# Patient Record
Sex: FEMALE | Race: WHITE | NOT HISPANIC OR LATINO | Employment: OTHER | URBAN - METROPOLITAN AREA
[De-identification: names, ages, dates, MRNs, and addresses within clinical notes are randomized per-mention and may not be internally consistent; named-entity substitution may affect disease eponyms.]

---

## 2017-06-08 ENCOUNTER — APPOINTMENT (OUTPATIENT)
Dept: GENERAL RADIOLOGY | Facility: CLINIC | Age: 67
End: 2017-06-08
Attending: PODIATRIST
Payer: MEDICARE

## 2017-06-08 ENCOUNTER — APPOINTMENT (OUTPATIENT)
Dept: GENERAL RADIOLOGY | Facility: CLINIC | Age: 67
End: 2017-06-08
Attending: FAMILY MEDICINE
Payer: MEDICARE

## 2017-06-08 ENCOUNTER — HOSPITAL ENCOUNTER (OUTPATIENT)
Facility: CLINIC | Age: 67
Discharge: HOME | End: 2017-06-09
Attending: FAMILY MEDICINE | Admitting: PODIATRIST
Payer: MEDICARE

## 2017-06-08 ENCOUNTER — APPOINTMENT (OUTPATIENT)
Dept: CT IMAGING | Facility: CLINIC | Age: 67
End: 2017-06-08
Attending: FAMILY MEDICINE
Payer: MEDICARE

## 2017-06-08 ENCOUNTER — ANESTHESIA (OUTPATIENT)
Dept: SURGERY | Facility: CLINIC | Age: 67
End: 2017-06-08
Payer: MEDICARE

## 2017-06-08 ENCOUNTER — ANESTHESIA EVENT (OUTPATIENT)
Dept: SURGERY | Facility: CLINIC | Age: 67
End: 2017-06-08
Payer: MEDICARE
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
## 2017-06-08 DIAGNOSIS — S92.012A CLOSED DISPLACED FRACTURE OF BODY OF LEFT CALCANEUS, INITIAL ENCOUNTER: ICD-10-CM

## 2017-06-08 LAB
ANION GAP SERPL CALCULATED.3IONS-SCNC: 10 MMOL/L (ref 3–14)
BASOPHILS # BLD AUTO: 0 10E9/L (ref 0–0.2)
BASOPHILS NFR BLD AUTO: 0.4 %
BUN SERPL-MCNC: 13 MG/DL (ref 7–30)
CALCIUM SERPL-MCNC: 9.3 MG/DL (ref 8.5–10.1)
CHLORIDE SERPL-SCNC: 106 MMOL/L (ref 94–109)
CO2 SERPL-SCNC: 27 MMOL/L (ref 20–32)
CREAT SERPL-MCNC: 0.79 MG/DL (ref 0.52–1.04)
DIFFERENTIAL METHOD BLD: NORMAL
EOSINOPHIL # BLD AUTO: 0.1 10E9/L (ref 0–0.7)
EOSINOPHIL NFR BLD AUTO: 1.7 %
ERYTHROCYTE [DISTWIDTH] IN BLOOD BY AUTOMATED COUNT: 13.5 % (ref 10–15)
GFR SERPL CREATININE-BSD FRML MDRD: 72 ML/MIN/1.7M2
GLUCOSE SERPL-MCNC: 112 MG/DL (ref 70–99)
HCT VFR BLD AUTO: 39.4 % (ref 35–47)
HGB BLD-MCNC: 12.6 G/DL (ref 11.7–15.7)
IMM GRANULOCYTES # BLD: 0 10E9/L (ref 0–0.4)
IMM GRANULOCYTES NFR BLD: 0.1 %
LYMPHOCYTES # BLD AUTO: 2.2 10E9/L (ref 0.8–5.3)
LYMPHOCYTES NFR BLD AUTO: 31.7 %
MCH RBC QN AUTO: 28 PG (ref 26.5–33)
MCHC RBC AUTO-ENTMCNC: 32 G/DL (ref 31.5–36.5)
MCV RBC AUTO: 88 FL (ref 78–100)
MONOCYTES # BLD AUTO: 0.4 10E9/L (ref 0–1.3)
MONOCYTES NFR BLD AUTO: 5.8 %
NEUTROPHILS # BLD AUTO: 4.3 10E9/L (ref 1.6–8.3)
NEUTROPHILS NFR BLD AUTO: 60.3 %
PLATELET # BLD AUTO: 388 10E9/L (ref 150–450)
POTASSIUM SERPL-SCNC: 4 MMOL/L (ref 3.4–5.3)
RBC # BLD AUTO: 4.5 10E12/L (ref 3.8–5.2)
SODIUM SERPL-SCNC: 143 MMOL/L (ref 133–144)
WBC # BLD AUTO: 7.1 10E9/L (ref 4–11)

## 2017-06-08 PROCEDURE — 99285 EMERGENCY DEPT VISIT HI MDM: CPT | Mod: Z6 | Performed by: FAMILY MEDICINE

## 2017-06-08 PROCEDURE — 25000125 ZZHC RX 250: Performed by: PODIATRIST

## 2017-06-08 PROCEDURE — 40000278 XR SURGERY CARM FLUORO LESS THAN 5 MIN

## 2017-06-08 PROCEDURE — 85025 COMPLETE CBC W/AUTO DIFF WBC: CPT | Performed by: FAMILY MEDICINE

## 2017-06-08 PROCEDURE — 96361 HYDRATE IV INFUSION ADD-ON: CPT | Mod: 59 | Performed by: FAMILY MEDICINE

## 2017-06-08 PROCEDURE — 40000065 ZZH STATISTIC EKG NON-CHARGEABLE: Performed by: FAMILY MEDICINE

## 2017-06-08 PROCEDURE — 71000015 ZZH RECOVERY PHASE 1 LEVEL 2 EA ADDTL HR: Performed by: PODIATRIST

## 2017-06-08 PROCEDURE — S0020 INJECTION, BUPIVICAINE HYDRO: HCPCS | Performed by: PODIATRIST

## 2017-06-08 PROCEDURE — 36000060 ZZH SURGERY LEVEL 3 W FLUORO 1ST 30 MIN: Performed by: PODIATRIST

## 2017-06-08 PROCEDURE — 99285 EMERGENCY DEPT VISIT HI MDM: CPT | Mod: 25 | Performed by: FAMILY MEDICINE

## 2017-06-08 PROCEDURE — 73700 CT LOWER EXTREMITY W/O DYE: CPT | Mod: RT

## 2017-06-08 PROCEDURE — 96374 THER/PROPH/DIAG INJ IV PUSH: CPT | Mod: 59 | Performed by: FAMILY MEDICINE

## 2017-06-08 PROCEDURE — 28415 OPTX CALCANEAL FRACTURE: CPT | Mod: RT | Performed by: PODIATRIST

## 2017-06-08 PROCEDURE — 25000128 H RX IP 250 OP 636: Performed by: FAMILY MEDICINE

## 2017-06-08 PROCEDURE — 25000125 ZZHC RX 250: Performed by: NURSE ANESTHETIST, CERTIFIED REGISTERED

## 2017-06-08 PROCEDURE — 25000564 ZZH DESFLURANE, EA 15 MIN: Performed by: PODIATRIST

## 2017-06-08 PROCEDURE — 27210794 ZZH OR GENERAL SUPPLY STERILE: Performed by: PODIATRIST

## 2017-06-08 PROCEDURE — 25000128 H RX IP 250 OP 636: Performed by: NURSE ANESTHETIST, CERTIFIED REGISTERED

## 2017-06-08 PROCEDURE — 73650 X-RAY EXAM OF HEEL: CPT | Mod: TC,RT

## 2017-06-08 PROCEDURE — C1713 ANCHOR/SCREW BN/BN,TIS/BN: HCPCS | Performed by: PODIATRIST

## 2017-06-08 PROCEDURE — 37000008 ZZH ANESTHESIA TECHNICAL FEE, 1ST 30 MIN: Performed by: PODIATRIST

## 2017-06-08 PROCEDURE — 71000014 ZZH RECOVERY PHASE 1 LEVEL 2 FIRST HR: Performed by: PODIATRIST

## 2017-06-08 PROCEDURE — 93005 ELECTROCARDIOGRAM TRACING: CPT | Performed by: FAMILY MEDICINE

## 2017-06-08 PROCEDURE — 71000027 ZZH RECOVERY PHASE 2 EACH 15 MINS: Performed by: PODIATRIST

## 2017-06-08 PROCEDURE — 71010 XR CHEST 1 VW: CPT | Mod: TC

## 2017-06-08 PROCEDURE — 96376 TX/PRO/DX INJ SAME DRUG ADON: CPT | Mod: 59 | Performed by: FAMILY MEDICINE

## 2017-06-08 PROCEDURE — 37000009 ZZH ANESTHESIA TECHNICAL FEE, EACH ADDTL 15 MIN: Performed by: PODIATRIST

## 2017-06-08 PROCEDURE — 73610 X-RAY EXAM OF ANKLE: CPT | Mod: TC,RT

## 2017-06-08 PROCEDURE — 25000128 H RX IP 250 OP 636: Performed by: PODIATRIST

## 2017-06-08 PROCEDURE — 99214 OFFICE O/P EST MOD 30 MIN: CPT | Mod: 57 | Performed by: PODIATRIST

## 2017-06-08 PROCEDURE — 36000058 ZZH SURGERY LEVEL 3 EA 15 ADDTL MIN: Performed by: PODIATRIST

## 2017-06-08 PROCEDURE — 80048 BASIC METABOLIC PNL TOTAL CA: CPT | Performed by: FAMILY MEDICINE

## 2017-06-08 DEVICE — IMPLANTABLE DEVICE: Type: IMPLANTABLE DEVICE | Site: HEEL | Status: FUNCTIONAL

## 2017-06-08 RX ORDER — HYDROXYZINE HYDROCHLORIDE 10 MG/1
10 TABLET, FILM COATED ORAL
Status: DISCONTINUED | OUTPATIENT
Start: 2017-06-08 | End: 2017-06-09 | Stop reason: HOSPADM

## 2017-06-08 RX ORDER — EPHEDRINE SULFATE 50 MG/ML
INJECTION, SOLUTION INTRAMUSCULAR; INTRAVENOUS; SUBCUTANEOUS PRN
Status: DISCONTINUED | OUTPATIENT
Start: 2017-06-08 | End: 2017-06-08

## 2017-06-08 RX ORDER — DIMENHYDRINATE 50 MG/ML
25 INJECTION, SOLUTION INTRAMUSCULAR; INTRAVENOUS
Status: COMPLETED | OUTPATIENT
Start: 2017-06-08 | End: 2017-06-08

## 2017-06-08 RX ORDER — LIDOCAINE 40 MG/G
CREAM TOPICAL
Status: DISCONTINUED | OUTPATIENT
Start: 2017-06-08 | End: 2017-06-09 | Stop reason: HOSPADM

## 2017-06-08 RX ORDER — CEFAZOLIN SODIUM 2 G/100ML
2 INJECTION, SOLUTION INTRAVENOUS
Status: COMPLETED | OUTPATIENT
Start: 2017-06-08 | End: 2017-06-08

## 2017-06-08 RX ORDER — SODIUM CHLORIDE 9 MG/ML
1000 INJECTION, SOLUTION INTRAVENOUS CONTINUOUS
Status: DISCONTINUED | OUTPATIENT
Start: 2017-06-08 | End: 2017-06-09 | Stop reason: HOSPADM

## 2017-06-08 RX ORDER — HYDROMORPHONE HYDROCHLORIDE 1 MG/ML
0.5 INJECTION, SOLUTION INTRAMUSCULAR; INTRAVENOUS; SUBCUTANEOUS
Status: COMPLETED | OUTPATIENT
Start: 2017-06-08 | End: 2017-06-08

## 2017-06-08 RX ORDER — SODIUM CHLORIDE, SODIUM LACTATE, POTASSIUM CHLORIDE, CALCIUM CHLORIDE 600; 310; 30; 20 MG/100ML; MG/100ML; MG/100ML; MG/100ML
INJECTION, SOLUTION INTRAVENOUS CONTINUOUS
Status: DISCONTINUED | OUTPATIENT
Start: 2017-06-08 | End: 2017-06-09 | Stop reason: HOSPADM

## 2017-06-08 RX ORDER — AMOXICILLIN 250 MG
1-2 CAPSULE ORAL 2 TIMES DAILY
Qty: 30 TABLET | Refills: 3 | Status: SHIPPED | OUTPATIENT
Start: 2017-06-08 | End: 2022-03-07

## 2017-06-08 RX ORDER — MEPERIDINE HYDROCHLORIDE 25 MG/ML
12.5 INJECTION INTRAMUSCULAR; INTRAVENOUS; SUBCUTANEOUS EVERY 5 MIN PRN
Status: DISCONTINUED | OUTPATIENT
Start: 2017-06-08 | End: 2017-06-09 | Stop reason: HOSPADM

## 2017-06-08 RX ORDER — SODIUM CHLORIDE, SODIUM LACTATE, POTASSIUM CHLORIDE, CALCIUM CHLORIDE 600; 310; 30; 20 MG/100ML; MG/100ML; MG/100ML; MG/100ML
INJECTION, SOLUTION INTRAVENOUS CONTINUOUS PRN
Status: DISCONTINUED | OUTPATIENT
Start: 2017-06-08 | End: 2017-06-08

## 2017-06-08 RX ORDER — FENTANYL CITRATE 50 UG/ML
INJECTION, SOLUTION INTRAMUSCULAR; INTRAVENOUS PRN
Status: DISCONTINUED | OUTPATIENT
Start: 2017-06-08 | End: 2017-06-08

## 2017-06-08 RX ORDER — CEFAZOLIN SODIUM 1 G/3ML
1 INJECTION, POWDER, FOR SOLUTION INTRAMUSCULAR; INTRAVENOUS SEE ADMIN INSTRUCTIONS
Status: DISCONTINUED | OUTPATIENT
Start: 2017-06-08 | End: 2017-06-09 | Stop reason: HOSPADM

## 2017-06-08 RX ORDER — BUPIVACAINE HYDROCHLORIDE AND EPINEPHRINE 5; 5 MG/ML; UG/ML
INJECTION, SOLUTION PERINEURAL PRN
Status: DISCONTINUED | OUTPATIENT
Start: 2017-06-08 | End: 2017-06-08

## 2017-06-08 RX ORDER — LIDOCAINE HYDROCHLORIDE 20 MG/ML
INJECTION, SOLUTION INFILTRATION; PERINEURAL PRN
Status: DISCONTINUED | OUTPATIENT
Start: 2017-06-08 | End: 2017-06-08

## 2017-06-08 RX ORDER — ONDANSETRON 2 MG/ML
INJECTION INTRAMUSCULAR; INTRAVENOUS PRN
Status: DISCONTINUED | OUTPATIENT
Start: 2017-06-08 | End: 2017-06-08

## 2017-06-08 RX ORDER — ONDANSETRON 4 MG/1
4 TABLET, ORALLY DISINTEGRATING ORAL EVERY 30 MIN PRN
Status: DISCONTINUED | OUTPATIENT
Start: 2017-06-08 | End: 2017-06-09 | Stop reason: HOSPADM

## 2017-06-08 RX ORDER — HYDROXYZINE HYDROCHLORIDE 10 MG/1
10 TABLET, FILM COATED ORAL EVERY 6 HOURS PRN
Qty: 30 TABLET | Refills: 3 | Status: SHIPPED | OUTPATIENT
Start: 2017-06-08 | End: 2017-06-22

## 2017-06-08 RX ORDER — FENTANYL CITRATE 50 UG/ML
25-50 INJECTION, SOLUTION INTRAMUSCULAR; INTRAVENOUS
Status: DISCONTINUED | OUTPATIENT
Start: 2017-06-08 | End: 2017-06-09 | Stop reason: HOSPADM

## 2017-06-08 RX ORDER — OXYCODONE HYDROCHLORIDE 5 MG/1
5-10 TABLET ORAL
Status: DISCONTINUED | OUTPATIENT
Start: 2017-06-08 | End: 2017-06-09 | Stop reason: HOSPADM

## 2017-06-08 RX ORDER — DEXAMETHASONE SODIUM PHOSPHATE 10 MG/ML
INJECTION, SOLUTION INTRAMUSCULAR; INTRAVENOUS PRN
Status: DISCONTINUED | OUTPATIENT
Start: 2017-06-08 | End: 2017-06-08

## 2017-06-08 RX ORDER — ONDANSETRON 2 MG/ML
4 INJECTION INTRAMUSCULAR; INTRAVENOUS EVERY 30 MIN PRN
Status: DISCONTINUED | OUTPATIENT
Start: 2017-06-08 | End: 2017-06-09 | Stop reason: HOSPADM

## 2017-06-08 RX ORDER — BUPIVACAINE HYDROCHLORIDE 5 MG/ML
INJECTION, SOLUTION PERINEURAL PRN
Status: DISCONTINUED | OUTPATIENT
Start: 2017-06-08 | End: 2017-06-09 | Stop reason: HOSPADM

## 2017-06-08 RX ORDER — OXYCODONE AND ACETAMINOPHEN 5; 325 MG/1; MG/1
1-2 TABLET ORAL EVERY 4 HOURS PRN
Qty: 60 TABLET | Refills: 0 | Status: SHIPPED | OUTPATIENT
Start: 2017-06-08 | End: 2017-07-06

## 2017-06-08 RX ORDER — PROPOFOL 10 MG/ML
INJECTION, EMULSION INTRAVENOUS PRN
Status: DISCONTINUED | OUTPATIENT
Start: 2017-06-08 | End: 2017-06-08

## 2017-06-08 RX ORDER — HYDROMORPHONE HYDROCHLORIDE 1 MG/ML
.3-.5 INJECTION, SOLUTION INTRAMUSCULAR; INTRAVENOUS; SUBCUTANEOUS EVERY 5 MIN PRN
Status: DISCONTINUED | OUTPATIENT
Start: 2017-06-08 | End: 2017-06-09 | Stop reason: HOSPADM

## 2017-06-08 RX ADMIN — FENTANYL CITRATE 50 MCG: 50 INJECTION, SOLUTION INTRAMUSCULAR; INTRAVENOUS at 20:29

## 2017-06-08 RX ADMIN — Medication 5 MG: at 20:40

## 2017-06-08 RX ADMIN — DIMENHYDRINATE 25 MG: 50 INJECTION, SOLUTION INTRAMUSCULAR; INTRAVENOUS at 22:57

## 2017-06-08 RX ADMIN — HYDROMORPHONE HYDROCHLORIDE 0.5 MG: 1 INJECTION, SOLUTION INTRAMUSCULAR; INTRAVENOUS; SUBCUTANEOUS at 16:20

## 2017-06-08 RX ADMIN — SUCCINYLCHOLINE CHLORIDE 80 MG: 20 INJECTION, SOLUTION INTRAMUSCULAR; INTRAVENOUS at 20:00

## 2017-06-08 RX ADMIN — Medication 5 ML: at 22:10

## 2017-06-08 RX ADMIN — MIDAZOLAM HYDROCHLORIDE 2 MG: 1 INJECTION, SOLUTION INTRAMUSCULAR; INTRAVENOUS at 19:47

## 2017-06-08 RX ADMIN — SODIUM CHLORIDE, POTASSIUM CHLORIDE, SODIUM LACTATE AND CALCIUM CHLORIDE: 600; 310; 30; 20 INJECTION, SOLUTION INTRAVENOUS at 21:20

## 2017-06-08 RX ADMIN — DEXAMETHASONE SODIUM PHOSPHATE 10 MG: 10 INJECTION, SOLUTION INTRAMUSCULAR; INTRAVENOUS at 20:40

## 2017-06-08 RX ADMIN — HYDROMORPHONE HYDROCHLORIDE 0.5 MG: 1 INJECTION, SOLUTION INTRAMUSCULAR; INTRAVENOUS; SUBCUTANEOUS at 16:46

## 2017-06-08 RX ADMIN — Medication 5 MG: at 20:34

## 2017-06-08 RX ADMIN — Medication 5 ML: at 22:29

## 2017-06-08 RX ADMIN — DEXAMETHASONE SODIUM PHOSPHATE 5 MG: 10 INJECTION, SOLUTION INTRAMUSCULAR; INTRAVENOUS at 22:29

## 2017-06-08 RX ADMIN — CEFAZOLIN SODIUM 2 G: 2 INJECTION, SOLUTION INTRAVENOUS at 20:08

## 2017-06-08 RX ADMIN — SODIUM CHLORIDE 1000 ML: 9 INJECTION, SOLUTION INTRAVENOUS at 16:20

## 2017-06-08 RX ADMIN — HYDROMORPHONE HYDROCHLORIDE 0.5 MG: 1 INJECTION, SOLUTION INTRAMUSCULAR; INTRAVENOUS; SUBCUTANEOUS at 18:44

## 2017-06-08 RX ADMIN — FENTANYL CITRATE 25 MCG: 50 INJECTION, SOLUTION INTRAMUSCULAR; INTRAVENOUS at 21:38

## 2017-06-08 RX ADMIN — Medication 5 MG: at 20:15

## 2017-06-08 RX ADMIN — ONDANSETRON HYDROCHLORIDE 4 MG: 2 SOLUTION INTRAMUSCULAR; INTRAVENOUS at 23:16

## 2017-06-08 RX ADMIN — Medication 5 MG: at 20:53

## 2017-06-08 RX ADMIN — DEXAMETHASONE SODIUM PHOSPHATE 5 MG: 10 INJECTION, SOLUTION INTRAMUSCULAR; INTRAVENOUS at 22:10

## 2017-06-08 RX ADMIN — LIDOCAINE HYDROCHLORIDE 40 MG: 20 INJECTION, SOLUTION INFILTRATION; PERINEURAL at 20:00

## 2017-06-08 RX ADMIN — SODIUM CHLORIDE 1000 ML: 9 INJECTION, SOLUTION INTRAVENOUS at 18:42

## 2017-06-08 RX ADMIN — ONDANSETRON 4 MG: 2 INJECTION INTRAMUSCULAR; INTRAVENOUS at 20:42

## 2017-06-08 RX ADMIN — PROPOFOL 150 MG: 10 INJECTION, EMULSION INTRAVENOUS at 20:00

## 2017-06-08 RX ADMIN — FENTANYL CITRATE 50 MCG: 50 INJECTION, SOLUTION INTRAMUSCULAR; INTRAVENOUS at 20:00

## 2017-06-08 ASSESSMENT — ENCOUNTER SYMPTOMS: ARTHRALGIAS: 1

## 2017-06-08 NOTE — ED PROVIDER NOTES
History     Chief Complaint   Patient presents with     Foot Injury     The history is provided by the patient.     Kylah Britt is a 66 year old female who presents to the emergency department with a right foot injury. She states that around 1300 she fell down an estimated 2-3 outdoor stairs injuring her right foot. She reports that she needed to crawl into her house because walking caused significant pain. Patient last ate at 1030 and last drank at 1200. Patient denies any chronic health problems.    I have reviewed the Medications, Allergies, Past Medical and Surgical History, and Social History in the Epic system.    Patient Active Problem List   Diagnosis     Tear meniscus knee     CARDIOVASCULAR SCREENING; LDL GOAL LESS THAN 160     Past Medical History:   Diagnosis Date     Abnormal Papanicolaou smear of cervix and cervical HPV 9/1/92    vaginitis with abnormal Pap     Depressive disorder, not elsewhere classified     depression     Female stress incontinence     urinary incontinence     Lyme disease      Osteoarthrosis, unspecified whether generalized or localized, unspecified site      monoarticular arthritis in the right first MTP joint     Other psychological or physical stress, not elsewhere classified(V62.89)     delayed stress syndrome       Past Surgical History:   Procedure Laterality Date     C APPENDECTOMY       C LIGATE FALLOPIAN TUBE,POSTPARTUM  04/12/77    Bilateral tubal cauterization     C TOTAL ABDOM HYSTERECTOMY  2/20/90    Hysterectomy, Total Abdominal     HC COLONOSCOPY THRU STOMA, DIAGNOSTIC  06/24/98     HC KNEE SCOPE, DIAGNOSTIC  1/31/89    Examination of right knee under anesthesia. Arthroscopy of right knee with debridement of torn anterior curciate ligament (medial meniscus not resected).     HC REMOVAL OF OVARY/TUBE(S)  2/20/90    Salpingo-Oophorectomy, bilateral       Family History   Problem Relation Age of Onset     CANCER Son      Hodgkin's     CANCER Paternal Aunt       "two with cervical cancer     Hypertension Mother        Social History   Substance Use Topics     Smoking status: Never Smoker     Smokeless tobacco: Not on file     Alcohol use Yes      Comment: very rare        Immunization History   Administered Date(s) Administered     Influenza (IIV3) 11/10/2005, 11/03/2007, 10/15/2008     TD (ADULT, 7+) 04/25/1995          Allergies   Allergen Reactions     No Known Drug Allergies        Current Outpatient Prescriptions   Medication Sig Dispense Refill     Naproxen Sodium (ALEVE PO) Take 220 mg by mouth 2 times daily (with meals)       guaiFENesin (MUCINEX) 600 MG 12 hr tablet Take 600 mg by mouth every 4 hours as needed for congestion Pt states she takes 1 tablet every 4 hours. Unsure of mg of tablet       LAMISIL AT CREA 1 % EX apply bid 60 0     MULTIPLE VITAMIN CAPS   OR daily  0     CALCIUM + D TABS 600-200 MG-IU OR 2 daily 0 0     Review of Systems   Musculoskeletal: Positive for arthralgias (right foot).   All other systems reviewed and are negative.      Physical Exam   BP: (!) 160/92  Pulse: 81  Temp: 98.1  F (36.7  C)  Resp: 18  Height: 160 cm (5' 3\")  Weight: 65.8 kg (145 lb)  SpO2: 97 %    Physical Exam   Constitutional: She is oriented to person, place, and time. She appears well-developed and well-nourished. No distress.   HENT:   Head: Normocephalic and atraumatic.   Eyes: Conjunctivae and EOM are normal.   Neck: Normal range of motion. Neck supple.   Cardiovascular: Intact distal pulses.    Musculoskeletal:   Protruding bone posteriorally on right foot. The skin is under significant tension but intact. No lacerations noted. Normal range of motion of right foot.   Neurological: She is alert and oriented to person, place, and time.   Skin: Skin is warm and dry.   Psychiatric: She has a normal mood and affect. Her behavior is normal.   Nursing note and vitals reviewed.          ED Course     ED Course     Procedures         Results for orders placed or performed " during the hospital encounter of 06/08/17   Ankle XR, G/E 3 views, right    Narrative    RIGHT ANKLE THREE OR MORE VIEWS, RIGHT CALCANEUS TWO OR MORE VIEWS   6/8/2017 4:07 PM     HISTORY: Pain.    COMPARISON: None.    FINDINGS: There is an oblique fracture through the dorsal mid to  superior calcaneus which appears to start just distal to the calcaneal  tuberosity insertion site of the Achilles tendon and extending  cephalad and anteriorly toward the posterior aspect of the subtalar  joint. This does not appear to involve the subtalar joint. There is  approximately 3.6 cm distraction at the dorsal aspect of the fracture  site with only minimal distraction of the anterior aspect of the  fracture. There is no abnormal widening of the calcaneus. Boehler's  angle is maintained. Fracture does not appear to extend in the mid  anterior aspect of the calcaneus. No other fractures are identified.  Ankle mortise and talar dome are symmetric and well-maintained. Small  to moderate-sized enthesopathic spur of the plantar aponeurosis is  seen at the calcaneus.      Impression    IMPRESSION:  1. There is a displaced oblique dorsal calcaneal fracture which  extends from the insertion site of the Achilles tendon into the dorsal  proximal calcaneus obliquely toward the posterior aspect of the  posterior subtalar joint but not definitely including the joint. The  more dorsal aspect is distracted more than the more anterior aspect.  The plantar aspect of the calcaneus and the anterior, middle, and  posterior subtalar joints do not appear to be affected. The achilles  tendon is likely intact. Mild enthesopathic changes are seen at the  Achilles tendon insertion into the calcaneal tuberosity of the dorsal  calcaneus.    I discussed these findings with Dr. Grove on 6/8/2017 at 4:16 PM.   XR Calcaneus Right G/E 2 Views    Narrative    RIGHT ANKLE THREE OR MORE VIEWS, RIGHT CALCANEUS TWO OR MORE VIEWS   6/8/2017 4:07 PM     HISTORY:  Pain.    COMPARISON: None.    FINDINGS: There is an oblique fracture through the dorsal mid to  superior calcaneus which appears to start just distal to the calcaneal  tuberosity insertion site of the Achilles tendon and extending  cephalad and anteriorly toward the posterior aspect of the subtalar  joint. This does not appear to involve the subtalar joint. There is  approximately 3.6 cm distraction at the dorsal aspect of the fracture  site with only minimal distraction of the anterior aspect of the  fracture. There is no abnormal widening of the calcaneus. Boehler's  angle is maintained. Fracture does not appear to extend in the mid  anterior aspect of the calcaneus. No other fractures are identified.  Ankle mortise and talar dome are symmetric and well-maintained. Small  to moderate-sized enthesopathic spur of the plantar aponeurosis is  seen at the calcaneus.      Impression    IMPRESSION:  1. There is a displaced oblique dorsal calcaneal fracture which  extends from the insertion site of the Achilles tendon into the dorsal  proximal calcaneus obliquely toward the posterior aspect of the  posterior subtalar joint but not definitely including the joint. The  more dorsal aspect is distracted more than the more anterior aspect.  The plantar aspect of the calcaneus and the anterior, middle, and  posterior subtalar joints do not appear to be affected. The achilles  tendon is likely intact. Mild enthesopathic changes are seen at the  Achilles tendon insertion into the calcaneal tuberosity of the dorsal  calcaneus.    I discussed these findings with Dr. Grove on 6/8/2017 at 4:16 PM.   CBC with platelets differential   Result Value Ref Range    WBC 7.1 4.0 - 11.0 10e9/L    RBC Count 4.50 3.8 - 5.2 10e12/L    Hemoglobin 12.6 11.7 - 15.7 g/dL    Hematocrit 39.4 35.0 - 47.0 %    MCV 88 78 - 100 fl    MCH 28.0 26.5 - 33.0 pg    MCHC 32.0 31.5 - 36.5 g/dL    RDW 13.5 10.0 - 15.0 %    Platelet Count 388 150 - 450 10e9/L     Diff Method Automated Method     % Neutrophils 60.3 %    % Lymphocytes 31.7 %    % Monocytes 5.8 %    % Eosinophils 1.7 %    % Basophils 0.4 %    % Immature Granulocytes 0.1 %    Absolute Neutrophil 4.3 1.6 - 8.3 10e9/L    Absolute Lymphocytes 2.2 0.8 - 5.3 10e9/L    Absolute Monocytes 0.4 0.0 - 1.3 10e9/L    Absolute Eosinophils 0.1 0.0 - 0.7 10e9/L    Absolute Basophils 0.0 0.0 - 0.2 10e9/L    Abs Immature Granulocytes 0.0 0 - 0.4 10e9/L         Medications   lidocaine 1 % 1 mL (not administered)   lidocaine (LMX4) kit (not administered)   sodium chloride (PF) 0.9% PF flush 3 mL (not administered)   sodium chloride (PF) 0.9% PF flush 3 mL (not administered)   HYDROmorphone (PF) (DILAUDID) injection 0.5 mg (not administered)     Kylah is a 66 year old female who presents to the ED with a right foot injury after she fell down 2-3 outside stairs around 1300 today. Upon arrival to the ED, patient had blood pressure of 160/92 and was otherwise vitally stable. On exam, patient has a protruding bone to the posterior aspect of right foot. The skin is under a significant amount of tension however it is intact. Right ankle and calcaneous X-rays were done showing calcaneal fracture.  It appears that the Achilles tendon hold the calcaneus and half..  I consulted Dr. Kincaid, with podiatry, because of how tight skin is, he will plan on doing surgery today to try and fix this.  He did recommend getting a CT scan of the ankle for further evaluation of this fracture.  He will come down to see the patient in the emergency department.  Patient last 8 or drank around 1 PM today.    Assessments & Plan (with Medical Decision Making)  left calcaneus fracture      I have reviewed the nursing notes.    I have reviewed the findings, diagnosis, plan and need for follow up with the patient.      Pre-OP Clearance   Please see the body of above note or dictation for the history and physical.   No medical contraindication to emergent surgery  or anesthesia identified. No additional modifiable risk identified. Informed consent deferred to surgeon If the surgeon or anesthesiologist feels that the patient needs further testing, would recommend a formal consult to get a more complete pre-op H&P.      This document serves as a record of services personally performed by Eulogio Grove MD. It was created on their behalf by Daniella Doe, a trained medical scribe. The creation of this record is based on the provider's personal observations and the statements of the patient. This document has been checked and approved by the attending provider.     Note: Chart documentation done in part with Dragon Voice Recognition software. Although reviewed after completion, some word and grammatical errors may remain.    6/8/2017   [facility] EMERGENCY DEPARTMENT     Eulogio Grove MD  06/08/17 [id number]

## 2017-06-08 NOTE — PROGRESS NOTES
[facility]  Podiatry Consultation         Darnell Kincaid DPM    Kylah Britt MRN# [id number]   YOB: 1950 Age: 66 year old      Date of Consult: 6/8/2017         Assessment and Plan:   Kylah Britt is a 66 year old female who presented with history, exam, laboratory and imaging most consistent with beak avulsion fracture of calcaneus body right ankle.  Today while walking down steps she fell under her own body weight and presented to the emergency department. Radiographs and CT scan demonstrate extra-articular avulsion of the superior process of the calcaneus including the insertion of the Achilles tendon avulsed proximally. The skin is tented and blanched posing risk for failure of skin therefore we will move forward with open reduction internal fixation in the next few hours.    We discussed treatment options as well as potential risks and outcomes and expected outcomes for each treatment options with the patient and daughter who presents with her today in the ED.  All questions were answered. Follow-up for open reduction internal fixation right calcaneus.       ICD-10-CM    1. Closed displaced fracture of body of left calcaneus, initial encounter S92.012A Basic metabolic panel               Code Status:   Full Code         Primary Care Physician:   [facility]         Requesting Physician:      No ref. provider found         Chief Complaint:     Chief Complaint   Patient presents with     Foot Injury       History is obtained from the patient         History of Present Illness:   Kylah Britt is a 66 year old female who presents on consultation with fall while walking down stairs and calcaneus fracture right ankle. The past medical history, past surgical history, family history, social history and review of systems was performed and as noted.           Past Medical History:     Past Medical History:   Diagnosis Date     Abnormal Papanicolaou  smear of cervix and cervical HPV 9/1/92    vaginitis with abnormal Pap     Depressive disorder, not elsewhere classified     depression     Female stress incontinence     urinary incontinence     Lyme disease      Osteoarthrosis, unspecified whether generalized or localized, unspecified site      monoarticular arthritis in the right first MTP joint     Other psychological or physical stress, not elsewhere classified(V62.89)     delayed stress syndrome             Past Surgical History:     Past Surgical History:   Procedure Laterality Date     C APPENDECTOMY       C LIGATE FALLOPIAN TUBE,POSTPARTUM  04/12/77    Bilateral tubal cauterization     C TOTAL ABDOM HYSTERECTOMY  2/20/90    Hysterectomy, Total Abdominal     HC COLONOSCOPY THRU STOMA, DIAGNOSTIC  06/24/98     HC KNEE SCOPE, DIAGNOSTIC  1/31/89    Examination of right knee under anesthesia. Arthroscopy of right knee with debridement of torn anterior curciate ligament (medial meniscus not resected).     HC REMOVAL OF OVARY/TUBE(S)  2/20/90    Salpingo-Oophorectomy, bilateral            Home Medications:     Prior to Admission medications    Medication Sig Last Dose Taking? Auth Provider   Calcium Carbonate (CALCIUM 600 PO) Take 1 tablet by mouth every other day 6/7/2017 at 0600 Yes Reported, Patient   VITAMIN K PO Take 1,000 mg by mouth every other day 6/7/2017 at 0600 Yes Reported, Patient   Cholecalciferol (VITAMIN D3 PO) Take 2,000 Units by mouth every other day 6/7/2017 at 0600 Yes Reported, Patient   BABY ASPIRIN PO Take 81 mg by mouth daily 6/8/2017 at 0600 Yes Reported, Patient   Naproxen Sodium (ALEVE PO) Take 220 mg by mouth 2 times daily as needed  Past Week at Unknown time Yes Reported, Patient   MULTIPLE VITAMIN CAPS   OR daily 6/8/2017 at 0600 Yes             Current Medications:           sodium chloride (PF)  3 mL Intracatheter Q8H     lidocaine, lidocaine 4%, sodium chloride (PF), HYDROmorphone         Allergies:     Allergies   Allergen  "Reactions     No Known Drug Allergies             Social History:   Kylah Britt  reports that she has never smoked. She does not have any smokeless tobacco history on file. She reports that she drinks alcohol. She reports that she does not use illicit drugs.          Family History:     Family History   Problem Relation Age of Onset     CANCER Son      Hodgkin's     CANCER Paternal Aunt      two with cervical cancer     Hypertension Mother              Review of Systems:   The 10 point Review of Systems is negative other than noted in the HPI or here.           Physical Exam:    ,   Vitals: /50  Pulse 81  Temp 98.1  F (36.7  C) (Oral)  Resp 18  Ht 5' 3\" (1.6 m)  Wt 145 lb (65.8 kg)  SpO2 98%  BMI 25.69 kg/m2  BMI= Body mass index is 25.69 kg/(m^2).    Constitutional: Awake, alert, no acute distress.   Cardiovascular:  DP and PT pulses are palpable bilateral. Skin however is blanched as the superior process of the calcaneus is displaced proximally.  Musculoskeletal:  Obvious deformity about the posterior calcaneus right ankle with tenting of the skin about the Achilles tendon and blanching.  Skin: No skin rashes or lesions to inspection.  No petechia.    Neurologic: Light touch sensation intact bilateral feet.     Radiographs demonstrate avulsion fracture of the posterior right calcaneus and Achilles. This is displaced nearly 2 cm  CT scan demonstrates extra-articular avulsion fracture of the superior calcaneus without involvement of other talus or subtalar joint.       Data:   All new lab and imaging data was reviewed.   Results for orders placed or performed during the hospital encounter of 06/08/17 (from the past 24 hour(s))   CBC with platelets differential   Result Value Ref Range    WBC 7.1 4.0 - 11.0 10e9/L    RBC Count 4.50 3.8 - 5.2 10e12/L    Hemoglobin 12.6 11.7 - 15.7 g/dL    Hematocrit 39.4 35.0 - 47.0 %    MCV 88 78 - 100 fl    MCH 28.0 26.5 - 33.0 pg    MCHC 32.0 31.5 - 36.5 g/dL    " RDW 13.5 10.0 - 15.0 %    Platelet Count 388 150 - 450 10e9/L    Diff Method Automated Method     % Neutrophils 60.3 %    % Lymphocytes 31.7 %    % Monocytes 5.8 %    % Eosinophils 1.7 %    % Basophils 0.4 %    % Immature Granulocytes 0.1 %    Absolute Neutrophil 4.3 1.6 - 8.3 10e9/L    Absolute Lymphocytes 2.2 0.8 - 5.3 10e9/L    Absolute Monocytes 0.4 0.0 - 1.3 10e9/L    Absolute Eosinophils 0.1 0.0 - 0.7 10e9/L    Absolute Basophils 0.0 0.0 - 0.2 10e9/L    Abs Immature Granulocytes 0.0 0 - 0.4 10e9/L   Basic metabolic panel   Result Value Ref Range    Sodium 143 133 - 144 mmol/L    Potassium 4.0 3.4 - 5.3 mmol/L    Chloride 106 94 - 109 mmol/L    Carbon Dioxide 27 20 - 32 mmol/L    Anion Gap 10 3 - 14 mmol/L    Glucose 112 (H) 70 - 99 mg/dL    Urea Nitrogen 13 7 - 30 mg/dL    Creatinine 0.79 0.52 - 1.04 mg/dL    GFR Estimate 72 >60 mL/min/1.7m2    GFR Estimate If Black 87 >60 mL/min/1.7m2    Calcium 9.3 8.5 - 10.1 mg/dL   XR Calcaneus Right G/E 2 Views    Narrative    RIGHT ANKLE THREE OR MORE VIEWS, RIGHT CALCANEUS TWO OR MORE VIEWS   6/8/2017 4:07 PM     HISTORY: Pain.    COMPARISON: None.    FINDINGS: There is an oblique fracture through the dorsal mid to  superior calcaneus which appears to start just distal to the calcaneal  tuberosity insertion site of the Achilles tendon and extending  cephalad and anteriorly toward the posterior aspect of the subtalar  joint. This does not appear to involve the subtalar joint. There is  approximately 3.6 cm distraction at the dorsal aspect of the fracture  site with only minimal distraction of the anterior aspect of the  fracture. There is no abnormal widening of the calcaneus. Boehler's  angle is maintained. Fracture does not appear to extend in the mid  anterior aspect of the calcaneus. No other fractures are identified.  Ankle mortise and talar dome are symmetric and well-maintained. Small  to moderate-sized enthesopathic spur of the plantar aponeurosis is  seen at the  calcaneus.      Impression    IMPRESSION:  1. There is a displaced oblique dorsal calcaneal fracture which  extends from the insertion site of the Achilles tendon into the dorsal  proximal calcaneus obliquely toward the posterior aspect of the  posterior subtalar joint but not definitely including the joint. The  more dorsal aspect is distracted more than the more anterior aspect.  The plantar aspect of the calcaneus and the anterior, middle, and  posterior subtalar joints do not appear to be affected. The achilles  tendon is likely intact. Mild enthesopathic changes are seen at the  Achilles tendon insertion into the calcaneal tuberosity of the dorsal  calcaneus.    I discussed these findings with Dr. Grove on 6/8/2017 at 4:16 PM.    PREETHI EPPS MD   Ankle XR, G/E 3 views, right    Narrative    RIGHT ANKLE THREE OR MORE VIEWS, RIGHT CALCANEUS TWO OR MORE VIEWS   6/8/2017 4:07 PM     HISTORY: Pain.    COMPARISON: None.    FINDINGS: There is an oblique fracture through the dorsal mid to  superior calcaneus which appears to start just distal to the calcaneal  tuberosity insertion site of the Achilles tendon and extending  cephalad and anteriorly toward the posterior aspect of the subtalar  joint. This does not appear to involve the subtalar joint. There is  approximately 3.6 cm distraction at the dorsal aspect of the fracture  site with only minimal distraction of the anterior aspect of the  fracture. There is no abnormal widening of the calcaneus. Boehler's  angle is maintained. Fracture does not appear to extend in the mid  anterior aspect of the calcaneus. No other fractures are identified.  Ankle mortise and talar dome are symmetric and well-maintained. Small  to moderate-sized enthesopathic spur of the plantar aponeurosis is  seen at the calcaneus.      Impression    IMPRESSION:  1. There is a displaced oblique dorsal calcaneal fracture which  extends from the insertion site of the Achilles tendon into the  dorsal  proximal calcaneus obliquely toward the posterior aspect of the  posterior subtalar joint but not definitely including the joint. The  more dorsal aspect is distracted more than the more anterior aspect.  The plantar aspect of the calcaneus and the anterior, middle, and  posterior subtalar joints do not appear to be affected. The achilles  tendon is likely intact. Mild enthesopathic changes are seen at the  Achilles tendon insertion into the calcaneal tuberosity of the dorsal  calcaneus.    I discussed these findings with Dr. Grove on 6/8/2017 at 4:16 PM.    PREETHI EPPS MD   CT Ankle Right w/o Contrast    Narrative    CT RIGHT ANKLE WITHOUT CONTRAST    6/8/2017 5:11 PM     HISTORY: Calcaneus fracture, fall, evaluate for other injury.    TECHNIQUE:  Radiation dose for this scan was reduced using automated  exposure control, adjustment of the mA and/or kV according to patient  size, or iterative reconstruction technique.  No IV contrast and  multiplanar reformations.    COMPARISON: Radiographs today.    FINDINGS: Multiple air bubbles are seen in the subcutaneous fat in the  fracture line indicating an open fracture. There is retraction of the  Achilles tendon with calcification at its insertion. The fracture line  is oblique extending from posterior to the posterior subtalar joint,  in the anterior aspect of the body the calcaneus is intact and not  fractured. No other fracture is seen more anteriorly in the hindfoot  or visualized midfoot and forefoot. There is osteoarthritis of the  first metatarsophalangeal joint.      Impression    IMPRESSION: Oblique open fracture of the posterior superior aspect of  the calcaneus with retraction of the Achilles tendon. No evidence of  other fractures more anteriorly.   XR Chest 1 View    Narrative    CHEST ONE VIEW   6/8/2017 5:13 PM     HISTORY: Preop evaluation.    COMPARISON: None.      Impression    IMPRESSION: No acute cardiopulmonary disease.       Darnell Kincaid  DPM  [facility]

## 2017-06-08 NOTE — IP AVS SNAPSHOT
[facility]    [address] [place] [address]    Phone:  [phone]                                       After Visit Summary   6/8/2017    Kylah Britt    MRN: [id number]           After Visit Summary Signature Page     I have received my discharge instructions, and my questions have been answered. I have discussed any challenges I see with this plan with the nurse or doctor.    ..........................................................................................................................................  Patient/Patient Representative Signature      ..........................................................................................................................................  Patient Representative Print Name and Relationship to Patient    ..................................................               ................................................  Date                                            Time    ..........................................................................................................................................  Reviewed by Signature/Title    ...................................................              ..............................................  Date                                                            Time

## 2017-06-08 NOTE — IP AVS SNAPSHOT
MRN:[id number]                      After Visit Summary   6/8/2017    Kylah Britt    MRN: [id number]           Thank you!     Thank you for choosing [facility] for your care. Our goal is always to provide you with excellent care. Hearing back from our patients is one way we can continue to improve our services. Please take a few minutes to complete the written survey that you may receive in the mail after you visit with us. Thank you!        Patient Information     Date Of Birth          1950        About your hospital stay     You were admitted on:  N/A You last received care in the:  [facility]    You were discharged on:  June 9, 2017       Who to Call     For medical emergencies, please call 911.  For non-urgent questions about your medical care, please call your primary care provider or clinic, [phone]  For questions related to your surgery, please call your surgery clinic        Attending Provider     Provider Eulogio Watts MD Emergency Medicine       Primary Care Provider Office Phone #    [facility] [phone]      After Care Instructions     Discharge Instructions       Review discharge instructions as directed by Provider.            Elevate affected extremity           Ice to affected area       Ice pack to affected area PRN (as needed).            No Alcohol       for 24 hours post-procedure            No dressing change       until follow up clinic appointment.            No driving or operating machinery       until the day after procedure            No weight bearing                 Further instructions from your care team       Post Operative Instructions following a Popliteal Block  Regional anesthesia is injected into or around the nerves to anesthetize the area supplied by that set of nerves.  It is a type of local anesthetic used to numb a specific area, and is used to control pain and  "decrease the need for narcotics following surgery.  Types of Regional Blocks:  Popliteal: An anesthetic medication injection into the back of the knee of the operative leg of the patient having foot surgery.  Procedure:  The type of anesthesia your provide used to numb your leg will usually not wear off for 4-6 hours, but may last as long 12 hours or more.  You should be careful during that period, since it is possible to injure your leg without being aware of the injury.  While your leg is numb, you should:    Use crutches (no weight bearing until the block is completely worn off)    Avoid striking or bumping your leg    Avoid extreme hot or cold  Discomfort:  You will have a tingling and prickly sensation in your leg as the feeling begins to return; you can also expect some discomfort.  The amount of discomfort is unpredictable but if you have more pain than can be controlled with pain medication you may have received, you should notify your physician.  We strongly recommend starting your pain medication at bedtime if you haven t already done so.  This is in the event that the block wears off while you are asleep.    Pending Results     No orders found for last 3 day(s).            Admission Information     Date & Time Department Dept. Phone    6/8/2017 [facility] [phone]      Your Vitals Were     Blood Pressure Pulse Temperature Respirations Height Weight    138/82 81 97.5  F (36.4  C) 15 1.6 m (5' 3\") 65.8 kg (145 lb)    Pulse Oximetry BMI (Body Mass Index)                96% 25.69 kg/m2          [facility] Information     [facility] lets you send messages to your doctor, view your test results, renew your prescriptions, schedule appointments and more. To sign up, go to [email or web address] . Click on \"Log in\" on the left side of the screen, which will take you to the Welcome page. Then click on \"Sign up Now\" on the right side of the page.     You will be asked to enter the access " code listed below, as well as some personal information. Please follow the directions to create your username and password.     Your access code is: [email or web address]  Expires: 2017 11:20 PM     Your access code will  in 90 days. If you need help or a new code, please call your [facility] clinic or [phone].        Care EveryWhere ID     This is your Care EveryWhere ID. This could be used by other organizations to access your [facility] medical records  [id number]           Review of your medicines      START taking        Dose / Directions    hydrOXYzine 10 MG tablet   Commonly known as:  ATARAX   Used for:  Closed displaced fracture of body of left calcaneus, initial encounter        Dose:  10 mg   Take 1 tablet (10 mg) by mouth every 6 hours as needed for itching (and nausea)   Quantity:  30 tablet   Refills:  3       order for DME   Used for:  Closed displaced fracture of body of left calcaneus, initial encounter        One pair of under arm crutches   Quantity:  1 Units   Refills:  0       oxyCODONE-acetaminophen 5-325 MG per tablet   Commonly known as:  PERCOCET   Used for:  Closed displaced fracture of body of left calcaneus, initial encounter        Dose:  1-2 tablet   Take 1-2 tablets by mouth every 4 hours as needed for pain (moderate to severe)   Quantity:  60 tablet   Refills:  0       senna-docusate 8.6-50 MG per tablet   Commonly known as:  SENOKOT-S;PERICOLACE   Used for:  Closed displaced fracture of body of left calcaneus, initial encounter        Dose:  1-2 tablet   Take 1-2 tablets by mouth 2 times daily Take while on oral narcotics to prevent or treat constipation.   Quantity:  30 tablet   Refills:  3         CONTINUE these medicines which have NOT CHANGED        Dose / Directions    ALEVE PO        Dose:  220 mg   Take 220 mg by mouth 2 times daily as needed   Refills:  0       BABY ASPIRIN PO        Dose:  81 mg   Take 81 mg by mouth daily   Refills:  0       CALCIUM 600 PO        Dose:   1 tablet   Take 1 tablet by mouth every other day   Refills:  0       Multiple vitamin Caps        daily   Refills:  0       VITAMIN D3 PO        Dose:  2000 Units   Take 2,000 Units by mouth every other day   Refills:  0       VITAMIN K PO        Dose:  1000 mg   Take 1,000 mg by mouth every other day   Refills:  0            Where to get your medicines      Some of these will need a paper prescription and others can be bought over the counter. Ask your nurse if you have questions.     Bring a paper prescription for each of these medications     hydrOXYzine 10 MG tablet    order for DME    oxyCODONE-acetaminophen 5-325 MG per tablet    senna-docusate 8.6-50 MG per tablet                Protect others around you: Learn how to safely use, store and throw away your medicines at www.disposemymeds.org.             Medication List: This is a list of all your medications and when to take them. Check marks below indicate your daily home schedule. Keep this list as a reference.      Medications           Morning Afternoon Evening Bedtime As Needed    ALEVE PO   Take 220 mg by mouth 2 times daily as needed                                BABY ASPIRIN PO   Take 81 mg by mouth daily                                CALCIUM 600 PO   Take 1 tablet by mouth every other day                                hydrOXYzine 10 MG tablet   Commonly known as:  ATARAX   Take 1 tablet (10 mg) by mouth every 6 hours as needed for itching (and nausea)                                Multiple vitamin Caps   daily                                order for DME   One pair of under arm crutches                                oxyCODONE-acetaminophen 5-325 MG per tablet   Commonly known as:  PERCOCET   Take 1-2 tablets by mouth every 4 hours as needed for pain (moderate to severe)                                senna-docusate 8.6-50 MG per tablet   Commonly known as:  SENOKOT-S;PERICOLACE   Take 1-2 tablets by mouth 2 times daily Take while on oral  narcotics to prevent or treat constipation.                                VITAMIN D3 PO   Take 2,000 Units by mouth every other day                                VITAMIN K PO   Take 1,000 mg by mouth every other day                                          More Information        Foot Fracture  You have a broken bone (fracture) in your foot. This will cause pain, swelling, and sometimes bruising. It will take about 4 to 6 weeks to heal. A foot fracture may be treated with a special shoe, splint, cast, or boot.  Home care  Follow these guidelines when caring for yourself at home:    You may be given a splint, cast, shoe, or boot to keep the injured area from moving. Unless you were told otherwise, use crutches or a walker. Don t put weight on the injured foot until your health care provider says you can do so. (You can rent crutches and a walker at many pharmacies and surgical or orthopedic supply stores.) Don t put weight on a splint, or it will break.    Keep your leg elevated to reduce pain and swelling. When sleeping, put a pillow under the injured leg. When sitting, support the injured leg so it is level with your waist. This is very important during the first 2 days (48 hours).    Put an ice pack on the injured area. Do this for 20 minutes every 1 to 2 hours the first day for pain relief. You can make an ice pack by wrapping a plastic bag of ice cubes in a thin towel. As the ice melts, be careful that the splint/cast/boot/shoe doesn t get wet. You can place the ice pack directly over the splint or cast. Unless told otherwise, you can open the boot or shoe to apply the ice pack. Continue using the ice pack 3 to 4 times a day for the next 2 days. Then use the ice pack as needed to ease pain and swelling.    Keep the splint/cast/boot/shoe dry. When bathing, protect it with a large plastic bag, rubber-banded at the top end. If a fiberglass splint or cast or boot gets wet, you can dry it with a hair dryer. Unless  told otherwise, you can take off the boot or shoe to bathe.    You may use acetaminophen or ibuprofen to control pain, unless another pain medicine was prescribed. If you have chronic liver or kidney disease, talk with your health care provider before using these medicines. Also talk with your provider if you ve had a stomach ulcer or GI bleeding.    Don t put creams or objects under the cast if you have itching.  Follow-up care  Follow up with your health care provider within 1 week, or as advised. This is to make sure the bone is healing the way it should. If you were given a splint, it may be changed to a cast or boot at your follow-up visit.  If X-rays were taken, a radiologist will look at them. You will be told of any new findings that may affect your care.  When to seek medical advice  Call your health care provider right away if any of these occur:    The cast cracks    The plaster cast or splint becomes wet or soft    The fiberglass cast or splint stays wet for more than 24 hours    Bad odor from the cast or wound fluid stains the cast    Tightness or pain under the cast or splint gets worse    Toes become swollen, cold, blue, numb, or tingly    You can t move your toes    Skin around cast becomes red    Fever of 101 F (38.3 C) or higher, or as directed by your health care provider    [id number] [employer]. [address] [place] [address], [place] [address]. All rights reserved. This information is not intended as a substitute for professional medical care. Always follow your healthcare professional's instructions.

## 2017-06-08 NOTE — ED NOTES
Fell outside on pavers/wood steps on deck around 1300 then had to crawl up to the house to call for help.  Deformity to right heel.  Last PO food at 1030-11 and had been drinking water until about 1300 when she fell.  Abrasions to bilateral knees from crawling around to get help.

## 2017-06-09 ENCOUNTER — TELEPHONE (OUTPATIENT)
Dept: PODIATRY | Facility: CLINIC | Age: 67
End: 2017-06-09

## 2017-06-09 VITALS
DIASTOLIC BLOOD PRESSURE: 73 MMHG | HEART RATE: 81 BPM | HEIGHT: 63 IN | TEMPERATURE: 97.5 F | OXYGEN SATURATION: 94 % | SYSTOLIC BLOOD PRESSURE: 126 MMHG | RESPIRATION RATE: 16 BRPM | BODY MASS INDEX: 25.69 KG/M2 | WEIGHT: 145 LBS

## 2017-06-09 NOTE — ANESTHESIA POSTPROCEDURE EVALUATION
Patient: Kylah Britt    Procedure(s):  Open Reduction Internal Fixation Right Calcaneous - Wound Class: I-Clean    Diagnosis:Right calcaneous fracture  Diagnosis Additional Information: No value filed.    Anesthesia Type:  General, ETT, Peripheral Nerve Block    Note:  Anesthesia Post Evaluation    Patient participation: Able to fully participate in evaluation  Level of consciousness: awake and alert  Pain management: adequate  Airway patency: patent  Cardiovascular status: blood pressure returned to baseline  Respiratory status: spontaneous ventilation and room air  Hydration status: euvolemic  PONV: none     Anesthetic complications: None    Comments: Spoke with Kylah Britt via a phone conversation today.  She was sitting up in bed she denies any pain or nausea at this time. Said her block is still working well for her and she has not had any pain yet.  She was very pleased with her anesthetic course        Last vitals:  Vitals:    06/08/17 2345 06/09/17 0000 06/09/17 0015   BP: 140/80 140/69 126/73   Pulse:      Resp:   16   Temp:      SpO2:   94%         Electronically Signed By: ESPERANZA Locke CRNA  June 9, 2017  10:45 AM

## 2017-06-09 NOTE — ANESTHESIA CARE TRANSFER NOTE
Patient: Kylah ROSAS Britt    Procedure(s):  Open Reduction Internal Fixation Right Calcaneous - Wound Class: I-Clean    Diagnosis: Right calcaneous fracture  Diagnosis Additional Information: No value filed.    Anesthesia Type:   General, ETT, Peripheral Nerve Block     Note:  Airway :Nasal Cannula  Patient transferred to:PACU  Comments: Denies pain, feeling a little nauseated.      Vitals: (Last set prior to Anesthesia Care Transfer)    CRNA VITALS  6/8/2017 2219 - 6/8/2017 2257      6/8/2017             SpO2: 97 %                Electronically Signed By: ESPERANZA Deshpande CRNA  June 8, 2017  10:57 PM

## 2017-06-09 NOTE — OP NOTE
DATE OF PROCEDURE:  06/08/2017      SURGEON:  Darnell Kincaid DPM      ASSISTANT:  None.      PREOPERATIVE DIAGNOSIS:  Avulsion type fracture, right calcaneus.      POSTOPERATIVE DIAGNOSIS:  Avulsion type fracture, right calcaneus.      PLANNED PROCEDURE:  Open reduction and internal fixation right calcaneus.      DESCRIPTION OF PROCEDURE:  In the preoperative holding area, informed consent was obtained.  We discussed potential risks and complications, alternative treatment options, it was decided since her skin was very tight nearly an open fracture to progress to the operating room urgently.  All questions were answered.  No guarantees were given or implied foot.  Kylah Britt was brought in the operating room, placed on the operating table after given general anesthesia.  She was placed in the left lateral recumbent position, padding between her knees.  The right foot was prepped and draped after placing a tourniquet about the thigh.  A timeout was performed.  A #15 blade was utilized to make an L or hockey stick style incision about the posterior aspect of the calcaneus after attempt at closed reduction.  The closed reduction did not work as there was too much tension on the fracture fragment, therefore, the incision was made after identifying landmarks.  The incision was plunged directly to bone and no portion of the incision was skived.  The skin was then retracted and subcutaneous tissue was retracted in a single flap in a standard fashion nearly to the posterior facet.  Two 0.062 K-wires were then brought dorsally into the lateral calcaneus and brought dorsally to protect the flap, the superior fragment of the calcaneus did extend proximally by approximately 3 cm; however, it did not extend into the posterior facet.  Surgical site was flushed with copious amounts of sterile saline.  A Steinmann pin was utilized to toggle the fracture fragment from its proximal position into its anatomical position.  Good  anatomical alignment was achieved.  A K-wire was placed across this for temporary fixation after placing a clamp and two 0.045 screws were then placed in a cannulated fashion just anterior to the Achilles tendon attempting to prevent them from being prominent about the posterior insertion of the Achilles tendon.  Fluoroscan was utilized for visualization of the pins and 2 screws were placed, 1 medial and 1 lateral.  I could not obtain more posterior positioning of them as the head would be placed within the Achilles tendon.  Surgical site was again flushed with copious amounts of sterile saline after removing the clamp and confirming under fluoroscan on multiple views that we have adequate reduction and good fixation.  Skin was closed with 3-0 Vicryl and then a 4-0 Prolene.  Ten mL of 0.5% Marcaine plain were injected about the surgical site for local anesthesia.  She was given a postoperative pop. block after she was closed with Prolene and Adaptic and a bulky sterile compression dressing and a posterior splint was applied.  The tourniquet was released after approximately 70 minutes followed by immediate hyperemia to all 5 digits on the right foot.  The patient tolerated the procedure and anesthesia well, and was brought to the recovery with vital signs stable and vascular status intact.         JOSEPHINE VILLAFANA DPM             D: 2017 22:00   T: 2017 01:15   MT: [id number]      Name:     JOSE MICHAELS   MRN:      [id number]        Account:        [id number]   :      1950           Procedure Date: 2017      Document: [id number]

## 2017-06-09 NOTE — ANESTHESIA PREPROCEDURE EVALUATION
Anesthesia Evaluation     . Pt has had prior anesthetic. Type: General           ROS/MED HX    ENT/Pulmonary:  - neg pulmonary ROS     Neurologic:  - neg neurologic ROS     Cardiovascular:  - neg cardiovascular ROS       METS/Exercise Tolerance:     Hematologic:  - neg hematologic  ROS       Musculoskeletal:  - neg musculoskeletal ROS       GI/Hepatic:  - neg GI/hepatic ROS       Renal/Genitourinary:  - ROS Renal section negative       Endo:  - neg endo ROS       Psychiatric:     (+) psychiatric history depression      Infectious Disease:  - neg infectious disease ROS       Malignancy:      - no malignancy   Other:    - neg other ROS                 Physical Exam  Normal systems: cardiovascular, pulmonary and dental    Airway   Mallampati: I  TM distance: >3 FB  Neck ROM: full    Dental     Cardiovascular   Rhythm and rate: regular and normal      Pulmonary    breath sounds clear to auscultation                    Anesthesia Plan      History & Physical Review  History and physical reviewed and following examination; no interval change.    ASA Status:  2 .    NPO Status:  > 8 hours    Plan for General, ETT and Peripheral Nerve Block with Intravenous and Propofol induction. Maintenance will be Inhalation.    PONV prophylaxis:  Ondansetron (or other 5HT-3) and Dexamethasone or Solumedrol       Postoperative Care  Postoperative pain management:  IV analgesics and Oral pain medications.      Consents  Anesthetic plan, risks, benefits and alternatives discussed with:  Patient..                          .

## 2017-06-09 NOTE — PROGRESS NOTES
HPI:  Fractured right calc today and she wishes to follow emmanuel with ORIF right calc.     ROS:  10 point ROS neg other than the symptoms noted above in the HPI.    PAST MEDICAL HISTORY:   Past Medical History:   Diagnosis Date     Abnormal Papanicolaou smear of cervix and cervical HPV 9/1/92    vaginitis with abnormal Pap     Depressive disorder, not elsewhere classified     depression     Female stress incontinence     urinary incontinence     Lyme disease      Osteoarthrosis, unspecified whether generalized or localized, unspecified site      monoarticular arthritis in the right first MTP joint     Other psychological or physical stress, not elsewhere classified(V62.89)     delayed stress syndrome        PAST SURGICAL HISTORY:   Past Surgical History:   Procedure Laterality Date     C APPENDECTOMY       C LIGATE FALLOPIAN TUBE,POSTPARTUM  04/12/77    Bilateral tubal cauterization     C TOTAL ABDOM HYSTERECTOMY  2/20/90    Hysterectomy, Total Abdominal     HC COLONOSCOPY THRU STOMA, DIAGNOSTIC  06/24/98     HC KNEE SCOPE, DIAGNOSTIC  1/31/89    Examination of right knee under anesthesia. Arthroscopy of right knee with debridement of torn anterior curciate ligament (medial meniscus not resected).     HC REMOVAL OF OVARY/TUBE(S)  2/20/90    Salpingo-Oophorectomy, bilateral        MEDICATIONS:   Current Facility-Administered Medications:      lidocaine 1 % 1 mL, 1 mL, Other, Q1H PRN, Eulogio Grove MD     lidocaine (LMX4) kit, , Topical, Q1H PRN, Eulogio Grove MD     sodium chloride (PF) 0.9% PF flush 3 mL, 3 mL, Intracatheter, Q1H PRN, Eulogio Grove MD     sodium chloride (PF) 0.9% PF flush 3 mL, 3 mL, Intracatheter, Q8H, Eulogio Grove MD     [COMPLETED] 0.9% sodium chloride BOLUS, 1,000 mL, Intravenous, Once, Stopped at 06/08/17 1730 **FOLLOWED BY** 0.9% sodium chloride infusion, 1,000 mL, Intravenous, Continuous, Eulogio Grove MD, Last Rate: 125 mL/hr at 06/08/17  "1842, 1,000 mL at 06/08/17 1842    Current Outpatient Prescriptions:      Calcium Carbonate (CALCIUM 600 PO), Take 1 tablet by mouth every other day, Disp: , Rfl:      VITAMIN K PO, Take 1,000 mg by mouth every other day, Disp: , Rfl:      Cholecalciferol (VITAMIN D3 PO), Take 2,000 Units by mouth every other day, Disp: , Rfl:      BABY ASPIRIN PO, Take 81 mg by mouth daily, Disp: , Rfl:      Naproxen Sodium (ALEVE PO), Take 220 mg by mouth 2 times daily as needed , Disp: , Rfl:      MULTIPLE VITAMIN CAPS   OR, daily, Disp: , Rfl: 0     ALLERGIES:    Allergies   Allergen Reactions     No Known Drug Allergies         SOCIAL HISTORY:   Social History     Social History     Marital status:      Spouse name: N/A     Number of children: N/A     Years of education: N/A     Occupational History     Not on file.     Social History Main Topics     Smoking status: Never Smoker     Smokeless tobacco: Not on file     Alcohol use Yes      Comment: very rare     Drug use: No     Sexual activity: Not on file     Other Topics Concern     Not on file     Social History Narrative        FAMILY HISTORY:   Family History   Problem Relation Age of Onset     CANCER Son      Hodgkin's     CANCER Paternal Aunt      two with cervical cancer     Hypertension Mother         EXAM:Vitals: /66  Pulse 81  Temp 98.1  F (36.7  C) (Oral)  Resp 18  Ht 5' 3\" (1.6 m)  Wt 145 lb (65.8 kg)  SpO2 94%  BMI 25.69 kg/m2  BMI= Body mass index is 25.69 kg/(m^2).    General appearance: Patient is alert and fully cooperative with history & exam.  No sign of distress is noted during the visit.     Psychiatric: Affect is pleasant & appropriate.  Patient appears motivated to improve health.     Respiratory: Breathing is regular & unlabored while sitting.     HEENT: Hearing is intact to spoken word.  Speech is clear.  No gross evidence of visual impairment that would impact ambulation.     Vascular: DP & PT pulses are intact & regular " bilaterally.  No significant edema or varicosities noted.  CFT and skin temperature is normal to both lower extremities.     Neurologic: Lower extremity sensation is intact to light touch.  No evidence of weakness or contracture in the lower extremities.  No evidence of neuropathy.    Dermatologic: Skin is intact to both lower extremities , tented posterior calc achilles    Musculoskeletal: deformity with skin blanched.  No change    Xray and CT show avulsion superior calc without fracture into the joint.      ASSESSMENT:       ICD-10-CM    1. Closed displaced fracture of body of left calcaneus, initial encounter S92.012A Basic metabolic panel        PLAN:  Reviewed [facility] chart in [facility].      6/8/2017   I obtained informed consent to treat Right calcaneous fracture with treatment of Procedure(s):  OPEN REDUCTION INTERNAL FIXATION CALCANEOUS.  I discussed with the patient potential risks and complications as well as alternative treatment options and post op care requirements.   I answered all questions for the patient.  No guarantees were given or implied.  They wish to proceed with surgical treatment.  They have been NPO for 8 hours or more.  No contraindications to surgical treatment at this time.        Darnell Kincaid DPM

## 2017-06-09 NOTE — DISCHARGE INSTRUCTIONS
Post Operative Instructions following a Popliteal Block  Regional anesthesia is injected into or around the nerves to anesthetize the area supplied by that set of nerves.  It is a type of local anesthetic used to numb a specific area, and is used to control pain and decrease the need for narcotics following surgery.  Types of Regional Blocks:  Popliteal: An anesthetic medication injection into the back of the knee of the operative leg of the patient having foot surgery.  Procedure:  The type of anesthesia your provide used to numb your leg will usually not wear off for 4-6 hours, but may last as long 12 hours or more.  You should be careful during that period, since it is possible to injure your leg without being aware of the injury.  While your leg is numb, you should:    Use crutches (no weight bearing until the block is completely worn off)    Avoid striking or bumping your leg    Avoid extreme hot or cold  Discomfort:  You will have a tingling and prickly sensation in your leg as the feeling begins to return; you can also expect some discomfort.  The amount of discomfort is unpredictable but if you have more pain than can be controlled with pain medication you may have received, you should notify your physician.  We strongly recommend starting your pain medication at bedtime if you haven t already done so.  This is in the event that the block wears off while you are asleep.

## 2017-06-09 NOTE — ANESTHESIA PROCEDURE NOTES
Peripheral nerve/Neuraxial procedure note : Popliteal  Pre-Procedure  Performed by  ZOILA SOSA   Location: post-op      Pre-Anesthestic Checklist: patient identified, IV checked, site marked, risks and benefits discussed, informed consent, monitors and equipment checked, pre-op evaluation, at physician/surgeon's request and post-op pain management    Timeout  Correct Patient: Yes   Correct Procedure: Yes   Correct Site: Yes   Correct Laterality: Yes   Correct Position: Yes   Site Marked: Yes   .   Procedure Documentation    .    Procedure:    Popliteal.  Local skin infiltrated with 1 mL of 1% lidocaine.     Ultrasound used to identify targeted nerve, plexus, or vascular marker and placed a needle adjacent to it., Ultrasound was used to visualize the spread of the anesthetic in close proximity to the above stated nerve.   Patient Prep;mask, sterile gloves, chlorhexidine gluconate and isopropyl alcohol.  Nerve Stim: Initial Level 0.05 mA. Lowest motor response mA..  Needle: insulated (22 G. 100 mm ). .  Spinal Needle: . . Insertion Method: Single Shot.     Assessment/Narrative  Injection made incrementally with aspirations every 5 mL..  The placement was negative for: blood aspirated, painful injection and site bleeding.  Bolus given via needle..   Secured via.   Complications: none. Comments:  Pt tolerated procedure well under general anesthesia.  No complications noted.Pt. Denies any pain. Will follow if needed.

## 2017-06-12 ENCOUNTER — TELEPHONE (OUTPATIENT)
Dept: PODIATRY | Facility: CLINIC | Age: 67
End: 2017-06-12

## 2017-06-12 NOTE — TELEPHONE ENCOUNTER
Left message for patients daughter to return call to schedule patient for post op appointments.   5-7 days  10-14 days  4 weeks  8 weeks

## 2017-06-12 NOTE — TELEPHONE ENCOUNTER
Patients daughter called back, please call to schedule post op appointments.       Cell - [phone] - daughter Nicholas -  Please call to set up post op appointments -   Thank you,  Eliza.

## 2017-06-13 DIAGNOSIS — Z98.890 STATUS POST OPEN REDUCTION WITH INTERNAL FIXATION (ORIF) OF FRACTURE OF ANKLE: Primary | ICD-10-CM

## 2017-06-13 DIAGNOSIS — Z87.81 STATUS POST OPEN REDUCTION WITH INTERNAL FIXATION (ORIF) OF FRACTURE OF ANKLE: Primary | ICD-10-CM

## 2017-06-13 NOTE — TELEPHONE ENCOUNTER
Patient has been scheduled for post op appointments. Patients daughter is also stating that Dr. Kincaid told them he ordered a scooter and they hadn't heard anything. I dont see an order for a knee scooter and one of the OR nurses stated that she doesn't see any notes or orders regarding the scooter. I sent a text message to Dr. Kincaid to see if he would like me to order one. Will wait to hear back.

## 2017-06-15 ENCOUNTER — OFFICE VISIT (OUTPATIENT)
Dept: PODIATRY | Facility: CLINIC | Age: 67
End: 2017-06-15
Payer: COMMERCIAL

## 2017-06-15 VITALS — WEIGHT: 145 LBS | HEIGHT: 63 IN | TEMPERATURE: 97.6 F | BODY MASS INDEX: 25.69 KG/M2

## 2017-06-15 DIAGNOSIS — Z87.81 STATUS POST OPEN REDUCTION WITH INTERNAL FIXATION (ORIF) OF FRACTURE OF ANKLE: Primary | ICD-10-CM

## 2017-06-15 DIAGNOSIS — Z98.890 STATUS POST OPEN REDUCTION WITH INTERNAL FIXATION (ORIF) OF FRACTURE OF ANKLE: Primary | ICD-10-CM

## 2017-06-15 PROCEDURE — 99024 POSTOP FOLLOW-UP VISIT: CPT | Performed by: PODIATRIST

## 2017-06-15 ASSESSMENT — PAIN SCALES - GENERAL: PAINLEVEL: NO PAIN (0)

## 2017-06-15 NOTE — NURSING NOTE
"Chief Complaint   Patient presents with     Surgical Followup     (7 days), NWB w/splint, pain 2, no fever or falls; DOS 6/8/2017 - Open Reduction Internal Fixation Right Calcaneous       Initial Temp 97.6  F (36.4  C) (Temporal)  Ht 5' 3\" (1.6 m)  Wt 145 lb (65.8 kg)  BMI 25.69 kg/m2 Estimated body mass index is 25.69 kg/(m^2) as calculated from the following:    Height as of this encounter: 5' 3\" (1.6 m).    Weight as of this encounter: 145 lb (65.8 kg).  BP completed using cuff size: NA (Not Taken)  Medication Reconciliation: complete    Candace Parmar CMA, Shilpi 15, 2017    "

## 2017-06-15 NOTE — MR AVS SNAPSHOT
After Visit Summary   6/15/2017    Kylah Britt    MRN: [id number]           Patient Information     Date Of Birth          1950        Visit Information        Provider Department      6/15/2017 2:45 PM Darnell Kincaid DPM [facility]        Today's Diagnoses     Status post open reduction with internal fixation (ORIF) of fracture of ankle    -  1      Care Instructions    Follow-up one week for suture removal. Remain nonweightbearing.          Follow-ups after your visit        Your next 10 appointments already scheduled     Jun 22, 2017  8:00 AM CDT   [facility]    [address] [place] [id number] [phone]            Jul 06, 2017  2:30 PM CDT   [facility] Visit with Darnell [facility]aid DPM   [facility]    [address] [place] [id number] [phone]            Aug 03, 2017  2:30 PM CDT   [facility] Visit with Darnell [facility]aid DPM   [facility]    [address] [place] [id number] [phone]              Who to contact     If you have questions or need follow up information about today's clinic visit or your schedule please contact [facility] directly at [phone].  Normal or non-critical lab and imaging results will be communicated to you by MyChart, letter or phone within 4 business days after the clinic has received the results. If you do not hear from us within 7 days, please contact the clinic through MyChart or phone. If you have a critical or abnormal lab result, we will notify you by phone as soon as possible.  Submit refill requests through [employer] or call your pharmacy and they will forward the refill request to us. Please allow 3 business days for your refill to be completed.          Additional Information About Your Visit    "     MyChart Information     [employer] lets you send messages to your doctor, view your test results, renew your prescriptions, schedule appointments and more. To sign up, go to [email or web address] . Click on \"Log in\" on the left side of the screen, which will take you to the Welcome page. Then click on \"Sign up Now\" on the right side of the page.     You will be asked to enter the access code listed below, as well as some personal information. Please follow the directions to create your username and password.     Your access code is: [email or web address]  Expires: 2017 11:20 PM     Your access code will  in 90 days. If you need help or a new code, please call your [place] clinic or [phone].        Care EveryWhere ID     This is your Care EveryWhere ID. This could be used by other organizations to access your [place] medical records  [id number]        Your Vitals Were     Temperature Height BMI (Body Mass Index)             97.6  F (36.4  C) (Temporal) 5' 3\" (1.6 m) 25.69 kg/m2          Blood Pressure from Last 3 Encounters:   17 126/73   14 142/75   04/21/10 128/74    Weight from Last 3 Encounters:   06/15/17 145 lb (65.8 kg)   17 145 lb (65.8 kg)   14 154 lb (69.9 kg)              Today, you had the following     No orders found for display       Primary Care Provider Office Phone #    [facility] [phone]       No address on file        Thank you!     Thank you for choosing [facility]  for your care. Our goal is always to provide you with excellent care. Hearing back from our patients is one way we can continue to improve our services. Please take a few minutes to complete the written survey that you may receive in the mail after your visit with us. Thank you!             Your Updated Medication List - Protect others around you: Learn how to safely use, store and throw away your medicines at www.disposemymeds.org.          This list " is accurate as of: 6/15/17  3:42 PM.  Always use your most recent med list.                   Brand Name Dispense Instructions for use    ALEVE PO      Take 220 mg by mouth 2 times daily as needed       BABY ASPIRIN PO      Take 81 mg by mouth daily       CALCIUM 600 PO      Take 1 tablet by mouth every other day       hydrOXYzine 10 MG tablet    ATARAX    30 tablet    Take 1 tablet (10 mg) by mouth every 6 hours as needed for itching (and nausea)       Multiple vitamin Caps      daily       * order for DME     1 Units    One pair of under arm crutches       * order for DME     1 Units    One-rollabout or bent knee scooter/wheeled walker with bent knee for non weight bearing status  Please call [phone] to schedule delivery of Roll About.       oxyCODONE-acetaminophen 5-325 MG per tablet    PERCOCET    60 tablet    Take 1-2 tablets by mouth every 4 hours as needed for pain (moderate to severe)       senna-docusate 8.6-50 MG per tablet    SENOKOT-S;PERICOLACE    30 tablet    Take 1-2 tablets by mouth 2 times daily Take while on oral narcotics to prevent or treat constipation.       VITAMIN D3 PO      Take 2,000 Units by mouth every other day       VITAMIN K PO      Take 1,000 mg by mouth every other day       * Notice:  This list has 2 medication(s) that are the same as other medications prescribed for you. Read the directions carefully, and ask your doctor or other care provider to review them with you.

## 2017-06-15 NOTE — PROGRESS NOTES
"Chief Complaint   Patient presents with     Surgical Followup     (7 days), NWB w/splint, pain 2, no fever or falls; DOS 6/8/2017 - Open Reduction Internal Fixation Right Calcaneous     BMI is normal.    Subjective: Patient reports for followup of DOS 6/8/2017 - Open Reduction Internal Fixation Right Calcaneous procedure.  Patient continues pain medication but is tapering off. Patient denies nausea vomiting fever or chills.     EXAM:  No apparent distress, patient is relaxed and comfortable.   Vitals: Temp 97.6  F (36.4  C) (Temporal)  Ht 5' 3\" (1.6 m)  Wt 145 lb (65.8 kg)  BMI 25.69 kg/m2  Vasc:  DP and PT pulses palpable bilateral, CFT immediate to all digits and surrounding the surgical site.  Neuro:  Light touch sensation intact about the digits. There is minimal to no appreciable numbness around the surgical incision with examination.  Derm: The incision is well approximated and dry. Sutures are intact. Mild edema as expected. There is no surrounding erythema, heat, drainage or other signs of infection or hematoma.  Musc: Adequate reduction of deformity identified. No complications.    Radiographs: Intraoperative/postoperative films reviewed with the patient. No complications noted.     Assessment:      ICD-10-CM    1. Status post open reduction with internal fixation (ORIF) of fracture of ankle Z96.7     Z87.81        Plan:    6/15/2017  The dressing was removed and surgical site inspected.   A bulky sterile dressing was applied with compression.   Patient instructed to reduce or discontinue pain medications as tolerated. Manage pain with reduced activity.   Continue ice and elevation as tolerated.   Continue restrictions of nonweightbearing.      Darnell Kincaid DPM  "

## 2017-06-22 ENCOUNTER — OFFICE VISIT (OUTPATIENT)
Dept: PODIATRY | Facility: CLINIC | Age: 67
End: 2017-06-22
Payer: COMMERCIAL

## 2017-06-22 VITALS — WEIGHT: 145 LBS | HEIGHT: 63 IN | BODY MASS INDEX: 25.69 KG/M2 | TEMPERATURE: 96.5 F

## 2017-06-22 DIAGNOSIS — S92.012A CLOSED DISPLACED FRACTURE OF BODY OF LEFT CALCANEUS, INITIAL ENCOUNTER: ICD-10-CM

## 2017-06-22 PROCEDURE — 99024 POSTOP FOLLOW-UP VISIT: CPT | Performed by: PODIATRIST

## 2017-06-22 RX ORDER — CEPHALEXIN 500 MG/1
500 CAPSULE ORAL 4 TIMES DAILY
Qty: 40 CAPSULE | Refills: 0 | Status: SHIPPED | OUTPATIENT
Start: 2017-06-22 | End: 2017-08-03

## 2017-06-22 RX ORDER — HYDROXYZINE HYDROCHLORIDE 10 MG/1
10 TABLET, FILM COATED ORAL EVERY 6 HOURS PRN
Qty: 30 TABLET | Refills: 3 | Status: SHIPPED | OUTPATIENT
Start: 2017-06-22 | End: 2022-03-07

## 2017-06-22 ASSESSMENT — PAIN SCALES - GENERAL: PAINLEVEL: NO PAIN (0)

## 2017-06-22 NOTE — NURSING NOTE
Dispensed 1 Pneumatic Walking Brace, Size Small, with [facility] agreement signed by patient. Candace Parmar CMA, June 22, 2017

## 2017-06-22 NOTE — NURSING NOTE
"Chief Complaint   Patient presents with     Surgical Followup     (2 weeks), NWB w/splint, today lost balance and pressure on Right foot, no pain, no fever; DOS 6/8/2017 - Open Reduction Internal Fixation Right Calcaneous; LOV 6/15/2017       Initial Temp 96.5  F (35.8  C) (Temporal)  Ht 5' 3\" (1.6 m)  Wt 145 lb (65.8 kg)  BMI 25.69 kg/m2 Estimated body mass index is 25.69 kg/(m^2) as calculated from the following:    Height as of this encounter: 5' 3\" (1.6 m).    Weight as of this encounter: 145 lb (65.8 kg).  BP completed using cuff size: NA (Not Taken)  Medication Reconciliation: complete    Candace Parmar CMA, June 22, 2017    "

## 2017-06-22 NOTE — MR AVS SNAPSHOT
"              After Visit Summary   6/22/2017    Kylah Britt    MRN: [id number]           Patient Information     Date Of Birth          1950        Visit Information        Provider Department      6/22/2017 8:00 AM Darnell Kincaid DPM [facility]        Today's Diagnoses     Closed displaced fracture of body of left calcaneus, initial encounter          Care Instructions    Remain nonweightbearing.          Follow-ups after your visit        Your next 10 appointments already scheduled     Jul 06, 2017  2:30 PM CDT   [facility] Darnell [facility]aid DPM   [facility]    [address] [place] [phone]            Aug 03, 2017  2:30 PM CDT   Return Visit with Darnell Kincaid DPM   [facility]    [address] [place] [address] [phone]              Who to contact     If you have questions or need follow up information about today's clinic visit or your schedule please contact [facility] directly at [phone].  Normal or non-critical lab and imaging results will be communicated to you by "[employer]hart, letter or phone within 4 business days after the clinic has received the results. If you do not hear from us within 7 days, please contact the clinic through [employer] or phone. If you have a critical or abnormal lab result, we will notify you by phone as soon as possible.  Submit refill requests through [employer] or call your pharmacy and they will forward the refill request to us. Please allow 3 business days for your refill to be completed.          Additional Information About Your Visit        "[employer]hart Information     [employer] lets you send messages to your doctor, view your test results, renew your prescriptions, schedule appointments and more. To sign up, go to [email or web address] . Click on \"Log in\" on the left side of the " "screen, which will take you to the Welcome page. Then click on \"Sign up Now\" on the right side of the page.     You will be asked to enter the access code listed below, as well as some personal information. Please follow the directions to create your username and password.     Your access code is: [email or web address]  Expires: 2017 11:20 PM     Your access code will  in 90 days. If you need help or a new code, please call your [facility] or [phone].        Care EveryWhere ID     This is your Care EveryWhere ID. This could be used by other organizations to access your [facility] medical records  [id number]        Your Vitals Were     Temperature Height BMI (Body Mass Index)             96.5  F (35.8  C) (Temporal) 5' 3\" (1.6 m) 25.69 kg/m2          Blood Pressure from Last 3 Encounters:   17 126/73   14 142/75   04/21/10 128/74    Weight from Last 3 Encounters:   17 145 lb (65.8 kg)   06/15/17 145 lb (65.8 kg)   17 145 lb (65.8 kg)              Today, you had the following     No orders found for display         Today's Medication Changes          These changes are accurate as of: 17  8:57 AM.  If you have any questions, ask your nurse or doctor.               Start taking these medicines.        Dose/Directions    cephALEXin 500 MG capsule   Commonly known as:  KEFLEX   Used for:  Closed displaced fracture of body of left calcaneus, initial encounter   Started by:  Darnell Kincaid DPM        Dose:  500 mg   Take 1 capsule (500 mg) by mouth 4 times daily   Quantity:  40 capsule   Refills:  0            Where to get your medicines      These medications were sent to [facility] - [place] [address] [id number]     Phone:  [phone]     cephALEXin 500 MG capsule    hydrOXYzine 10 MG tablet                Primary Care Provider Office Phone #    [facility] [phone]       No address on " file        Equal Access to Services     [facility] JEFFY : Hadii aad ku hadrodríguez Gan, wachelseada luqadaha, qaybta sageleonorsara canseco, edvin pittman. So [facility] [phone].    ATENCIÓN: Si habla español, tiene a anand disposición servicios gratuitos de asistencia lingüística. Llame al [phone].    We comply with applicable federal civil rights laws and [place] laws. We do not discriminate on the basis of race, color, national origin, age, disability sex, sexual orientation or gender identity.            Thank you!     Thank you for choosing [facility]  for your care. Our goal is always to provide you with excellent care. Hearing back from our patients is one way we can continue to improve our services. Please take a few minutes to complete the written survey that you may receive in the mail after your visit with us. Thank you!             Your Updated Medication List - Protect others around you: Learn how to safely use, store and throw away your medicines at www.disposemymeds.org.          This list is accurate as of: 6/22/17  8:57 AM.  Always use your most recent med list.                   Brand Name Dispense Instructions for use Diagnosis    ALEVE PO      Take 220 mg by mouth 2 times daily as needed        BABY ASPIRIN PO      Take 81 mg by mouth daily        CALCIUM 600 PO      Take 1 tablet by mouth every other day        cephALEXin 500 MG capsule    KEFLEX    40 capsule    Take 1 capsule (500 mg) by mouth 4 times daily    Closed displaced fracture of body of left calcaneus, initial encounter       hydrOXYzine 10 MG tablet    ATARAX    30 tablet    Take 1 tablet (10 mg) by mouth every 6 hours as needed for itching (and nausea)    Closed displaced fracture of body of left calcaneus, initial encounter       Multiple vitamin Caps      daily        * order for DME     1 Units    One pair of under arm crutches    Closed displaced fracture of body of left calcaneus, initial  encounter       * order for DME     1 Units    One-rollabout or bent knee scooter/wheeled walker with bent knee for non weight bearing status  Please call [phone] to schedule delivery of Roll About.    Status post open reduction with internal fixation (ORIF) of fracture of ankle       oxyCODONE-acetaminophen 5-325 MG per tablet    PERCOCET    60 tablet    Take 1-2 tablets by mouth every 4 hours as needed for pain (moderate to severe)    Closed displaced fracture of body of left calcaneus, initial encounter       senna-docusate 8.6-50 MG per tablet    SENOKOT-S;PERICOLACE    30 tablet    Take 1-2 tablets by mouth 2 times daily Take while on oral narcotics to prevent or treat constipation.    Closed displaced fracture of body of left calcaneus, initial encounter       VITAMIN D3 PO      Take 2,000 Units by mouth every other day        VITAMIN K PO      Take 1,000 mg by mouth every other day        * Notice:  This list has 2 medication(s) that are the same as other medications prescribed for you. Read the directions carefully, and ask your doctor or other care provider to review them with you.

## 2017-06-22 NOTE — PROGRESS NOTES
"Chief Complaint   Patient presents with     Surgical Followup     (2 weeks), NWB w/splint, today lost balance and pressure on Right foot, no pain, no fever; DOS 6/8/2017 - Open Reduction Internal Fixation Right Calcaneous; LOV 6/15/2017     BMI is normal.    Subjective: Patient reports for followup of DOS 6/8/2017 - Open Reduction Internal Fixation Right Calcaneous procedure.  Patient continues pain medication but is tapering off. Patient denies nausea vomiting fever or chills.     EXAM:  No apparent distress, patient is relaxed and comfortable.   Vitals: Temp 96.5  F (35.8  C) (Temporal)  Ht 5' 3\" (1.6 m)  Wt 145 lb (65.8 kg)  BMI 25.69 kg/m2  Vasc:  DP and PT pulses palpable bilateral, CFT immediate to all digits and surrounding the surgical site.  Neuro:  Light touch sensation intact about the digits. There is minimal to no appreciable numbness around the surgical incision with examination.  Derm: The incision is well approximated and dry. Sutures are intact. Mild edema as expected. No surrounding heat or significant edema. No bleeding. Approximately 1-2 cm of erythema about the apex of the flap but not posteriorly.  Musc: Adequate reduction of deformity identified. No complications.    Assessment:      ICD-10-CM    1. Closed displaced fracture of body of left calcaneus, initial encounter S92.012A cephALEXin (KEFLEX) 500 MG capsule     hydrOXYzine (ATARAX) 10 MG tablet       Plan:    6/15/2017  The dressing was removed and surgical site inspected.   A bulky sterile dressing was applied with compression.   Patient instructed to reduce or discontinue pain medications as tolerated. Manage pain with reduced activity.   Continue ice and elevation as tolerated.   Continue restrictions of nonweightbearing.    6/22/2017  Refill atarax  Start keflex just a bit of erythema.   Fracture boot today to stay on 24/7  Can begin bathing in 5 days.   Follow-up in 2 weeks.      Darnell Kincaid DPM    "

## 2017-07-06 ENCOUNTER — OFFICE VISIT (OUTPATIENT)
Dept: PODIATRY | Facility: CLINIC | Age: 67
End: 2017-07-06
Payer: COMMERCIAL

## 2017-07-06 VITALS — TEMPERATURE: 97.4 F | BODY MASS INDEX: 25.69 KG/M2 | WEIGHT: 145 LBS | HEIGHT: 63 IN

## 2017-07-06 DIAGNOSIS — Z87.81 STATUS POST OPEN REDUCTION WITH INTERNAL FIXATION (ORIF) OF FRACTURE OF ANKLE: ICD-10-CM

## 2017-07-06 DIAGNOSIS — S92.031A CLOSED DISPLACED AVULSION FRACTURE OF TUBEROSITY OF RIGHT CALCANEUS, INITIAL ENCOUNTER: Primary | ICD-10-CM

## 2017-07-06 DIAGNOSIS — Z98.890 STATUS POST OPEN REDUCTION WITH INTERNAL FIXATION (ORIF) OF FRACTURE OF ANKLE: ICD-10-CM

## 2017-07-06 PROCEDURE — 99024 POSTOP FOLLOW-UP VISIT: CPT | Performed by: PODIATRIST

## 2017-07-06 RX ORDER — OXYCODONE AND ACETAMINOPHEN 5; 325 MG/1; MG/1
1-2 TABLET ORAL EVERY 4 HOURS PRN
Qty: 25 TABLET | Refills: 0 | Status: SHIPPED | OUTPATIENT
Start: 2017-07-06 | End: 2017-08-03

## 2017-07-06 ASSESSMENT — PAIN SCALES - GENERAL: PAINLEVEL: NO PAIN (0)

## 2017-07-06 NOTE — PROGRESS NOTES
"Chief Complaint   Patient presents with     Surgical Followup     (4 weeks), NWB w/fracture boot; DOS 6/8/2017 - Open Reduction Internal Fixation Right Calcaneous; LOV 6/22/2017     BMI is normal.    Subjective: Patient reports for followup of DOS 6/8/2017 - Open Reduction Internal Fixation Right Calcaneous procedure.  Patient continues pain medication but is tapering off. Patient denies nausea vomiting fever or chills.     EXAM:  No apparent distress, patient is relaxed and comfortable.   Vitals: Temp 97.4  F (36.3  C) (Temporal)  Ht 5' 3\" (1.6 m)  Wt 145 lb (65.8 kg)  BMI 25.69 kg/m2  Vasc:  DP and PT pulses palpable bilateral, CFT immediate to all digits and surrounding the surgical site.  Neuro:  Light touch sensation intact about the digits. There is minimal to no appreciable numbness around the surgical incision with examination.  Derm: The incision is well approximated and dry. Sutures are intact.  No surrounding heat or significant edema. No bleeding. Skin flap has immediate CFT and no open drainage or bleeding. Minimal induration about the surgical site.  Musc: Adequate reduction of deformity identified. No complications.    Assessment:      ICD-10-CM    1. Closed displaced avulsion fracture of tuberosity of right calcaneus, initial encounter S92.031A PHYSICAL THERAPY REFERRAL   2. Status post open reduction with internal fixation (ORIF) of fracture of ankle Z96.7     Z87.81        Plan:    6/15/2017  The dressing was removed and surgical site inspected.   A bulky sterile dressing was applied with compression.   Patient instructed to reduce or discontinue pain medications as tolerated. Manage pain with reduced activity.   Continue ice and elevation as tolerated.   Continue restrictions of nonweightbearing.    6/22/2017  Refill atarax  Start keflex just a bit of erythema.   Fracture boot today to stay on 24/7  Can begin bathing in 5 days.   Follow-up in 2 weeks.    7/6/2017  OrderPT for active ROM and can " begin WB in boot at 6-8 weeks post op, progress as tolerated, and then no boot after 8 weeks po, PT PT review pre and post ORIF xrays  Follow-up in 3-4 weeks.  Refilled Percocet 25 tablets to keep sleeping through the night and not sleep during the day    Darnell Kincaid DPM

## 2017-07-06 NOTE — NURSING NOTE
"Temp 97.4  F (36.3  C) (Temporal)  Ht 5' 3\" (1.6 m)  Wt 145 lb (65.8 kg)  BMI 25.69 kg/m2  Estimated body mass index is 25.87 kg/(m^2) as calculated from the following:    Height as of 9/2/16: 1.638 m (5' 4.5\").    Weight as of this encounter: 69.4 kg (153 lb).  BP Readings from Last 1 Encounters:   09/06/16 121/80   ]  BP cuff size:  NA (Not Taken)  Do you feel safe in your environment?  Not asked  Does the patient need any medication refills today? Yes. Oxycodone.    Lilia Fuentes RN  [facility]  [phone]  7/6/2017 2:39 PM      "

## 2017-07-06 NOTE — MR AVS SNAPSHOT
"              After Visit Summary   7/6/2017    Kylah Britt    MRN: [id number]           Patient Information     Date Of Birth          1950        Visit Information        Provider Department      7/6/2017 2:30 PM Darnell Kincaid DPM [facility]        Today's Diagnoses     Closed displaced avulsion fracture of tuberosity of right calcaneus, initial encounter    -  1    Status post open reduction with internal fixation (ORIF) of fracture of ankle          Care Instructions    Begin PT          Follow-ups after your visit        Additional Services     PHYSICAL THERAPY REFERRAL       *This therapy referral will be filtered to a centralized scheduling office at [facility] and the patient will receive a call to schedule an appointment at a [place] location most convenient for them. *     [facility] provides Physical Therapy evaluation and treatment and many specialty services across the [place] system.  If requesting a specialty program, please choose from the list below.    If you have not heard from the scheduling office within 2 business days, please call [phone] for all locations, with the exception of [place], please call [phone].  Treatment: Evaluation & Treatment  Special Instructions/Modalities: eval and treat,  Special Programs: None    Please be aware that coverage of these services is subject to the terms and limitations of your health insurance plan.  Call member services at your health plan with any benefit or coverage questions.      **Note to Provider:  If you are referring outside of [place] for the therapy appointment, please list the name of the location in the \"special instructions\" above, print the referral and give to the patient to schedule the appointment.            PHYSICAL THERAPY REFERRAL       *This therapy referral will be filtered to a centralized scheduling office at [facility] and " "the patient will receive a call to schedule an appointment at a [place] location most convenient for them. *     [facility] provides Physical Therapy evaluation and treatment and many specialty services across the [place] system.  If requesting a specialty program, please choose from the list below.    If you have not heard from the scheduling office within 2 business days, please call [phone] for all locations, with the exception of [place], please call [phone].  Treatment: Evaluation & Treatment  Special Instructions/Modalities: eval and treat  Special Programs: None    Please be aware that coverage of these services is subject to the terms and limitations of your health insurance plan.  Call member services at your health plan with any benefit or coverage questions.      **Note to Provider:  If you are referring outside of [place] for the therapy appointment, please list the name of the location in the \"special instructions\" above, print the referral and give to the patient to schedule the appointment.                  Your next 10 appointments already scheduled     Jul 25, 2017  3:45 PM CDT   Ortho Treatment with Sadaf Dobbs PT   [facility] ([facility])    [address] [place] [address] [address] [place] [address]   [phone]            Jul 31, 2017  2:15 PM CDT   Ortho Treatment with Carmita Gomez PT   [facility] ([facility])    [address] [place] [address]   [phone]            Aug 03, 2017  2:30 PM CDT   Return Visit with Darnell Kincaid DPM   [facility] ([facility]    [address] [place] [address]   [phone]            Aug 07, 2017  2:30 PM CDT   Ortho Treatment with Sadaf Dobbs PT   [facility] ([facility])    [address] [place] [address]   [phone]            Aug 11, 2017 "  7:15 AM CDT   Ortho Treatment with Sadaf Kneisl, PT   [facility] ([facility])    [address] [place] [address]   [phone]            Aug 15, 2017  2:30 PM CDT   Ortho Treatment with Sadaf Kneisl, PT   [facility] ([facility])    [address] [place] [address]   [phone]            Aug 18, 2017  3:30 PM CDT   Ortho Treatment with Sadaf Kneisl, PT   [facility] ([facility])    [address] [place] [address]   [phone]            Aug 21, 2017  2:30 PM CDT   Ortho Treatment with Sadaf Kneisl, PT   [facility] ([facility])    [address] [place] [address]   [phone]            Aug 23, 2017  3:00 PM CDT   Ortho Treatment with Sadaf Kneisl, PT   [facility] ([facility])    [address] [place] [address]   [phone]            Aug 29, 2017  3:15 PM CDT   Ortho Treatment with Sadaf Kneisl, PT   [facility] ([facility])    [address] [place] [address]   [phone]              Who to contact     If you have questions or need follow up information about today's clinic visit or your schedule please contact [facility] directly at [phone].  Normal or non-critical lab and imaging results will be communicated to you by MyChart, letter or phone within 4 business days after the clinic has received the results. If you do not hear from us within 7 days, please contact the clinic through MyChart or phone. If you have a critical or abnormal lab result, we will notify you by phone as soon as possible.  Submit refill requests through [email or web address] or call your pharmacy and they will forward the refill request to us. Please allow 3 business days for your refill to be completed.          Additional  "Information About Your Visit        MyChart Information     [employer] lets you send messages to your doctor, view your test results, renew your prescriptions, schedule appointments and more. To sign up, go to [email or web address] . Click on \"Log in\" on the left side of the screen, which will take you to the Welcome page. Then click on \"Sign up Now\" on the right side of the page.     You will be asked to enter the access code listed below, as well as some personal information. Please follow the directions to create your username and password.     Your access code is: [email or web address]  Expires: 2017 11:20 PM     Your access code will  in 90 days. If you need help or a new code, please call your [facility] clinic or [phone].        Care EveryWhere ID     This is your Care EveryWhere ID. This could be used by other organizations to access your [place] medical records  [id number]        Your Vitals Were     Temperature Height BMI (Body Mass Index)             97.4  F (36.3  C) (Temporal) 5' 3\" (1.6 m) 25.69 kg/m2          Blood Pressure from Last 3 Encounters:   17 126/73   14 142/75   04/21/10 128/74    Weight from Last 3 Encounters:   17 145 lb (65.8 kg)   17 145 lb (65.8 kg)   06/15/17 145 lb (65.8 kg)              We Performed the Following     PHYSICAL THERAPY REFERRAL     PHYSICAL THERAPY REFERRAL          Where to get your medicines      Some of these will need a paper prescription and others can be bought over the counter.  Ask your nurse if you have questions.     Bring a paper prescription for each of these medications     oxyCODONE-acetaminophen 5-325 MG per tablet          Primary Care Provider Office Phone #    [facility] [phone]       No address on file        Equal Access to Services     JOSE PITTMAN: Shantanu Gan, waalexander luqadaha, qaybta kaalmada harleen, edvin pittman. So waryan " [phone].    ATENCIÓN: Si luigi troy, tiene a anand disposición servicios gratuitos de asistencia lingüística. Remigio hall [phone].    We comply with applicable federal civil rights laws and [place] laws. We do not discriminate on the basis of race, color, national origin, age, disability sex, sexual orientation or gender identity.            Thank you!     Thank you for choosing [facility]  for your care. Our goal is always to provide you with excellent care. Hearing back from our patients is one way we can continue to improve our services. Please take a few minutes to complete the written survey that you may receive in the mail after your visit with us. Thank you!             Your Updated Medication List - Protect others around you: Learn how to safely use, store and throw away your medicines at www.disposemymeds.org.          This list is accurate as of: 7/6/17 11:59 PM.  Always use your most recent med list.                   Brand Name Dispense Instructions for use Diagnosis    ALEVE PO      Take 220 mg by mouth 2 times daily as needed        BABY ASPIRIN PO      Take 81 mg by mouth daily        CALCIUM 600 PO      Take 1 tablet by mouth every other day        cephALEXin 500 MG capsule    KEFLEX    40 capsule    Take 1 capsule (500 mg) by mouth 4 times daily    Closed displaced fracture of body of left calcaneus, initial encounter       hydrOXYzine 10 MG tablet    ATARAX    30 tablet    Take 1 tablet (10 mg) by mouth every 6 hours as needed for itching (and nausea)    Closed displaced fracture of body of left calcaneus, initial encounter       Multiple vitamin Caps      daily        * order for DME     1 Units    One pair of under arm crutches    Closed displaced fracture of body of left calcaneus, initial encounter       * order for DME     1 Units    One-rollabout or bent knee scooter/wheeled walker with bent knee for non weight bearing status  Please call [phone] to schedule  delivery of Roll About.    Status post open reduction with internal fixation (ORIF) of fracture of ankle       oxyCODONE-acetaminophen 5-325 MG per tablet    PERCOCET    25 tablet    Take 1-2 tablets by mouth every 4 hours as needed for pain (moderate to severe)        senna-docusate 8.6-50 MG per tablet    SENOKOT-S;PERICOLACE    30 tablet    Take 1-2 tablets by mouth 2 times daily Take while on oral narcotics to prevent or treat constipation.    Closed displaced fracture of body of left calcaneus, initial encounter       VITAMIN D3 PO      Take 2,000 Units by mouth every other day        VITAMIN K PO      Take 1,000 mg by mouth every other day        * Notice:  This list has 2 medication(s) that are the same as other medications prescribed for you. Read the directions carefully, and ask your doctor or other care provider to review them with you.

## 2017-07-17 ENCOUNTER — HOSPITAL ENCOUNTER (OUTPATIENT)
Dept: PHYSICAL THERAPY | Facility: CLINIC | Age: 67
Setting detail: THERAPIES SERIES
End: 2017-07-17
Attending: PODIATRIST
Payer: MEDICARE

## 2017-07-17 PROCEDURE — 97162 PT EVAL MOD COMPLEX 30 MIN: CPT | Mod: GP | Performed by: PHYSICAL THERAPIST

## 2017-07-17 PROCEDURE — G8978 MOBILITY CURRENT STATUS: HCPCS | Mod: GP,CL | Performed by: PHYSICAL THERAPIST

## 2017-07-17 PROCEDURE — 40000718 ZZHC STATISTIC PT DEPARTMENT ORTHO VISIT: Performed by: PHYSICAL THERAPIST

## 2017-07-17 PROCEDURE — 97110 THERAPEUTIC EXERCISES: CPT | Mod: GP | Performed by: PHYSICAL THERAPIST

## 2017-07-17 PROCEDURE — G8979 MOBILITY GOAL STATUS: HCPCS | Mod: GP,CH | Performed by: PHYSICAL THERAPIST

## 2017-07-17 PROCEDURE — 97116 GAIT TRAINING THERAPY: CPT | Mod: GP | Performed by: PHYSICAL THERAPIST

## 2017-07-18 NOTE — PROGRESS NOTES
[facility]    OUTPATIENT PHYSICAL THERAPY ORTHOPEDIC EVALUATION  PLAN OF TREATMENT FOR OUTPATIENT REHABILITATION  (COMPLETE FOR INITIAL CLAIMS ONLY)  Patient's Last Name, First Name, M.I.  YOB: 1950  Kylah Britt    Provider s Name:  [facility]   Medical Record No.  [id number]   Start of Care Date:  07/17/17   Onset Date:  06/08/17   Type:     _X__PT   ___OT   ___SLP Medical Diagnosis:  Closed displaced fracture of body of right calcaneus, initial encounter; status post open reduction with internal fixation of fracture of ankle     PT Diagnosis:  R ankle decreased ROM, strength, stability, and mobility secondary to fracture to R calcaneous and post operative ORIF of R ankle.   Visits from SOC:  1      _________________________________________________________________________________  Plan of Treatment/Functional Goals:  balance training, gait training, joint mobilization, manual therapy, neuromuscular re-education, ROM, stretching, strengthening, transfer training  Skilled services to improve ROM, strength, mobility, and stability in order to return pt to OF.     As needed for symptom relief.  Goals  Goal Identifier: #1  Goal Description: Pt will demonstrate R ankle AROM to be WNL and pain free in order to begin standing and walking activities.  Target Date: 08/16/17    Goal Identifier: #2  Goal Description: Pt will demonstrate ability to stand on R LE with normal equal weight shift in order to have stability to stand while showering.  Target Date: 08/31/17    Goal Identifier: #3  Goal Description: Pt will demonstrate ability to negotiate 4 stairs without assistive device in order to enter the home and navigate the home.  Target Date: 09/15/17    Goal Identifier: #4  Goal Description: Pt will demonstrate ability to ambulate without assistive and symmetrical heel to toe gait pattern for 500 ft.  Target Date: 09/15/17    Goal Identifier: #5  Goal  Description: Pt will demonstrate ability to return to walking 2 miles without increased symptoms and proper gait pattern in order to return to PLOF.  Target Date: 11/14/17    Therapy Frequency:  2 times/Week  Predicted Duration of Therapy Intervention:  8 weeks.    Sadaf Dobbs, PT                 I CERTIFY THE NEED FOR THESE SERVICES FURNISHED UNDER        THIS PLAN OF TREATMENT AND WHILE UNDER MY CARE     (Physician co-signature of this document indicates review and certification of the therapy plan).                       Certification Date From:  07/17/17   Certification Date To:  09/15/17    Referring Provider:  Dr. Kincaid    Initial Assessment        See Epic Evaluation Start of Care Date: 07/17/17             Thank you for your referral.    Sadaf Dobbs, EM[place], DPT  [facility]  [phone]

## 2017-07-18 NOTE — PROGRESS NOTES
07/17/17 1432   General Information   Type of Visit Initial OP Ortho PT Evaluation   Start of Care Date 07/17/17   Referring Physician Dr. Kincaid   Patient/Family Goals Statement Get back to 2x/week yoga and walking 2 miles a day.   Orders Evaluate and Treat   Date of Order 07/06/17   Insurance Type Medicare;[employer]   Medical Diagnosis Closed displaced fracture of body of right calcaneus, initial encounter; status post open reduction with internal fixation of fracture of ankle   Surgical/Medical history reviewed Yes   Precautions/Limitations no known precautions/limitations   Weight-Bearing Status - LUE full weight-bearing   Weight-Bearing Status - RUE full weight-bearing   Weight-Bearing Status - LLE full weight-bearing   Weight-Bearing Status - RLE nonweight-bearing   General Information Comments Per MD, walking in boot at 6-8 weeks, after 8 weeks walking without boot.       Present No   Body Part(s)   Body Part(s) Ankle/Foot   Presentation and Etiology   Pertinent history of current problem (include personal factors and/or comorbidities that impact the POC) Fell off the deck steps and landed backwards.  She wasn't sure why she fell.  Pt states she went into the ER that day and underwent surgery that night.  Pt is 7 weeks ORIF to the R calcaneal repair.  She ambulates with a knee scooter and is currently NWB on the R side.  Pt states she uses a crutch and the retaining wall to get up the 4 steps into her home.  Sits to get dressed and shower.  Pt reports she lives in a house and usually participates in yoga 2 days a week and walks 2 miles a day.   Impairments A. Pain;D. Decreased ROM;F. Decreased strength and endurance;H. Impaired gait;G. Impaired balance;E. Decreased flexibility;C. Swelling;B. Decreased WB tolerance   Functional Limitations perform activities of daily living   Symptom Location Lateral R calcaneous.   How/Where did it occur With a fall;At home   Onset date of current  episode/exacerbation 06/08/17   Chronicity New   Pain rating (0-10 point scale) Best (/10);Worst (/10)   Best (/10) 0/10   Worst (/10) Last couple weeks 5/10   Pain quality B. Dull;C. Aching   Frequency of pain/symptoms B. Intermittent   Pain/symptoms are: Worse during the day   Pain/symptoms exacerbated by M. Other   Pain exacerbation comment Pt is NWB at this time and not able to report ability to determine what is painful yet.  She reports muscle cramping at night in the upper thigh.   Pain/symptoms eased by K. Other   Pain eased by comment Pain medications   Progression of symptoms since onset: Improved   Current / Previous Interventions   Diagnostic Tests: X-ray   X-ray Results Results   X-ray results Pleased see findings in chart.   Prior Level of Function   Prior Level of Function-Mobility Independent   Prior Level of Function-ADLs Independent   Current Level of Function   Current Community Support Family/friend caregiver   Patient role/employment history F. Retired   Living environment House[facility]   Home/community accessibility 4 steps to enter the home.   Current equipment-Gait/Locomotion Axillary crutches;Other  (Kneeling scooter)   Current equipment-ADL Shower/tub chair   Fall Risk Screen   Fall screen completed by PT   Per patient - Fall 2 or more times in past year? No   Per patient - Fall with injury in past year? Yes   Is patient a fall risk? No   Fall screen comments Pt fell off step of deck and landed on her heel.   Functional Scales   Functional Scales Other   Other Scales  LEFS   Ankle/Foot Objective Findings   Side (if bilateral, select both right and left) Right   Observation No acute distress   Integumentary Incision is healing.  2 small scabs along the incision.   Posture Forward head position;Protracted shoulders   Gait/Locomotion Currently not assessed due to kneeling scooter.  NWB.   Balance/ Proprioception (Single Leg Stance) Able to balance well on L LE with transfers.   Ankle/Foot  Strength Comments Did not assess strength to R ankle due to no resistance to ankle just yet.   Ankle/Foot Special Tests Comments Did not assess pt is post operative.   Palpation Tenderness along R ankle incision sites.  Slight swelling around lateral malleolus.   Accessory Motion/Joint Mobility Did not assess at this time due to post operative.   Right DF (Knee Ext) AROM 0 degrees   Right PF AROM 40 degrees   Right Calcanceal Inversion AROM 20 degrees   Right Calcaneal Eversion AROM 15 degrees   Right Great Toe Flexion AROM WNL   Right Gastroc (in WB) Flexibility Tightness   Right Soleus (in WB) Flexibility Tightness   Planned Therapy Interventions   Planned Therapy Interventions balance training;gait training;joint mobilization;manual therapy;neuromuscular re-education;ROM;stretching;strengthening;transfer training   Planned Therapy Interventions Comment Skilled services to improve ROM, strength, mobility, and stability in order to return pt to PLOF.   Planned Modality Interventions   Planned Modality Interventions Comments As needed for symptom relief.   Clinical Impression   Criteria for Skilled Therapeutic Interventions Met yes, treatment indicated   PT Diagnosis R ankle decreased ROM, strength, stability, and mobility secondary to fracture to R calcaneous and post operative ORIF of R ankle.   Influenced by the following impairments Restrictions, ROM, stability   Functional limitations due to impairments Walking, stairs, standing   Clinical Presentation Evolving/Changing   Clinical Presentation Rationale Pt is a 66 year old female 7 weeks post op R ORIF to the calcaneous.  Pt is currently NWB on R side and is not able to walk, stand, or negotiate stairs without an assistive device or assistance by family.  No significant PMH that will affect pt's POC. With her restrictions, she has increased fear and currently complains of spasming in the thigh at night.  Pt will benefit from skilled PT services to improve ROM,  strength, stability, and mobility to return to PLOF.   Clinical Decision Making (Complexity) Moderate complexity   Therapy Frequency 2 times/Week   Predicted Duration of Therapy Intervention (days/wks) 8 weeks.   Risk & Benefits of therapy have been explained Yes   Patient, Family & other staff in agreement with plan of care Yes   Education Assessment   Preferred Learning Style Listening;Reading;Demonstration;Pictures/video   Barriers to Learning No barriers   ORTHO GOALS   PT Ortho Eval Goals 1;2;3;4;5   Ortho Goal 1   Goal Identifier #1   Goal Description Pt will demonstrate R ankle AROM to be WNL and pain free in order to begin standing and walking activities.   Target Date 08/16/17   Ortho Goal 2   Goal Identifier #2   Goal Description Pt will demonstrate ability to stand on R LE with normal equal weight shift in order to have stability to stand while showering.   Target Date 08/31/17   Ortho Goal 3   Goal Identifier #3   Goal Description Pt will demonstrate ability to negotiate 4 stairs without assistive device in order to enter the home and navigate the home.   Target Date 09/15/17   Ortho Goal 4   Goal Identifier #4   Goal Description Pt will demonstrate ability to ambulate without assistive and symmetrical heel to toe gait pattern for 500 ft.   Target Date 09/15/17   Ortho Goal 5   Goal Identifier #5   Goal Description Pt will demonstrate ability to return to walking 2 miles without increased symptoms and proper gait pattern in order to return to PLOF.   Target Date 11/14/17   Total Evaluation Time   Total Evaluation Time 30   Therapy Certification   Certification date from 07/17/17   Certification date to 09/15/17   Medical Diagnosis Closed displaced fracture of body of right calcaneus, initial encounter; status post open reduction with internal fixation of fracture of ankle     Thank you for your referral.    Sadaf Dobbs, PT, DPT  [facility]  [phone]

## 2017-07-25 ENCOUNTER — HOSPITAL ENCOUNTER (OUTPATIENT)
Dept: PHYSICAL THERAPY | Facility: CLINIC | Age: 67
Setting detail: THERAPIES SERIES
End: 2017-07-25
Attending: PODIATRIST
Payer: MEDICARE

## 2017-07-25 PROCEDURE — 97116 GAIT TRAINING THERAPY: CPT | Mod: GP | Performed by: PHYSICAL THERAPIST

## 2017-07-25 PROCEDURE — 40000718 ZZHC STATISTIC PT DEPARTMENT ORTHO VISIT: Performed by: PHYSICAL THERAPIST

## 2017-07-25 PROCEDURE — 97110 THERAPEUTIC EXERCISES: CPT | Mod: GP | Performed by: PHYSICAL THERAPIST

## 2017-07-31 ENCOUNTER — HOSPITAL ENCOUNTER (OUTPATIENT)
Dept: PHYSICAL THERAPY | Facility: CLINIC | Age: 67
Setting detail: THERAPIES SERIES
End: 2017-07-31
Attending: PODIATRIST
Payer: MEDICARE

## 2017-07-31 PROCEDURE — 40000718 ZZHC STATISTIC PT DEPARTMENT ORTHO VISIT: Performed by: PHYSICAL THERAPIST

## 2017-07-31 PROCEDURE — 97116 GAIT TRAINING THERAPY: CPT | Mod: GP | Performed by: PHYSICAL THERAPIST

## 2017-07-31 PROCEDURE — 97110 THERAPEUTIC EXERCISES: CPT | Mod: GP | Performed by: PHYSICAL THERAPIST

## 2017-08-01 ENCOUNTER — HOSPITAL ENCOUNTER (OUTPATIENT)
Dept: PHYSICAL THERAPY | Facility: CLINIC | Age: 67
Setting detail: THERAPIES SERIES
End: 2017-08-01
Attending: PODIATRIST
Payer: MEDICARE

## 2017-08-01 PROCEDURE — 97110 THERAPEUTIC EXERCISES: CPT | Mod: GP | Performed by: PHYSICAL THERAPIST

## 2017-08-01 PROCEDURE — 40000718 ZZHC STATISTIC PT DEPARTMENT ORTHO VISIT: Performed by: PHYSICAL THERAPIST

## 2017-08-03 ENCOUNTER — RADIANT APPOINTMENT (OUTPATIENT)
Dept: GENERAL RADIOLOGY | Facility: CLINIC | Age: 67
End: 2017-08-03
Attending: PODIATRIST
Payer: COMMERCIAL

## 2017-08-03 ENCOUNTER — OFFICE VISIT (OUTPATIENT)
Dept: PODIATRY | Facility: CLINIC | Age: 67
End: 2017-08-03
Payer: COMMERCIAL

## 2017-08-03 VITALS — TEMPERATURE: 97.3 F | WEIGHT: 145 LBS | BODY MASS INDEX: 25.69 KG/M2 | HEIGHT: 63 IN

## 2017-08-03 DIAGNOSIS — S92.031A CLOSED DISPLACED AVULSION FRACTURE OF TUBEROSITY OF RIGHT CALCANEUS, INITIAL ENCOUNTER: ICD-10-CM

## 2017-08-03 DIAGNOSIS — Z98.890 STATUS POST OPEN REDUCTION WITH INTERNAL FIXATION (ORIF) OF FRACTURE OF ANKLE: ICD-10-CM

## 2017-08-03 DIAGNOSIS — Z87.81 STATUS POST OPEN REDUCTION WITH INTERNAL FIXATION (ORIF) OF FRACTURE OF ANKLE: ICD-10-CM

## 2017-08-03 DIAGNOSIS — S92.031A CLOSED DISPLACED AVULSION FRACTURE OF TUBEROSITY OF RIGHT CALCANEUS, INITIAL ENCOUNTER: Primary | ICD-10-CM

## 2017-08-03 PROCEDURE — 99024 POSTOP FOLLOW-UP VISIT: CPT | Performed by: PODIATRIST

## 2017-08-03 PROCEDURE — 73650 X-RAY EXAM OF HEEL: CPT | Mod: TC

## 2017-08-03 RX ORDER — OXYCODONE AND ACETAMINOPHEN 5; 325 MG/1; MG/1
1 TABLET ORAL EVERY 6 HOURS PRN
Qty: 30 TABLET | Refills: 0 | Status: SHIPPED | OUTPATIENT
Start: 2017-08-03 | End: 2022-03-07

## 2017-08-03 ASSESSMENT — PAIN SCALES - GENERAL: PAINLEVEL: SEVERE PAIN (7)

## 2017-08-03 NOTE — MR AVS SNAPSHOT
After Visit Summary   8/3/2017    Kylah Britt    MRN: [id number]           Patient Information     Date Of Birth          1950        Visit Information        Provider Department      8/3/2017 2:30 PM Darnell Kincaid DPM [facility]        Today's Diagnoses     Closed displaced avulsion fracture of tuberosity of right calcaneus, initial encounter    -  1    Status post open reduction with internal fixation (ORIF) of fracture of ankle          Care Instructions    Progress out of the fracture boot as tolerated  Continue PT as tolerated  Follow-up one month          Follow-ups after your visit        Your next 10 appointments already scheduled     Aug 07, 2017  2:30 PM CDT   Ortho Treatment with Sadaf Zurisl, PT   [facility] ([facility])    [address] [place] [address] Dr Alena CISNEROS [address]   [phone]            Aug 11, 2017  7:15 AM CDT   Ortho Treatment with Sadaf Zurisl, PT   [facility] ([facility])    [address] [place] [address] Dr Alena CISNEROS [address]   [phone]            Aug 15, 2017  2:30 PM CDT   Ortho Treatment with Sadaf Jeffreyl, PT   [facility] ([facility])    [address] [place] [address] Dr Alena CISNEROS [address]   [phone]            Aug 18, 2017  3:30 PM CDT   Ortho Treatment with Sadaf Jeffreyl, PT   [facility] ([facility])    [address] [place] [address] Dr Alena CISNEROS [address]   [phone]            Aug 21, 2017  2:30 PM CDT   Ortho Treatment with Sadaf Jeffreyl, PT   [facility] ([facility])    [address] [place] [address] Dr Alena CISNEROS [address]   [phone]            Aug 23, 2017  3:00 PM CDT   Ortho Treatment with Sadaf Rinku, PT   [facility] ([facility])    [address] [place] [address] Dr Alena CISNEROS [address]   [phone]            Aug 29, 2017  3:15 PM CDT  "  Ortho Treatment with Sadaf Rinku, PT   [facility] ([facility])    [address] Alena CISNEROS [id number] [phone]            Sep 01, 2017  3:30 PM CDT   Ortho Treatment with Sadaf Rinku, PT   [facility] ([facility])    [address] Alena CISNEROS [id number] [phone]              Who to contact     If you have questions or need follow up information about today's clinic visit or your schedule please contact [facility] directly at [phone].  Normal or non-critical lab and imaging results will be communicated to you by MyChart, letter or phone within 4 business days after the clinic has received the results. If you do not hear from us within 7 days, please contact the clinic through [email or web address] or phone. If you have a critical or abnormal lab result, we will notify you by phone as soon as possible.  Submit refill requests through [employer] or call your pharmacy and they will forward the refill request to us. Please allow 3 business days for your refill to be completed.          Additional Information About Your Visit        [employer] Information     [employer] lets you send messages to your doctor, view your test results, renew your prescriptions, schedule appointments and more. To sign up, go to [email or web address] . Click on \"Log in\" on the left side of the screen, which will take you to the Welcome page. Then click on \"Sign up Now\" on the right side of the page.     You will be asked to enter the access code listed below, as well as some personal information. Please follow the directions to create your username and password.     Your access code is: [email or web address]  Expires: 2017 11:20 PM     Your access code will  in 90 days. If you need help or a new code, please call your [facility] clinic or [phone].        Care EveryWhere ID     This is your Care EveryWhere ID. This could be used by " "other organizations to access your [facility] medical records  [id number]        Your Vitals Were     Temperature Height BMI (Body Mass Index)             97.3  F (36.3  C) (Temporal) 5' 3\" (1.6 m) 25.69 kg/m2          Blood Pressure from Last 3 Encounters:   06/09/17 126/73   12/18/14 142/75   04/21/10 128/74    Weight from Last 3 Encounters:   08/03/17 145 lb (65.8 kg)   07/06/17 145 lb (65.8 kg)   06/22/17 145 lb (65.8 kg)                 Today's Medication Changes          These changes are accurate as of: 8/3/17  3:28 PM.  If you have any questions, ask your nurse or doctor.               These medicines have changed or have updated prescriptions.        Dose/Directions    oxyCODONE-acetaminophen 5-325 MG per tablet   Commonly known as:  PERCOCET   This may have changed:    - how much to take  - when to take this   Used for:  Closed displaced avulsion fracture of tuberosity of right calcaneus, initial encounter, Status post open reduction with internal fixation (ORIF) of fracture of ankle   Changed by:  Darnell Kincaid DPM        Dose:  1 tablet   Take 1 tablet by mouth every 6 hours as needed for pain (moderate to severe)   Quantity:  30 tablet   Refills:  0            Where to get your medicines      Some of these will need a paper prescription and others can be bought over the counter.  Ask your nurse if you have questions.     Bring a paper prescription for each of these medications     oxyCODONE-acetaminophen 5-325 MG per tablet                Primary Care Provider Office Phone #    [facility] [phone]       No address on file        Equal Access to Services     JOSE BARDALES : Hadii matthew reno [place], waaxda luqadaha, qaybta kaalmada edvin canseco idikai pittman. So [facility] [phone].    ATENCIÓN: Si habla español, tiene a anand disposición servicios gratuitos de asistencia lingüística. Llame al [phone].    We comply with applicable federal " civil rights laws and [place] laws. We do not discriminate on the basis of race, color, national origin, age, disability sex, sexual orientation or gender identity.            Thank you!     Thank you for choosing [facility]  for your care. Our goal is always to provide you with excellent care. Hearing back from our patients is one way we can continue to improve our services. Please take a few minutes to complete the written survey that you may receive in the mail after your visit with us. Thank you!             Your Updated Medication List - Protect others around you: Learn how to safely use, store and throw away your medicines at www.disposemymeds.org.          This list is accurate as of: 8/3/17  3:28 PM.  Always use your most recent med list.                   Brand Name Dispense Instructions for use Diagnosis    ALEVE PO      Take 220 mg by mouth 2 times daily as needed        BABY ASPIRIN PO      Take 81 mg by mouth daily        CALCIUM 600 PO      Take 1 tablet by mouth every other day        hydrOXYzine 10 MG tablet    ATARAX    30 tablet    Take 1 tablet (10 mg) by mouth every 6 hours as needed for itching (and nausea)    Closed displaced fracture of body of left calcaneus, initial encounter       Multiple vitamin Caps      daily        * order for DME     1 Units    One pair of under arm crutches    Closed displaced fracture of body of left calcaneus, initial encounter       * order for DME     1 Units    One-rollabout or bent knee scooter/wheeled walker with bent knee for non weight bearing status  Please call [phone] to schedule delivery of Roll About.    Status post open reduction with internal fixation (ORIF) of fracture of ankle       oxyCODONE-acetaminophen 5-325 MG per tablet    PERCOCET    30 tablet    Take 1 tablet by mouth every 6 hours as needed for pain (moderate to severe)    Closed displaced avulsion fracture of tuberosity of right calcaneus, initial encounter, Status  post open reduction with internal fixation (ORIF) of fracture of ankle       senna-docusate 8.6-50 MG per tablet    SENOKOT-S;PERICOLACE    30 tablet    Take 1-2 tablets by mouth 2 times daily Take while on oral narcotics to prevent or treat constipation.    Closed displaced fracture of body of left calcaneus, initial encounter       VITAMIN D3 PO      Take 2,000 Units by mouth every other day        VITAMIN K PO      Take 1,000 mg by mouth every other day        * Notice:  This list has 2 medication(s) that are the same as other medications prescribed for you. Read the directions carefully, and ask your doctor or other care provider to review them with you.

## 2017-08-03 NOTE — LETTER
"    8/3/2017         RE: Kylah Britt  [address]  [place] [place] [address]        Dear Colleague,    Thank you for referring your patient, Kylah Britt, to the [facility]. Please see a copy of my visit note below.    Chief Complaint   Patient presents with     Surgical Followup     (8 weeks), WB w/fracture boot and aid of 1 crutch, pain 8, PT; DOS 6/8/2017 - Open Reduction Internal Fixation Right Calcaneous; LOV 7/6/2017     BMI is normal.    Subjective: Patient reports for followup of DOS 6/8/2017 - Open Reduction Internal Fixation Right Calcaneous procedure.  Patient continues pain medication but is tapering off. Patient denies nausea vomiting fever or chills.     EXAM:  No apparent distress, patient is relaxed and comfortable.   Vitals: Temp 97.3  F (36.3  C) (Temporal)  Ht 5' 3\" (1.6 m)  Wt 145 lb (65.8 kg)  BMI 25.69 kg/m2  Vasc:  DP and PT pulses palpable bilateral, CFT immediate to all digits and surrounding the surgical site.  Neuro:  Light touch sensation intact about the digits. There is minimal to no appreciable numbness around the surgical incision with examination.  Derm: The incision is healed at this time  Musc: Adequate reduction of deformity identified. No complications. Mild discomfort at direct palpation of the Achilles insertion. No complications of the incision    Radiographs: 2 views 8/3/2017 demonstrate partial union of the fracture without complication. Hardware in good prepare and alignment at this time    Assessment:      ICD-10-CM    1. Closed displaced avulsion fracture of tuberosity of right calcaneus, initial encounter S92.031A XR Calcaneus Right G/E 2 Views     oxyCODONE-acetaminophen (PERCOCET) 5-325 MG per tablet   2. Status post open reduction with internal fixation (ORIF) of fracture of ankle Z96.7 XR Calcaneus Right G/E 2 Views    Z87.81 oxyCODONE-acetaminophen (PERCOCET) 5-325 MG per tablet       Plan:    6/15/2017  The dressing was removed and " surgical site inspected.   A bulky sterile dressing was applied with compression.   Patient instructed to reduce or discontinue pain medications as tolerated. Manage pain with reduced activity.   Continue ice and elevation as tolerated.   Continue restrictions of nonweightbearing.    6/22/2017  Refill atarax  Start keflex just a bit of erythema.   Fracture boot today to stay on 24/7  Can begin bathing in 5 days.   Follow-up in 2 weeks.    7/6/2017  OrderPT for active ROM and can begin WB in boot at 6-8 weeks post op, progress as tolerated, and then no boot after 8 weeks po, PT PT review pre and post ORIF xrays  Follow-up in 3-4 weeks.  Refilled Percocet 25 tablets to keep sleeping through the night and not sleep during the day    8/3/2017  Reorder narcotics  Start coming out of boot in home as tolerated  She may still use crutch or cane or walker.    Continue PT until no difference in ROM, strength and balance.       8/3/2017   interpreted radiographs  Refill pain medication for after PT  Continue PT  May start progressing out of the boot for very short periods of time as tolerated under the guidance of PT  Follow up in one month      Darnell Kincaid DPM        Again, thank you for allowing me to participate in the care of your patient.        Sincerely,        Darnell Kincaid DPM

## 2017-08-03 NOTE — NURSING NOTE
"Chief Complaint   Patient presents with     Surgical Followup     (8 weeks), WB w/fracture boot and aid of 1 crutch, pain 8, PT; DOS 6/8/2017 - Open Reduction Internal Fixation Right Calcaneous; LOV 7/6/2017       Initial Temp 97.3  F (36.3  C) (Temporal)  Ht 5' 3\" (1.6 m)  Wt 145 lb (65.8 kg)  BMI 25.69 kg/m2 Estimated body mass index is 25.69 kg/(m^2) as calculated from the following:    Height as of this encounter: 5' 3\" (1.6 m).    Weight as of this encounter: 145 lb (65.8 kg).  BP completed using cuff size: NA (Not Taken)  Medication Reconciliation: complete    Candace Parmar CMA, August 3, 2017    "

## 2017-08-03 NOTE — PROGRESS NOTES
"Chief Complaint   Patient presents with     Surgical Followup     (8 weeks), WB w/fracture boot and aid of 1 crutch, pain 8, PT; DOS 6/8/2017 - Open Reduction Internal Fixation Right Calcaneous; LOV 7/6/2017     BMI is normal.    Subjective: Patient reports for followup of DOS 6/8/2017 - Open Reduction Internal Fixation Right Calcaneous procedure.  Patient continues pain medication but is tapering off. Patient denies nausea vomiting fever or chills.     EXAM:  No apparent distress, patient is relaxed and comfortable.   Vitals: Temp 97.3  F (36.3  C) (Temporal)  Ht 5' 3\" (1.6 m)  Wt 145 lb (65.8 kg)  BMI 25.69 kg/m2  Vasc:  DP and PT pulses palpable bilateral, CFT immediate to all digits and surrounding the surgical site.  Neuro:  Light touch sensation intact about the digits. There is minimal to no appreciable numbness around the surgical incision with examination.  Derm: The incision is healed at this time  Musc: Adequate reduction of deformity identified. No complications. Mild discomfort at direct palpation of the Achilles insertion. No complications of the incision    Radiographs: 2 views 8/3/2017 demonstrate partial union of the fracture without complication. Hardware in good prepare and alignment at this time    Assessment:      ICD-10-CM    1. Closed displaced avulsion fracture of tuberosity of right calcaneus, initial encounter S92.031A XR Calcaneus Right G/E 2 Views     oxyCODONE-acetaminophen (PERCOCET) 5-325 MG per tablet   2. Status post open reduction with internal fixation (ORIF) of fracture of ankle Z96.7 XR Calcaneus Right G/E 2 Views    Z87.81 oxyCODONE-acetaminophen (PERCOCET) 5-325 MG per tablet       Plan:    6/15/2017  The dressing was removed and surgical site inspected.   A bulky sterile dressing was applied with compression.   Patient instructed to reduce or discontinue pain medications as tolerated. Manage pain with reduced activity.   Continue ice and elevation as tolerated.   Continue " restrictions of nonweightbearing.    6/22/2017  Refill atarax  Start keflex just a bit of erythema.   Fracture boot today to stay on 24/7  Can begin bathing in 5 days.   Follow-up in 2 weeks.    7/6/2017  OrderPT for active ROM and can begin WB in boot at 6-8 weeks post op, progress as tolerated, and then no boot after 8 weeks po, PT PT review pre and post ORIF xrays  Follow-up in 3-4 weeks.  Refilled Percocet 25 tablets to keep sleeping through the night and not sleep during the day    8/3/2017  Reorder narcotics  Start coming out of boot in home as tolerated  She may still use crutch or cane or walker.    Continue PT until no difference in ROM, strength and balance.       8/3/2017   interpreted radiographs  Refill pain medication for after PT  Continue PT  May start progressing out of the boot for very short periods of time as tolerated under the guidance of PT  Follow up in one month      Darnell Kincaid DPM

## 2017-08-07 ENCOUNTER — HOSPITAL ENCOUNTER (OUTPATIENT)
Dept: PHYSICAL THERAPY | Facility: CLINIC | Age: 67
Setting detail: THERAPIES SERIES
End: 2017-08-07
Attending: PODIATRIST
Payer: MEDICARE

## 2017-08-07 PROCEDURE — 40000718 ZZHC STATISTIC PT DEPARTMENT ORTHO VISIT: Performed by: PHYSICAL THERAPIST

## 2017-08-07 PROCEDURE — 97116 GAIT TRAINING THERAPY: CPT | Mod: GP | Performed by: PHYSICAL THERAPIST

## 2017-08-07 PROCEDURE — 97110 THERAPEUTIC EXERCISES: CPT | Mod: GP | Performed by: PHYSICAL THERAPIST

## 2017-08-11 ENCOUNTER — HOSPITAL ENCOUNTER (OUTPATIENT)
Dept: PHYSICAL THERAPY | Facility: CLINIC | Age: 67
Setting detail: THERAPIES SERIES
End: 2017-08-11
Attending: PODIATRIST
Payer: MEDICARE

## 2017-08-11 PROCEDURE — 97014 ELECTRIC STIMULATION THERAPY: CPT | Mod: GP | Performed by: PHYSICAL THERAPIST

## 2017-08-11 PROCEDURE — 40000718 ZZHC STATISTIC PT DEPARTMENT ORTHO VISIT: Performed by: PHYSICAL THERAPIST

## 2017-08-11 PROCEDURE — 97530 THERAPEUTIC ACTIVITIES: CPT | Mod: GP | Performed by: PHYSICAL THERAPIST

## 2017-08-15 ENCOUNTER — HOSPITAL ENCOUNTER (OUTPATIENT)
Dept: PHYSICAL THERAPY | Facility: CLINIC | Age: 67
Setting detail: THERAPIES SERIES
End: 2017-08-15
Attending: PODIATRIST
Payer: MEDICARE

## 2017-08-15 PROCEDURE — 97110 THERAPEUTIC EXERCISES: CPT | Mod: GP | Performed by: PHYSICAL THERAPIST

## 2017-08-15 PROCEDURE — 40000718 ZZHC STATISTIC PT DEPARTMENT ORTHO VISIT: Performed by: PHYSICAL THERAPIST

## 2017-08-15 PROCEDURE — 97116 GAIT TRAINING THERAPY: CPT | Mod: GP | Performed by: PHYSICAL THERAPIST

## 2017-08-18 ENCOUNTER — HOSPITAL ENCOUNTER (OUTPATIENT)
Dept: PHYSICAL THERAPY | Facility: CLINIC | Age: 67
Setting detail: THERAPIES SERIES
End: 2017-08-18
Attending: PODIATRIST
Payer: MEDICARE

## 2017-08-18 PROCEDURE — 97110 THERAPEUTIC EXERCISES: CPT | Mod: GP | Performed by: PHYSICAL THERAPIST

## 2017-08-18 PROCEDURE — 97140 MANUAL THERAPY 1/> REGIONS: CPT | Mod: GP | Performed by: PHYSICAL THERAPIST

## 2017-08-18 PROCEDURE — 40000718 ZZHC STATISTIC PT DEPARTMENT ORTHO VISIT: Performed by: PHYSICAL THERAPIST

## 2017-08-21 ENCOUNTER — HOSPITAL ENCOUNTER (OUTPATIENT)
Dept: PHYSICAL THERAPY | Facility: CLINIC | Age: 67
Setting detail: THERAPIES SERIES
End: 2017-08-21
Attending: PODIATRIST
Payer: MEDICARE

## 2017-08-21 PROCEDURE — 40000718 ZZHC STATISTIC PT DEPARTMENT ORTHO VISIT: Performed by: PHYSICAL THERAPIST

## 2017-08-21 PROCEDURE — 97112 NEUROMUSCULAR REEDUCATION: CPT | Mod: GP | Performed by: PHYSICAL THERAPIST

## 2017-08-21 PROCEDURE — 97140 MANUAL THERAPY 1/> REGIONS: CPT | Mod: GP | Performed by: PHYSICAL THERAPIST

## 2017-08-21 PROCEDURE — 97110 THERAPEUTIC EXERCISES: CPT | Mod: GP | Performed by: PHYSICAL THERAPIST

## 2017-08-23 ENCOUNTER — HOSPITAL ENCOUNTER (OUTPATIENT)
Dept: PHYSICAL THERAPY | Facility: CLINIC | Age: 67
Setting detail: THERAPIES SERIES
End: 2017-08-23
Attending: PODIATRIST
Payer: MEDICARE

## 2017-08-23 PROCEDURE — 40000718 ZZHC STATISTIC PT DEPARTMENT ORTHO VISIT: Performed by: PHYSICAL THERAPIST

## 2017-08-23 PROCEDURE — 97110 THERAPEUTIC EXERCISES: CPT | Mod: GP | Performed by: PHYSICAL THERAPIST

## 2017-08-23 PROCEDURE — 97112 NEUROMUSCULAR REEDUCATION: CPT | Mod: GP | Performed by: PHYSICAL THERAPIST

## 2017-08-23 PROCEDURE — G8979 MOBILITY GOAL STATUS: HCPCS | Mod: GP,CH | Performed by: PHYSICAL THERAPIST

## 2017-08-23 PROCEDURE — G8978 MOBILITY CURRENT STATUS: HCPCS | Mod: GP,CJ | Performed by: PHYSICAL THERAPIST

## 2017-08-23 PROCEDURE — 97140 MANUAL THERAPY 1/> REGIONS: CPT | Mod: GP | Performed by: PHYSICAL THERAPIST

## 2017-08-23 NOTE — PROGRESS NOTES
Outpatient Physical Therapy Progress Note     Patient: Kylah Britt  : 1950    Beginning/End Dates of Reporting Period:  2017 to 2017    Referring Provider: Dr. Darnell Kincaid    Therapy Diagnosis: R ankle decreased ROM, strength, stability, and mobility secondary to fracture to R calcaneous and post operative ORIF of R ankle.     Client Self Report: Pt states things are getting better.  Pain is staying minimal even with activity.  She is doing her HEP consistently.  Pt report she does have a fear of shifting her body weight forward.  However standing, bathing, and dressing is improving.    Objective Measurements:  Objective Measure: LEFS  Details: Lower Extremity Functional Scale (LEFS) assesses the patients level of difficulty with various activities. The higher the score, the greater the level of function a patient demonstrates. Pt scored 50 points out of 80 possible indicating patient is at 62.50% of maximal function. MCID is 9 points.   Objective Measure: ROM  Details: Limited PF resulting in decreased ROM on R side with heel raises.  Objective Measure: inspection/palpation  Details: Myofascial restrictions around incision sites.  Objective Measure: Gait  Details: Walking with 1 axillary crutch and tennis shoes.  Asymmetrical pattern with decreased toe off on R side.    Goals:  Goal Identifier #1   Goal Description Pt will demonstrate R ankle AROM to be WNL and pain free in order to begin standing and walking activities.   Target Date 17   Date Met      Progress:     Goal Identifier #2   Goal Description Pt will demonstrate ability to stand on R LE with normal equal weight shift in order to have stability to stand while showering.   Target Date 17   Date Met  17   Progress:     Goal Identifier #3   Goal Description Pt will demonstrate ability to negotiate 4 stairs without assistive device in order to enter the home and navigate the home.   Target Date 09/15/17   Date Met    (1 step at a time.)   Progress:     Goal Identifier #4   Goal Description Pt will demonstrate ability to ambulate without assistive and symmetrical heel to toe gait pattern for 500 ft.   Target Date 09/15/17   Date Met   (1 axillary crutch)   Progress:     Goal Identifier #5   Goal Description Pt will demonstrate ability to return to walking 2 miles without increased symptoms and proper gait pattern in order to return to PLOF.   Target Date 11/14/17   Date Met   (Walking with 1 axillary crutch, asymmetrical pattern)   Progress:     Progress Toward Goals:   Progress this reporting period: Pt has demonstrated great improvement in the last couple weeks.  At the start of therapy, pt was not honest and open about how she was feeling with her pain.  She was constantly at 8-9/10 pain, nauseated, and not eating.  Pt came to therapy with her concerns and we had a long discussion about the importance of symptom/pain management along with improving strength, stability, and gait.  After symptoms calmed down, pt was more willing to work with PT.  Since the start of therapy, pt is now walking with 1 axillary crutch however demonstrating asymmetrical gait pattern.  She has some strength and stability issues in the R knee and ankle.  Pt has improved ROM and muscle activation, however continues to struggle with heel raises and toe extensions.  Pt has a fear of falling forward and does not want to shift her weight forward allowing for push off on her toes with gait.  Pt will continue to benefit from skilled PT services focus on improvement of strength, stability, and gait.    Plan:  Continue therapy per current plan of care.    Discharge:  No    Thank you for your referral.    Sadaf Dobbs, PT, DPT  [facility]  [phone]

## 2017-08-29 ENCOUNTER — HOSPITAL ENCOUNTER (OUTPATIENT)
Dept: PHYSICAL THERAPY | Facility: CLINIC | Age: 67
Setting detail: THERAPIES SERIES
End: 2017-08-29
Attending: PODIATRIST
Payer: MEDICARE

## 2017-08-29 PROCEDURE — 97112 NEUROMUSCULAR REEDUCATION: CPT | Mod: GP | Performed by: PHYSICAL THERAPIST

## 2017-08-29 PROCEDURE — 40000718 ZZHC STATISTIC PT DEPARTMENT ORTHO VISIT: Performed by: PHYSICAL THERAPIST

## 2017-08-29 PROCEDURE — 97140 MANUAL THERAPY 1/> REGIONS: CPT | Mod: GP | Performed by: PHYSICAL THERAPIST

## 2017-08-31 ENCOUNTER — RADIANT APPOINTMENT (OUTPATIENT)
Dept: GENERAL RADIOLOGY | Facility: CLINIC | Age: 67
End: 2017-08-31
Attending: PODIATRIST
Payer: COMMERCIAL

## 2017-08-31 ENCOUNTER — OFFICE VISIT (OUTPATIENT)
Dept: PODIATRY | Facility: CLINIC | Age: 67
End: 2017-08-31
Payer: COMMERCIAL

## 2017-08-31 VITALS — HEIGHT: 63 IN | WEIGHT: 153 LBS | TEMPERATURE: 97.1 F | BODY MASS INDEX: 27.11 KG/M2

## 2017-08-31 DIAGNOSIS — S92.031A CLOSED DISPLACED AVULSION FRACTURE OF TUBEROSITY OF RIGHT CALCANEUS, INITIAL ENCOUNTER: ICD-10-CM

## 2017-08-31 DIAGNOSIS — S92.031A CLOSED DISPLACED AVULSION FRACTURE OF TUBEROSITY OF RIGHT CALCANEUS, INITIAL ENCOUNTER: Primary | ICD-10-CM

## 2017-08-31 PROCEDURE — 73650 X-RAY EXAM OF HEEL: CPT | Mod: TC

## 2017-08-31 PROCEDURE — 99024 POSTOP FOLLOW-UP VISIT: CPT | Performed by: PODIATRIST

## 2017-08-31 ASSESSMENT — PAIN SCALES - GENERAL: PAINLEVEL: MILD PAIN (2)

## 2017-08-31 NOTE — MR AVS SNAPSHOT
After Visit Summary   8/31/2017    Kylah Britt    MRN: [id number]           Patient Information     Date Of Birth          1950        Visit Information        Provider Department      8/31/2017 2:30 PM Darnell Kincaid DPM [facility]        Today's Diagnoses     Closed displaced avulsion fracture of tuberosity of right calcaneus, initial encounter    -  1      Care Instructions    Return to all activities as tolerated.          Follow-ups after your visit        Your next 10 appointments already scheduled     Sep 01, 2017  3:30 PM CDT   Ortho Treatment with Sadaf Dobbs, PT   [facility] ([facility])    [address] [place] [address] Dr Alena CISNEROS [address]   [phone]            Sep 06, 2017  9:00 AM CDT   Ortho Treatment with Sadaf Dobbs, PT   [facility] ([facility])    [address] [place] [address] Dr Alena CISNEROS [address]   [phone]            Sep 13, 2017  4:45 PM CDT   Ortho Treatment with Sadaf Dobbs, PT   [facility] ([facility])    [address] [place] [address] Dr Alena CISNEROS [address]   [phone]            Sep 14, 2017  8:30 AM CDT   Ortho Treatment with Sadaf Dobbs, PT   [facility] ([facility])    [address] [place] [address] Dr Alena CISNEROS [address]   [phone]            Sep 19, 2017 11:15 AM CDT   Ortho Treatment with Sadaf Dobbs, PT   [facility] ([facility])    [address] [place] [address] Dr Alena CISNEROS [address]   [phone]            Sep 20, 2017  2:30 PM CDT   Ortho Treatment with Sadaf Dobbs, PT   [facility] ([facility])    [address] [place] [address] Dr Alena CISNEROS [address]   [phone]            Sep 25, 2017  2:15 PM CDT   Ortho Treatment with Carmita Gomez, PT   [facility] ([facility])    [address] [place] [address]  "Dr Alena CISNEROS [address]   [phone]            Sep 29, 2017  1:30 PM CDT   Ortho Treatment with Carmita Gomez, PT   [facility] ([facility])    [address] Alena CISNREOS [address]   [phone]            Oct 02, 2017  4:00 PM CDT   Ortho Treatment with Sadafyusuf Keatingsl, PT   [facility] ([facility])    [address] Alena CISNEROS [address]   [phone]            Oct 04, 2017  2:30 PM CDT   Ortho Treatment with Sadaf Taraeisl, PT   [facility] ([facility])    [address] Alena CISNEROS [address]   [phone]              Who to contact     If you have questions or need follow up information about today's clinic visit or your schedule please contact [facility] directly at [phone].  Normal or non-critical lab and imaging results will be communicated to you by [email or web address], letter or phone within 4 business days after the clinic has received the results. If you do not hear from us within 7 days, please contact the clinic through SmartDocs (Teknowmics)t or phone. If you have a critical or abnormal lab result, we will notify you by phone as soon as possible.  Submit refill requests through RentablesÂ® or call your pharmacy and they will forward the refill request to us. Please allow 3 business days for your refill to be completed.          Additional Information About Your Visit        RentablesÂ® Information     [employer]ablesÂ® lets you send messages to your doctor, view your test results, renew your prescriptions, schedule appointments and more. To sign up, go to [email or web address] . Click on \"Log in\" on the left side of the screen, which will take you to the Welcome page. Then click on \"Sign up Now\" on the right side of the page.     You will be asked to enter the access code listed below, as well as some personal information. Please follow the directions to create your username and " "password.     Your access code is: [email or web address]  Expires: 2017 11:20 PM     Your access code will  in 90 days. If you need help or a new code, please call your [place] clinic or [phone].        Care EveryWhere ID     This is your Care EveryWhere ID. This could be used by other organizations to access your [place] medical records  [id number]        Your Vitals Were     Temperature Height BMI (Body Mass Index)             97.1  F (36.2  C) (Temporal) 5' 3\" (1.6 m) 27.1 kg/m2          Blood Pressure from Last 3 Encounters:   17 126/73   14 142/75   04/21/10 128/74    Weight from Last 3 Encounters:   17 153 lb (69.4 kg)   17 145 lb (65.8 kg)   17 145 lb (65.8 kg)               Primary Care Provider Office Phone #    [facility] [phone]       No address on file        Equal Access to Services     JOSE BARDALES : Hadii matthew montoya hadasho Soomaali, waaxda luqadaha, qaybta kaalmada adeegyada, edvin yarbrough haybrittney mckinley . So [facility] [phone].    ATENCIÓN: Si habla español, tiene a anand disposición servicios gratuitos de asistencia lingüística. Llame al [phone].    We comply with applicable federal civil rights laws and [place] laws. We do not discriminate on the basis of race, color, national origin, age, disability sex, sexual orientation or gender identity.            Thank you!     Thank you for choosing [facility]  for your care. Our goal is always to provide you with excellent care. Hearing back from our patients is one way we can continue to improve our services. Please take a few minutes to complete the written survey that you may receive in the mail after your visit with us. Thank you!             Your Updated Medication List - Protect others around you: Learn how to safely use, store and throw away your medicines at www.disposemymeds.org.          This list is accurate as of: 17  2:56 PM.  Always use your " most recent med list.                   Brand Name Dispense Instructions for use Diagnosis    ALEVE PO      Take 220 mg by mouth 2 times daily as needed        BABY ASPIRIN PO      Take 81 mg by mouth daily        CALCIUM 600 PO      Take 1 tablet by mouth every other day        hydrOXYzine 10 MG tablet    ATARAX    30 tablet    Take 1 tablet (10 mg) by mouth every 6 hours as needed for itching (and nausea)    Closed displaced fracture of body of left calcaneus, initial encounter       Multiple vitamin Caps      daily        * order for DME     1 Units    One pair of under arm crutches    Closed displaced fracture of body of left calcaneus, initial encounter       * order for DME     1 Units    One-rollabout or bent knee scooter/wheeled walker with bent knee for non weight bearing status  Please call [phone] to schedule delivery of Roll About.    Status post open reduction with internal fixation (ORIF) of fracture of ankle       oxyCODONE-acetaminophen 5-325 MG per tablet    PERCOCET    30 tablet    Take 1 tablet by mouth every 6 hours as needed for pain (moderate to severe)    Closed displaced avulsion fracture of tuberosity of right calcaneus, initial encounter, Status post open reduction with internal fixation (ORIF) of fracture of ankle       senna-docusate 8.6-50 MG per tablet    SENOKOT-S;PERICOLACE    30 tablet    Take 1-2 tablets by mouth 2 times daily Take while on oral narcotics to prevent or treat constipation.    Closed displaced fracture of body of left calcaneus, initial encounter       VITAMIN D3 PO      Take 2,000 Units by mouth every other day        VITAMIN K PO      Take 1,000 mg by mouth every other day        * Notice:  This list has 2 medication(s) that are the same as other medications prescribed for you. Read the directions carefully, and ask your doctor or other care provider to review them with you.

## 2017-08-31 NOTE — PROGRESS NOTES
"Chief Complaint   Patient presents with     Surgical Followup     (12 weeks), WB w/athletic shoe and aid of 1 crutch, pain 2, PT; XR Right calcaneus today, 8/31/2017; DOS 6/8/2017 - Open Reduction Internal Fixation Right Calcaneous; LOV 8/3/2017     BMI is normal.    Subjective: Patient reports for followup of DOS 6/8/2017 - Open Reduction Internal Fixation Right Calcaneous procedure.  Patient continues pain medication but is tapering off. Patient denies nausea vomiting fever or chills.     EXAM:  No apparent distress, patient is relaxed and comfortable.   Vitals: Temp 97.1  F (36.2  C) (Temporal)  Ht 5' 3\" (1.6 m)  Wt 153 lb (69.4 kg)  BMI 27.1 kg/m2  Vasc:  DP and PT pulses palpable bilateral, CFT immediate to all digits and surrounding the surgical site.  Neuro:  Light touch sensation intact about the digits. There is minimal to no appreciable numbness around the surgical incision with examination.  Derm: The incision is healed at this time  Musc: Adequate reduction of deformity identified. No complications. Mild discomfort at direct palpation of the Achilles insertion. No complications of the incision    Radiographs: 2 views 8/3/2017 demonstrate partial union of the fracture without complication. Hardware in good prepare and alignment at this time    Radiographs 8/31/17 demonstrate appropriate interval healing at the posterior calcaneus without complication of hardware.    Assessment:      ICD-10-CM    1. Closed displaced avulsion fracture of tuberosity of right calcaneus, initial encounter S92.031A XR Calcaneus Right G/E 2 Views       Plan:    6/15/2017  The dressing was removed and surgical site inspected.   A bulky sterile dressing was applied with compression.   Patient instructed to reduce or discontinue pain medications as tolerated. Manage pain with reduced activity.   Continue ice and elevation as tolerated.   Continue restrictions of nonweightbearing.    6/22/2017  Refill atarax  Start keflex just a " bit of erythema.   Fracture boot today to stay on 24/7  Can begin bathing in 5 days.   Follow-up in 2 weeks.    7/6/2017  OrderPT for active ROM and can begin WB in boot at 6-8 weeks post op, progress as tolerated, and then no boot after 8 weeks po, PT PT review pre and post ORIF xrays  Follow-up in 3-4 weeks.  Refilled Percocet 25 tablets to keep sleeping through the night and not sleep during the day    8/3/2017  Reorder narcotics  Start coming out of boot in home as tolerated  She may still use crutch or cane or walker.    Continue PT until no difference in ROM, strength and balance.       8/3/2017   interpreted radiographs  Refill pain medication for after PT  Continue PT  May start progressing out of the boot for very short periods of time as tolerated under the guidance of PT  Follow up in one month      8/31/2017  Obtained and interpreted radiographs  All activities as tolerated in regular shoes to tolerance including driving  Continue physical therapy until low considerable gains are noted. Focus on strength and balance  Would encourage sturdy shoe gear most of the time and expect to have some bone callus prominence about the medial lateral posterior calcaneus in line with the fracture.  Recommend considerable daily stretching routine over the next 12 months  Follow-up in 2 or 3 months with any symptoms questions or limitations.      Darnell Kincaid DPM

## 2017-08-31 NOTE — NURSING NOTE
"Chief Complaint   Patient presents with     Surgical Followup     (12 weeks), WB w/athletic shoe and aid of 1 crutch, pain 2, PT; XR Right calcaneus today, 8/31/2017; DOS 6/8/2017 - Open Reduction Internal Fixation Right Calcaneous; LOV 8/3/2017       Initial Temp 97.1  F (36.2  C) (Temporal)  Ht 5' 3\" (1.6 m)  Wt 153 lb (69.4 kg)  BMI 27.1 kg/m2 Estimated body mass index is 27.1 kg/(m^2) as calculated from the following:    Height as of this encounter: 5' 3\" (1.6 m).    Weight as of this encounter: 153 lb (69.4 kg).  BP completed using cuff size: NA (Not Taken)  Medication Reconciliation: complete    Candace Parmar CMA, August 31, 2017    "

## 2017-09-01 ENCOUNTER — HOSPITAL ENCOUNTER (OUTPATIENT)
Dept: PHYSICAL THERAPY | Facility: CLINIC | Age: 67
Setting detail: THERAPIES SERIES
End: 2017-09-01
Attending: PODIATRIST
Payer: MEDICARE

## 2017-09-01 PROCEDURE — 97112 NEUROMUSCULAR REEDUCATION: CPT | Mod: GP | Performed by: PHYSICAL THERAPIST

## 2017-09-01 PROCEDURE — 97140 MANUAL THERAPY 1/> REGIONS: CPT | Mod: GP | Performed by: PHYSICAL THERAPIST

## 2017-09-01 PROCEDURE — 40000718 ZZHC STATISTIC PT DEPARTMENT ORTHO VISIT: Performed by: PHYSICAL THERAPIST

## 2017-09-06 ENCOUNTER — HOSPITAL ENCOUNTER (OUTPATIENT)
Dept: PHYSICAL THERAPY | Facility: CLINIC | Age: 67
Setting detail: THERAPIES SERIES
End: 2017-09-06
Attending: PODIATRIST
Payer: MEDICARE

## 2017-09-06 PROCEDURE — 97112 NEUROMUSCULAR REEDUCATION: CPT | Mod: GP | Performed by: PHYSICAL THERAPIST

## 2017-09-06 PROCEDURE — 97140 MANUAL THERAPY 1/> REGIONS: CPT | Mod: GP | Performed by: PHYSICAL THERAPIST

## 2017-09-06 PROCEDURE — 40000718 ZZHC STATISTIC PT DEPARTMENT ORTHO VISIT: Performed by: PHYSICAL THERAPIST

## 2017-09-13 ENCOUNTER — HOSPITAL ENCOUNTER (OUTPATIENT)
Dept: PHYSICAL THERAPY | Facility: CLINIC | Age: 67
Setting detail: THERAPIES SERIES
End: 2017-09-13
Attending: PODIATRIST
Payer: MEDICARE

## 2017-09-13 PROCEDURE — 40000718 ZZHC STATISTIC PT DEPARTMENT ORTHO VISIT: Performed by: PHYSICAL THERAPIST

## 2017-09-13 PROCEDURE — 97112 NEUROMUSCULAR REEDUCATION: CPT | Mod: GP | Performed by: PHYSICAL THERAPIST

## 2017-09-13 PROCEDURE — 97140 MANUAL THERAPY 1/> REGIONS: CPT | Mod: GP | Performed by: PHYSICAL THERAPIST

## 2017-09-14 ENCOUNTER — HOSPITAL ENCOUNTER (OUTPATIENT)
Dept: PHYSICAL THERAPY | Facility: CLINIC | Age: 67
Setting detail: THERAPIES SERIES
End: 2017-09-14
Attending: PODIATRIST
Payer: MEDICARE

## 2017-09-14 PROCEDURE — 40000718 ZZHC STATISTIC PT DEPARTMENT ORTHO VISIT: Performed by: PHYSICAL THERAPIST

## 2017-09-14 PROCEDURE — 97140 MANUAL THERAPY 1/> REGIONS: CPT | Mod: GP | Performed by: PHYSICAL THERAPIST

## 2017-09-14 PROCEDURE — 97112 NEUROMUSCULAR REEDUCATION: CPT | Mod: GP | Performed by: PHYSICAL THERAPIST

## 2017-09-19 ENCOUNTER — HOSPITAL ENCOUNTER (OUTPATIENT)
Dept: PHYSICAL THERAPY | Facility: CLINIC | Age: 67
Setting detail: THERAPIES SERIES
End: 2017-09-19
Attending: PODIATRIST
Payer: MEDICARE

## 2017-09-19 PROCEDURE — 97530 THERAPEUTIC ACTIVITIES: CPT | Mod: GP | Performed by: PHYSICAL THERAPIST

## 2017-09-19 PROCEDURE — 97112 NEUROMUSCULAR REEDUCATION: CPT | Mod: GP | Performed by: PHYSICAL THERAPIST

## 2017-09-19 PROCEDURE — 40000718 ZZHC STATISTIC PT DEPARTMENT ORTHO VISIT: Performed by: PHYSICAL THERAPIST

## 2017-09-25 ENCOUNTER — HOSPITAL ENCOUNTER (OUTPATIENT)
Dept: PHYSICAL THERAPY | Facility: CLINIC | Age: 67
Setting detail: THERAPIES SERIES
End: 2017-09-25
Attending: PODIATRIST
Payer: MEDICARE

## 2017-09-25 PROCEDURE — 97112 NEUROMUSCULAR REEDUCATION: CPT | Mod: GP | Performed by: PHYSICAL THERAPIST

## 2017-09-25 PROCEDURE — 97140 MANUAL THERAPY 1/> REGIONS: CPT | Mod: GP | Performed by: PHYSICAL THERAPIST

## 2017-09-25 PROCEDURE — 97110 THERAPEUTIC EXERCISES: CPT | Mod: GP | Performed by: PHYSICAL THERAPIST

## 2017-09-25 PROCEDURE — 40000718 ZZHC STATISTIC PT DEPARTMENT ORTHO VISIT: Performed by: PHYSICAL THERAPIST

## 2017-10-02 ENCOUNTER — HOSPITAL ENCOUNTER (OUTPATIENT)
Dept: PHYSICAL THERAPY | Facility: CLINIC | Age: 67
Setting detail: THERAPIES SERIES
End: 2017-10-02
Attending: PODIATRIST
Payer: MEDICARE

## 2017-10-02 PROCEDURE — 97112 NEUROMUSCULAR REEDUCATION: CPT | Mod: GP | Performed by: PHYSICAL THERAPIST

## 2017-10-02 PROCEDURE — G8978 MOBILITY CURRENT STATUS: HCPCS | Mod: GP,CI | Performed by: PHYSICAL THERAPIST

## 2017-10-02 PROCEDURE — 40000718 ZZHC STATISTIC PT DEPARTMENT ORTHO VISIT: Performed by: PHYSICAL THERAPIST

## 2017-10-02 PROCEDURE — 97110 THERAPEUTIC EXERCISES: CPT | Mod: GP | Performed by: PHYSICAL THERAPIST

## 2017-10-02 PROCEDURE — G8979 MOBILITY GOAL STATUS: HCPCS | Mod: GP,CH | Performed by: PHYSICAL THERAPIST

## 2017-10-02 NOTE — ADDENDUM NOTE
Encounter addended by: Carmita Gomez PT on: 10/1/2017 11:04 PM<BR>     Actions taken: Flowsheet data copied forward, Flowsheet accepted

## 2017-10-02 NOTE — ADDENDUM NOTE
Encounter addended by: Carmita Gomez PT on: 10/2/2017  3:42 PM<BR>     Actions taken: Flowsheet data copied forward, Flowsheet accepted, Charge Capture section accepted

## 2017-10-02 NOTE — PROGRESS NOTES
[facility]      OUTPATIENT PHYSICAL THERAPY  PLAN OF TREATMENT FOR OUTPATIENT REHABILITATION    Patient's Last Name, First Name, M.I.                YOB: 1950  Kylah Britt                        Provider's Name  [facility] Medical Record No.  [id number]                               Onset Date: 6/8/2017   Start of Care Date: 7/17/2017   Type:     _X_PT   ___OT   ___SLP Medical Diagnosis: Closed displaced fracture of body and right calcaneus, initial encounter; status post open reduction with internal fixation of fracture of ankle.                       PT Diagnosis: R ankle decreased ROM, strength, stability, and mobility secondary to fracture to R calcaneous and post operative ORIF of R ankle.      _________________________________________________________________________________  Plan of Treatment:  Balance training, gait training, joint mobilizations, manual therapy, neuromuscular re-education, ROM, stretching, strengthening, transfer training    Frequency/Duration: 1x/week for 4-6 weeks     Goals:  Goal Identifier #1   Goal Description Pt will demonstrate R ankle AROM to be WNL and pain free in order to begin standing and walking activities.   Target Date 08/16/17   Date Met  09/25/17   Progress:     Goal Identifier #2   Goal Description Pt will demonstrate ability to stand on R LE with normal equal weight shift in order to have stability to stand while showering.   Target Date 08/31/17   Date Met  08/23/17   Progress:     Goal Identifier #3   Goal Description Pt will demonstrate ability to negotiate 4 stairs without assistive device in order to enter the home and navigate the home.   Target Date 09/15/17   Date Met  09/19/17   Progress:     Goal Identifier #4   Goal Description Pt will demonstrate ability to ambulate without assistive and symmetrical heel to toe gait  pattern for 500 ft.   Target Date 09/15/17   Date Met  09/25/17 (pt has walked for 2 miles without assistive device)   Progress:     Goal Identifier #5   Goal Description Pt will demonstrate ability to return to walking 2 miles without increased symptoms and proper gait pattern in order to return to PLOF.   Target Date 11/14/17   Date Met   (has walked 2 miles for 3 times with dog & 2-1/2 miles twice)   Progress:     Progress Toward Goals:   Progress this reporting period: Pt demonstrates improvement with overall gait, strength, and ROM.  Pt continues to demonstrate slight asymmetrical gait pattern with decreased weight shift to R side and short stride and step length due to decreased toe off motion.  When pt is fatigued her gait pattern becomes more asymmetrical.  Pt also demonstrates difficulties with NBOS and uneven surface balance SL and walking.  Pt will benefit from therapy services to address gait and balance issues at this time.    Certification date from 9/15/2017 to 10/31/2017.    Sadaf Dobbs, PT          I CERTIFY THE NEED FOR THESE SERVICES FURNISHED UNDER        THIS PLAN OF TREATMENT AND WHILE UNDER MY CARE     (Physician co-signature of this document indicates review and certification of the therapy plan).              Referring Provider: Dr. Darnell Kincaid    Thank you for your referral.    Sadaf Dobbs, PT, DPT  [facility]  [phone]

## 2017-10-09 ENCOUNTER — HOSPITAL ENCOUNTER (OUTPATIENT)
Dept: PHYSICAL THERAPY | Facility: CLINIC | Age: 67
Setting detail: THERAPIES SERIES
End: 2017-10-09
Attending: PODIATRIST
Payer: MEDICARE

## 2017-10-09 PROCEDURE — 40000718 ZZHC STATISTIC PT DEPARTMENT ORTHO VISIT: Performed by: PHYSICAL THERAPIST

## 2017-10-09 PROCEDURE — G8980 MOBILITY D/C STATUS: HCPCS | Mod: GP,CI | Performed by: PHYSICAL THERAPIST

## 2017-10-09 PROCEDURE — G8979 MOBILITY GOAL STATUS: HCPCS | Mod: GP,CH | Performed by: PHYSICAL THERAPIST

## 2017-10-09 PROCEDURE — 97112 NEUROMUSCULAR REEDUCATION: CPT | Mod: GP | Performed by: PHYSICAL THERAPIST

## 2018-01-11 NOTE — ADDENDUM NOTE
Encounter addended by: Sadaf Dobbs, PT on: 1/11/2018  8:59 AM<BR>     Actions taken: Episode resolved, Pend clinical note, Flowsheet accepted, Sign clinical note

## 2018-01-11 NOTE — PROGRESS NOTES
Outpatient Physical Therapy Discharge Note     Patient: Kylah Britt  : 1950    Beginning/End Dates of Reporting Period:  2017 to 2018    Referring Provider: Dr. Darnell Kincaid    Therapy Diagnosis: R ankle decreased ROM, strength, stability, and mobility secondary to fracture to R calcaneous and post operative ORIF of R ankle.     Client Self Report: Pt states has been attending yoga and the only challenge is with balance.  Pt states she can stand on her L leg but can't on her R leg.    Objective Measurements:  Objective Measure: LEFS  Details: 64/80  Objective Measure: ROM  Details: B ankle PF and DF WFL.  Objective Measure: inspection/palpation  Details: No concerns for tissue mobility.  Some joint restriction of anterior talocrural joint.  Objective Measure: Gait  Details: Prolonged stance on L side with slight decrease of R side shifting.    Goals:  Goal Identifier #1   Goal Description Pt will demonstrate R ankle AROM to be WNL and pain free in order to begin standing and walking activities.   Target Date 17   Date Met  17   Progress:     Goal Identifier #2   Goal Description Pt will demonstrate ability to stand on R LE with normal equal weight shift in order to have stability to stand while showering.   Target Date 17   Date Met  17   Progress:     Goal Identifier #3   Goal Description Pt will demonstrate ability to negotiate 4 stairs without assistive device in order to enter the home and navigate the home.   Target Date 09/15/17   Date Met  17   Progress:     Goal Identifier #4   Goal Description Pt will demonstrate ability to ambulate without assistive and symmetrical heel to toe gait pattern for 500 ft.   Target Date 09/15/17   Date Met  17 (pt has walked for 2 miles without assistive device)   Progress:     Goal Identifier #5   Goal Description Pt will demonstrate ability to return to walking 2 miles without increased symptoms and proper gait pattern  in order to return to OF.   Target Date 11/14/17   Date Met   (has walked 2 miles for 3 times with dog & 2-1/2 miles twice)   Progress:     Progress Toward Goals:   Pt has been seen for a total of 19 visits.  Pt has been able to return to yoga, walking, house and yard chores, and able to complete HEP independently.  Pt has demonstrated improved walking pattern, strength, and mobility of the foot and ankle.  PT instructed pt on progressing HEP and symptom management.  Pt would benefit from continued exercises and yoga however able to independently manage symptoms.  PT contacted pt 2 weeks after last visit and she reports continuing to notice improvement.    Plan:  Discharge from therapy.    Discharge:    Reason for Discharge: Achieved 4 of 5 therapy goals.    Equipment Issued: Theraband    Discharge Plan: Patient to continue home program.    Thank you for your referral.    Sadaf Dobbs, PT, DPT  [facility]  [phone]

## 2019-04-16 NOTE — TELEPHONE ENCOUNTER
Procedure (EGD) was rescheduled for 4/30/19 at [facility].   This was signed and sent to [facility]. I spoke with [facility] and they should be able to deliver tomorrow. Notified Nicholas, patients daughter and gave her the info to call and check on the status.

## 2022-03-07 ENCOUNTER — OFFICE VISIT (OUTPATIENT)
Dept: FAMILY MEDICINE | Facility: CLINIC | Age: 72
End: 2022-03-07
Payer: COMMERCIAL

## 2022-03-07 ENCOUNTER — HOSPITAL ENCOUNTER (OUTPATIENT)
Dept: GENERAL RADIOLOGY | Facility: CLINIC | Age: 72
Discharge: HOME | End: 2022-03-07
Attending: PHYSICIAN ASSISTANT | Admitting: PHYSICIAN ASSISTANT
Payer: MEDICARE

## 2022-03-07 VITALS
DIASTOLIC BLOOD PRESSURE: 72 MMHG | TEMPERATURE: 98.7 F | HEART RATE: 109 BPM | WEIGHT: 140 LBS | OXYGEN SATURATION: 97 % | SYSTOLIC BLOOD PRESSURE: 138 MMHG | BODY MASS INDEX: 24.8 KG/M2

## 2022-03-07 DIAGNOSIS — Z11.59 NEED FOR HEPATITIS C SCREENING TEST: ICD-10-CM

## 2022-03-07 DIAGNOSIS — Z23 NEED FOR VACCINATION: ICD-10-CM

## 2022-03-07 DIAGNOSIS — Z78.0 ASYMPTOMATIC POSTMENOPAUSAL STATUS: ICD-10-CM

## 2022-03-07 DIAGNOSIS — Z13.220 SCREENING FOR HYPERLIPIDEMIA: ICD-10-CM

## 2022-03-07 DIAGNOSIS — Z12.31 VISIT FOR SCREENING MAMMOGRAM: ICD-10-CM

## 2022-03-07 DIAGNOSIS — Z13.1 SCREENING FOR DIABETES MELLITUS: ICD-10-CM

## 2022-03-07 DIAGNOSIS — Z12.11 SCREEN FOR COLON CANCER: ICD-10-CM

## 2022-03-07 DIAGNOSIS — S69.92XA WRIST INJURY, LEFT, INITIAL ENCOUNTER: ICD-10-CM

## 2022-03-07 DIAGNOSIS — S52.592A OTHER CLOSED FRACTURE OF DISTAL END OF LEFT RADIUS, INITIAL ENCOUNTER: ICD-10-CM

## 2022-03-07 DIAGNOSIS — Z00.00 ENCOUNTER FOR MEDICARE ANNUAL WELLNESS EXAM: Primary | ICD-10-CM

## 2022-03-07 LAB
ANION GAP SERPL CALCULATED.3IONS-SCNC: 6 MMOL/L (ref 3–14)
BUN SERPL-MCNC: 21 MG/DL (ref 7–30)
CALCIUM SERPL-MCNC: 9.1 MG/DL (ref 8.5–10.1)
CHLORIDE BLD-SCNC: 111 MMOL/L (ref 94–109)
CHOLEST SERPL-MCNC: 282 MG/DL
CO2 SERPL-SCNC: 24 MMOL/L (ref 20–32)
CREAT SERPL-MCNC: 0.59 MG/DL (ref 0.52–1.04)
FASTING STATUS PATIENT QL REPORTED: YES
GFR SERPL CREATININE-BSD FRML MDRD: >90 ML/MIN/1.73M2
GLUCOSE BLD-MCNC: 127 MG/DL (ref 70–99)
HDLC SERPL-MCNC: 89 MG/DL
LDLC SERPL CALC-MCNC: 178 MG/DL
NONHDLC SERPL-MCNC: 193 MG/DL
POTASSIUM BLD-SCNC: 3.8 MMOL/L (ref 3.4–5.3)
SODIUM SERPL-SCNC: 141 MMOL/L (ref 133–144)
TRIGL SERPL-MCNC: 74 MG/DL

## 2022-03-07 PROCEDURE — G0009 ADMIN PNEUMOCOCCAL VACCINE: HCPCS | Performed by: PHYSICIAN ASSISTANT

## 2022-03-07 PROCEDURE — 90714 TD VACC NO PRESV 7 YRS+ IM: CPT | Performed by: PHYSICIAN ASSISTANT

## 2022-03-07 PROCEDURE — 80061 LIPID PANEL: CPT | Performed by: PHYSICIAN ASSISTANT

## 2022-03-07 PROCEDURE — 90732 PPSV23 VACC 2 YRS+ SUBQ/IM: CPT | Performed by: PHYSICIAN ASSISTANT

## 2022-03-07 PROCEDURE — 90472 IMMUNIZATION ADMIN EACH ADD: CPT | Performed by: PHYSICIAN ASSISTANT

## 2022-03-07 PROCEDURE — 80048 BASIC METABOLIC PNL TOTAL CA: CPT | Performed by: PHYSICIAN ASSISTANT

## 2022-03-07 PROCEDURE — 36415 COLL VENOUS BLD VENIPUNCTURE: CPT | Performed by: PHYSICIAN ASSISTANT

## 2022-03-07 PROCEDURE — 86803 HEPATITIS C AB TEST: CPT | Performed by: PHYSICIAN ASSISTANT

## 2022-03-07 PROCEDURE — 73110 X-RAY EXAM OF WRIST: CPT | Mod: LT

## 2022-03-07 PROCEDURE — 99387 INIT PM E/M NEW PAT 65+ YRS: CPT | Mod: 25 | Performed by: PHYSICIAN ASSISTANT

## 2022-03-07 PROCEDURE — 99214 OFFICE O/P EST MOD 30 MIN: CPT | Mod: 25 | Performed by: PHYSICIAN ASSISTANT

## 2022-03-07 ASSESSMENT — ENCOUNTER SYMPTOMS
HEMATURIA: 0
MYALGIAS: 0
FREQUENCY: 1
HEADACHES: 0
CONSTIPATION: 0
NERVOUS/ANXIOUS: 0
FEVER: 0
PARESTHESIAS: 0
ABDOMINAL PAIN: 0
COUGH: 0
HEMATOCHEZIA: 0
BREAST MASS: 0
HEARTBURN: 0
SHORTNESS OF BREATH: 0
EYE PAIN: 0
ARTHRALGIAS: 0
DIARRHEA: 0
DIZZINESS: 0
DYSURIA: 0
SORE THROAT: 0
CHILLS: 0
PALPITATIONS: 0
NAUSEA: 0
JOINT SWELLING: 0

## 2022-03-07 ASSESSMENT — ACTIVITIES OF DAILY LIVING (ADL): CURRENT_FUNCTION: NO ASSISTANCE NEEDED

## 2022-03-07 ASSESSMENT — PAIN SCALES - GENERAL: PAINLEVEL: NO PAIN (0)

## 2022-03-07 NOTE — PROGRESS NOTES
The patient was counseled and encouraged to consider modifying their diet and eating habits. She was provided with information on recommended healthy diet options.  The patient was provided with written information regarding signs of hearing loss.  Information on urinary incontinence and treatment options given to patient.  She is at risk for falling and has been provided with information to reduce the risk of falling at home.

## 2022-03-07 NOTE — LETTER
March 8, 2022      Kylah ROSAS Britt  [address]  [facility] [address]        Dear ,    We are writing to inform you of your test results.    cholesterol levels were quite high and blood sugar was also mildly elevated. Would encourage ongoing diet and exercise modifications and recheck of labs in 6-12 months.     Tram Alatorre PA-C       Resulted Orders   Hepatitis C Screen Reflex to HCV RNA Quant and Genotype   Result Value Ref Range    Hepatitis C Antibody Nonreactive Nonreactive    Narrative    Assay performance characteristics have not been established for newborns, infants, and children.   Lipid panel reflex to direct LDL Fasting   Result Value Ref Range    Cholesterol 282 (H) <200 mg/dL    Triglycerides 74 <150 mg/dL    Direct Measure HDL 89 >=50 mg/dL    LDL Cholesterol Calculated 178 (H) <=100 mg/dL    Non HDL Cholesterol 193 (H) <130 mg/dL    Patient Fasting > 8hrs? Yes     Narrative    Cholesterol  Desirable:  <200 mg/dL    Triglycerides  Normal:  Less than 150 mg/dL  Borderline High:  150-199 mg/dL  High:  200-499 mg/dL  Very High:  Greater than or equal to 500 mg/dL    Direct Measure HDL  Female:  Greater than or equal to 50 mg/dL   Male:  Greater than or equal to 40 mg/dL    LDL Cholesterol  Desirable:  <100mg/dL  Above Desirable:  100-129 mg/dL   Borderline High:  130-159 mg/dL   High:  160-189 mg/dL   Very High:  >= 190 mg/dL    Non HDL Cholesterol  Desirable:  130 mg/dL  Above Desirable:  130-159 mg/dL  Borderline High:  160-189 mg/dL  High:  190-219 mg/dL  Very High:  Greater than or equal to 220 mg/dL   Basic metabolic panel  (Ca, Cl, CO2, Creat, Gluc, K, Na, BUN)   Result Value Ref Range    Sodium 141 133 - 144 mmol/L    Potassium 3.8 3.4 - 5.3 mmol/L    Chloride 111 (H) 94 - 109 mmol/L    Carbon Dioxide (CO2) 24 20 - 32 mmol/L    Anion Gap 6 3 - 14 mmol/L    Urea Nitrogen 21 7 - 30 mg/dL    Creatinine 0.59 0.52 - 1.04 mg/dL    Calcium 9.1 8.5 - 10.1 mg/dL    Glucose 127 (H) 70 - 99  mg/dL    GFR Estimate >90 >60 mL/min/1.73m2      Comment:      Effective December 21, 2021 eGFRcr in adults is calculated using the 2021 CKD-EPI creatinine equation which includes age and gender (Asuncion et al., NEJM, DOI: 10.1056/MJQWfl3908340)       If you have any questions or concerns, please call the clinic at the number listed above.

## 2022-03-07 NOTE — PATIENT INSTRUCTIONS
Patient Education   Personalized Prevention Plan  You are due for the preventive services outlined below.  Your care team is available to assist you in scheduling these services.  If you have already completed any of these items, please share that information with your care team to update in your medical record.  Health Maintenance Due   Topic Date Due     ANNUAL REVIEW OF HM ORDERS  Never done     Hepatitis C Screening  Never done     Zoster (Shingles) Vaccine (1 of 2) Never done     Diptheria Tetanus Pertussis (DTAP/TDAP/TD) Vaccine (2 - Td or Tdap) 04/25/2005     Cholesterol Lab  04/12/2007     Colorectal Cancer Screening  03/05/2012     FALL RISK ASSESSMENT  Never done     Mammogram  01/27/2017     Pneumococcal Vaccine (2 of 2 - PPSV23) 10/30/2018     Flu Vaccine (1) 09/01/2021       Understanding USDA MyPlate  The USDA has guidelines to help you make healthy food choices. These are called MyPlate. MyPlate shows the food groups that make up healthy meals using the image of a place setting. Before you eat, think about the healthiest choices for what to put on your plate or in your cup or bowl. To learn more about building a healthy plate, visit www.choosemyplate.gov.    The food groups    Fruits. Any fruit or 100% fruit juice counts as part of the Fruit Group. Fruits may be fresh, canned, frozen, or dried, and may be whole, cut-up, or pureed. Make 1/2 of your plate fruits and vegetables.    Vegetables. Any vegetable or 100% vegetable juice counts as a member of the Vegetable Group. Vegetables may be fresh, frozen, canned, or dried. They can be served raw or cooked and may be whole, cut-up, or mashed. Make 1/2 of your plate fruits and vegetables.    Grains. All foods made from grains are part of the Grains Group. These include wheat, rice, oats, cornmeal, and barley. Grains are often used to make foods such as bread, pasta, oatmeal, cereal, tortillas, and grits. Grains should be no more than 1/4 of your plate.  At least half of your grains should be whole grains.    Protein. This group includes meat, poultry, seafood, beans and peas, eggs, processed soy products (such as tofu), nuts (including nut butters), and seeds. Make protein choices no more than 1/4 of your plate. Meat and poultry choices should be lean or low fat.    Dairy. The Dairy Group includes all fluid milk products and foods made from milk that contain calcium, such as yogurt and cheese. (Foods that have little calcium, such as cream, butter, and cream cheese, are not part of this group.) Most dairy choices should be low-fat or fat-free.    Oils. Oils aren't a food group, but they do contain essential nutrients. However it's important to watch your intake of oils. These are fats that are liquid at room temperature. They include canola, corn, olive, soybean, vegetable, and sunflower oil. Foods that are mainly oil include mayonnaise, certain salad dressings, and soft margarines. You likely already get your daily oil allowance from the foods you eat.  Things to limit  Eating healthy also means limiting these things in your diet:       Salt (sodium). Many processed foods have a lot of sodium. To keep sodium intake down, eat fresh vegetables, meats, poultry, and seafood when possible. Purchase low-sodium, reduced-sodium, or no-salt-added food products at the store. And don't add salt to your meals at home. Instead, season them with herbs and spices such as dill, oregano, cumin, and paprika. Or try adding flavor with lemon or lime zest and juice.    Saturated fat. Saturated fats are most often found in animal products such as beef, pork, and chicken. They are often solid at room temperature, such as butter. To reduce your saturated fat intake, choose leaner cuts of meat and poultry. And try healthier cooking methods such as grilling, broiling, roasting, or baking. For a simple lower-fat swap, use plain nonfat yogurt instead of mayonnaise when making potato salad or  macaroni salad.    Added sugars. These are sugars added to foods. They are in foods such as ice cream, candy, soda, fruit drinks, sports drinks, energy drinks, cookies, pastries, jams, and syrups. Cut down on added sugars by sharing sweet treats with a family member or friend. You can also choose fruit for dessert, and drink water or other unsweetened beverages.     [employer] last reviewed this educational content on 6/1/2020 2000-2021 [employer]. All rights reserved. This information is not intended as a substitute for professional medical care. Always follow your healthcare professional's instructions.          Signs of Hearing Loss      Hearing much better with one ear can be a sign of hearing loss.   Hearing loss is a problem shared by many people. In fact, it is one of the most common health problems, particularly as people age. Most people age 65 and older have some hearing loss. By age 80, almost everyone does. Hearing loss often occurs slowly over the years. So you may not realize your hearing has gotten worse.  Have your hearing checked  Call your healthcare provider if you:    Have to strain to hear normal conversation    Have to watch other people s faces very carefully to follow what they re saying    Need to ask people to repeat what they ve said    Often misunderstand what people are saying    Turn the volume of the television or radio up so high that others complain    Feel that people are mumbling when they re talking to you    Find that the effort to hear leaves you feeling tired and irritated    Notice, when using the phone, that you hear better with one ear than the other  [employer] last reviewed this educational content on 1/1/2020 2000-2021 [employer]. All rights reserved. This information is not intended as a substitute for professional medical care. Always follow your healthcare professional's instructions.          Urinary Incontinence, Female (Adult)    Urinary incontinence means loss of bladder control. This problem affects many women, especially as they get older. If you have incontinence, you may be embarrassed to ask for help. But know that this problem can be treated.   Types of Incontinence  There are different types of incontinence. Two of the main types are described here. You can have more than one type.     Stress incontinence. With this type, urine leaks when pressure (stress) is put on the bladder. This may happen when you cough, sneeze, or laugh. Stress incontinence most often occurs because the pelvic floor muscles that support the bladder and urethra are weak. This can happen after pregnancy and vaginal childbirth or a hysterectomy. It can also be due to excess body weight or hormone changes.    Urge incontinence (also called overactive bladder). With this type, a sudden urge to urinate is felt often. This may happen even though there may not be much urine in the bladder. The need to urinate often during the night is common. Urge incontinence most often occurs because of bladder spasms. This may be due to bladder irritation or infection. Damage to bladder nerves or pelvic muscles, constipation, and certain medicines can also lead to urge incontinence.  Treatment depends on the cause. Further evaluation is needed to find the type you have. This will likely include an exam and certain tests. Based on the results, you and your healthcare provider can then plan treatment. Until a diagnosis is made, the home care tips below can help ease symptoms.   Home care    Do pelvic floor muscle exercises, if they are prescribed. The pelvic floor muscles help support the bladder and urethra. Many women find that their symptoms improve when doing special exercises that strengthen these muscles. To do the exercises, contract the muscles you would use to stop your stream of urine. But do this when you re not urinating. Hold for 10 seconds, then relax. Repeat 10 to 20  times in a row, at least 3 times a day. Your healthcare provider may give you other instructions for how to do the exercises and how often.    Keep a bladder diary. This helps track how often and how much you urinate over a set period of time. Bring this diary with you to your next visit with the provider. The information can help your provider learn more about your bladder problem.    Lose weight, if advised to by your provider. Extra weight puts pressure on the bladder. Your provider can help you create a weight-loss plan that s right for you. This may include exercising more and making certain diet changes.    Don't have foods and drinks that may irritate the bladder. These can include alcohol and caffeinated drinks.    Quit smoking. Smoking and other tobacco use can lead to a long-term (chronic) cough that strains the pelvic floor muscles. Smoking may also damage the bladder and urethra. Talk with your provider about treatments or methods you can use to quit smoking.    If drinking large amounts of fluid makes you have symptoms, you may be advised to limit your fluid intake. You may also be advised to drink most of your fluids during the day and to limit fluids at night.    If you re worried about urine leakage or accidents, you may wear absorbent pads to catch urine. Change the pads often. This helps reduce discomfort. It may also reduce the risk of skin or bladder infections.    Follow-up care  Follow up with your healthcare provider, or as directed. It may take some to find the right treatment for your problem. But healthy lifestyle changes can be made right away. These include such things as exercising on a regular basis, eating a healthy diet, losing weight (if needed), and quitting smoking. Your treatment plan may include special therapies or medicines. Certain procedures or surgery may also be options. Talk about any questions you have with your provider.   When to seek medical advice  Call the healthcare  provider right away if any of these occur:    Fever of 100.4 F (38 C) or higher, or as directed by your provider    Bladder pain or fullness    Belly swelling    Nausea or vomiting    Back pain    Weakness, dizziness, or fainting  [employer] last reviewed this educational content on 1/1/2020 2000-2021 [employer]. All rights reserved. This information is not intended as a substitute for professional medical care. Always follow your healthcare professional's instructions.          Preventing Falls at Home  A person can fall for many reasons. Older adults may fall because reaction time slows down as we age. Your muscles and joints may get stiff, weak, or less flexible because of illness, medicines, or a physical condition.   Other health problems that make falls more likely include:     Arthritis    Dizziness or lightheadedness when you stand up (orthostatic hypotension)    History of a stroke    Dizziness    Anemia    Certain medicines taken for mental illness or to control blood pressure.    Problems with balance or gait    Bladder or urinary problems    History of falling    Changes in vision (vision impairment)    Changes in thinking skills and memory (cognitive impairment)  Injuries from a fall can include serious injuries such as broken bones, dislocated joints, internal bleeding and cuts. Injuries like these can limit your independence.   Prevention tips  To help prevent falls and fall-related injuries, follow the tips below.    Floors  To make floors safer:     Put nonskid pads under area rugs.    Remove small rugs.    Replace worn floor coverings.    Tack carpets firmly to each step on carpeted stairs. Put nonskid strips on the edges of uncarpeted stairs.    Keep floors and stairs free of clutter and cords.    Arrange furniture so there are clear pathways.    Clean up any spills right away.  Bathrooms    To make bathrooms safer:     Install grab bars in the tub or shower.    Apply nonskid  strips or put a nonskid rubber mat in the tub or shower.    Sit on a bath chair to bathe.    Use bathmats with nonskid backing.  Lighting  To improve visibility in your home:      Keep a flashlight in each room. Or put a lamp next to the bed within easy reach.    Put nightlights in the bedrooms, hallways, kitchen, and bathrooms.    Make sure all stairways have good lighting.    Take your time when going up and down stairs.    Put handrails on both sides of stairs and in walkways for more support. To prevent injury to your wrist or arm, don t use handrails to pull yourself up.    Install grab bars to pull yourself up.    Move or rearrange items that you use often. This will make them easier to find or reach.    Look at your home to find any safety hazards. Especially look at doorways, walkways, and the driveway. Remove or repair any safety problems that you find.  Other changes to make    Look around to find any safety hazards. Look closely at doorways, walkways, and the driveway. Remove or repair any safety problems that you find.    Wear shoes that fit well.    Take your time when going up and down stairs.    Put handrails on both sides of stairs and in walkways for more support. To prevent injury to your wrist or arm, don t use handrails to pull yourself up.    Install grab bars wherever needed to pull yourself up.    Arrange items that you use often. This will make them easier to find or reach.    [employer] last reviewed this educational content on 3/1/2020    2568-8895 [employer]. All rights reserved. This information is not intended as a substitute for professional medical care. Always follow your healthcare professional's instructions.

## 2022-03-07 NOTE — PROGRESS NOTES
"SUBJECTIVE:   Kylah Britt is a 71 year old female who presents for Preventive Visit.    Patient has been advised of split billing requirements and indicates understanding: Yes  Are you in the first 12 months of your Medicare coverage?  No    Healthy Habits:     In general, how would you rate your overall health?  Good    Frequency of exercise:  4-5 days/week    Duration of exercise:  15-30 minutes    Do you usually eat at least 4 servings of fruit and vegetables a day, include whole grains    & fiber and avoid regularly eating high fat or \"junk\" foods?  No    Taking medications regularly:  Yes    Medication side effects:  Not applicable    Ability to successfully perform activities of daily living:  No assistance needed    Home Safety:  No safety concerns identified    Hearing Impairment:  Need to ask people to speak up or repeat themselves and no hearing concerns    In the past 6 months, have you been bothered by leaking of urine? Yes    In general, how would you rate your overall mental or emotional health?  Good      PHQ-2 Total Score: 0    Additional concerns today:  No    Do you feel safe in your environment? Yes    Have you ever done Advance Care Planning? (For example, a Health Directive, POLST, or a discussion with a medical provider or your loved ones about your wishes): Yes, advance care planning is on file.       Fall risk  Fallen 2 or more times in the past year?: Yes  Any fall with injury in the past year?: No    Cognitive Screening   1) Repeat 3 items (Leader, Season, Table)    2) Clock draw: NORMAL  3) 3 item recall: Recalls 2 objects   Results: NORMAL clock, 1-2 items recalled: COGNITIVE IMPAIRMENT LESS LIKELY    Mini-CogTM Copyright MISSY Jensen. Licensed by the author for use in [facility]; reprinted with permission (alice@.[facility]). All rights reserved.      Do you have sleep apnea, excessive snoring or daytime drowsiness?: no    Reviewed and updated as needed this visit by clinical " staff   Tobacco  Allergies  Meds      Soc Hx        Reviewed and updated as needed this visit by Provider                 Social History     Tobacco Use     Smoking status: Never Smoker     Smokeless tobacco: Never Used   Substance Use Topics     Alcohol use: Yes     Comment: very rare     If you drink alcohol do you typically have >3 drinks per day or >7 drinks per week? Not applicable    Alcohol Use 3/7/2022   Prescreen: >3 drinks/day or >7 drinks/week? Not Applicable     Joint or Musculoskeletal Pain  Duration of complaint: this morning   Description:   Location: left wrist  Character: Cramping and only when using it  Intensity: moderate  Progression of Symptoms: same  Accompanying Signs & Symptoms: Other symptoms: none  History: Previous similar pain: no     Precipitating factors: Trauma or overuse: YES  Alleviating factors: Improved by: acetaminophen  Therapies Tried and outcome: Tylenol    Patient reports she got tripped up this morning when feeding her dogs. She feel with her left arm trying to catch herself. Reports a lot of of swelling and discomfort of the palmar area of her wrist. Wrapped this which does help. Has not taken anything for pain.    Current providers sharing in care for this patient include:  Patient Care Team:  Vivienne Valdivia [place] as PCP - General    The following health maintenance items are reviewed in Epic and correct as of today:  Health Maintenance Due   Topic Date Due     ANNUAL REVIEW OF HM ORDERS  Never done     ADVANCE CARE PLANNING  Never done     HEPATITIS C SCREENING  Never done     ZOSTER IMMUNIZATION (1 of 2) Never done     DTAP/TDAP/TD IMMUNIZATION (2 - Td or Tdap) 04/25/2005     LIPID  04/12/2007     COLORECTAL CANCER SCREENING  03/05/2012     MEDICARE ANNUAL WELLNESS VISIT  Never done     FALL RISK ASSESSMENT  Never done     MAMMO SCREENING  01/27/2017     Pneumococcal Vaccine: 65+ Years (2 of 2 - PPSV23) 10/30/2018     INFLUENZA VACCINE (1) 09/01/2021     BP  Readings from Last 3 Encounters:   03/07/22 138/72   06/09/17 126/73   12/18/14 142/75    Wt Readings from Last 3 Encounters:   03/07/22 63.5 kg (140 lb)   08/31/17 69.4 kg (153 lb)   08/03/17 65.8 kg (145 lb)                  Patient Active Problem List   Diagnosis     Tear meniscus knee     CARDIOVASCULAR SCREENING; LDL GOAL LESS THAN 160     Past Surgical History:   Procedure Laterality Date     HC KNEE SCOPE, DIAGNOSTIC  1/31/89    Examination of right knee under anesthesia. Arthroscopy of right knee with debridement of torn anterior curciate ligament (medial meniscus not resected).     HC REMOVAL OF OVARY/TUBE(S)  2/20/90    Salpingo-Oophorectomy, bilateral     OPEN REDUCTION INTERNAL FIXATION CALCANEOUS Right 6/8/2017    Procedure: OPEN REDUCTION INTERNAL FIXATION CALCANEOUS;  Open Reduction Internal Fixation Right Calcaneous;  Surgeon: Darnell Kincaid DPM;  Location: [facility] OR     ZZC APPENDECTOMY       ZZC LIGATE FALLOPIAN TUBE,POSTPARTUM  04/12/77    Bilateral tubal cauterization     ZZC TOTAL ABDOM HYSTERECTOMY  2/20/90    Hysterectomy, Total Abdominal     ZZHC COLONOSCOPY THRU STOMA, DIAGNOSTIC  06/24/98       Social History     Tobacco Use     Smoking status: Never Smoker     Smokeless tobacco: Never Used   Substance Use Topics     Alcohol use: Yes     Comment: very rare     Family History   Problem Relation Age of Onset     Hypertension Mother      Heart Disease Mother      Cancer Father      Cancer Son         Hodgkin's     Cancer Paternal Aunt         two with cervical cancer         Current Outpatient Medications   Medication Sig Dispense Refill     BABY ASPIRIN PO Take 81 mg by mouth daily       Calcium Carbonate (CALCIUM 600 PO) Take 1 tablet by mouth every other day       Cholecalciferol (VITAMIN D3 PO) Take 2,000 Units by mouth every other day       MULTIPLE VITAMIN CAPS   OR daily  0     VITAMIN K PO Take 1,000 mg by mouth every other day       Vaccines updated today.    Review of Systems  "  Constitutional: Negative for chills and fever.   HENT: Negative for congestion, ear pain, hearing loss and sore throat.    Eyes: Negative for pain and visual disturbance.   Respiratory: Negative for cough and shortness of breath.    Cardiovascular: Negative for chest pain, palpitations and peripheral edema.   Gastrointestinal: Negative for abdominal pain, constipation, diarrhea, heartburn, hematochezia and nausea.   Breasts:  Negative for tenderness, breast mass and discharge.   Genitourinary: Positive for frequency and urgency. Negative for dysuria, genital sores, hematuria, pelvic pain, vaginal bleeding and vaginal discharge.   Musculoskeletal: Negative for arthralgias, joint swelling and myalgias.   Skin: Negative for rash.   Neurological: Negative for dizziness, headaches and paresthesias.   Psychiatric/Behavioral: Negative for mood changes. The patient is not nervous/anxious.        OBJECTIVE:   /72   Pulse 109   Temp 98.7  F (37.1  C) (Temporal)   Wt 63.5 kg (140 lb)   SpO2 97%   BMI 24.80 kg/m   Estimated body mass index is 24.8 kg/m  as calculated from the following:    Height as of 8/31/17: 1.6 m (5' 3\").    Weight as of this encounter: 63.5 kg (140 lb).  Physical Exam  GENERAL: healthy, alert and no distress  EYES: Eyes grossly normal to inspection, PERRL and conjunctivae and sclerae normal  HENT: ear canals and TM's normal, nose and mouth without ulcers or lesions  NECK: no adenopathy, no asymmetry, masses, or scars and thyroid normal to palpation  RESP: lungs clear to auscultation - no rales, rhonchi or wheezes  CV: regular rate and rhythm, normal S1 S2, no S3 or S4, no murmur, click or rub, no peripheral edema and peripheral pulses strong  ABDOMEN: soft, nontender, no hepatosplenomegaly, no masses and bowel sounds normal  MS: soft tissue swelling ecchymosis over the palmar aspect of left wrist. Tender to the touch. Full ROM of fingers.   SKIN: no suspicious lesions or rashes  NEURO: Normal " "strength and tone, mentation intact and speech normal  PSYCH: mentation appears normal, affect normal/bright    ASSESSMENT / PLAN:   (Z00.00) Encounter for Medicare annual wellness exam  (primary encounter diagnosis)    (S52.206M) Other closed fracture of distal end of left radius, initial encounter  Comment: Xray showing fracture affecting the distal radius. Split provided to patient today. Encouraged ice and tylenol/ibuprofen as needed. She declines need for any additional pain support. Referral placed to ortho for follow-up.  Plan: XR Wrist Left G/E 3 Views, Orthopedic         Referral    (Z11.59) Need for hepatitis C screening test  Plan: Hepatitis C Screen Reflex to HCV RNA Quant and         Genotype    (Z13.220) Screening for hyperlipidemia  Plan: Lipid panel reflex to direct LDL Fasting    (Z12.11) Screen for colon cancer  Plan: Adult Gastro Ref - Procedure Only    (Z12.31) Visit for screening mammogram  Plan: MA SCREENING DIGITAL BILAT - Future  (s+30)    (Z78.0) Asymptomatic postmenopausal status  Plan: DX Hip/Pelvis/Spine    (Z13.1) Screening for diabetes mellitus  Plan: Basic metabolic panel  (Ca, Cl, CO2, Creat,         Gluc, K, Na, BUN)    (Z23) Need for vaccination  Plan: PPSV23, IM/SUBQ (2+ YRS) - [id number], TD         PRESERV FREE, IM (7+ YRS) (DECAVAC/TENIVAC)      Patient has been advised of split billing requirements and indicates understanding: Yes    COUNSELING:  Reviewed preventive health counseling, as reflected in patient instructions    Estimated body mass index is 24.8 kg/m  as calculated from the following:    Height as of 8/31/17: 1.6 m (5' 3\").    Weight as of this encounter: 63.5 kg (140 lb).        She reports that she has never smoked. She has never used smokeless tobacco.      Appropriate preventive services were discussed with this patient, including applicable screening as appropriate for cardiovascular disease, diabetes, osteopenia/osteoporosis, and glaucoma.  As " appropriate for age/gender, discussed screening for colorectal cancer, prostate cancer, breast cancer, and cervical cancer. Checklist reviewing preventive services available has been given to the patient.    Reviewed patients plan of care and provided an AVS. The Basic Care Plan (routine screening as documented in Health Maintenance) for Kylah meets the Care Plan requirement. This Care Plan has been established and reviewed with the Patient.    Counseling Resources:  ATP IV Guidelines  Pooled Cohorts Equation Calculator  Breast Cancer Risk Calculator  Breast Cancer: Medication to Reduce Risk  FRAX Risk Assessment  ICSI Preventive Guidelines  Dietary Guidelines for Americans, 2010  USDA's MyPlate  ASA Prophylaxis  Lung CA Screening    FARZAD Borjas [facility]    Identified Health Risks:      The patient was counseled and encouraged to consider modifying their diet and eating habits. She was provided with information on recommended healthy diet options.  The patient was provided with written information regarding signs of hearing loss.  Information on urinary incontinence and treatment options given to patient.  She is at risk for falling and has been provided with information to reduce the risk of falling at home.

## 2022-03-08 ENCOUNTER — TELEPHONE (OUTPATIENT)
Dept: GASTROENTEROLOGY | Facility: CLINIC | Age: 72
End: 2022-03-08
Payer: COMMERCIAL

## 2022-03-08 DIAGNOSIS — Z11.59 ENCOUNTER FOR SCREENING FOR OTHER VIRAL DISEASES: Primary | ICD-10-CM

## 2022-03-08 LAB — HCV AB SERPL QL IA: NONREACTIVE

## 2022-03-08 NOTE — TELEPHONE ENCOUNTER
Screening Questions  BlueKIND OF PREP RedLOCATION [review exclusion criteria] GreenSEDATION TYPE    1. Have you had a positive covid test in the last 90 days? n     2. Are you active on mychart? n    3. What insurance is in the chart? BXBS/Medicare     3.  Ordering/Referring Provider: Landry    4. BMI 24.8 [BMI OVER 40-EXTENDED PREP]  If greater than 40 review exclusion criteria [PAC APPT IF @ [facility]]    5.  Respiratory Screening :  [If yes to any of the following HOSPITAL setting only]     Do you use daily home oxygen? n    Do you have mod to severe Obstructive Sleep Apnea? n  [OKAY @ [facility] SH PH RI]   Do you have Pulmonary Hypertension? n     Do you have UNCONTROLLED asthma? n      6. Have you had a heart or lung transplant? n      7. Are you currently on dialysis? n [ If yes, G-PREP & HOSPITAL setting only]     8. Do you have chronic kidney disease? n [ If yes, G-PREP ]    9. Have you had a stroke or Transient ischemic attack (TIA) within 6 months? n (If yes, do not schedule at [facility])    10. In the past 6 months, have you had any heart related issues including cardiomyopathy or heart attack? n        If yes, did it require cardiac stenting or other implantable device? n      11. Do you have any implantable devices in your body (pacemaker, defib, LVAD)? n (If yes, schedule at U)    12. Do you take nitroglycerin? n   If yes, how often? n  (if yes, HOSPITAL setting ONLY)    13. Are you currently taking any blood thinners? n   [IF YES, INFORM PATIENT TO FOLLOW UP W/ ORDERING PROVIDER FOR BRIDGING INSTRUCTIONS]     14. Are you a diabetic? n   [ If yes, G-PREP ]    15. [FEMALES] Are you currently pregnant?     If yes, how many weeks?     16. Are you taking any prescription pain medications on a routine schedule?  n  [ If yes, EXTENDED PREP.] [If yes, MAC]    17. Do you have any chemical dependencies such as alcohol, street drugs, or methadone?  n [If yes, MAC]    18. Do you have any history of post-traumatic  stress syndrome, severe anxiety or history of psychosis?  n  [If yes, MAC]    19. Do you transfer independently?  y    20.  Do you have any issues with constipation?  n  [ If yes, EXTENDED PREP.]    21. Preferred LOCAL Pharmacy for Pre Prescription  [facility] [address] [address] [address]  Scheduling Details      Caller : Kylah Britt  (Please ask for phone number if not scheduled by patient)    Type of Procedure Scheduled: Colonoscopy  Which Colonoscopy Prep was Sent?: Miralax  KHORUTS CF PATIENTS & GROEN'S PATIENTS NEEDS EXTENDED PREP  Surgeon: long  Date of Procedure: 4/6  Location:       Sedation Type: CS  Conscious Sedation- Needs  for 6 hours after the procedure  MAC/General-Needs  for 24 hours after procedure    Pre-op Required at [facility] [place], [place] and OR for MAC sedation: n  (advise patient they will need a pre-op prior to procedure -)      Informed patient they will need an adult  y  Cannot take any type of public or medical transportation alone    Pre-Procedure Covid test to be completed at [facility] or Externally: y    Confirmed Nurse will call to complete assessment y    Additional comments:

## 2022-03-08 NOTE — RESULT ENCOUNTER NOTE
Please notify patient /parent that cholesterol levels were quite high and blood sugar was also mildly elevated. Would encourage ongoing diet and exercise modifications and recheck of labs in 6-12 months.     Tram Alatorre PA-C

## 2022-03-10 ENCOUNTER — OFFICE VISIT (OUTPATIENT)
Dept: ORTHOPEDICS | Facility: OTHER | Age: 72
End: 2022-03-10
Attending: PHYSICIAN ASSISTANT
Payer: COMMERCIAL

## 2022-03-10 VITALS
WEIGHT: 140 LBS | RESPIRATION RATE: 18 BRPM | BODY MASS INDEX: 24.8 KG/M2 | SYSTOLIC BLOOD PRESSURE: 154 MMHG | HEART RATE: 80 BPM | DIASTOLIC BLOOD PRESSURE: 72 MMHG

## 2022-03-10 DIAGNOSIS — S52.532A CLOSED COLLES' FRACTURE OF LEFT RADIUS, INITIAL ENCOUNTER: ICD-10-CM

## 2022-03-10 PROCEDURE — 99203 OFFICE O/P NEW LOW 30 MIN: CPT | Mod: 57 | Performed by: ORTHOPAEDIC SURGERY

## 2022-03-10 PROCEDURE — 25600 CLTX DST RDL FX/EPHYS SEP WO: CPT | Mod: LT | Performed by: ORTHOPAEDIC SURGERY

## 2022-03-10 NOTE — LETTER
3/10/2022         RE: Kylah Britt  [address]  [facility] [address]        Dear Colleague,    Thank you for referring your patient, Kylah Britt, to the [facility]. Please see a copy of my visit note below.    Kylah Britt is a 71 year old female seen in consultation at the request of Tram Alatorre PA-C  for left distal radius fracture.    She sustained this on 3/7/22 when she  fell at home tripping over her dogs..    The arm was placed into a short arm velcro splint without reduction.   She is now seen for definitive treatment.  She reports she also had a fracture of this wrist about 20 years ago without treatment at that time.    Other injuries: None.    Past Medical History:   Diagnosis Date     Abnormal Papanicolaou smear of cervix and cervical HPV 9/1/92    vaginitis with abnormal Pap     Depressive disorder, not elsewhere classified     depression     Female stress incontinence     urinary incontinence     Lyme disease      Osteoarthrosis, unspecified whether generalized or localized, unspecified site      monoarticular arthritis in the right first MTP joint     Other psychological or physical stress, not elsewhere classified(V62.89)     delayed stress syndrome       Past Surgical History:   Procedure Laterality Date     HC KNEE SCOPE, DIAGNOSTIC  1/31/89    Examination of right knee under anesthesia. Arthroscopy of right knee with debridement of torn anterior curciate ligament (medial meniscus not resected).     HC REMOVAL OF OVARY/TUBE(S)  2/20/90    Salpingo-Oophorectomy, bilateral     OPEN REDUCTION INTERNAL FIXATION CALCANEOUS Right 6/8/2017    Procedure: OPEN REDUCTION INTERNAL FIXATION CALCANEOUS;  Open Reduction Internal Fixation Right Calcaneous;  Surgeon: Darnell Kincaid DPM;  Location:  OR     Z APPENDECTOMY       Z LIGATE FALLOPIAN TUBE,POSTPARTUM  04/12/77    Bilateral tubal cauterization     Z TOTAL ABDOM HYSTERECTOMY  2/20/90     Hysterectomy, Total Abdominal     ZZHC COLONOSCOPY THRU STOMA, DIAGNOSTIC  06/24/98       Family History   Problem Relation Age of Onset     Hypertension Mother      Heart Disease Mother      Cancer Father      Cancer Son         Hodgkin's     Cancer Paternal Aunt         two with cervical cancer       Social History     Socioeconomic History     Marital status:      Spouse name: Not on file     Number of children: Not on file     Years of education: Not on file     Highest education level: Not on file   Occupational History     Not on file   Tobacco Use     Smoking status: Never Smoker     Smokeless tobacco: Never Used   Vaping Use     Vaping Use: Never used   Substance and Sexual Activity     Alcohol use: Yes     Comment: very rare     Drug use: No     Sexual activity: Not on file   Other Topics Concern     Parent/sibling w/ CABG, MI or angioplasty before 65F 55M? Not Asked   Social History Narrative     Not on file     Social Determinants of Health     Financial Resource Strain: Not on file   Food Insecurity: Not on file   Transportation Needs: Not on file   Physical Activity: Not on file   Stress: Not on file   Social Connections: Not on file   Intimate Partner Violence: Not on file   Housing Stability: Not on file       Current Outpatient Medications   Medication Sig Dispense Refill     BABY ASPIRIN PO Take 81 mg by mouth daily       Calcium Carbonate (CALCIUM 600 PO) Take 1 tablet by mouth every other day       Cholecalciferol (VITAMIN D3 PO) Take 2,000 Units by mouth every other day       MULTIPLE VITAMIN CAPS   OR daily  0     VITAMIN K PO Take 1,000 mg by mouth every other day         Allergies   Allergen Reactions     No Known Drug Allergies        REVIEW OF SYSTEMS:  CONSTITUTIONAL:  NEGATIVE for fever, chills, change in weight, not feeling tired  SKIN:  NEGATIVE for worrisome rashes, no skin lumps, no skin ulcers and no non-healing wounds  EYES:  NEGATIVE for vision changes or  irritation.  ENT/MOUTH:  NEGATIVE.  No hearing loss, no hoarseness, no difficulty swallowing.  RESP:  NEGATIVE. No cough or shortness of breath.  BREAST:  NEGATIVE for masses, tenderness or discharge  CV:  NEGATIVE for chest pain, palpitations or peripheral edema  GI:  NEGATIVE for nausea, abdominal pain, heartburn, or change in bowel habits  :  Negative. No dysuria, no hematuria  MUSCULOSKELETAL:  See HPI above  NEURO:  NEGATIVE . No headaches, no dizziness,  no numbness  ENDOCRINE:  NEGATIVE for temperature intolerance, skin/hair changes  HEME/ALLERGY/IMMUNE:  NEGATIVE for bleeding problems  PSYCHIATRIC:  NEGATIVE. no anxiety, no depression.         Xray images were independently visualized with the patient.  This shows distal radius fracture with no dorsal translation, moderate dorsal angulation.  The dorsal side of the wrist does not show the comminution expected for the degree of dorsal angulation. It appears to be acute fracture through old deformity.     Exam:    Shows severe tenderness at left distal radius.  moderate swelling of wrist and hand.  full range of motion of fingers.  Sensation, motor, and circulation are intact  She is right hand dominant.    Assessment/Plan:  Left distal radius fracture with moderate displacement, but this may be new fracture through old deformity..  This is acceptable position given nondominant arm with likely prior deformity..  She is too tender now to place EXOS or cast.  Return to clinic 1 week with EXOS placement with Colles mold.   X-ray wrist in Exos        Again, thank you for allowing me to participate in the care of your patient.        Sincerely,        Yoni Franco MD

## 2022-03-12 PROBLEM — S52.532A CLOSED COLLES' FRACTURE OF LEFT RADIUS: Status: ACTIVE | Noted: 2022-03-12

## 2022-03-12 NOTE — PROGRESS NOTES
Kylah Britt is a 71 year old female seen in consultation at the request of Tram Alatorre PA-C  for left distal radius fracture.    She sustained this on 3/7/22 when she  fell at home tripping over her dogs..    The arm was placed into a short arm velcro splint without reduction.   She is now seen for definitive treatment.  She reports she also had a fracture of this wrist about 20 years ago without treatment at that time.    Other injuries: None.    Past Medical History:   Diagnosis Date     Abnormal Papanicolaou smear of cervix and cervical HPV 9/1/92    vaginitis with abnormal Pap     Depressive disorder, not elsewhere classified     depression     Female stress incontinence     urinary incontinence     Lyme disease      Osteoarthrosis, unspecified whether generalized or localized, unspecified site      monoarticular arthritis in the right first MTP joint     Other psychological or physical stress, not elsewhere classified(V62.89)     delayed stress syndrome       Past Surgical History:   Procedure Laterality Date     HC KNEE SCOPE, DIAGNOSTIC  1/31/89    Examination of right knee under anesthesia. Arthroscopy of right knee with debridement of torn anterior curciate ligament (medial meniscus not resected).     HC REMOVAL OF OVARY/TUBE(S)  2/20/90    Salpingo-Oophorectomy, bilateral     OPEN REDUCTION INTERNAL FIXATION CALCANEOUS Right 6/8/2017    Procedure: OPEN REDUCTION INTERNAL FIXATION CALCANEOUS;  Open Reduction Internal Fixation Right Calcaneous;  Surgeon: Darnell Kincaid DPM;  Location:  OR     ZZC APPENDECTOMY       ZZC LIGATE FALLOPIAN TUBE,POSTPARTUM  04/12/77    Bilateral tubal cauterization     ZZC TOTAL ABDOM HYSTERECTOMY  2/20/90    Hysterectomy, Total Abdominal     ZZHC COLONOSCOPY THRU STOMA, DIAGNOSTIC  06/24/98       Family History   Problem Relation Age of Onset     Hypertension Mother      Heart Disease Mother      Cancer Father      Cancer Son         Hodgkin's     Cancer Paternal  Aunt         two with cervical cancer       Social History     Socioeconomic History     Marital status:      Spouse name: Not on file     Number of children: Not on file     Years of education: Not on file     Highest education level: Not on file   Occupational History     Not on file   Tobacco Use     Smoking status: Never Smoker     Smokeless tobacco: Never Used   Vaping Use     Vaping Use: Never used   Substance and Sexual Activity     Alcohol use: Yes     Comment: very rare     Drug use: No     Sexual activity: Not on file   Other Topics Concern     Parent/sibling w/ CABG, MI or angioplasty before 65F 55M? Not Asked   Social History Narrative     Not on file     Social Determinants of Health     Financial Resource Strain: Not on file   Food Insecurity: Not on file   Transportation Needs: Not on file   Physical Activity: Not on file   Stress: Not on file   Social Connections: Not on file   Intimate Partner Violence: Not on file   Housing Stability: Not on file       Current Outpatient Medications   Medication Sig Dispense Refill     BABY ASPIRIN PO Take 81 mg by mouth daily       Calcium Carbonate (CALCIUM 600 PO) Take 1 tablet by mouth every other day       Cholecalciferol (VITAMIN D3 PO) Take 2,000 Units by mouth every other day       MULTIPLE VITAMIN CAPS   OR daily  0     VITAMIN K PO Take 1,000 mg by mouth every other day         Allergies   Allergen Reactions     No Known Drug Allergies        REVIEW OF SYSTEMS:  CONSTITUTIONAL:  NEGATIVE for fever, chills, change in weight, not feeling tired  SKIN:  NEGATIVE for worrisome rashes, no skin lumps, no skin ulcers and no non-healing wounds  EYES:  NEGATIVE for vision changes or irritation.  ENT/MOUTH:  NEGATIVE.  No hearing loss, no hoarseness, no difficulty swallowing.  RESP:  NEGATIVE. No cough or shortness of breath.  BREAST:  NEGATIVE for masses, tenderness or discharge  CV:  NEGATIVE for chest pain, palpitations or peripheral edema  GI:  NEGATIVE  for nausea, abdominal pain, heartburn, or change in bowel habits  :  Negative. No dysuria, no hematuria  MUSCULOSKELETAL:  See HPI above  NEURO:  NEGATIVE . No headaches, no dizziness,  no numbness  ENDOCRINE:  NEGATIVE for temperature intolerance, skin/hair changes  HEME/ALLERGY/IMMUNE:  NEGATIVE for bleeding problems  PSYCHIATRIC:  NEGATIVE. no anxiety, no depression.         Xray images were independently visualized with the patient.  This shows distal radius fracture with no dorsal translation, moderate dorsal angulation.  The dorsal side of the wrist does not show the comminution expected for the degree of dorsal angulation. It appears to be acute fracture through old deformity.     Exam:    Shows severe tenderness at left distal radius.  moderate swelling of wrist and hand.  full range of motion of fingers.  Sensation, motor, and circulation are intact  She is right hand dominant.    Assessment/Plan:  Left distal radius fracture with moderate displacement, but this may be new fracture through old deformity..  This is acceptable position given nondominant arm with likely prior deformity..  She is too tender now to place EXOS or cast.  Return to clinic 1 week with EXOS placement with Colles mold.   X-ray wrist in Exos

## 2022-03-14 ENCOUNTER — HOSPITAL ENCOUNTER (OUTPATIENT)
Dept: BONE DENSITY | Facility: CLINIC | Age: 72
Discharge: HOME | End: 2022-03-14
Attending: PHYSICIAN ASSISTANT
Payer: MEDICARE

## 2022-03-14 ENCOUNTER — HOSPITAL ENCOUNTER (OUTPATIENT)
Dept: MAMMOGRAPHY | Facility: CLINIC | Age: 72
Discharge: HOME | End: 2022-03-14
Attending: PHYSICIAN ASSISTANT
Payer: MEDICARE

## 2022-03-14 DIAGNOSIS — Z78.0 ASYMPTOMATIC POSTMENOPAUSAL STATUS: ICD-10-CM

## 2022-03-14 DIAGNOSIS — Z12.31 VISIT FOR SCREENING MAMMOGRAM: ICD-10-CM

## 2022-03-14 PROCEDURE — 77080 DXA BONE DENSITY AXIAL: CPT

## 2022-03-14 PROCEDURE — 77067 SCR MAMMO BI INCL CAD: CPT

## 2022-03-16 ENCOUNTER — TELEPHONE (OUTPATIENT)
Dept: FAMILY MEDICINE | Facility: CLINIC | Age: 72
End: 2022-03-16
Payer: COMMERCIAL

## 2022-03-16 NOTE — TELEPHONE ENCOUNTER
Spoke with patient and informed of results below. Patient understood. Wants to hold off on fosamax. Please let patient know how much vitamin D and calcium she should take.     Mona Dubose MA

## 2022-03-16 NOTE — TELEPHONE ENCOUNTER
----- Message from Tram Alatorre PA-C sent at 3/16/2022  6:36 AM CDT -----  Please notify patient /parent that her DEXA scan shows osteopenia or thinning of her bones. She is at moderate risk for fractures in the future. She may want to consider starting a medication like Fosamax. If she declines would recommend she continue Vitamin D and calcium supplementation. Please advise.     Tram Alatorre PA-C

## 2022-03-16 NOTE — TELEPHONE ENCOUNTER
She should work on taking in [id number] mg of calcium (this can be dietary or supplementation) with 2,000 units of vitamin D daily. She should also be getting daily weight bearing exercise (walking works great).    Tram Alatorre PA-C

## 2022-03-16 NOTE — RESULT ENCOUNTER NOTE
Please notify patient /parent that her DEXA scan shows osteopenia or thinning of her bones. She is at moderate risk for fractures in the future. She may want to consider starting a medication like Fosamax. If she declines would recommend she continue Vitamin D and calcium supplementation. Please advise.     Tram Alatorre PA-C

## 2022-03-17 ENCOUNTER — OFFICE VISIT (OUTPATIENT)
Dept: ORTHOPEDICS | Facility: CLINIC | Age: 72
End: 2022-03-17
Payer: COMMERCIAL

## 2022-03-17 ENCOUNTER — ANCILLARY PROCEDURE (OUTPATIENT)
Dept: GENERAL RADIOLOGY | Facility: CLINIC | Age: 72
End: 2022-03-17
Attending: ORTHOPAEDIC SURGERY
Payer: COMMERCIAL

## 2022-03-17 VITALS
RESPIRATION RATE: 20 BRPM | DIASTOLIC BLOOD PRESSURE: 70 MMHG | WEIGHT: 139 LBS | SYSTOLIC BLOOD PRESSURE: 136 MMHG | BODY MASS INDEX: 24.62 KG/M2 | HEART RATE: 80 BPM

## 2022-03-17 DIAGNOSIS — S52.532D CLOSED COLLES' FRACTURE OF LEFT RADIUS WITH ROUTINE HEALING, SUBSEQUENT ENCOUNTER: ICD-10-CM

## 2022-03-17 DIAGNOSIS — S52.532D CLOSED COLLES' FRACTURE OF LEFT RADIUS WITH ROUTINE HEALING, SUBSEQUENT ENCOUNTER: Primary | ICD-10-CM

## 2022-03-17 PROCEDURE — 73110 X-RAY EXAM OF WRIST: CPT | Mod: TC | Performed by: RADIOLOGY

## 2022-03-17 PROCEDURE — 99207 PR FRACTURE CARE IN GLOBAL PERIOD: CPT | Performed by: ORTHOPAEDIC SURGERY

## 2022-03-17 NOTE — PATIENT INSTRUCTIONS
3 Keys to Success with EXOS & caring for your skin and brace       Not too tight - Wear it right and leave some wiggle room       EXOs braces are meant to be worn under light pressure and not too tight to the skin. A little wiggle room inside the Exos brace promotes air circulation and helps maintain healthy skin.        An overly tight brace creates  shear  or pressure on the skin shearing motion, and can result in irritation, rash, odor, and skin issues.        Consult with your physician if you experience unusual swelling or an increase in discomfort, numbness, or tingling appear.       If the brace breaks or cracks, see your physician immediately.           If the brace and skin get wet, the  CAN'T STAYWET        The brace may be washed or worn when swimming or bathing ONLY if the physician allows the patient to loosen or remove the brace, if not, the patient cannot get the brace and skin wet.        It is important the inside surface of the brace and the patient's skin underneath are completely dry after swimming or bathing. See below for drying details.           Keep your  SKIN and BRACE CLEAN AND DRY        A clean and dry environment inside the brace will help maintain healthy skin and reduce odor and kin issues.       Do not expose the brace to moisturizers, chemicals, or solvents as they may affect the durability or cause skin issues.        Do not expose brace to heat over 130 degrees Fahrenheit       See below for cleaning and drying instructions:       Loosen or remove the brace as instructed by your physician        Was inside and outside of the brace and skin with antibacterial soap and water       Rinse thoroughly with water       Use a hair dryer set on high volume and the COOL setting to thoroughly dry the interior of the brace and skin.        Reapply the brace and retighten the brace. Remember to leave wiggle room.      Return to clinic 4 1/2 weeks for x-ray.

## 2022-03-17 NOTE — LETTER
3/17/2022         RE: Kylah Britt  [address]  [facility] [address]        Dear Colleague,    Thank you for referring your patient, Kylah Britt, to the [facility]. Please see a copy of my visit note below.    Follow up left distal radius fracture.  Still looks like a new fracture of old deformity.  EXOS placed.  New x-ray shows no change.    Return to clinic 4 weeks with left wrist x-ray.      Again, thank you for allowing me to participate in the care of your patient.        Sincerely,        Yoni Franco MD

## 2022-03-18 NOTE — PROGRESS NOTES
Follow up left distal radius fracture.  Still looks like a new fracture of old deformity.  EXOS placed.  New x-ray shows no change.    Return to clinic 4 weeks with left wrist x-ray.

## 2022-04-02 ENCOUNTER — LAB (OUTPATIENT)
Dept: LAB | Facility: CLINIC | Age: 72
End: 2022-04-02
Payer: COMMERCIAL

## 2022-04-02 DIAGNOSIS — Z11.59 ENCOUNTER FOR SCREENING FOR OTHER VIRAL DISEASES: ICD-10-CM

## 2022-04-02 PROCEDURE — U0003 INFECTIOUS AGENT DETECTION BY NUCLEIC ACID (DNA OR RNA); SEVERE ACUTE RESPIRATORY SYNDROME CORONAVIRUS 2 (SARS-COV-2) (CORONAVIRUS DISEASE [COVID-19]), AMPLIFIED PROBE TECHNIQUE, MAKING USE OF HIGH THROUGHPUT TECHNOLOGIES AS DESCRIBED BY CMS-2020-01-R: HCPCS

## 2022-04-02 PROCEDURE — U0005 INFEC AGEN DETEC AMPLI PROBE: HCPCS

## 2022-04-03 LAB — SARS-COV-2 RNA RESP QL NAA+PROBE: NEGATIVE

## 2022-04-06 ENCOUNTER — HOSPITAL ENCOUNTER (OUTPATIENT)
Facility: CLINIC | Age: 72
Discharge: HOME | End: 2022-04-06
Attending: INTERNAL MEDICINE | Admitting: INTERNAL MEDICINE
Payer: MEDICARE

## 2022-04-06 ENCOUNTER — ANESTHESIA (OUTPATIENT)
Dept: GASTROENTEROLOGY | Facility: CLINIC | Age: 72
End: 2022-04-06
Payer: MEDICARE

## 2022-04-06 ENCOUNTER — ANESTHESIA EVENT (OUTPATIENT)
Dept: GASTROENTEROLOGY | Facility: CLINIC | Age: 72
End: 2022-04-06
Payer: MEDICARE

## 2022-04-06 VITALS
OXYGEN SATURATION: 97 % | HEART RATE: 71 BPM | SYSTOLIC BLOOD PRESSURE: 118 MMHG | DIASTOLIC BLOOD PRESSURE: 85 MMHG | TEMPERATURE: 98.1 F | RESPIRATION RATE: 20 BRPM

## 2022-04-06 LAB — COLONOSCOPY: NORMAL

## 2022-04-06 PROCEDURE — 250N000011 HC RX IP 250 OP 636: Performed by: NURSE ANESTHETIST, CERTIFIED REGISTERED

## 2022-04-06 PROCEDURE — 250N000009 HC RX 250: Performed by: NURSE ANESTHETIST, CERTIFIED REGISTERED

## 2022-04-06 PROCEDURE — 45380 COLONOSCOPY AND BIOPSY: CPT | Performed by: INTERNAL MEDICINE

## 2022-04-06 PROCEDURE — 88305 TISSUE EXAM BY PATHOLOGIST: CPT | Mod: TC | Performed by: INTERNAL MEDICINE

## 2022-04-06 PROCEDURE — 258N000003 HC RX IP 258 OP 636: Performed by: NURSE ANESTHETIST, CERTIFIED REGISTERED

## 2022-04-06 PROCEDURE — 370N000017 HC ANESTHESIA TECHNICAL FEE, PER MIN: Performed by: INTERNAL MEDICINE

## 2022-04-06 RX ORDER — SODIUM CHLORIDE, SODIUM LACTATE, POTASSIUM CHLORIDE, CALCIUM CHLORIDE 600; 310; 30; 20 MG/100ML; MG/100ML; MG/100ML; MG/100ML
INJECTION, SOLUTION INTRAVENOUS CONTINUOUS
Status: DISCONTINUED | OUTPATIENT
Start: 2022-04-06 | End: 2022-04-06 | Stop reason: HOSPADM

## 2022-04-06 RX ORDER — LIDOCAINE HYDROCHLORIDE 20 MG/ML
INJECTION, SOLUTION INFILTRATION; PERINEURAL PRN
Status: DISCONTINUED | OUTPATIENT
Start: 2022-04-06 | End: 2022-04-06

## 2022-04-06 RX ORDER — LIDOCAINE 40 MG/G
CREAM TOPICAL
Status: DISCONTINUED | OUTPATIENT
Start: 2022-04-06 | End: 2022-04-06 | Stop reason: HOSPADM

## 2022-04-06 RX ORDER — PROPOFOL 10 MG/ML
INJECTION, EMULSION INTRAVENOUS PRN
Status: DISCONTINUED | OUTPATIENT
Start: 2022-04-06 | End: 2022-04-06

## 2022-04-06 RX ORDER — PROPOFOL 10 MG/ML
INJECTION, EMULSION INTRAVENOUS CONTINUOUS PRN
Status: DISCONTINUED | OUTPATIENT
Start: 2022-04-06 | End: 2022-04-06

## 2022-04-06 RX ADMIN — SODIUM CHLORIDE, POTASSIUM CHLORIDE, SODIUM LACTATE AND CALCIUM CHLORIDE: 600; 310; 30; 20 INJECTION, SOLUTION INTRAVENOUS at 09:01

## 2022-04-06 RX ADMIN — PROPOFOL 150 MCG/KG/MIN: 10 INJECTION, EMULSION INTRAVENOUS at 09:45

## 2022-04-06 RX ADMIN — LIDOCAINE HYDROCHLORIDE 40 MG: 20 INJECTION, SOLUTION INFILTRATION; PERINEURAL at 09:45

## 2022-04-06 RX ADMIN — PROPOFOL 50 MG: 10 INJECTION, EMULSION INTRAVENOUS at 09:45

## 2022-04-06 NOTE — DISCHARGE INSTRUCTIONS
[facility]    Home Care Following Endoscopy  Dr. Tsai          Activity:    You have just undergone an endoscopic procedure usually performed with conscious sedation.  Do not work or operate machinery (including a car) for at least 12 hours.      I encourage you to walk and attempt to pass this air as soon as possible.    Diet:    Return to the diet you were on before your procedure but eat lightly for the first 12-24 hours.    Drink plenty of water.    Resume any regular medications unless otherwise advised by your physician.  Please begin any new medication prescribed as a result of your procedure as directed by your physician.     If you had any biopsy or polyp removed please refrain from aspirin or aspirin products for 2 days.  If on Coumadin please restart as instructed by your physician.   Pain:    You may take Tylenol as needed for pain.  Expected Recovery:    You can expect some mild abdominal fullness and/or discomfort due to the air used to inflate your intestinal tract.     Call Your Physician if You Have:    After Colonoscopy:  o Worsening persisting abdominal pain which is worse with activity.  o Fevers (>101 degrees F), chills or shakes.  o Passage of continued blood with bowel movements.   o   Any questions or concerns about your recovery, please call [phone] or after hours [phone] Nurse Advice Line.    Follow-up Care:  You did have polyps/biopsy tissue sample(s) removed.  The polyps/biopsy tissue sample(s) will be sent to pathology.    You should receive letter in your My Chart with your results within 1-2 weeks. If you do not participate in My Chart a physical letter will come in the mail in 2-3 weeks.    Please call if you have not received a notification of your results.  If asked to return to clinic please make an appointment 1 week after your procedure.  Call [phone].

## 2022-04-06 NOTE — ANESTHESIA POSTPROCEDURE EVALUATION
Patient: Kylah Britt    Procedure: Procedure(s):  COLONOSCOPY, WITH POLYPECTOMY       Anesthesia Type:  MAC    Note:  Disposition: Outpatient   Postop Pain Control: Uneventful            Sign Out: Well controlled pain   PONV: No   Neuro/Psych: Uneventful            Sign Out: Acceptable/Baseline neuro status   Airway/Respiratory: Uneventful            Sign Out: Acceptable/Baseline resp. status   CV/Hemodynamics: Uneventful            Sign Out: Acceptable CV status   Other NRE: NONE   DID A NON-ROUTINE EVENT OCCUR? No    Event details/Postop Comments:  Pt was happy with anesthesia care.  No complications.  I will follow up with the pt if needed.           Last vitals:  Vitals Value Taken Time   /71 04/06/22 1012   Temp     Pulse 65 04/06/22 1012   Resp     SpO2 100 % 04/06/22 1017   Vitals shown include unvalidated device data.    Electronically Signed By: ESPERANZA East CRNA  April 6, 2022  10:18 AM

## 2022-04-06 NOTE — ANESTHESIA PREPROCEDURE EVALUATION
Anesthesia Pre-Procedure Evaluation    Patient: Kylah Britt   MRN: [id number] : 1950        Procedure : Procedure(s):  COLONOSCOPY          Past Medical History:   Diagnosis Date     Abnormal Papanicolaou smear of cervix and cervical HPV 92    vaginitis with abnormal Pap     Depressive disorder, not elsewhere classified     depression     Female stress incontinence     urinary incontinence     Lyme disease      Osteoarthrosis, unspecified whether generalized or localized, unspecified site      monoarticular arthritis in the right first MTP joint     Other psychological or physical stress, not elsewhere classified(V62.89)     delayed stress syndrome      Past Surgical History:   Procedure Laterality Date     HC KNEE SCOPE, DIAGNOSTIC  89    Examination of right knee under anesthesia. Arthroscopy of right knee with debridement of torn anterior curciate ligament (medial meniscus not resected).     HC REMOVAL OF OVARY/TUBE(S)  90    Salpingo-Oophorectomy, bilateral     OPEN REDUCTION INTERNAL FIXATION CALCANEOUS Right 2017    Procedure: OPEN REDUCTION INTERNAL FIXATION CALCANEOUS;  Open Reduction Internal Fixation Right Calcaneous;  Surgeon: Darnell Kincaid DPM;  Location: [facility]     ZZC APPENDECTOMY       ZZC LIGATE FALLOPIAN TUBE,POSTPARTUM  77    Bilateral tubal cauterization     ZZC TOTAL ABDOM HYSTERECTOMY  90    Hysterectomy, Total Abdominal     ZZHC COLONOSCOPY THRU STOMA, DIAGNOSTIC  98      Allergies   Allergen Reactions     No Known Drug Allergies       Social History     Tobacco Use     Smoking status: Never Smoker     Smokeless tobacco: Never Used   Substance Use Topics     Alcohol use: Yes     Comment: very rare      Wt Readings from Last 1 Encounters:   22 63 kg (139 lb)        Anesthesia Evaluation            ROS/MED HX  ENT/Pulmonary:  - neg pulmonary ROS     Neurologic:  - neg neurologic ROS     Cardiovascular:     (+) Dyslipidemia -----     METS/Exercise Tolerance:     Hematologic:  - neg hematologic  ROS     Musculoskeletal:  - neg musculoskeletal ROS     GI/Hepatic:     (+) bowel prep, appendicitis,     Renal/Genitourinary:  - neg Renal ROS     Endo:  - neg endo ROS     Psychiatric/Substance Use:     (+) psychiatric history depression     Infectious Disease:  - neg infectious disease ROS     Malignancy:  - neg malignancy ROS     Other:            Physical Exam    Airway        Mallampati: II   TM distance: > 3 FB   Neck ROM: full   Mouth opening: > 3 cm    Respiratory Devices and Support         Dental           Cardiovascular   cardiovascular exam normal          Pulmonary   pulmonary exam normal                OUTSIDE LABS:  CBC:   Lab Results   Component Value Date    WBC 7.1 06/08/2017    WBC 3.0 (L) 12/17/2014    HGB 12.6 06/08/2017    HGB 13.9 12/17/2014    HCT 39.4 06/08/2017    HCT 41.9 12/17/2014     06/08/2017     12/17/2014     BMP:   Lab Results   Component Value Date     03/07/2022     06/08/2017    POTASSIUM 3.8 03/07/2022    POTASSIUM 4.0 06/08/2017    CHLORIDE 111 (H) 03/07/2022    CHLORIDE 106 06/08/2017    CO2 24 03/07/2022    CO2 27 06/08/2017    BUN 21 03/07/2022    BUN 13 06/08/2017    CR 0.59 03/07/2022    CR 0.79 06/08/2017     (H) 03/07/2022     (H) 06/08/2017     COAGS: No results found for: PTT, INR, FIBR  POC: No results found for: BGM, HCG, HCGS  HEPATIC:   Lab Results   Component Value Date    ALBUMIN 4.1 04/12/2002    PROTTOTAL 7.0 04/12/2002    ALT 18 04/12/2002    AST 24 04/12/2002    ALKPHOS 51 04/12/2002    BILITOTAL 0.4 04/12/2002    BILIDIRECT 0.1 04/12/2002     OTHER:   Lab Results   Component Value Date    A1C 5.7 04/12/2002    NARA 9.1 03/07/2022    TSH 0.96 04/12/2002    T4 0.9 04/12/2002    SED 15 04/12/2002       Anesthesia Plan    ASA Status:  2   NPO Status:  NPO Appropriate    Anesthesia Type: MAC.     - Reason for MAC: straight local not clinically adequate    Induction: Intravenous, Propofol.   Maintenance: TIVA.        Consents    Anesthesia Plan(s) and associated risks, benefits, and realistic alternatives discussed. Questions answered and patient/representative(s) expressed understanding.    - Discussed:     - Discussed with:  Patient         Postoperative Care            Comments:    Other Comments: The risks and benefits of anesthesia, and the alternatives where applicable, have been discussed with the patient, and they wish to proceed.            ESPERANZA East CRNA

## 2022-04-06 NOTE — H&P
[facility] Anesthesia Pre-op History and Physical    Kylah Britt MRN# [id number]   Age: 71 year old YOB: 1950      Date of Surgery: 4/6/2022 Location [facility]      Date of Exam 4/6/2022 Facility (Same day)       Home clinic: [facility]  Primary care provider: [facility], [facility]         Chief Complaint and/or Reason for Procedure:   No chief complaint on file.    Colonoscopy       Active problem list:     Patient Active Problem List    Diagnosis Date Noted     Closed Colles' fracture of left radius 03/12/2022     Priority: Medium     CARDIOVASCULAR SCREENING; LDL GOAL LESS THAN 160 10/31/2010     Priority: Medium     Tear meniscus knee 04/21/2010     Priority: Medium            Medications (include herbals and vitamins):   Any Plavix use in the last 7 days? No     No current facility-administered medications for this encounter.     Current Outpatient Medications   Medication Sig     BABY ASPIRIN PO Take 81 mg by mouth daily     Calcium Carbonate (CALCIUM 600 PO) Take 1 tablet by mouth every other day     Cholecalciferol (VITAMIN D3 PO) Take 2,000 Units by mouth every other day     MULTIPLE VITAMIN CAPS   OR daily     VITAMIN K PO Take 1,000 mg by mouth every other day             Allergies:      Allergies   Allergen Reactions     No Known Drug Allergies      Allergy to Latex? No  Allergy to tape?   No  Intolerances:             Physical Exam:   All vitals have been reviewed  No data found.  No intake/output data recorded.  Lungs:   No increased work of breathing, good air exchange, clear to auscultation bilaterally, no crackles or wheezing     Cardiovascular:   Normal apical impulse, regular rate and rhythm, normal S1 and S2, no S3 or S4, and no murmur noted             Lab / Radiology Results:            Anesthetic risk and/or ASA classification:       Bishnu Tsai MD

## 2022-04-06 NOTE — LETTER
April 8, 2022      [address]yl[address]  [address]   [facility] [address]        Dear MsDulce,    We are writing to inform you of your test results.    This is benign and non-concerning.  A repeat exam is not needed in 7 yrs (age 78).  Congratulations     Resulted Orders   Surgical Pathology Exam   Result Value Ref Range    Case Report       Surgical Pathology Report                         Case: [id number]                                  Authorizing Provider:  Bishnu Tsai MD        Collected:           04/06/2022 10:02 AM          Ordering Location:     [facility]          Received:            04/06/2022 11:27 AM                                 [facility]                                                          Pathologist:           Joshua Nina MD                                                                           Specimen:    Large Intestine, Colon, Descending                                                         Final Diagnosis       Large intestine, descending, polyp, biopsy/polypectomy:  - Tubular adenoma negative for high-grade dysplasia.      Gross Description       A(1). Large Intestine, Colon, Descending, :  The specimen is received in formalin, labeled with the patient's name, medical record number and other identifying information and designated  descending colon polyp . It consists of a single tan soft tissue fragment measuring 0.2 cm in greatest dimension. Entirely submitted in one cassette.   (SACHIN Tay)        Microscopic Description       Sections demonstrate tubular adenoma negative for high-grade dysplasia.      Performing Labs       The technical component of this testing was completed at [facility]      Case Images         If you have any questions or concerns, please call the clinic at the number listed  above.       Sincerely,      Bishnu Tsai MD

## 2022-04-08 LAB
PATH REPORT.COMMENTS IMP SPEC: NORMAL
PATH REPORT.COMMENTS IMP SPEC: NORMAL
PATH REPORT.FINAL DX SPEC: NORMAL
PATH REPORT.GROSS SPEC: NORMAL
PATH REPORT.MICROSCOPIC SPEC OTHER STN: NORMAL
PHOTO IMAGE: NORMAL

## 2022-04-08 PROCEDURE — 88305 TISSUE EXAM BY PATHOLOGIST: CPT | Mod: 26 | Performed by: PATHOLOGY

## 2022-04-28 ENCOUNTER — OFFICE VISIT (OUTPATIENT)
Dept: ORTHOPEDICS | Facility: CLINIC | Age: 72
End: 2022-04-28
Payer: COMMERCIAL

## 2022-04-28 VITALS
HEIGHT: 62 IN | DIASTOLIC BLOOD PRESSURE: 78 MMHG | SYSTOLIC BLOOD PRESSURE: 138 MMHG | WEIGHT: 136 LBS | BODY MASS INDEX: 25.03 KG/M2

## 2022-04-28 DIAGNOSIS — S52.532D CLOSED COLLES' FRACTURE OF LEFT RADIUS WITH ROUTINE HEALING, SUBSEQUENT ENCOUNTER: Primary | ICD-10-CM

## 2022-04-28 PROCEDURE — 99207 PR FRACTURE CARE IN GLOBAL PERIOD: CPT | Performed by: PHYSICIAN ASSISTANT

## 2022-04-28 ASSESSMENT — PAIN SCALES - GENERAL: PAINLEVEL: MODERATE PAIN (4)

## 2022-04-28 NOTE — PROGRESS NOTES
"HISTORY OF PRESENT ILLNESS:    Kylah Britt is a 71 year old female who is seen in follow up for   Chief Complaint   Patient presents with     RECHECK      left distal radius fracture- DOI: 3/7/2022   Present symptoms: Patient has not removed left Exos splint since it was placed.  She has had people come into her home to assist her with chores needing to be done especially household chores.  Patient is a non-smoker and does not drink alcohol.  Patient reports she does not take medications very often.  She states she still has some tenderness/soreness.  Treatments tried to this point: Immobilization with short arm Exos splint  Family present: None    Physical Exam:  Vitals: /78   Ht 1.575 m (5' 2\")   Wt 61.7 kg (136 lb)   BMI 24.87 kg/m    BMI= Body mass index is 24.87 kg/m .  Constitutional: healthy, alert and no acute distress   Psychiatric: mentation appears normal and affect normal/bright  NEURO: no focal deficits  SKIN: no excoriation or erythema. No signs of infection.  JOINT/EXTREMITIES:  Affected extremity pulses are easily palpable.  Wrist Exam: WRIST:  Inspection: Patient still has a small amount of radial swelling present.  There is no bruising or redness.  Palpation: Tender:  over the distal radius  Range of Motion: Patient is able to flex approximately 30 degrees of the left wrist.  Extension is approximately 15 degrees; radial deviation is approximately 10 degrees; ulnar deviation is minimal; patient is approximately 25% with pronation/supination  Strength: no deficits not tested    IMAGING INTERPRETATION: Left wrist x-rays.  There is no change in the alignment of the left distal radius fracture and is still appropriate alignment.  Patient is not showing a considerable amount of callus or healing.  There is a small whiteness transversely to indicate some healing has been present.  Independent visualization of the images was performed.     ASSESSMENT:    Chief Complaint   Patient " "presents with     RECHECK      left distal radius fracture- DOI: 3/7/2022       ICD-10-CM    1. Closed Colles' fracture of left radius with routine healing, subsequent encounter  S52.532D XR Wrist Left G/E 3 Views     Patient with left distal radius fracture with healing not as advanced as we would hope at 7 weeks out.      Plan:   I have asked patient to remove her splint frequently to perform range of motion exercises.  She can remove the splint when at rest.  Splint was changed from the Exos splint to a removable over-the-counter splint for ease of patient to put on and off.  Patient does live alone.  She is advised if she is doing any type of outdoor activity or any in-house activities/chores that would be \"risky\" for falls and she should place the splint.  She should not be lifting with left wrist at this time.  Return to clinic 2 weeks at which time we will repeat x-rays and recheck patient's range of motion.  Our hope is to see more callus with challenges of removal of splint    BP Readings from Last 1 Encounters:   04/28/22 138/78     BP noted to be well controlled today in office.     Cherelle Owen PA-C   4/28/2022  2:34 PM    "

## 2022-04-28 NOTE — LETTER
"    4/28/2022         RE: Kylah Britt  [address]  [facility] [address]        Dear Colleague,    Thank you for referring your patient, Kylah Britt, to the [facility]. Please see a copy of my visit note below.    HISTORY OF PRESENT ILLNESS:    Kylah Britt is a 71 year old female who is seen in follow up for   Chief Complaint   Patient presents with     RECHECK      left distal radius fracture- DOI: 3/7/2022   Present symptoms: Patient has not removed left Exos splint since it was placed.  She has had people come into her home to assist her with chores needing to be done especially household chores.  Patient is a non-smoker and does not drink alcohol.  Patient reports she does not take medications very often.  She states she still has some tenderness/soreness.  Treatments tried to this point: Immobilization with short arm Exos splint  Family present: None    Physical Exam:  Vitals: /78   Ht 1.575 m (5' 2\")   Wt 61.7 kg (136 lb)   BMI 24.87 kg/m    BMI= Body mass index is 24.87 kg/m .  Constitutional: healthy, alert and no acute distress   Psychiatric: mentation appears normal and affect normal/bright  NEURO: no focal deficits  SKIN: no excoriation or erythema. No signs of infection.  JOINT/EXTREMITIES:  Affected extremity pulses are easily palpable.  Wrist Exam: WRIST:  Inspection: Patient still has a small amount of radial swelling present.  There is no bruising or redness.  Palpation: Tender:  over the distal radius  Range of Motion: Patient is able to flex approximately 30 degrees of the left wrist.  Extension is approximately 15 degrees; radial deviation is approximately 10 degrees; ulnar deviation is minimal; patient is approximately 25% with pronation/supination  Strength: no deficits not tested    IMAGING INTERPRETATION: Left wrist x-rays.  There is no change in the alignment of the left distal radius fracture and is still appropriate alignment.  " "Patient is not showing a considerable amount of callus or healing.  There is a small whiteness transversely to indicate some healing has been present.  Independent visualization of the images was performed.     ASSESSMENT:    Chief Complaint   Patient presents with     RECHECK      left distal radius fracture- DOI: 3/7/2022       ICD-10-CM    1. Closed Colles' fracture of left radius with routine healing, subsequent encounter  S52.532D XR Wrist Left G/E 3 Views     Patient with left distal radius fracture with healing not as advanced as we would hope at 7 weeks out.      Plan:   I have asked patient to remove her splint frequently to perform range of motion exercises.  She can remove the splint when at rest.  Splint was changed from the Exos splint to a removable over-the-counter splint for ease of patient to put on and off.  Patient does live alone.  She is advised if she is doing any type of outdoor activity or any in-house activities/chores that would be \"risky\" for falls and she should place the splint.  She should not be lifting with left wrist at this time.  Return to clinic 2 weeks at which time we will repeat x-rays and recheck patient's range of motion.  Our hope is to see more callus with challenges of removal of splint    BP Readings from Last 1 Encounters:   04/28/22 138/78     BP noted to be well controlled today in office.     Cherelle Owen PA-C   4/28/2022  2:34 PM        Again, thank you for allowing me to participate in the care of your patient.        Sincerely,        Cherelle Owen PA-C    "

## 2022-04-28 NOTE — PATIENT INSTRUCTIONS
Over the next 2 weeks remove brace frequently to move your wrist up and down, side to side, and flip palm up and palm down moving only the portion of arm below your elbow.    Use brace when up walking or could tend to trip or if carrying items.     Use Tylenol 1-2 tablets and/or ibuprofen 2-3 tablets every 6 hours for pain as needed.

## 2022-04-28 NOTE — NURSING NOTE
Chief Complaint   Patient presents with     RECHECK      left distal radius fracture- DOI: 3/7/2022     MP/MA

## 2022-05-25 ENCOUNTER — OFFICE VISIT (OUTPATIENT)
Dept: INTERNAL MEDICINE | Facility: CLINIC | Age: 72
End: 2022-05-25
Payer: COMMERCIAL

## 2022-05-25 VITALS
DIASTOLIC BLOOD PRESSURE: 74 MMHG | TEMPERATURE: 97.7 F | OXYGEN SATURATION: 96 % | WEIGHT: 129 LBS | HEART RATE: 109 BPM | SYSTOLIC BLOOD PRESSURE: 122 MMHG | BODY MASS INDEX: 23.59 KG/M2

## 2022-05-25 DIAGNOSIS — S52.532A CLOSED COLLES' FRACTURE OF LEFT RADIUS, INITIAL ENCOUNTER: ICD-10-CM

## 2022-05-25 DIAGNOSIS — S01.01XD LACERATION OF SCALP, SUBSEQUENT ENCOUNTER: Primary | ICD-10-CM

## 2022-05-25 PROCEDURE — 99213 OFFICE O/P EST LOW 20 MIN: CPT | Performed by: INTERNAL MEDICINE

## 2022-05-25 ASSESSMENT — PAIN SCALES - GENERAL: PAINLEVEL: NO PAIN (0)

## 2022-05-25 NOTE — PROGRESS NOTES
Brian Montero is a 71 year old who presents for the following health issues     HPI     ED/UC Followup:    Facility:  [facility]  Date of visit: 05/18/2022  Reason for visit: Fell, stitches on head  Current Status: doing better overall        EMR reviewed including:             Complaint, History of Chief Complaint, Corresponding Review of Systems, and Complaint Specific Physical Examination.    #1   Recent fall  Sutures to vertex of scalp.  Presents for removal.  4 sutures removed.  Remaining are lost to an immature scab.  Attempts at scab removal demonstrated that it is too early to remove the remaining sutures as dependable approximation and healing has not occurred.  Instructed patient to return next week.  Requested patient uses peroxide dabbing 2 or 3 times daily with careful digital debridement of scab prior to presentation.        Exam:   Constitutional: The patient appears to be in no acute distress. The patient appears to be adequately hydrated. No acute respiratory or hemodynamic distress is noted at this time.   LUNGS: clear bilaterally, airflow is brisk, no intercostal retraction or stridor is noted. No coughing is noted during visit.   HEART:  regular without rubs, clicks, gallops, or murmurs. PMI is nondisplaced. Upstrokes are brisk. S1,S2 are heard.   NEURO: Pt is alert and appropriate. No neurologic lateralization is noted. Cranial nerves 2-12 are intact. Peripheral sensory and motor function are grossly normal.  Patient appears to be modestly unsteady on her feet.  Does not use walker or assistive device.        Patient has been interviewed, applicable history and applied review of systems have been performed.    Vital Signs:   /74   Pulse 109   Temp 97.7  F (36.5  C) (Temporal)   Wt 58.5 kg (129 lb)   SpO2 96%   BMI 23.59 kg/m        Decision Making    Problem and Complexity     1. Laceration of scalp, subsequent encounter  As above.  Patient will return next Tuesday for the  remaining sutures to be removed.    2. Closed Colles' fracture of left radius, initial encounter  Follow-up with orthopedics as scheduled                                FOLLOW UP   I have asked the patient to make an appointment for followup with me Tuesday.  Appointment made by me.        I have carefully explained the diagnosis and treatment options to the patient.  The patient has displayed an understanding of the above, and all subsequent questions were answered.      DO MARVIN Galloway    Portions of this note were produced using [employer]  Although every attempt at real-time proof reading has been made, occasional grammar/syntax errors may have been missed.

## 2022-06-01 ENCOUNTER — OFFICE VISIT (OUTPATIENT)
Dept: INTERNAL MEDICINE | Facility: CLINIC | Age: 72
End: 2022-06-01
Payer: COMMERCIAL

## 2022-06-01 VITALS
SYSTOLIC BLOOD PRESSURE: 128 MMHG | OXYGEN SATURATION: 97 % | RESPIRATION RATE: 16 BRPM | DIASTOLIC BLOOD PRESSURE: 84 MMHG | WEIGHT: 131 LBS | BODY MASS INDEX: 23.96 KG/M2 | TEMPERATURE: 97.7 F | HEART RATE: 112 BPM

## 2022-06-01 DIAGNOSIS — S01.01XD LACERATION OF SCALP, SUBSEQUENT ENCOUNTER: Primary | ICD-10-CM

## 2022-06-01 PROCEDURE — 99212 OFFICE O/P EST SF 10 MIN: CPT | Performed by: INTERNAL MEDICINE

## 2022-06-01 ASSESSMENT — PAIN SCALES - GENERAL: PAINLEVEL: NO PAIN (0)

## 2022-06-01 NOTE — PROGRESS NOTES
Brian Montero is a 71 year old who presents for the following health issues     HPI     Chief Complaint   Patient presents with     Laceration     Follow up on scalp laceration     Follow-up scalp laceration with suture removal.  Laceration healed well.  Last suture removed.  No residual scab or eschar.  Patient has minimal memory of the accident.  Otherwise is alert and appropriate showing no significant neurologic compromise.        Patient has been interviewed, applicable history and applied review of systems have been performed.    Vital Signs:   /84   Pulse 112   Temp 97.7  F (36.5  C) (Temporal)   Resp 16   Wt 59.4 kg (131 lb)   SpO2 97%   BMI 23.96 kg/m        Decision Making    Problem and Complexity     1. Laceration of scalp, subsequent encounter  Resolved.                                FOLLOW UP   I have asked the patient to make an appointment for followup with me or her provider of choice for her ongoing and routine medical care.        I have carefully explained the diagnosis and treatment options to the patient.  The patient has displayed an understanding of the above, and all subsequent questions were answered.      DO MARVIN Galloway    Portions of this note were produced using [employer]  Although every attempt at real-time proof reading has been made, occasional grammar/syntax errors may have been missed.

## 2022-06-07 ENCOUNTER — OFFICE VISIT (OUTPATIENT)
Dept: ORTHOPEDICS | Facility: CLINIC | Age: 72
End: 2022-06-07
Payer: COMMERCIAL

## 2022-06-07 ENCOUNTER — ANCILLARY PROCEDURE (OUTPATIENT)
Dept: GENERAL RADIOLOGY | Facility: CLINIC | Age: 72
End: 2022-06-07
Attending: PHYSICIAN ASSISTANT
Payer: COMMERCIAL

## 2022-06-07 VITALS
WEIGHT: 127.5 LBS | BODY MASS INDEX: 23.46 KG/M2 | SYSTOLIC BLOOD PRESSURE: 118 MMHG | DIASTOLIC BLOOD PRESSURE: 70 MMHG | HEIGHT: 62 IN

## 2022-06-07 DIAGNOSIS — S52.532D CLOSED COLLES' FRACTURE OF LEFT RADIUS WITH ROUTINE HEALING, SUBSEQUENT ENCOUNTER: Primary | ICD-10-CM

## 2022-06-07 DIAGNOSIS — S52.532D CLOSED COLLES' FRACTURE OF LEFT RADIUS WITH ROUTINE HEALING, SUBSEQUENT ENCOUNTER: ICD-10-CM

## 2022-06-07 PROCEDURE — 99207 PR FRACTURE CARE IN GLOBAL PERIOD: CPT | Performed by: PHYSICIAN ASSISTANT

## 2022-06-07 PROCEDURE — 73110 X-RAY EXAM OF WRIST: CPT | Mod: TC | Performed by: RADIOLOGY

## 2022-06-07 ASSESSMENT — PAIN SCALES - GENERAL: PAINLEVEL: NO PAIN (0)

## 2022-06-07 NOTE — PROGRESS NOTES
"Office Visit-Fracture Follow up    Chief Complaint: Kylah Britt is a 71 year old female who is being seen for   Chief Complaint   Patient presents with     RECHECK     Closed Colles' fracture of left radius- doi: 3/7/2022     I reviewed Cherelle Owen's note from 4/28/2022.    History of Present Illness:   Mechanism of Injury: Fell tripping over her dog.  Location: Left distal radius  Duration of Pain: Since date of injury which was 3/7/2022 pain now  Rating of Pain: 0 out of 10  Pain Quality: No pain  Pain is better with: No pain  Pain is worse with: No pain  Treatment so far consists of: Patient was last seen by Cherelle Owen PA-C on 5/18/2022 and at that time she was changed from splint to an Exo brace and told to wear it when she is doing risky things but otherwise can be out and working on range of motion.  She was to come back in 2 weeks for final x-rays but she did not return and now it is 5 weeks later.  Patient has been wearing her brace but only off-and-on.  Associated Features: Denies numbness tingling shooting burning electric pain.  Denies any complaints of the left wrist  Pain is Limiting: Nothing at this point  Here to: Orthopedic follow-up and x-ray  Additional History: None    REVIEW OF SYSTEMS  General: negative for, night sweats, dizziness, fatigue  Resp: No shortness of breath and no cough  CV: negative for chest pain, syncope or near-syncope  GI: negative for nausea, vomiting and diarrhea  : negative for dysuria and hematuria  Musculoskeletal: as above  Neurologic: negative for syncope   Hematologic: negative for bleeding disorder    Physical Exam:  Vitals: /70   Ht 1.575 m (5' 2\")   Wt 57.8 kg (127 lb 8 oz)   BMI 23.32 kg/m    BMI= Body mass index is 23.32 kg/m .  Constitutional: healthy, alert and no acute distress   Psychiatric: mentation appears normal and affect normal/bright  NEURO: no focal deficits, CMS intact left upper extremity   RESP: Normal with easy respirations and no use " of accessory muscles to breathe, no audible wheezing or retractions  CV: left upper extremity   SKIN: No erythema, rashes, excoriation, or breakdown. No evidence of infection.   MUSCULOSKELETAL:    INSPECTION of left wrist: No gross deformities, erythema, edema, ecchymosis, atrophy or fasciculations.     PALPATION: No tenderness distal radius or distal ulna, no tenderness to palpation of the hand or digits or forearm.  No increased warmth.     ROM: flexion 55 compared to 80 on the contralateral side, extension 35 compared to 45 on the contralateral side, supination 90, pronation 90. The range of motion is without catching locking or pain.      STRENGTH: 5 out of 5 , interosseous and thumb without pain.     SPECIAL TEST: DRUJ stable  GAIT: non-antalgic  Lymph: no palpable lymph nodes      Diagnostic Modalities:  X-rays done today showing increased mineralization over the fracture site when compared to the previous x-rays which were on 4/28/2022.  No change in the alignment.  No other fracture dislocation or tumor.    Independent visualization of the images was performed.      Impression: 1.  3-month status post left distal radius fracture    Plan:  All of the above pertinent physical exam and imaging modalities findings was reviewed with Kylah.    FOCUSED PLAN:   X-ray showing good mineralization today compared to the previous x-rays.  Patient has okay range of motion and good strength on exam.  I tried to encourage hand therapy however she wanted to do the exercises on her own.  I printed them out in an AVS and also demonstrated them for her today.  She is to do them 3 times a day.  She can follow-up on an as-needed basis.    Re-x-ray on return: No      This note was dictated with [employer].    Bishnu Gordon PA-C

## 2022-06-07 NOTE — LETTER
"    6/7/2022         RE: Kylah Britt  [address]  [facility] [address]        Dear Colleague,    Thank you for referring your patient, Kylah Britt, to the [facility]. Please see a copy of my visit note below.    Office Visit-Fracture Follow up    Chief Complaint: Kylah Britt is a 71 year old female who is being seen for   Chief Complaint   Patient presents with     RECHECK     Closed Colles' fracture of left radius- doi: 3/7/2022     I reviewed Cherelle Owen's note from 4/28/2022.    History of Present Illness:   Mechanism of Injury: Fell tripping over her dog.  Location: Left distal radius  Duration of Pain: Since date of injury which was 3/7/2022 pain now  Rating of Pain: 0 out of 10  Pain Quality: No pain  Pain is better with: No pain  Pain is worse with: No pain  Treatment so far consists of: Patient was last seen by Cherelle Owen PA-C on 5/18/2022 and at that time she was changed from splint to an Exo brace and told to wear it when she is doing risky things but otherwise can be out and working on range of motion.  She was to come back in 2 weeks for final x-rays but she did not return and now it is 5 weeks later.  Patient has been wearing her brace but only off-and-on.  Associated Features: Denies numbness tingling shooting burning electric pain.  Denies any complaints of the left wrist  Pain is Limiting: Nothing at this point  Here to: Orthopedic follow-up and x-ray  Additional History: None    REVIEW OF SYSTEMS  General: negative for, night sweats, dizziness, fatigue  Resp: No shortness of breath and no cough  CV: negative for chest pain, syncope or near-syncope  GI: negative for nausea, vomiting and diarrhea  : negative for dysuria and hematuria  Musculoskeletal: as above  Neurologic: negative for syncope   Hematologic: negative for bleeding disorder    Physical Exam:  Vitals: /70   Ht 1.575 m (5' 2\")   Wt 57.8 kg (127 lb 8 oz)   BMI 23.32 kg/m    BMI= Body mass " index is 23.32 kg/m .  Constitutional: healthy, alert and no acute distress   Psychiatric: mentation appears normal and affect normal/bright  NEURO: no focal deficits, CMS intact left upper extremity   RESP: Normal with easy respirations and no use of accessory muscles to breathe, no audible wheezing or retractions  CV: left upper extremity   SKIN: No erythema, rashes, excoriation, or breakdown. No evidence of infection.   MUSCULOSKELETAL:    INSPECTION of left wrist: No gross deformities, erythema, edema, ecchymosis, atrophy or fasciculations.     PALPATION: No tenderness distal radius or distal ulna, no tenderness to palpation of the hand or digits or forearm.  No increased warmth.     ROM: flexion 55 compared to 80 on the contralateral side, extension 35 compared to 45 on the contralateral side, supination 90, pronation 90. The range of motion is without catching locking or pain.      STRENGTH: 5 out of 5 , interosseous and thumb without pain.     SPECIAL TEST: DRUJ stable  GAIT: non-antalgic  Lymph: no palpable lymph nodes      Diagnostic Modalities:  X-rays done today showing increased mineralization over the fracture site when compared to the previous x-rays which were on 4/28/2022.  No change in the alignment.  No other fracture dislocation or tumor.    Independent visualization of the images was performed.      Impression: 1.  3-month status post left distal radius fracture    Plan:  All of the above pertinent physical exam and imaging modalities findings was reviewed with Kylah.    FOCUSED PLAN:   X-ray showing good mineralization today compared to the previous x-rays.  Patient has okay range of motion and good strength on exam.  I tried to encourage hand therapy however she wanted to do the exercises on her own.  I printed them out in an AVS and also demonstrated them for her today.  She is to do them 3 times a day.  She can follow-up on an as-needed basis.    Re-x-ray on return: No      This note  was dictated with [facility].    Bishnu Gordon PA-C        Again, thank you for allowing me to participate in the care of your patient.        Sincerely,        Bishnu Gordon PA-C

## 2022-06-07 NOTE — PATIENT INSTRUCTIONS
Encounter Diagnosis   Name Primary?    Closed Colles' fracture of left radius with routine healing, subsequent encounter Yes     Rest, ice and elevate above heart level as needed for pain control  1.  X-rays: Show excellent healing of your bone.  2.  Exam: The wrist is still stiff.  3.  Hand therapy: I do recommend doing hand therapy but he wanted to work on it on your own so I printed off exercises below.  Do them 3 times a day.  4.  Brace: Discontinue your brace now at this time.  5.  Follow-up: On an as-needed basis      Hand and Wrist Exercises: Wrist Flexion    This exercise is designed to stretch your hands and wrists. Before beginning, read through all the instructions. While exercising, breathe normally. If you feel any pain, stop the exercise. If pain persists, inform your healthcare provider.  Hold your _____ hand in front of you with your palm down and elbow bent.  Grasp the back of that hand with your other hand. Pull back so your fingers point down as you straighten your arm. Feel a stretch in your forearm and wrist. Hold for _____ seconds. Then relax.  Repeat _____ times. Do _____ sets a day.  Date Last Reviewed: 8/16/2015 2000-2017 [employer]. [address] [place] [id number]. All rights reserved. This information is not intended as a substitute for professional medical care. Always follow your healthcare professional's instructions.        Wrist Extension (Flexibility)    Stand or sit and hold your arms straight out in front of you.  Bend your right hand back at the wrist. Gently pull back on your right hand with your left hand. Don t bend your fingertips backward. Feel the stretch in your right wrist.  Hold for 30 to 60 seconds, then relax. Repeat 2 times.  Switch arms and repeat.  Date Last Reviewed: 3/10/2016    [id number] [employer]. [address] [place] [id number]. All rights reserved. This information is not intended as a substitute for  professional medical care. Always follow your healthcare professional's instructions.          Wrist Supination (Flexibility)    This exercise is for your right hand and wrist. Switch sides for your left hand and wrist.  Sit with your right arm against your body and elbow bent. Support your right elbow with your left hand.  Hold your hand straight ahead, thumb up. Turn your hand to the right so your palm is up. Hold for 5 seconds. Then turn your hand back to thumb up and relax.  Repeat 10 times, or as instructed.  Date Last Reviewed: 3/10/2016    [id number] [employer]. [address] [place] [address]. All rights reserved. This information is not intended as a substitute for professional medical care. Always follow your healthcare professional's instructions.        Wrist Pronation (Flexibility)    Sit with your right arm against your body and elbow bent. Support your right elbow with your left hand.  Hold your hand straight ahead, thumb up. Turn your hand to the left so your palm is down. Hold for 5 seconds. Then turn your hand back to thumb up and relax.  Repeat 10 times, or as instructed.  Date Last Reviewed: 3/10/2016    [id number] [employer]. [address] [place] [address]. All rights reserved. This information is not intended as a substitute for professional medical care. Always follow your healthcare professional's instructions.       [employer] and [employer] may offer reliable information regarding your diagnosis and treatment plan.    THANK YOU for coming in today. If you receive a survey via [employer] or mail please let us know if there was anything you especially appreciated today or if there is any way we can improve our clinic. We appreciate your input.    GENERAL INFORMATION:  Our hours are:  (Pending)    [facility] for any issues or concerns: [phone]      We are not in the office Thursdays. Therefore non- urgent calls and  medical messages received on Thursday will be addressed when we are back in the office on Wednesday. Urgent matters will be reviewed and addressed by one of our partners in the office as needed.    If lab work was done today as part of your evaluation you will generally be contacted via [employer], mail, or phone with the results within 1-5 days. If there is an alarming result we will contact you by phone. Lab results come back at varying times, I generally wait until all labs are resulted before making comments on results. Please note labs are automatically released to [employer] (if you have signed up for it) once available-at times you may see these prior to my having a chance to review them as well.    If you need refills please contact your pharmacist. They will send a refill request to me to review. Please allow 3 business days for us to process all refill requests. All narcotic refills should be handled in the clinic at the time of your visit.

## 2022-07-06 ENCOUNTER — APPOINTMENT (OUTPATIENT)
Dept: GENERAL RADIOLOGY | Facility: CLINIC | Age: 72
End: 2022-07-06
Attending: EMERGENCY MEDICINE
Payer: MEDICARE

## 2022-07-06 ENCOUNTER — APPOINTMENT (OUTPATIENT)
Dept: CT IMAGING | Facility: CLINIC | Age: 72
End: 2022-07-06
Attending: EMERGENCY MEDICINE
Payer: MEDICARE

## 2022-07-06 ENCOUNTER — HOSPITAL ENCOUNTER (OUTPATIENT)
Facility: CLINIC | Age: 72
Setting detail: OBSERVATION
Discharge: NURSING, REHAB OR LONG-TERM CARE | End: 2022-07-08
Attending: EMERGENCY MEDICINE | Admitting: PEDIATRICS
Payer: MEDICARE

## 2022-07-06 ENCOUNTER — APPOINTMENT (OUTPATIENT)
Dept: PHYSICAL THERAPY | Facility: CLINIC | Age: 72
End: 2022-07-06
Attending: EMERGENCY MEDICINE
Payer: MEDICARE

## 2022-07-06 DIAGNOSIS — M79.604 PAIN OF RIGHT LOWER EXTREMITY: ICD-10-CM

## 2022-07-06 DIAGNOSIS — Z11.52 ENCOUNTER FOR SCREENING LABORATORY TESTING FOR SEVERE ACUTE RESPIRATORY SYNDROME CORONAVIRUS 2 (SARS-COV-2): ICD-10-CM

## 2022-07-06 DIAGNOSIS — S76.319A HAMSTRING TEAR: ICD-10-CM

## 2022-07-06 DIAGNOSIS — M79.651 PAIN OF RIGHT THIGH: Primary | ICD-10-CM
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
## 2022-07-06 PROBLEM — M85.852 OSTEOPENIA OF NECKS OF BOTH FEMURS: Status: ACTIVE | Noted: 2022-07-06

## 2022-07-06 PROBLEM — R20.8 ALLODYNIA: Status: ACTIVE | Noted: 2022-07-06

## 2022-07-06 PROBLEM — R26.89: Status: ACTIVE | Noted: 2022-07-06

## 2022-07-06 PROBLEM — R20.0 NUMBNESS OF TOES: Status: ACTIVE | Noted: 2022-07-06

## 2022-07-06 PROBLEM — M85.851 OSTEOPENIA OF NECKS OF BOTH FEMURS: Status: ACTIVE | Noted: 2022-07-06

## 2022-07-06 LAB
ALBUMIN SERPL-MCNC: 4 G/DL (ref 3.4–5)
ALBUMIN UR-MCNC: NEGATIVE MG/DL
ALP SERPL-CCNC: 53 U/L (ref 40–150)
ALT SERPL W P-5'-P-CCNC: 36 U/L (ref 0–50)
AMPHETAMINES UR QL: NOT DETECTED
ANION GAP SERPL CALCULATED.3IONS-SCNC: 9 MMOL/L (ref 3–14)
APAP SERPL-MCNC: <2 MG/L (ref 10–30)
APPEARANCE UR: CLEAR
AST SERPL W P-5'-P-CCNC: 49 U/L (ref 0–45)
BARBITURATES UR QL SCN: NOT DETECTED
BASE EXCESS BLDV CALC-SCNC: 0.4 MMOL/L (ref -7.7–1.9)
BASOPHILS # BLD AUTO: 0 10E3/UL (ref 0–0.2)
BASOPHILS NFR BLD AUTO: 1 %
BENZODIAZ UR QL SCN: NOT DETECTED
BILIRUB SERPL-MCNC: 0.5 MG/DL (ref 0.2–1.3)
BILIRUB UR QL STRIP: NEGATIVE
BUN SERPL-MCNC: 8 MG/DL (ref 7–30)
BUPRENORPHINE UR QL: NOT DETECTED
CALCIUM SERPL-MCNC: 8.7 MG/DL (ref 8.5–10.1)
CANNABINOIDS UR QL: NOT DETECTED
CHLORIDE BLD-SCNC: 104 MMOL/L (ref 94–109)
CO2 SERPL-SCNC: 24 MMOL/L (ref 20–32)
COCAINE UR QL SCN: NOT DETECTED
COLOR UR AUTO: YELLOW
CREAT SERPL-MCNC: 0.67 MG/DL (ref 0.52–1.04)
D-METHAMPHET UR QL: NOT DETECTED
EOSINOPHIL # BLD AUTO: 0 10E3/UL (ref 0–0.7)
EOSINOPHIL NFR BLD AUTO: 0 %
ERYTHROCYTE [DISTWIDTH] IN BLOOD BY AUTOMATED COUNT: 12.1 % (ref 10–15)
GFR SERPL CREATININE-BSD FRML MDRD: >90 ML/MIN/1.73M2
GLUCOSE BLD-MCNC: 85 MG/DL (ref 70–99)
GLUCOSE UR STRIP-MCNC: NEGATIVE MG/DL
HCO3 BLDV-SCNC: 25 MMOL/L (ref 21–28)
HCT VFR BLD AUTO: 40.9 % (ref 35–47)
HGB BLD-MCNC: 13.7 G/DL (ref 11.7–15.7)
HGB UR QL STRIP: NEGATIVE
IMM GRANULOCYTES # BLD: 0 10E3/UL
IMM GRANULOCYTES NFR BLD: 0 %
KETONES UR STRIP-MCNC: 20 MG/DL
LEUKOCYTE ESTERASE UR QL STRIP: NEGATIVE
LYMPHOCYTES # BLD AUTO: 1.4 10E3/UL (ref 0.8–5.3)
LYMPHOCYTES NFR BLD AUTO: 19 %
MCH RBC QN AUTO: 29.5 PG (ref 26.5–33)
MCHC RBC AUTO-ENTMCNC: 33.5 G/DL (ref 31.5–36.5)
MCV RBC AUTO: 88 FL (ref 78–100)
METHADONE UR QL SCN: NOT DETECTED
MONOCYTES # BLD AUTO: 0.6 10E3/UL (ref 0–1.3)
MONOCYTES NFR BLD AUTO: 8 %
NEUTROPHILS # BLD AUTO: 5.4 10E3/UL (ref 1.6–8.3)
NEUTROPHILS NFR BLD AUTO: 72 %
NITRATE UR QL: NEGATIVE
NRBC # BLD AUTO: 0 10E3/UL
NRBC BLD AUTO-RTO: 0 /100
O2/TOTAL GAS SETTING VFR VENT: 21 %
OPIATES UR QL SCN: NOT DETECTED
OXYCODONE UR QL SCN: DETECTED
PCO2 BLDV: 40 MM HG (ref 40–50)
PCP UR QL SCN: NOT DETECTED
PH BLDV: 7.41 [PH] (ref 7.32–7.43)
PH UR STRIP: 6 [PH] (ref 5–7)
PLATELET # BLD AUTO: 373 10E3/UL (ref 150–450)
PO2 BLDV: 40 MM HG (ref 25–47)
POTASSIUM BLD-SCNC: 3.5 MMOL/L (ref 3.4–5.3)
PROPOXYPH UR QL: NOT DETECTED
PROT SERPL-MCNC: 7.3 G/DL (ref 6.8–8.8)
RBC # BLD AUTO: 4.65 10E6/UL (ref 3.8–5.2)
RBC URINE: 1 /HPF
SALICYLATES SERPL-MCNC: <2 MG/DL
SARS-COV-2 RNA RESP QL NAA+PROBE: NEGATIVE
SODIUM SERPL-SCNC: 137 MMOL/L (ref 133–144)
SP GR UR STRIP: 1.01 (ref 1–1.03)
TRICYCLICS UR QL SCN: NOT DETECTED
UROBILINOGEN UR STRIP-MCNC: NORMAL MG/DL
WBC # BLD AUTO: 7.5 10E3/UL (ref 4–11)
WBC URINE: <1 /HPF

## 2022-07-06 PROCEDURE — 80306 DRUG TEST PRSMV INSTRMNT: CPT | Performed by: EMERGENCY MEDICINE

## 2022-07-06 PROCEDURE — C9803 HOPD COVID-19 SPEC COLLECT: HCPCS | Performed by: EMERGENCY MEDICINE

## 2022-07-06 PROCEDURE — 99285 EMERGENCY DEPT VISIT HI MDM: CPT | Mod: 25 | Performed by: EMERGENCY MEDICINE

## 2022-07-06 PROCEDURE — 87635 SARS-COV-2 COVID-19 AMP PRB: CPT | Performed by: EMERGENCY MEDICINE

## 2022-07-06 PROCEDURE — 85025 COMPLETE CBC W/AUTO DIFF WBC: CPT | Performed by: EMERGENCY MEDICINE

## 2022-07-06 PROCEDURE — 97162 PT EVAL MOD COMPLEX 30 MIN: CPT | Mod: GP

## 2022-07-06 PROCEDURE — G0378 HOSPITAL OBSERVATION PER HR: HCPCS

## 2022-07-06 PROCEDURE — 36415 COLL VENOUS BLD VENIPUNCTURE: CPT | Performed by: EMERGENCY MEDICINE

## 2022-07-06 PROCEDURE — 80143 DRUG ASSAY ACETAMINOPHEN: CPT | Performed by: EMERGENCY MEDICINE

## 2022-07-06 PROCEDURE — G1010 CDSM STANSON: HCPCS

## 2022-07-06 PROCEDURE — 80179 DRUG ASSAY SALICYLATE: CPT | Performed by: EMERGENCY MEDICINE

## 2022-07-06 PROCEDURE — 82803 BLOOD GASES ANY COMBINATION: CPT | Performed by: EMERGENCY MEDICINE

## 2022-07-06 PROCEDURE — 81001 URINALYSIS AUTO W/SCOPE: CPT | Performed by: EMERGENCY MEDICINE

## 2022-07-06 PROCEDURE — 99285 EMERGENCY DEPT VISIT HI MDM: CPT | Performed by: EMERGENCY MEDICINE

## 2022-07-06 PROCEDURE — 250N000013 HC RX MED GY IP 250 OP 250 PS 637: Performed by: EMERGENCY MEDICINE

## 2022-07-06 PROCEDURE — 73552 X-RAY EXAM OF FEMUR 2/>: CPT | Mod: RT

## 2022-07-06 PROCEDURE — 99220 PR INITIAL OBSERVATION CARE,LEVEL III: CPT | Performed by: PEDIATRICS

## 2022-07-06 PROCEDURE — 80053 COMPREHEN METABOLIC PANEL: CPT | Performed by: EMERGENCY MEDICINE

## 2022-07-06 PROCEDURE — 250N000013 HC RX MED GY IP 250 OP 250 PS 637: Performed by: PEDIATRICS

## 2022-07-06 RX ORDER — LIDOCAINE 40 MG/G
CREAM TOPICAL
Status: DISCONTINUED | OUTPATIENT
Start: 2022-07-06 | End: 2022-07-08 | Stop reason: HOSPADM

## 2022-07-06 RX ORDER — NALOXONE HYDROCHLORIDE 0.4 MG/ML
0.4 INJECTION, SOLUTION INTRAMUSCULAR; INTRAVENOUS; SUBCUTANEOUS
Status: DISCONTINUED | OUTPATIENT
Start: 2022-07-06 | End: 2022-07-08 | Stop reason: HOSPADM

## 2022-07-06 RX ORDER — GABAPENTIN 100 MG/1
100 CAPSULE ORAL AT BEDTIME
Status: DISCONTINUED | OUTPATIENT
Start: 2022-07-06 | End: 2022-07-08 | Stop reason: HOSPADM

## 2022-07-06 RX ORDER — LIDOCAINE 40 MG/G
CREAM TOPICAL
Status: DISCONTINUED | OUTPATIENT
Start: 2022-07-06 | End: 2022-07-06

## 2022-07-06 RX ORDER — ONDANSETRON 4 MG/1
4 TABLET, ORALLY DISINTEGRATING ORAL EVERY 6 HOURS PRN
Status: DISCONTINUED | OUTPATIENT
Start: 2022-07-06 | End: 2022-07-08 | Stop reason: HOSPADM

## 2022-07-06 RX ORDER — NALOXONE HYDROCHLORIDE 0.4 MG/ML
0.2 INJECTION, SOLUTION INTRAMUSCULAR; INTRAVENOUS; SUBCUTANEOUS
Status: DISCONTINUED | OUTPATIENT
Start: 2022-07-06 | End: 2022-07-08 | Stop reason: HOSPADM

## 2022-07-06 RX ORDER — NAPROXEN 250 MG/1
500 TABLET ORAL 2 TIMES DAILY WITH MEALS
Status: DISCONTINUED | OUTPATIENT
Start: 2022-07-06 | End: 2022-07-08 | Stop reason: HOSPADM

## 2022-07-06 RX ORDER — OXYCODONE HYDROCHLORIDE 5 MG/1
5 TABLET ORAL ONCE
Status: COMPLETED | OUTPATIENT
Start: 2022-07-06 | End: 2022-07-06

## 2022-07-06 RX ORDER — ACETAMINOPHEN 325 MG/1
975 TABLET ORAL EVERY 8 HOURS
Status: DISCONTINUED | OUTPATIENT
Start: 2022-07-06 | End: 2022-07-08 | Stop reason: HOSPADM

## 2022-07-06 RX ORDER — ONDANSETRON 2 MG/ML
4 INJECTION INTRAMUSCULAR; INTRAVENOUS EVERY 6 HOURS PRN
Status: DISCONTINUED | OUTPATIENT
Start: 2022-07-06 | End: 2022-07-08 | Stop reason: HOSPADM

## 2022-07-06 RX ADMIN — GABAPENTIN 100 MG: 100 CAPSULE ORAL at 20:37

## 2022-07-06 RX ADMIN — NAPROXEN 500 MG: 250 TABLET ORAL at 20:37

## 2022-07-06 RX ADMIN — ACETAMINOPHEN 975 MG: 325 TABLET, FILM COATED ORAL at 20:37

## 2022-07-06 RX ADMIN — OXYCODONE HYDROCHLORIDE 5 MG: 5 TABLET ORAL at 11:16

## 2022-07-06 ASSESSMENT — ACTIVITIES OF DAILY LIVING (ADL)
NUMBER_OF_TIMES_PATIENT_HAS_FALLEN_WITHIN_LAST_SIX_MONTHS: 4
DIFFICULTY_COMMUNICATING: NO
DOING_ERRANDS_INDEPENDENTLY_DIFFICULTY: NO
DIFFICULTY_EATING/SWALLOWING: NO
CONCENTRATING,_REMEMBERING_OR_MAKING_DECISIONS_DIFFICULTY: NO
TOILETING_ISSUES: NO
DRESSING/BATHING_DIFFICULTY: NO
HEARING_DIFFICULTY_OR_DEAF: NO
FALL_HISTORY_WITHIN_LAST_SIX_MONTHS: YES
WALKING_OR_CLIMBING_STAIRS: TRANSFERRING DIFFICULTY, ASSISTANCE 1 PERSON
WEAR_GLASSES_OR_BLIND: YES
WALKING_OR_CLIMBING_STAIRS_DIFFICULTY: YES

## 2022-07-06 NOTE — PROGRESS NOTES
07/06/22 1500   Quick Adds   Type of Visit Initial PT Evaluation       Present no   Living Environment   People in Home alone   Current Living Arrangements house   Home Accessibility stairs to enter home;stairs within home   Number of Stairs, Main Entrance 3   Stair Railings, Main Entrance railing on left side (ascending)   Number of Stairs, Within Home, Primary greater than 10 stairs   Stair Railings, Within Home, Primary railing on left side (ascending)   Transportation Anticipated family or friend will provide   Living Environment Comments bed and bathroom on first floor, laundry room downstairs   Self-Care   Usual Activity Tolerance excellent   Current Activity Tolerance fair   Regular Exercise Yes   Activity/Exercise Type walking  (walks 2 1/2 miles, uses exercise equipment downstairs)   Exercise Amount/Frequency 2 times/wk;other (see comments)  (tries to walk every day, doesn't walk if it raining)   Equipment Currently Used at Home   (has FWW at home, doesn't use it)   Fall history within last six months yes   Number of times patient has fallen within last six months 4  (4 falls since Decemeber)   Activity/Exercise/Self-Care Comment Independent for all ADLs/IADLs and mobility. Has a FWW that pt's daughter has asked her to use as she has now had 4 falls since this past December. Pt reports she does not use it. Has a daughter and son who live near [facility]. Daughter present for eval and states they cannot stay with pt for 24/7 assist.   General Information   Onset of Illness/Injury or Date of Surgery 07/05/22   Referring Physician Yan Beavers MD   Patient/Family Therapy Goals Statement (PT) to go home   Pertinent History of Current Problem (include personal factors and/or comorbidities that impact the POC) Pt is a 71-year-old female who presents today for physical therapy evaluation in the ED s/p a fall on 07/05/2022. Pt reports she was pulling weeds in her  garden when her knee gave out an she fell on a rock. X-rays were negative for an acute fracture in right anterior thigh/hip. Pt also had a CT of her head done which was unremarkable. Pt reports this is her 4th fall since December. Pt fractured her wrist this past March/April at the time of her previous fall. Pt's daughter is present at time of evaluation.   Existing Precautions/Restrictions no known precautions/restrictions   Weight-Bearing Status - LUE full weight-bearing   Weight-Bearing Status - RUE full weight-bearing   Weight-Bearing Status - LLE full weight-bearing   Weight-Bearing Status - RLE full weight-bearing   General Observations Pt very mobile in bed. Pt continues to bring her knee up towards her chest and rubbing the back of her right thigh. Pt has multiple red marks/abrasions on the back of her leg likely from her finger nails digging into her skin. Pt also able to move her leg out, in, up, and down without any pain reproduction. Pt calm and able to answer all questions accurately, but appears distressed and tearful during ambulation attempts.   Cognition   Orientation Status (Cognition) oriented x 4   Cognitive Status Comments AxOx4, but would benefit from a pyschiatric evaluation, especially given pt's fall history in the last 6 months   Pain Assessment   Patient Currently in Pain Yes, see Vital Sign flowsheet   Integumentary/Edema   Integumentary/Edema other (describe)   Integumentary/Edema Comments bruising/abrasions noted on right lower shin   Posture    Posture Forward head position;Protracted shoulders;Kyphosis   Range of Motion (ROM)   Range of Motion ROM is WFL   Strength (Manual Muscle Testing)   Strength (Manual Muscle Testing) strength is WFL;Able to perform R SLR;Able to perform L SLR   Strength Comments Pt is at least a 3/5 for hip abduction, adduction, IR, ER< flexion, and extension. She also demonstrates 4/5 MMTs for knee and ankles B.   Bed Mobility   Bed Mobility no deficits  identified   Comment, (Bed Mobility) Independent, no subjective reports of increased right leg pain. Able to transition supine to sit without rolling.   Transfers   Transfers sit-stand transfer   Sit-Stand Transfer   Sit-Stand [place] (Transfers) contact guard   Assistive Device (Sit-Stand Transfers) walker, standard   Comment, (Sit-Stand Transfer) pt refusing to put weight through RLE upon stand, but will place foot flat once standing with verbal cueing   Gait/Stairs (Locomotion)   [place] Level (Gait) verbal cues;maximum assist (25% patient effort)   Assistive Device (Gait) walker, standard   Distance in Feet (Required for LE Total Joints) 3 ft.   Pattern (Gait) step-to   Deviations/Abnormal Patterns (Gait) right sided deviations;antalgic;base of support, narrow;kar[place] decreased;gait speed decreased;stride length decreased;steppage;weight shifting decreased   Right Sided Gait Deviations heel strike decreased   Maintains Weight-bearing Status (Gait) able to maintain  (pt preferring to be NWB, however, no WB restrictions present at this time)   Negotiation (Stairs) other (see comments)  (unable to assess at this time)   Comment, (Gait/Stairs) pt able to complete standing weight shifts with feet flat, bearing weight symmetrically. However, when attempting steps forward, pt cries out in pain and becomes tearful despite encouragement and education on unremarkable imaging. Pt requires at least maxA using FWW for ambulation x2-3 ft.   Balance   Balance Comments impaired   Coordination   Coordination Comments impaired   Muscle Tone   Muscle Tone Comments WFL   Clinical Impression   Criteria for Skilled Therapeutic Intervention Yes, treatment indicated   PT Diagnosis (PT) impaired mobility and activity tolerance s/p fall   Influenced by the following impairments pain   Functional limitations due to impairments inability to ambulate independently, decreased function from baseline   Clinical Presentation (PT  Evaluation Complexity) Evolving/Changing   Clinical Presentation Rationale Pt presents with >3 personal factors, comorbidities, or confounding variables that may affect a therapy POC.   Clinical Decision Making (Complexity) moderate complexity   Planned Therapy Interventions (PT) balance training;gait training;transfer training;neuromuscular re-education   Anticipated Equipment Needs at Discharge (PT) other (see comments)  (has own equipment, FWW)   Risk & Benefits of therapy have been explained evaluation/treatment results reviewed;care plan/treatment goals reviewed;risks/benefits reviewed;participants voiced agreement with care plan;participants included;patient;daughter   Clinical Impression Comments Pt is a 71-year-old female who is seen today for PT evaluation in ED s/p a fall on 07/05/2022 and inability to bear weight on RLE. Pt comes from home in [facility] where she lives alone and is independent for all mobility and ALDs/IADLs. Pt reports 4 falls since December, her last fall being in March/April in which pt sustained a left wrist fracture. Pt's daughter bought her a FWW to use at home because of the falls, however, the pt states she doesn't use it. Upon examination, pt presents with no strength or ROM deficits that would lead to non-weightbear on her RLE. Pt able to complete standing weigh shifts on flat feet using FWW once standing, however, becomes tearful with attempts at walking, keeping her RLE in the air. Pt's pain does not appear to be solely musculoskeletal in nature and she would benefit from a psych evaluation. She will continue to benefit from skilled PT services in order to improve functional mobility skills and facilitate a safe discharge.   PT Discharge Planning   PT Discharge Recommendation (DC Rec) Transitional Care Facility  (24/7 assist)   PT Rationale for DC Rec Pt comes from home where she lives alone. Per pt and her daughter, pt has a significant fall history in the past 6 months  including 4 falls. The last fall pt had prior to this one was in March/April and pt broke her left wrist. Pt currently does not receive any assistance at home for ADLs/IADLs and is independent for mobility. Pt's daughter reports pt has a FWW upstairs that she has asked the pt to use to prevent more falls, but the pt states she does not use it. Pt presents with no ROM or strength deficits that would lead the pt to non-weightbear on her RLE. Pt had x-rays and a CT done that were negative for an acute fracture. Pt able to weight shift over her RLE in standing using a FWW, but cries out in pain and becomes teary-eyed with any attempts at walking stating her right leg hurts too much. Because of this, it is recommended pt has 24/7 at this time upon d/c given her current level of function and fall history. Pt's daughter reports she and her brother are unable to provide the pt with this type of assist and would like to pursue TCU options if able. Pt is not safe to discharge home at this time. PT will continue to follow if pt is admitted to the hospital to help further mobilize the pt, facilitate return to her PLOF, and aid in facilitating a safe d/c. Pt would also benefit from a psych referral at this time to further address pt's pain as it may not be purely musculoskeletal in nature based on pt's physical examination and imaging.   PT Brief overview of current status independent bed mobility, CGA sit to stands with FWW, able to weight shift left and right using walker and bearing weight symmetricall, maxA ambulation x2 steps using FWW   Total Evaluation Time   Total Evaluation Time (Minutes) 30   Physical Therapy Goals   PT Frequency Daily   PT Predicted Duration/Target Date for Goal Attainment 07/13/22   PT Goals Transfers;Gait   PT: Transfers Modified independent;Sit to/from stand;Bed to/from chair   PT: Gait Modified independent;Rolling walker;100 feet  (or least restrictive AD)     Thank you for your referral.  Lucina  Schoenke, PT, DPT    [facility]  O: [phone]  E: [email or web address]

## 2022-07-06 NOTE — ED TRIAGE NOTES
Pt presents with right upper leg pain. Pt states knees gave out yesterday and fell on rock.      Triage Assessment     Row Name 07/06/22 1046       Triage Assessment (Adult)    Airway WDL WDL       Respiratory WDL    Respiratory WDL WDL       Skin Circulation/Temperature WDL    Skin Circulation/Temperature WDL WDL       Cardiac WDL    Cardiac WDL WDL       Peripheral/Neurovascular WDL    Peripheral Neurovascular WDL WDL       Cognitive/Neuro/Behavioral WDL    Cognitive/Neuro/Behavioral WDL WDL
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
## 2022-07-06 NOTE — H&P
Formerly [facility]    History and Physical - Hospitalist Service       Date of Admission:  7/6/2022    Assessment & Plan      Kylah Britt is a 71 year old female with osteopenia presenting on 7/6/2022 due to severe right thigh pain and inability to bear weight on her right leg.  Initial evaluation in the emergency room is nondiagnostic.  Combination of fall, severe right thigh pain with weightbearing, markedly positive straight leg test on the right, and sensory disturbance with allodynia in the lateral right thigh is concerning for lumbar radiculopathy or other nerve compression syndrome and associated neuropathic pain.  However, with underlying osteopenia, fall, and tenderness of the right thigh, occult fracture of the right femur and/or hip remains possible as well which was not radiographically evident on x-ray.  She has had a significant acute functional decline associated with suspected injury and her ongoing right thigh pain, so hospitalization is considered medically necessary to expedite diagnostic evaluation and formulate an appropriate treatment plan as well as a disposition plan.  Based on the presently available information, hospitalization for about 24 hours is anticipated.    Principal Problem:    Pain of right thigh  Assessment: Onset after fall increasing suspicion for occult injury that is not yet evident, reproducible upper to mid thigh pain laterally, tender but also with allodynia on examination and positive straight leg raise that increase suspicion for neuropathic pain, known osteopenia and fall increase concern for possible osteoporotic fracture that was not evident on x-ray, unable to bear weight on right leg due to severity of pain  Plan: Hospitalize for observation, nonweightbearing right leg while undergoing further evaluation, MRI lumbar spine to evaluate for causes of suspected lumbar radiculopathy due to suspected injury, MRI right thigh to evaluate for  occult femur fracture not evident on x-ray, hold additional PT evaluation and treatment until diagnostic evaluation has been completed, possible orthopedic consult depending upon MRI results, start scheduled acetaminophen and naproxen, use oxycodone as needed for breakthrough pain and add IV narcotics if necessary, reassess functional status and disposition planning after diagnostic evaluation has been completed    Active Problems:    Unable to bear weight on right lower extremity  Assessment: Unable to stand or ambulate on the right leg due to the severity of right lateral thigh pain increases concern for organic cause, dramatic change in her functional status compared with her normal ambulatory baseline  Plan: Reassess weightbearing capacity once diagnostic evaluation has been completed and treatment plan has been formulated      Fall  Assessment: Fall at home in [place] outside while gardening with suspected injury causing severe right thigh pain  Plan: As above      Osteopenia of necks of both femurs-per DEXA March 2022  Assessment: Known osteopenia, taking vitamin D and calcium supplementation at baseline  Plan: Resume vitamin D and calcium supplementation after discharge, holding for now while on observation status but could also restart during hospitalization if patient prefers      Allodynia-lateral right thigh  Assessment: Uncomfortable sensitivity to light touch in the right lateral thigh increases suspicion for neuropathic pain  Plan: As above      Numbness of toes-right, chronic  Assessment: Chronic, unknown cause but suggests neurogenic at baseline  Plan: As above       Diet:  Regular  DVT Prophylaxis: Not indicated due to observation status  Kee Catheter: Not present  Central Lines: None  Cardiac Monitoring: None  Code Status:  Patient expresses preference for DNR/DNI CODE STATUS    Clinically Significant Risk Factors Present on Admission                          Disposition Plan         The  patient's care was discussed with the Patient and Patient's daughter.    Marvin Milligan MD  Hospitalist Service  [facility]  Securely message with the Vocera Web Console (learn more here)  Text page via [employer] Paging/Directory         ______________________________________________________________________    Chief Complaint   Right thigh pain    History is obtained from the patient, electronic health record, emergency department physician and patient's daughter    History of Present Illness   Kylah Britt is a 71 year old female who was in her usual health at home in [place] yesterday.  She was outside gardening and was in a portion of the garden that has stone and was weeding.  As she was bent over pulling weeds, she lost her balance and fell over landing on her right thigh.  She does not think she hit her head or got knocked out.  She had immediate pain in the right thigh.  She tried to stand up but had such severe pain in either thigh that she could not bear weight while standing up on either leg.  She was able to crawl on both legs.  She crawled into the house.  With assistance, she was moved onto a couch late yesterday.  She thinks she tried taking some Tylenol to help her thigh pain but is not sure if it helped.  She decided to move off of the couch onto the floor to let her dogs sleep on the couch.  She had contacted her daughter who picked her up this morning and found her lying on the floor next to the couch.  Patient says she did not fall off of the couch.  She has severe pain in her right lateral thigh with any attempt to bear weight on her right leg and therefore was assisted into a vehicle and brought to the emergency room for evaluation.  Dr. Beavers in the ER reports that initial evaluation in the ER today was nondiagnostic but that the patient has had continued severe pain in the right lateral thigh with any attempt to put weight on her right leg.  She  was given a dose of oxycodone in the emergency room and still has not been able to bear weight on the right leg due to severe right thigh pain.  Due to ongoing pain of unknown cause and her inability to bear weight and ambulate, Dr. Beavers advised hospitalization for observation.  Patient is now seen for evaluation.    Review of Systems    The 10 point Review of Systems is negative other than noted in the HPI or here.     Past Medical History    I have reviewed this patient's medical history and updated it with pertinent information if needed.   Past Medical History:   Diagnosis Date     Abnormal Papanicolaou smear of cervix and cervical HPV 9/1/92    vaginitis with abnormal Pap     Depressive disorder, not elsewhere classified     depression     Female stress incontinence     urinary incontinence     Lyme disease      Osteoarthrosis, unspecified whether generalized or localized, unspecified site      monoarticular arthritis in the right first MTP joint     Other psychological or physical stress, not elsewhere classified(V62.89)     delayed stress syndrome     She underwent DEXA scan in March 2022 with results concerning for osteopenia in the hips.    Past Surgical History   I have reviewed this patient's surgical history and updated it with pertinent information if needed.  Past Surgical History:   Procedure Laterality Date     COLONOSCOPY N/A 4/6/2022    Procedure: COLONOSCOPY, WITH POLYPECTOMY;  Surgeon: Bishnu Tsai MD;  Location:  GI     HC KNEE SCOPE, DIAGNOSTIC  1/31/89    Examination of right knee under anesthesia. Arthroscopy of right knee with debridement of torn anterior curciate ligament (medial meniscus not resected).      REMOVAL OF OVARY/TUBE(S)  2/20/90    Salpingo-Oophorectomy, bilateral     OPEN REDUCTION INTERNAL FIXATION CALCANEOUS Right 6/8/2017    Procedure: OPEN REDUCTION INTERNAL FIXATION CALCANEOUS;  Open Reduction Internal Fixation Right Calcaneous;  Surgeon: Darnell Kincaid,  EJANNINE;  Location: [facility] OR     ZZC APPENDECTOMY       ZZC LIGATE FALLOPIAN TUBE,POSTPARTUM  04/12/77    Bilateral tubal cauterization     ZZC TOTAL ABDOM HYSTERECTOMY  2/20/90    Hysterectomy, Total Abdominal     ZZHC COLONOSCOPY THRU STOMA, DIAGNOSTIC  06/24/98       Social History   I have reviewed this patient's social history and updated it with pertinent information if needed.  Social History     Tobacco Use     Smoking status: Never Smoker     Smokeless tobacco: Never Used   Vaping Use     Vaping Use: Never used   Substance Use Topics     Alcohol use: Yes     Comment: very rare     Drug use: No     She has been  for 9 years and lives independently in [place].  She normally performs all of her own activities of daily living.    Family History   I have reviewed this patient's family history and updated it with pertinent information if needed.  Family History   Problem Relation Age of Onset     Hypertension Mother      Heart Disease Mother      Cancer Father      Cancer Son         Hodgkin's     Cancer Paternal Aunt         two with cervical cancer     They are not aware of any family history of bone disease.    Prior to Admission Medications   Prior to Admission Medications   Prescriptions Last Dose Informant Patient Reported? Taking?   BABY ASPIRIN PO  Self Yes No   Sig: Take 81 mg by mouth daily   Calcium Carbonate (CALCIUM 600 PO)  Self Yes No   Sig: Take 1 tablet by mouth every other day   Cholecalciferol (VITAMIN D3 PO)  Self Yes No   Sig: Take 2,000 Units by mouth every other day   MULTIPLE VITAMIN CAPS   OR   No No   Sig: daily   VITAMIN K PO  Self Yes No   Sig: Take 1,000 mg by mouth every other day      Facility-Administered Medications: None     Allergies   Allergies   Allergen Reactions     No Known Drug Allergies        Physical Exam   Vital Signs: Temp: 97.5  F (36.4  C) Temp src: Temporal BP: (!) 143/91 Pulse: 78   Resp: 16 SpO2: 97 % O2 Device: None (Room air)    Weight: 127 lbs 0 oz  Body mass index is 23.23 kg/m .    General Appearance: Elderly woman who appears younger than her stated age, no acute distress while lying still in bed but uncomfortable with certain exam maneuvers  Eyes: Anicteric sclerae, clear conjunctivae  HEENT: No obvious signs of recent head injury, symmetric small pupils, clear nares without bleeding, normal oropharynx  Neck: Trachea midline, symmetric, nontender  Chest: No gross abnormalities, symmetric excursion  Respiratory: Normal respiratory effort, clear lungs  Cardiovascular: Regular rate and rhythm, good radial pulse, brisk capillary refill, no peripheral edema  GI: Normal appearing abdomen, soft, nontender  Lymph/Hematologic: No cervical or supraclavicular lymphadenopathy  Skin: No rash, erythematous patches with abrasions over both knees and an erythematous patch on the right lower leg  Musculoskeletal: Aside from the skin abnormalities, lower extremities appear normal and symmetric to inspection with normal muscle bulk, passive range of motion at the right hip and knee is normal and painless, right femur is tender to palpation anteriorly and laterally along the upper and mid femur without point tenderness or palpable abnormality  Neurologic: Alert and maintains wakefulness and attention, sometimes seems to have some transient memory difficulties for which she turns to her daughter to help with answering questions, able to spontaneously and symmetrically move her lower extremities while lying in bed, symmetric tone in the lower extremities, symmetric deep tendon reflexes at the knees and ankles, asymmetric sensory disturbance in the right lateral thigh with enhanced touch sensation that is painful, she has severe pain in the lateral right thigh with straight leg raise of the right leg to only about 10 degrees and has no radicular pain with straight leg raise of the left leg  Psychiatric: Appears anxious intermittently in the course of the interview and exam but  mood generally appropriate for the circumstances    Data   Data reviewed today: I reviewed all medications, new labs and imaging results over the last 24 hours. I personally reviewed no images or EKG's today.    Recent Labs   Lab 07/06/22  1344   WBC 7.5   HGB 13.7   MCV 88         POTASSIUM 3.5   CHLORIDE 104   CO2 24   BUN 8   CR 0.67   ANIONGAP 9   NARA 8.7   GLC 85   ALBUMIN 4.0   PROTTOTAL 7.3   BILITOTAL 0.5   ALKPHOS 53   ALT 36   AST 49*     Recent Results (from the past 24 hour(s))   XR Femur Right 2 Views    Narrative    Examination:  XR FEMUR RIGHT 2 VIEW    Date:  7/6/2022 11:29 AM     Clinical Information: fall and thigh pain cannot bear weight    Comparison: none.      Impression    Impression:    1.  Right pelvis and femur negative for fracture or bone lesion.  Normal hip and knee joint alignment with maintained joint spacing.    TITUS BENITO MD         SYSTEM ID:  [id number]   Head CT w/o contrast    Narrative    CT OF THE HEAD WITHOUT CONTRAST 7/6/2022 2:05 PM     COMPARISON: None    HISTORY: Altered mental status.    TECHNIQUE: 5 mm thick axial CT images of the head were acquired  without IV contrast material.    FINDINGS: There is mild diffuse cerebral volume loss. There are subtle  patchy areas of decreased density in the cerebral white matter  bilaterally that are consistent with sequela of chronic small vessel  ischemic disease.    The ventricles and basal cisterns are within normal limits in  configuration given the degree of cerebral volume loss.  There is no  midline shift. There are no extra-axial fluid collections.    No intracranial hemorrhage, mass or recent infarct.    The visualized paranasal sinuses are well-aerated. There is no  mastoiditis. There are no fractures of the visualized bones.      Impression    IMPRESSION: Diffuse cerebral volume loss and cerebral white matter  changes consistent with chronic small vessel ischemic disease. No  evidence for acute  intracranial pathology.        Radiation dose for this scan was reduced using automated exposure  control, adjustment of the mA and/or kV according to patient size, or  iterative reconstruction technique    SILVIA DAS MD         SYSTEM ID:  [id number]      Abdominal Pain, N/V/D

## 2022-07-07 ENCOUNTER — APPOINTMENT (OUTPATIENT)
Dept: MRI IMAGING | Facility: CLINIC | Age: 72
End: 2022-07-07
Attending: PEDIATRICS
Payer: MEDICARE

## 2022-07-07 ENCOUNTER — APPOINTMENT (OUTPATIENT)
Dept: PHYSICAL THERAPY | Facility: CLINIC | Age: 72
End: 2022-07-07
Payer: MEDICARE

## 2022-07-07 PROCEDURE — 250N000013 HC RX MED GY IP 250 OP 250 PS 637: Performed by: PEDIATRICS

## 2022-07-07 PROCEDURE — G0378 HOSPITAL OBSERVATION PER HR: HCPCS

## 2022-07-07 PROCEDURE — 99225 PR SUBSEQUENT OBSERVATION CARE,LEVEL II: CPT | Performed by: INTERNAL MEDICINE

## 2022-07-07 PROCEDURE — G1010 CDSM STANSON: HCPCS

## 2022-07-07 PROCEDURE — 72148 MRI LUMBAR SPINE W/O DYE: CPT | Mod: MG

## 2022-07-07 PROCEDURE — 73718 MRI LOWER EXTREMITY W/O DYE: CPT | Mod: 26 | Performed by: RADIOLOGY

## 2022-07-07 PROCEDURE — G1010 CDSM STANSON: HCPCS | Performed by: RADIOLOGY

## 2022-07-07 PROCEDURE — 97530 THERAPEUTIC ACTIVITIES: CPT | Mod: GP | Performed by: PHYSICAL THERAPIST

## 2022-07-07 RX ADMIN — OXYCODONE HYDROCHLORIDE 2.5 MG: 5 TABLET ORAL at 18:04

## 2022-07-07 RX ADMIN — ACETAMINOPHEN 975 MG: 325 TABLET, FILM COATED ORAL at 03:37

## 2022-07-07 RX ADMIN — OXYCODONE HYDROCHLORIDE 2.5 MG: 5 TABLET ORAL at 13:06

## 2022-07-07 RX ADMIN — GABAPENTIN 100 MG: 100 CAPSULE ORAL at 21:15

## 2022-07-07 RX ADMIN — ACETAMINOPHEN 975 MG: 325 TABLET, FILM COATED ORAL at 21:14

## 2022-07-07 RX ADMIN — OXYCODONE HYDROCHLORIDE 2.5 MG: 5 TABLET ORAL at 03:37

## 2022-07-07 RX ADMIN — ACETAMINOPHEN 975 MG: 325 TABLET, FILM COATED ORAL at 13:06

## 2022-07-07 RX ADMIN — NAPROXEN 500 MG: 250 TABLET ORAL at 09:01

## 2022-07-07 RX ADMIN — NAPROXEN 500 MG: 250 TABLET ORAL at 18:04

## 2022-07-07 NOTE — PROGRESS NOTES
"PRIMARY DIAGNOSIS: \"GENERIC\" NURSING  OUTPATIENT/OBSERVATION GOALS TO BE MET BEFORE DISCHARGE:  1. ADLs back to baseline: No    2. Activity and level of assistance: Stand and Pivot assist of 1 to bedside commode, non weight bearing to RLE.     3. Pain status: Improved-controlled with oral pain medications.    4. Return to near baseline physical activity: No     Discharge Planner Nurse   Safe discharge environment identified: No  Barriers to discharge: Yes       Entered by: Rosana Garsia RN 07/07/2022 3:44 AM     Please review provider order for any additional goals.   Nurse to notify provider when observation goals have been met and patient is ready for discharge.  "

## 2022-07-07 NOTE — PROGRESS NOTES
Care Management Follow Up    Length of Stay (days): 0    Expected Discharge Date:  7/8/22     Concerns to be Addressed:         Patient plan of care discussed at interdisciplinary rounds: Yes    Anticipated Discharge Disposition: Transitional Care - [facility] (Main Phone: [phone] Admissions Phone: [phone] Fax: [phone])     Anticipated Discharge Services:      Anticipated Discharge DME:      Patient/family educated on Medicare website which has current facility and service quality ratings: yes    Education Provided:    Patient/Family in Agreement with the Plan: yes    Referrals Placed by CM/SW: Internal Clinic Care Coordination, Post Acute Facilities, Transportation    Private pay costs discussed: Not applicable    Additional Information:  Patient accepted at [facility] (Main Phone: [phone] Admissions Phone: [phone] Fax: [phone]).    Patient needs wheelchair transport.  Contacted Justo at [employer].  [facility] plans to transport patient with bus around 1030.  Patient in agreement with discharge plan.    BELINDA Rodrigues  [facility] [phone]/ Jose Luis [phone]  Care Management

## 2022-07-07 NOTE — ED NOTES
ED Nursing criteria listed below was addressed during verbal handoff:     Abnormal vitals: N/A  Abnormal results: Yes  Med Reconciliation completed: Yes  Meds given in ED: Yes  Any Overdue Meds: N/A  Core Measures: N/A  Isolation: N/A  Special needs: N/A  Skin assessment: Yes    Observation Patient  Education provided: Yes

## 2022-07-07 NOTE — CONSULTS
Care Management Initial Consult    General Information  Assessment completed with: Patient,    Type of CM/SW Visit: Initial Assessment    Primary Care Provider verified and updated as needed:     Readmission within the last 30 days:        Reason for Consult: discharge planning  Advance Care Planning:          Communication Assessment  Patient's communication style: spoken language (English or Bilingual)    Hearing Difficulty or Deaf: no   Wear Glasses or Blind: yes    Cognitive  Cognitive/Neuro/Behavioral: WDL                    Living Environment:   People in home: alone     Current living Arrangements: house      Able to return to prior arrangements: no     Family/Social Support:  Care provided by: self  Provides care for: no one  Marital Status:   Children          Description of Support System:         Current Resources:   Patient receiving home care services: No     Community Resources: None  Equipment currently used at home: walker, rolling  Supplies currently used at home:      Employment/Financial:  Employment Status:          Financial Concerns:        Lifestyle & Psychosocial Needs:  Social Determinants of Health     Tobacco Use: Low Risk      Smoking Tobacco Use: Never Smoker     Smokeless Tobacco Use: Never Used   Alcohol Use: Not on file   Financial Resource Strain: Not on file   Food Insecurity: Not on file   Transportation Needs: Not on file   Physical Activity: Not on file   Stress: Not on file   Social Connections: Not on file   Intimate Partner Violence: Not on file   Depression: Not at risk     PHQ-2 Score: 0   Housing Stability: Not on file       Functional Status:  Prior to admission patient needed assistance:          Mental Health Status:          Chemical Dependency Status:              Values/Beliefs:  Spiritual, Cultural Beliefs, Alevism Practices, Values that affect care:                 Additional Information:  Referral received for discharge planning.  Initial assessment  completed with patient.  Patient states she lives alone in her own home.  Patient states she has 2 homes (1 in [place], [place] and 1 in [place]).  Patient is alone at both homes.  At baseline, patient states she is independent with all ADLs.  Patient does not use an assistive device at baseline.  Patient drives.  She does NOT receive any in-home services.  Patient has had numerous falls in the last 6 months.  Awaiting MRI at 1100.  At this time, P/T recommendation is TCU.  Patient is in agreement with TCU.  Patient requests referral be sent to the following facilities:    - [facility] (Main Phone: [phone] Admissions Phone: [phone] Fax: [phone])    - [facility] (Admissions: [phone] Main Phone: [phone] Fax: [phone])    Referrals sent.    If MRI results are negative, provider states patient can discharge today if bed is found.    BELINDA Rodrigues  [facility] [phone]/ [facility] [phone]  Care Management

## 2022-07-07 NOTE — PROGRESS NOTES
Formerly [facility]    Medicine Progress Note - Hospitalist Service    Date of Admission:  7/6/2022    Assessment & Plan        Kylah Britt is a 72 yo female w/ h/o depression, DJD and Lyme disease.  She was outside gardening and was in a portion of the garden that has stone and weeds.  She was bent over pulling weeds on 7/5/22 when she lost her balance and fell over landing on her right thigh.  She does not think she hit her head or got knocked out.  She had immediate pain in the right thigh.  She tried to stand up but had such severe pain in either thigh that she could not bear weight while standing up on either leg.  She was able to crawl on both legs.  She crawled into the house.  With assistance, she was moved onto a couch.  She thinks she tried taking some Tylenol to help her thigh pain but is not sure if it helped.  She decided to move off of the couch onto the floor to let her dogs sleep on the couch.  She contacted her daughter who came over morning of 7/6/22 and found pt lying on the floor next to the couch.  Patient says she did not fall off of the couch.  She has severe pain in her right lateral thigh with any attempt to bear weight on her right leg and therefore was assisted into a vehicle and brought to [facility] ED on 7/6/22 for evaluation.  Xray of right femur was negative for fracture in the ED.  Patient was admitted for pain management and further work-up.    Right thigh pain   ---   Resulted from mechanical fall while patient pulling weeds on 7/5/22   ---   X-ray of right femur was negative for fracture 7/6  ---   MRI right femur discovered complete tear of the right hamstring conjoined portion at the ischial tuberosity attachment with distal retraction approximately 4 cm.  ---   Consulted with Ortho on the phone and recommendation is nonoperative management with knee brace, ambulation with a walker or crutches and weightbearing as tolerated  ---    MRI of the lumbar spine revealed mild grade 1 anterolisthesis, DJD, mild to moderate lumbar spinal stenosis  ---   PT/OT consult  ---   Pain management with Tylenol, naproxen, gabapentin and oxycodone  ---   She may need to transfer to TCU at discharge        Diet: Regular Diet Adult    DVT Prophylaxis: PCD  Kee Catheter: Not present  Central Lines: None  Cardiac Monitoring: None  Code Status: No CPR- Do NOT Intubate    Disposition Plan  Probable discharge tomorrow with, possibly TCU      Jesenia Nuno MD  Hospitalist Service  [facility]  Securely message with the Vocera Web Console (learn more here)  Text page via [facility] Paging/Directory     ______________________________________________________________________    Interval History   Complaining of right thigh pain.  She developed right buttocks pain while undergoing MRI earlier today.    Data reviewed today: I reviewed all medications, new labs and imaging results over the last 24 hours. I personally reviewed MRI of right femur that revealed complete tear of the right hamstring conjoined portion at the ischial tuberosity attachment with distal retraction approximately 4 cm.  MRI of the lumbar spine revealed mild grade 1 anterolisthesis, DJD, mild to moderate lumbar spinal stenosis    Physical Exam   Vital Signs: Temp: 97.2  F (36.2  C) Temp src: Oral BP: 133/60 Pulse: 80   Resp: 18 SpO2: 96 % O2 Device: None (Room air)    Weight: 127 lbs 10.34 oz  General: aao x 3, NAD.  HEENT:  NC/AT, neck supple  CVS:  NL s 1 and s2, no m/r/g.  Lungs:  CTA B/L.   Abd:  Soft, + bs, NT, no rebound or gaurding, no fluid shift.  Ext:  No c/c.  Lymph:  No edema.  Neuro:  Nonfocal.  Musculoskeletal: No calf tenderness to palpation.    Skin:  No rash.  Psychiatry:  Mood and affect appropriate.      Data   Recent Labs   Lab 07/06/22  1344   WBC 7.5   HGB 13.7   MCV 88         POTASSIUM 3.5   CHLORIDE 104   CO2 24   BUN 8   CR 0.67   ANIONGAP  9   NARA 8.7   GLC 85   ALBUMIN 4.0   PROTTOTAL 7.3   BILITOTAL 0.5   ALKPHOS 53   ALT 36   AST 49*     Recent Results (from the past 24 hour(s))   MR Lumbar Spine w/o Contrast    Narrative    MRI LUMBAR SPINE WITHOUT CONTRAST   7/7/2022 12:19 PM     HISTORY: Fall 7/5, severe pain right lateral thigh with weight  bearing, unable to bear weight right leg, positive right straight leg  raise, allodynia right lateral thigh.    TECHNIQUE: Multiplanar multisequence MRI of the lumbar spine without  contrast.     COMPARISON: None.     FINDINGS: There are 5 lumbar-type vertebral bodies assumed for the  purposes of this dictation. The distal spinal cord and cauda equina  appear normal.     Mild grade 1 anterolisthesis of L4 on L5 and L5 on S1 likely due to  degenerative facet arthropathy. No significant loss of vertebral body  height. No focal destructive bony lesions. No STIR hyperintense  endplate edema (Modic type I changes).    Level by level as follows:     T12-L1: No loss of disc height. No significant disc herniation. Normal  facets. No spinal canal or neural foraminal narrowing.     L1-L2: No loss of disc height. No significant disc herniation. Normal  facets. No spinal canal or neural foraminal narrowing.     L2-L3: Mild loss of disc height and signal. Circumferential disc  bulge. Mild facet hypertrophy. No spinal canal narrowing. Mild right  neural foraminal narrowing. Mild left neural foraminal narrowing.     L3-L4: Mild loss of disc height and signal. Posterior disc bulge. Mild  facet hypertrophy. Small bilateral facet joint effusions. No spinal  canal narrowing. Mild right neural foraminal narrowing. Mild left  neural foraminal narrowing.     L4-L5: Mild grade 1 anterolisthesis with uncovering of the disc. Mild  loss of disc height and signal. Posterior disc bulge. Moderate  bilateral facet hypertrophy. Bilateral facet joint effusions. No  spinal canal narrowing however there is narrowing of the lateral  recesses  which could affect the descending L5 nerve roots. Moderate  right neural foraminal narrowing. Mild left neural foraminal  narrowing.     L5-S1: Mild grade 1 anterolisthesis. Mild loss of disc height and  signal with degenerative endplate changes. Posterior disc bulge with  mild endplate osteophytic spurring. Moderate bilateral facet  hypertrophy. No spinal canal narrowing. Mild right neural foraminal  narrowing. Mild left neural foraminal narrowing.     Paraspinous soft tissues: Unremarkable.       Impression    IMPRESSION:    1. Degenerative changes in the lower lumbar spine as detailed above.  2. Mild grade 1 anterolisthesis of L4 on L5 and L5 on S1 likely due to  degenerative facet arthropathy. Small facet joint effusions in the  lower lumbar spine.  3. Moderate right and mild left neural foraminal narrowing at L4-L5.  Mild neural foraminal narrowings also at L2-L3, L3-L4, and L5-S1.  4. No spinal canal narrowing at any level however there is narrowing  of the lateral recesses at L4-L5 which could affect the descending L5  nerve roots.      OFELIA JOYA MD         SYSTEM ID:  [id number]   MR Femur Thigh Right wo Contrast    Narrative    Exam: MR FEMUR THIGH RIGHT W/O CONTRAST    History:  Fall 7/5, osteopenia, severe pain right lateral thigh with  weight bearing, unable to bear weight right leg, tender upper to mid  right thigh, XR negative for fracture, ?occult right femur fracture;    Techniques: Multiplanar multisequence imaging through the right thigh  was obtained without administration of intra-articular or intravenous  gadolinium contrast  using routine protocol.    Comparison: Radiographs 7/6/2022.    Findings:    Motion partially compromising assessment.    Lateral external marker placed approximately 19 cm from the top of the  right femoral head.    Bones:    No fracture, marrow contusion or infiltrative change.    Muscles/tendons:    Complete tear of the conjoined portion of the hamstring tendon at  the  distal attachment with tearing of the sacrotuberous ligament.  Approximately 4 cm distal retraction of torn fibers. Associated  extensive muscle edema and epimysial fluid extending to the level of  the external marker.    Extensive edema gluteus nick muscle, consistent with muscle strain.  Similarly membranous portion of the tendon remains intact.    Gluteus minimus and medius tendinosis. Proximal rectus femoris tendon  is intact. Iliopsoas tendon is intact. No adductor muscle strain.    Edema surrounding the right sciatic nerve without change in caliber or  intrasubstance signal, presumably reactive edema from the hamstring  injury.    Internal derangement of joints are not well assessed owing to chosen  field of view. A physiologic amount of joint fluid is present in the  right knee. Small popliteal cyst. Degenerative changes of knee  indicated by osteophytosis. Lateral patellar subluxation and tilting.  A physiologic amount of joint fluid present in right hip.    Evaluation of contralateral left thigh confined to large field view  coronal sequences demonstrate no fracture, marrow contusion or  infiltrative change. Milder edema of the left gluteus nick,  consistent with muscle strain. A physiologic amount of fluid in the  left hip.      Impression    IMPRESSION:  1. Complete tear of the right hamstring conjoined portion at the  ischial tuberosity attachment with distal retraction approximately 4  cm.   a. Associated sacrotuberous ligament tear.   b. Intact semimembranosus portion.   c. Muscle strain extending distally to the level of external marker.  2. Right greater than left gluteus nick strain.    [facility]         SYSTEM ID:  [id number]     Medications       acetaminophen  975 mg Oral Q8H     gabapentin  100 mg Oral At Bedtime     naproxen  500 mg Oral BID w/meals     sodium chloride (PF)  3 mL Intracatheter Q8H

## 2022-07-07 NOTE — PROGRESS NOTES
"S-(situation): Patient registered to Observation. Patient arrived to room 268 via wheelchair from ED.    B-(background): fall outside at home, right sided weakness, not bearing weight to right lower extremity.     A-(assessment): Patient able to stand and pivot from wheelchair to bed, able to adjust self in bed with assist of hand rails and over the bed bar device. Patient has bruising and red scuffs on bilateral knees from patient reporting sliding around for mobility at home since fall. Patient otherwise vitally stable, slightly hypertensive /55, reports pain 6/10 on right thigh, reports that pain goal would be 6/10 and that pain is \"ok for now\". Lung clear. No edema. Continent. Alert and oriented. Able to demonstrate call light use appropriately.     R-(recommendations): Orders and observation goals reviewed with patient.    Nursing Observation criteria listed below was met:    Skin issues/needs documented:Yes  Isolation needs addressed and Signage up: NA  Fall Prevention: Education given and documented: Yes  Education Assessment documented:Yes  Admission Education Documented: Yes  New medication patient education completed and documented (Possible Side Effects of Common Medications handout): Yes  OBS video/handout Reviewed & Documented: Yes  Allergies Reviewed: Yes  Medication Reconciliation Complete: Yes  Home medications if not able to send immediately home with family stored here: NA  Reminder note placed in discharge instructions of home meds: NA  Patient has discharge needs (If yes, please explain): No  Patient discharge preferences addressed and charted on white board:  Yes  Provider notified that patient has arrived to the unit: Yes          "

## 2022-07-07 NOTE — PROGRESS NOTES
"PRIMARY DIAGNOSIS: \"GENERIC\" NURSING  OUTPATIENT/OBSERVATION GOALS TO BE MET BEFORE DISCHARGE:  1. ADLs back to baseline: No    2. Activity and level of assistance: Up with maximum assistance. Consider SW and/or PT evaluation.     Pain status: Improved with use of alternative comfort measures i.e.: ice, essential oils and positioning, and po analgesics.  Immobilizer applied.   3. Return to near baseline physical activity: No     Discharge Planner Nurse   Safe discharge environment identified: Yes, [facility] [place] tomorrow at 1030  Barriers to discharge: No       Entered by: Jaime Pipkin, RN 07/07/2022 4:53 PM     Please review provider order for any additional goals.   Nurse to notify provider when observation goals have been met and patient is ready for discharge.  "

## 2022-07-07 NOTE — PROGRESS NOTES
"PRIMARY DIAGNOSIS: \"GENERIC\" NURSING  OUTPATIENT/OBSERVATION GOALS TO BE MET BEFORE DISCHARGE:  1. ADLs back to baseline: No, still NWB, awaiting MRI later this morning.     2. Activity and level of assistance: Up with maximum assistance. Consider SW and/or PT evaluation.     3. Pain status: No improvement noted. Consider adjustment in pain regimen. Taking po analgesics and ice pack and aromatherapy.    4. Return to near baseline physical activity: No     Discharge Planner Nurse   Safe discharge environment identified: No  Barriers to discharge: Yes, awaiting MRI.         Entered by: Jaime Pipkin, RN 07/07/2022 4:51 PM     Please review provider order for any additional goals.   Nurse to notify provider when observation goals have been met and patient is ready for discharge.  "

## 2022-07-07 NOTE — PROGRESS NOTES
"PRIMARY DIAGNOSIS: \"GENERIC\" NURSING  OUTPATIENT/OBSERVATION GOALS TO BE MET BEFORE DISCHARGE:  1. ADLs back to baseline: No    2. Activity and level of assistance: Stand and Pivot, assist of 1, non weight bearing to right lower extremity at this time.     3. Pain status: Improved-controlled with oral pain medications.    4. Return to near baseline physical activity: No     Discharge Planner Nurse   Safe discharge environment identified: No  Barriers to discharge: Yes further assessment and observation       Entered by: Rosana Garsia RN 07/06/2022 8:51 PM     Please review provider order for any additional goals.   Nurse to notify provider when observation goals have been met and patient is ready for discharge.  "

## 2022-07-07 NOTE — PROGRESS NOTES
PRIMARY DIAGNOSIS: GENERALIZED WEAKNESS    OUTPATIENT/OBSERVATION GOALS TO BE MET BEFORE DISCHARGE  1. Orthostatic performed: No    2. Tolerating PO medications: Yes, patient requesting more pain medications after last PRN was given. Patient uses prescription sunglasses in room for reading or seeing farther away. Board in room has schedule for pain medications and PRNs available.     3. Return to near baseline physical activity: No , patient NWB to RLE. A1 to stand and pivot to bedside commode.     4. Cleared for discharge by consultants (if involved): No    Discharge Planner Nurse   Safe discharge environment identified: No  Barriers to discharge: Yes       Entered by: Rosana Garsia RN 07/07/2022 7:51 AM     Please review provider order for any additional goals.   Nurse to notify provider when observation goals have been met and patient is ready for discharge.

## 2022-07-07 NOTE — PROGRESS NOTES
"PRIMARY DIAGNOSIS: \"GENERIC\" NURSING  OUTPATIENT/OBSERVATION GOALS TO BE MET BEFORE DISCHARGE:  1. ADLs back to baseline: No    2. Activity and level of assistance: Stand and Pivot assist of 1 to bedside commode, non weight bearing to RLE.     3. Pain status: Improved-controlled with oral pain medications. Patient requesting \"something stronger for pain\" rated pain 8/10. Gave PRN oxycodone 2.5 mg with scheduled APAP now.     4. Return to near baseline physical activity: No     Discharge Planner Nurse   Safe discharge environment identified: No  Barriers to discharge: Yes       Entered by: Rosana Garsia RN 07/07/2022 3:46 AM     Please review provider order for any additional goals.   Nurse to notify provider when observation goals have been met and patient is ready for discharge.  "

## 2022-07-08 ENCOUNTER — APPOINTMENT (OUTPATIENT)
Dept: PHYSICAL THERAPY | Facility: CLINIC | Age: 72
End: 2022-07-08
Payer: MEDICARE

## 2022-07-08 VITALS
TEMPERATURE: 97.6 F | SYSTOLIC BLOOD PRESSURE: 131 MMHG | WEIGHT: 127.65 LBS | HEART RATE: 75 BPM | OXYGEN SATURATION: 96 % | RESPIRATION RATE: 18 BRPM | HEIGHT: 63 IN | BODY MASS INDEX: 22.62 KG/M2 | DIASTOLIC BLOOD PRESSURE: 73 MMHG

## 2022-07-08 PROCEDURE — 97530 THERAPEUTIC ACTIVITIES: CPT | Mod: GP | Performed by: PHYSICAL THERAPIST

## 2022-07-08 PROCEDURE — 99217 PR OBSERVATION CARE DISCHARGE: CPT | Performed by: INTERNAL MEDICINE

## 2022-07-08 PROCEDURE — G0378 HOSPITAL OBSERVATION PER HR: HCPCS

## 2022-07-08 PROCEDURE — 250N000013 HC RX MED GY IP 250 OP 250 PS 637: Performed by: PEDIATRICS

## 2022-07-08 RX ORDER — OXYCODONE HYDROCHLORIDE 5 MG/1
5 TABLET ORAL EVERY 4 HOURS PRN
Qty: 15 TABLET | Refills: 0 | Status: SHIPPED | OUTPATIENT
Start: 2022-07-08 | End: 2022-07-14

## 2022-07-08 RX ORDER — GABAPENTIN 100 MG/1
100 CAPSULE ORAL 3 TIMES DAILY
Qty: 21 CAPSULE | Refills: 0 | Status: SHIPPED | OUTPATIENT
Start: 2022-07-08 | End: 2022-09-06

## 2022-07-08 RX ORDER — NAPROXEN 500 MG/1
500 TABLET ORAL 2 TIMES DAILY WITH MEALS
Qty: 10 TABLET | Refills: 0 | Status: SHIPPED | OUTPATIENT
Start: 2022-07-08 | End: 2022-09-06

## 2022-07-08 RX ORDER — ACETAMINOPHEN 325 MG/1
975 TABLET ORAL EVERY 8 HOURS
Qty: 20 TABLET | Refills: 0 | Status: SHIPPED | OUTPATIENT
Start: 2022-07-08 | End: 2022-07-15

## 2022-07-08 RX ADMIN — OXYCODONE HYDROCHLORIDE 2.5 MG: 5 TABLET ORAL at 03:41

## 2022-07-08 RX ADMIN — NAPROXEN 500 MG: 250 TABLET ORAL at 07:44

## 2022-07-08 RX ADMIN — ACETAMINOPHEN 975 MG: 325 TABLET, FILM COATED ORAL at 05:17

## 2022-07-08 RX ADMIN — OXYCODONE HYDROCHLORIDE 2.5 MG: 5 TABLET ORAL at 07:44

## 2022-07-08 NOTE — PROGRESS NOTES
"PRIMARY DIAGNOSIS: \"GENERIC\" NURSING  OUTPATIENT/OBSERVATION GOALS TO BE MET BEFORE DISCHARGE:  1. ADLs back to baseline: No    2. Activity and level of assistance: Up with maximum assistance. SW and PT involved.  Hospitalist consulted with ortho and will use knee immobilizer.       3. Pain status: Improved-controlled with oral pain medications.  Ice pack and aromatherapy also utilized.     4. Return to near baseline physical activity: No     Discharge Planner Nurse   Safe discharge environment identified: Yes, Irene [facility]te  Barriers to discharge: No, will transport to [facility] tomorrow AM.        Entered by: Jaime Pipkin, RN 07/07/2022 10:12 PM     Please review provider order for any additional goals.   Nurse to notify provider when observation goals have been met and patient is ready for discharge.  "

## 2022-07-08 NOTE — PROGRESS NOTES
Justo from [facility] called and stated he needed patient's PAS number.  Writer provided it to him.      Malina Rubio, [facility]   [phone]

## 2022-07-08 NOTE — PROGRESS NOTES
Kylah Britt  Gender: female  : 1950  PO   [facility] [address]  [phone] (home)     Medical Record: [id number]  Pharmacy: [facility] [address] [address]  Primary Care Provider: [facility]    Parent's names are: Data Unavailable (mother) and Data Unavailable (father).      [facility]  2022     Discharge Phone Call:  Discharge to [facility]

## 2022-07-08 NOTE — PROGRESS NOTES
"PRIMARY DIAGNOSIS: \"GENERIC\" NURSING  OUTPATIENT/OBSERVATION GOALS TO BE MET BEFORE DISCHARGE:  1. ADLs back to baseline: No    2. Activity and level of assistance: Up with maximum assistance. Consider SW and/or PT evaluation.     3. Pain status: Improved-controlled with oral pain medications. Ice packs used.     4. Return to near baseline physical activity: No     Discharge Planner Nurse   Safe discharge environment identified: Yes, [facility].   Barriers to discharge: No, patient will transport to  [place] 07/08/22 at 10:30AM.        Entered by: Michael Pickett RN 07/08/2022 8:09 AM     Please review provider order for any additional goals.   Nurse to notify provider when observation goals have been met and patient is ready for discharge.  "

## 2022-07-08 NOTE — PROGRESS NOTES
"PRIMARY DIAGNOSIS: \"GENERIC\" NURSING  OUTPATIENT/OBSERVATION GOALS TO BE MET BEFORE DISCHARGE:  1. ADLs back to baseline: No    2. Activity and level of assistance: Up with maximum assistance. Consider SW and/or PT evaluation.     Pain status: Improved-controlled with oral pain medications. Ice packs used.  3. Return to near baseline physical activity: No     Discharge Planner Nurse   Safe discharge environment identified: Yes  Barriers to discharge: No - will go to [facility] today.       Entered by: Angie Gordon RN 07/08/2022 3:36 AM     Please review provider order for any additional goals.   Nurse to notify provider when observation goals have been met and patient is ready for discharge.  "

## 2022-07-08 NOTE — PLAN OF CARE
Occupational Therapy: Orders received. Chart reviewed and discussed with care team.? Occupational Therapy not indicated due to patient discharging to TCU this date, able to sae immobilizer and manage self cares when willing to per PT and RN.? Defer discharge recommendations to PT.? Will complete orders.     Thank you for your referral.  Anu Chua, PT, DPT, ATC, LAT    [facility]  O: [phone]  E: [email or web address]

## 2022-07-08 NOTE — PROGRESS NOTES
"PRIMARY DIAGNOSIS: \"GENERIC\" NURSING  OUTPATIENT/OBSERVATION GOALS TO BE MET BEFORE DISCHARGE:  1. ADLs back to baseline: No    2. Activity and level of assistance: Up with maximum assistance. Consider SW and/or PT evaluation.     Pain status: Improved-controlled with oral pain medications. Ice packs used.  3. Return to near baseline physical activity: No     Discharge Planner Nurse   Safe discharge environment identified: Yes  Barriers to discharge: No - will go to [facility] today.       Entered by: Angie Gordon RN 07/08/2022 6:30 AM     Please review provider order for any additional goals.   Nurse to notify provider when observation goals have been met and patient is ready for discharge.  "

## 2022-07-08 NOTE — DISCHARGE SUMMARY
[facility]  Hospitalist Discharge Summary      Date of Admission:  7/6/2022  Date of Discharge:  7/8/2022  Discharging Provider: Jesenia Nuno MD  Discharge Service: Hospitalist Service    Discharge Diagnoses   Acute right hip/thigh pain due traumatic complete tear of the right hamstring conjoined portion at the ischial tuberosity attachment with distal retraction approximately 4 cm.      PAST MEDICAL HISTORY   Diagnosis Date     Abnormal Papanicolaou smear of cervix and cervical HPV 9/1/92    vaginitis with abnormal Pap     Depressive disorder, not elsewhere classified     depression     Female stress incontinence     urinary incontinence     Lyme disease      Osteoarthrosis, unspecified whether generalized or localized, unspecified site      monoarticular arthritis in the right first MTP joint     Other psychological or physical stress, not elsewhere classified(V62.89)     delayed stress syndrome       PAST SURGICAL HISTORY   Procedure Laterality Date     COLONOSCOPY N/A 4/6/2022    Procedure: COLONOSCOPY, WITH POLYPECTOMY;  Surgeon: Bishnu Tsai MD;  Location:  GI     HC KNEE SCOPE, DIAGNOSTIC  1/31/89    Examination of right knee under anesthesia. Arthroscopy of right knee with debridement of torn anterior curciate ligament (medial meniscus not resected).     HC REMOVAL OF OVARY/TUBE(S)  2/20/90    Salpingo-Oophorectomy, bilateral     OPEN REDUCTION INTERNAL FIXATION CALCANEOUS Right 6/8/2017    Procedure: OPEN REDUCTION INTERNAL FIXATION CALCANEOUS;  Open Reduction Internal Fixation Right Calcaneous;  Surgeon: Darnell Kincaid DPM;  Location:  OR     [facility] APPENDECTOMY       ZZ LIGATE FALLOPIAN TUBE,POSTPARTUM  04/12/77    Bilateral tubal cauterization     ZZC TOTAL ABDOM HYSTERECTOMY  2/20/90    Hysterectomy, Total Abdominal     ZZ COLONOSCOPY THRU STOMA, DIAGNOSTIC  06/24/98       Follow-ups Needed After Discharge     Follow up with Dr. Handy Gordon of [place]  orthopedic clinic on 7/25/22 at  3:20 PM       Discharge Disposition   Transferred to TCU  Condition at discharge: Stable    Hospital Course   Kylah Britt is a 70 yo female w/ h/o depression, DJD and Lyme disease.  She was outside gardening and was in a portion of the garden that has stones and weeds.  She was bent over pulling weeds on 7/5/22 when she lost her balance and fell over landing on her right thigh.  She did not hit her head or got knocked out.  She had immediate pain in the right thigh.  She tried to stand up but had such severe pain in either thigh that she could not bear weight while standing up on either leg.  She was able to crawl on both legs.  She crawled into the house.  With assistance, she was moved onto a couch.  She thinks she tried taking some Tylenol to help her thigh pain but is not sure if it helped.  She decided to move off of the couch onto the floor to let her dogs sleep on the couch.  She contacted her daughter who came over morning of 7/6/22 and found pt lying on the floor next to the couch.  Patient says she did not fall off of the couch.  She has severe pain in her right lateral thigh with any attempt to bear weight on her right leg and therefore was assisted into a vehicle and brought to [facility] ED on 7/6/22 for evaluation.  Xray of right femur was negative for fracture in the ED.  Patient was admitted for pain management and further work-up.     Acute right hip/thigh pain:  Precipitated by a mechanical fall while patient pulling weeds on 7/5/22.  X-ray of right femur was negative for fracture 7/6/22.  MRI right femur discovered complete tear of the right hamstring conjoined portion at the ischial tuberosity attachment with distal retraction approximately 4 cm.  Consulted with Ortho on the phone and recommendation is nonoperative management with knee brace, ambulation with a walker or crutches and weightbearing as tolerated.  MRI of the lumbar spine  revealed mild grade 1 anterolisthesis, DJD, mild to moderate lumbar spinal stenosis but nothing acute.  Patient participated in PT/OT and rec from the therapy team is to transfer patient to TCU for further therapy.  Acute right hip/thigh pain due to complete tear of the right hamstring is managed with Tylenol, naproxen, gabapentin and oxycodone.  Patient is medically stable for transfer to TCU today.  She will follow-up with Dr. Handy Gordon of [place] orthopedic service on 7/25/2022 at 3:20 PM       Diet:    Regular Diet Adult    Code Status:   No CPR- Do NOT Intubate      Consultations This Hospital Stay   VASCULAR ACCESS ADULT IP CONSULT  PHYSICAL THERAPY ADULT IP CONSULT  PHYSICAL THERAPY ADULT IP CONSULT  CARE MANAGEMENT / SOCIAL WORK IP CONSULT  ORTHOPEDIC SURGERY IP CONSULT  PHYSICAL THERAPY ADULT IP CONSULT  OCCUPATIONAL THERAPY ADULT IP CONSULT  PHYSICAL THERAPY ADULT IP CONSULT  OCCUPATIONAL THERAPY ADULT IP CONSULT      Time Spent on this Encounter   I, Jesenia Nuno MD, personally saw the patient today and spent greater than 30 minutes discharging this patient.       Jesenia Nuno MD  [address] SURGICAL  [address] [place] [address]  Phone: [phone]  ______________________________________________________________________    Physical Exam   Vital Signs: Temp: 97.6  F (36.4  C) Temp src: Oral BP: 131/73 Pulse: 75   Resp: 18 SpO2: 96 % O2 Device: None (Room air)    Weight: 127 lbs 10.34 oz  General: aao x 3, NAD.  HEENT:  NC/AT, neck supple  CVS:  NL s 1 and s2, no m/r/g.  Lungs:  CTA B/L.   Abd:  Soft, + bs, NT, no rebound or gaurding, no fluid shift.  Ext: Right hip with significant decrease ROM  Lymph:  No edema.  Neuro:  Nonfocal.  Musculoskeletal: No calf tenderness to palpation.    Skin:  No rash.  Psychiatry:  Mood and affect appropriate.         Primary Care Physician   [facility]    Discharge Orders      General info for SNF    Length of  Stay Estimate: Short Term Care: Estimated # of Days <30  Condition at Discharge: Stable  Level of care:skilled   Rehabilitation Potential: Good  Admission H&P remains valid and up-to-date: Yes  Recent Chemotherapy: N/A  Use Nursing Home Standing Orders: Yes     Mantoux instructions    Give two-step Mantoux (PPD) Per Facility Policy Yes     Follow Up and recommended labs and tests    Follow up with Dr. Handy Gordon of [place] orthopedic clinic on 7/25/22 at 3:20 PM     Reason for your hospital stay    Right hip/thigh pain due to traumatic complete tear of the right hamstring conjoined portion at the ischial tuberosity attachment with distal retraction approximately 4 cm.     Activity - Up ad [place]     Physical Therapy Adult Consult    Evaluate and treat as clinically indicated.    Reason:  Traumatic complete tear of the right hamstring conjoined portion at the ischial tuberosity attachment with distal retraction approximately 4 cm.     Occupational Therapy Adult Consult    Evaluate and treat as clinically indicated.    Reason:  Traumatic complete tear of the right hamstring conjoined portion at the ischial tuberosity attachment with distal retraction approximately 4 cm.     Fall precautions     Diet    Follow this diet upon discharge:  Regular diet       Discharge Medications   Current Discharge Medication List      START taking these medications    Details   acetaminophen (TYLENOL) 325 MG tablet Take 3 tablets (975 mg) by mouth every 8 hours  Qty: 20 tablet, Refills: 0    Comments: Right hip/thigh pain  Associated Diagnoses: Hamstring tear      gabapentin (NEURONTIN) 100 MG capsule Take 1 capsule (100 mg) by mouth 3 times daily  Qty: 21 capsule, Refills: 0    Comments: Right hip/thigh pain  Associated Diagnoses: Hamstring tear      naproxen (NAPROSYN) 500 MG tablet Take 1 tablet (500 mg) by mouth 2 times daily (with meals)  Qty: 10 tablet, Refills: 0    Comments: Right hip/thigh pain  Associated Diagnoses: Hamstring  tear      oxyCODONE (ROXICODONE) 5 MG tablet Take 1 tablet (5 mg) by mouth every 4 hours as needed for moderate to severe pain  Qty: 15 tablet, Refills: 0    Comments: Right hip/thigh pain  Associated Diagnoses: Hamstring tear         CONTINUE these medications which have NOT CHANGED    Details   BABY ASPIRIN PO Take 81 mg by mouth daily      Calcium Carbonate (CALCIUM 600 PO) Take 1 tablet by mouth every other day      Cholecalciferol (VITAMIN D3 PO) Take 2,000 Units by mouth every other day      MULTIPLE VITAMIN CAPS   OR daily  Refills: 0         STOP taking these medications       VITAMIN K PO Comments:   Reason for Stopping:             Allergies   Allergies   Allergen Reactions     No Known Drug Allergies

## 2022-07-08 NOTE — PROGRESS NOTES
Care Management Discharge Note    Discharge Date: 07/08/2022       Discharge Disposition: Transitional Care    Discharge Services:      Discharge DME:      Discharge Transportation: family or friend will provide    Private pay costs discussed: Not applicable    PAS Confirmation Code: [id number]  Patient/family educated on Medicare website which has current facility and service quality ratings: yes    Education Provided on the Discharge Plan:    Persons Notified of Discharge Plans: Yes   Patient/Family in Agreement with the Plan: yes    Handoff Referral Completed: Yes    Additional Information:  Patient will discharge to [facility] (Main Phone: [phone] Admissions Phone: [phone] Fax: [phone]) via [facility] bus Patient in agreement and family is aware per patient.        Deb Starks RN

## 2022-07-08 NOTE — PROGRESS NOTES
"PRIMARY DIAGNOSIS: \"GENERIC\" NURSING  OUTPATIENT/OBSERVATION GOALS TO BE MET BEFORE DISCHARGE:  1. ADLs back to baseline: No    2. Activity and level of assistance: Up with maximum assistance. SW and PT involved.  Hospitalist consulted with ortho and will use knee immobilizer.       3. Pain status: Improved-controlled with oral pain medications.  Ice pack and aromatherapy also utilized.     4. Return to near baseline physical activity: No     Discharge Planner Nurse   Safe discharge environment identified: Yes, [facility]  Barriers to discharge: No, will transport to [facility] tomorrow AM.        Entered by: Jaime Pipkin, RN 07/07/2022 10:12 PM     Please review provider order for any additional goals.   Nurse to notify provider when observation goals have been met and patient is ready for discharge.    "

## 2022-07-08 NOTE — PLAN OF CARE
Goal Outcome Evaluation:    Plan of Care Reviewed With: patient     Overall Patient Progress: improving    S-(situation): Patient discharged to [facility] via wheelchair with company van.     B-(background): On 07/05/22 patient fell in her garden. 07/06/22 reported to ER and was admitted. MRI indicates a right torn hamstring.     A-(assessment): Patient is VSS, but continues to have knee pain to be expected.     Last bowel movement: 07/07/22     R-(recommendations):Report called to [facility]. Listed belongings gathered and sent with patient.     Discharge Nursing Criteria:     Care Plan and Patient education resolved: Yes      MISC  Home medications returned to patient: NA  Medication Bin checked and emptied on discharge Yes  All paperwork sent with patient/Copy of AVS given to patient or family Yes.  TIME PATIENT discharge: 10:30AM

## 2022-07-11 ENCOUNTER — PATIENT OUTREACH (OUTPATIENT)
Dept: CARE COORDINATION | Facility: CLINIC | Age: 72
End: 2022-07-11

## 2022-07-11 NOTE — LETTER
[facility]   To:             Please give to facility    From:   Mai Sesay  RN  Care Coordinator   [facility]   P: [phone]  [email or web address]   Patient Name:  Kylah Britt YOB: 1950   Admit date: 7/8/2022      *Information Needed:  Please contact me when the patient will discharge (or if they will move to long term care)- include the discharge date, disposition, and main diagnosis   - If the patient is discharged with home care services, please provide the name of the agency    Also- Please inform me if a care conference is being held.   Phone, Fax or Email with information                        Thank you

## 2022-07-11 NOTE — PROGRESS NOTES
Clinic Care Coordination Contact  Care Coordination Transition Communication    Referral Source: IP Handoff    Clinical Data: Patient was hospitalized at [facility] from 7/6/2022 to 7/8/2022 with diagnosis of Acute right hip/thigh pain due traumatic complete tear of the right hamstring conjoined portion at the ischial tuberosity attachment with distal retraction approximately 4 cm..     Transition to Facility:              Facility Name: [facility]              Contact name and phone number/fax:     [facility] (Main Phone: [phone] Admissions Phone: [phone] Fax: [phone])    Plan: RN/SW Care Coordinator will await notification from facility staff informing RN/SW Care Coordinator of patient's discharge plans/needs. RN/SW Care Coordinator will review chart and outreach to facility staff every 4 weeks and as needed.     Mai Sesay RN Care Coordination   [facility]  [place]  Email: [email or web address]  Phone: [phone]

## 2022-07-12 ENCOUNTER — TRANSITIONAL CARE UNIT VISIT (OUTPATIENT)
Dept: GERIATRICS | Facility: CLINIC | Age: 72
End: 2022-07-12
Payer: COMMERCIAL

## 2022-07-12 VITALS
WEIGHT: 126.8 LBS | HEART RATE: 71 BPM | TEMPERATURE: 97 F | BODY MASS INDEX: 22.47 KG/M2 | HEIGHT: 63 IN | SYSTOLIC BLOOD PRESSURE: 123 MMHG | OXYGEN SATURATION: 96 % | RESPIRATION RATE: 18 BRPM | DIASTOLIC BLOOD PRESSURE: 69 MMHG

## 2022-07-12 DIAGNOSIS — S52.532S CLOSED COLLES' FRACTURE OF LEFT RADIUS, SEQUELA: ICD-10-CM

## 2022-07-12 DIAGNOSIS — S76.311D STRAIN OF RIGHT HAMSTRING MUSCLE, SUBSEQUENT ENCOUNTER: Primary | ICD-10-CM

## 2022-07-12 DIAGNOSIS — K59.03 DRUG-INDUCED CONSTIPATION: ICD-10-CM

## 2022-07-12 DIAGNOSIS — R52 PAIN: ICD-10-CM

## 2022-07-12 PROCEDURE — 99305 1ST NF CARE MODERATE MDM 35: CPT | Performed by: FAMILY MEDICINE

## 2022-07-12 NOTE — LETTER
"    7/12/2022        RE: Kylah Britt  [address]  [place] [address]        [facility]  PRIMARY CARE PROVIDER AND CLINIC:  [facility], [address] / [place] [address]  Chief Complaint   Patient presents with     Hospital F/U     [place] Medical Record Number:  [id number]  Place of Service where encounter took place:  [facility] (TCU) [[id number]]    Kylah Britt  is a 71 year old  (1950), admitted to the above facility from  [facility]. Hospital stay 7/6/22 through 7/8/22..  Admitted to this facility for  rehab, medical management and nursing care.    HPI:    HPI information obtained from: facility chart records, facility staff, patient report and [facility] chart review.   Brief Summary of Hospital Course:   * pt lost balance and fell while gardening, landed on her right thigh, resulted in intractable pain, brought to ED and was found to have \"traumatic complete tear of the right hamstring conjoined portion at the ischial tuberosity attachment with distal retraction approximately 4 cm.\" Ortho consulted, recommended non surgical approach.  - * Evaluated by PMR and felt to benefit from TCU placement.     Today:  - Pain: reports pain is sharp over  Bottom on the right side, 8/10 now, aggravate with putting weight on, better with rest and mediations.     - Rehab: reports able to put weight on right side but mainly depends on left the thigh. Therapy is going on. reports has a follow up with Ortho team in 2 weeks.       ===============================  CODE STATUS/ADVANCE DIRECTIVES DISCUSSION:   DNR / DNI  Patient's living condition: lives alone  ALLERGIES: No known drug allergies  PAST MEDICAL HISTORY:  has a past medical history of Abnormal Papanicolaou smear of cervix and cervical HPV (9/1/92), Depressive disorder, not elsewhere classified, Female stress incontinence, Lyme disease, Osteoarthrosis, " unspecified whether generalized or localized, unspecified site, and Other psychological or physical stress, not elsewhere classified(V62.89).    She has no past medical history of Complication of anesthesia or Motion sickness.  PAST SURGICAL HISTORY:   has a past surgical history that includes REMOVAL OF OVARY/TUBE(S) (2/20/90); APPENDECTOMY; TOTAL ABDOM HYSTERECTOMY (2/20/90); LIGATE FALLOPIAN TUBE,POSTPARTUM (04/12/77); COLONOSCOPY THRU STOMA, DIAGNOSTIC (06/24/98); KNEE SCOPE,DIAGNOSTIC (1/31/89); Open reduction internal fixation calcaneous (Right, 6/8/2017); and Colonoscopy (N/A, 4/6/2022).  FAMILY HISTORY: family history includes Cancer in her father, paternal aunt, and son; Heart Disease in her mother; Hypertension in her mother.  SOCIAL HISTORY:   reports that she has never smoked. She has never used smokeless tobacco. She reports current alcohol use. She reports that she does not use drugs.    Post Discharge Medication Reconciliation Status: discharge medications reconciled and changed, per note/orders  Current Outpatient Medications   Medication Sig Dispense Refill     acetaminophen (TYLENOL) 325 MG tablet Take 3 tablets (975 mg) by mouth every 8 hours 20 tablet 0     BABY ASPIRIN PO Take 81 mg by mouth daily       Calcium Carbonate (CALCIUM 600 PO) Take 1 tablet by mouth every other day       Cholecalciferol (VITAMIN D3 PO) Take 2,000 Units by mouth every other day       gabapentin (NEURONTIN) 100 MG capsule Take 1 capsule (100 mg) by mouth 3 times daily 21 capsule 0     MULTIPLE VITAMIN CAPS   OR daily  0     naproxen (NAPROSYN) 500 MG tablet Take 1 tablet (500 mg) by mouth 2 times daily (with meals) 10 tablet 0     oxyCODONE (ROXICODONE) 5 MG tablet Take 1 tablet (5 mg) by mouth every 4 hours as needed for moderate to severe pain 15 tablet 0       ROS: 10 point ROS of systems including Constitutional, Eyes, Respiratory, Cardiovascular, Gastroenterology, Genitourinary, Integumentary, Musculoskeletal,  "Psychiatric were all negative except for pertinent positives noted in my HPI.    Vitals:  /69   Pulse 71   Temp 97  F (36.1  C)   Resp 18   Ht 1.6 m (5' 3\")   Wt 57.5 kg (126 lb 12.8 oz)   SpO2 96%   BMI 22.46 kg/m    Exam:  GENERAL APPEARANCE:  in no distress, cooperative  ENT:  Mouth and posterior oropharynx normal, moist mucous membranes, oral mucosa moist, no lesion noted.   EYES:  EOMI, Pupil rounded and equal.  RESP:  lungs clear to auscultation   CV:  S1S2 audible, regular HR, no murmur appreciated.   ABDOMEN:  soft, NT/ND, BS audible. no mass appreciated on palpation.   M/S:  Brace in RLL.   SKIN:  No rash.   NEURO:   No NFD appreciated on observation. Hand  5/5 b/l  PSYCH:  affect and mood normal    Lab/Diagnostic data: Reviewed in the chart and EHR.          ASSESSMENT/PLAN:  ----------------------------  * Pt with hx of left Jose Elias's fx (3/7/2022)pt lost balance and fell while gardening, landed on her right thigh, resulted in intractable pain, brought to ED and was found to have \"traumatic complete tear of the right hamstring conjoined portion at the ischial tuberosity attachment with distal retraction approximately 4 cm.\" Ortho consulted, recommended non surgical approach.  - * Evaluated by PMR and felt to benefit from TCU placement.       - started rehab program, making a progress, continue until desired goal is achieved.   - analgesia optimal  - followed with Ortho team.   - on Ca and Vit D supplement.   - will start senna one daily        Order: senna once daily for constipation.   Electronically signed by:  Brandon Aviles MD         Sincerely,        Brandon Aviles MD      "

## 2022-07-12 NOTE — PROGRESS NOTES
"[place] [facility]  PRIMARY CARE PROVIDER AND CLINIC:  [facility], [address] [facility] / [facility] [address]  Chief Complaint   Patient presents with     Hospital F/U     [place] Medical Record Number:  [id number]  Place of Service where encounter took place:  [facility] (TCU) [[id number]]    Kylah Britt  is a 71 year old  (1950), admitted to the above facility from  [facility]. Hospital stay 7/6/22 through 7/8/22..  Admitted to this facility for  rehab, medical management and nursing care.    HPI:    HPI information obtained from: facility chart records, facility staff, patient report and [facility] chart review.   Brief Summary of Hospital Course:   * pt lost balance and fell while gardening, landed on her right thigh, resulted in intractable pain, brought to ED and was found to have \"traumatic complete tear of the right hamstring conjoined portion at the ischial tuberosity attachment with distal retraction approximately 4 cm.\" Ortho consulted, recommended non surgical approach.  - * Evaluated by PMR and felt to benefit from TCU placement.     Today:  - Pain: reports pain is sharp over  Bottom on the right side, 8/10 now, aggravate with putting weight on, better with rest and mediations.     - Rehab: reports able to put weight on right side but mainly depends on left the thigh. Therapy is going on. reports has a follow up with Ortho team in 2 weeks.       ===============================  CODE STATUS/ADVANCE DIRECTIVES DISCUSSION:   DNR / DNI  Patient's living condition: lives alone  ALLERGIES: No known drug allergies  PAST MEDICAL HISTORY:  has a past medical history of Abnormal Papanicolaou smear of cervix and cervical HPV (9/1/92), Depressive disorder, not elsewhere classified, Female stress incontinence, Lyme disease, Osteoarthrosis, unspecified whether generalized or localized, unspecified site, and Other psychological " or physical stress, not elsewhere classified(V62.89).    She has no past medical history of Complication of anesthesia or Motion sickness.  PAST SURGICAL HISTORY:   has a past surgical history that includes REMOVAL OF OVARY/TUBE(S) (2/20/90); APPENDECTOMY; TOTAL ABDOM HYSTERECTOMY (2/20/90); LIGATE FALLOPIAN TUBE,POSTPARTUM (04/12/77); COLONOSCOPY THRU STOMA, DIAGNOSTIC (06/24/98); KNEE SCOPE,DIAGNOSTIC (1/31/89); Open reduction internal fixation calcaneous (Right, 6/8/2017); and Colonoscopy (N/A, 4/6/2022).  FAMILY HISTORY: family history includes Cancer in her father, paternal aunt, and son; Heart Disease in her mother; Hypertension in her mother.  SOCIAL HISTORY:   reports that she has never smoked. She has never used smokeless tobacco. She reports current alcohol use. She reports that she does not use drugs.    Post Discharge Medication Reconciliation Status: discharge medications reconciled and changed, per note/orders  Current Outpatient Medications   Medication Sig Dispense Refill     acetaminophen (TYLENOL) 325 MG tablet Take 3 tablets (975 mg) by mouth every 8 hours 20 tablet 0     BABY ASPIRIN PO Take 81 mg by mouth daily       Calcium Carbonate (CALCIUM 600 PO) Take 1 tablet by mouth every other day       Cholecalciferol (VITAMIN D3 PO) Take 2,000 Units by mouth every other day       gabapentin (NEURONTIN) 100 MG capsule Take 1 capsule (100 mg) by mouth 3 times daily 21 capsule 0     MULTIPLE VITAMIN CAPS   OR daily  0     naproxen (NAPROSYN) 500 MG tablet Take 1 tablet (500 mg) by mouth 2 times daily (with meals) 10 tablet 0     oxyCODONE (ROXICODONE) 5 MG tablet Take 1 tablet (5 mg) by mouth every 4 hours as needed for moderate to severe pain 15 tablet 0       ROS: 10 point ROS of systems including Constitutional, Eyes, Respiratory, Cardiovascular, Gastroenterology, Genitourinary, Integumentary, Musculoskeletal, Psychiatric were all negative except for pertinent positives noted in my HPI.    Vitals:  BP  "123/69   Pulse 71   Temp 97  F (36.1  C)   Resp 18   Ht 1.6 m (5' 3\")   Wt 57.5 kg (126 lb 12.8 oz)   SpO2 96%   BMI 22.46 kg/m    Exam:  GENERAL APPEARANCE:  in no distress, cooperative  ENT:  Mouth and posterior oropharynx normal, moist mucous membranes, oral mucosa moist, no lesion noted.   EYES:  EOMI, Pupil rounded and equal.  RESP:  lungs clear to auscultation   CV:  S1S2 audible, regular HR, no murmur appreciated.   ABDOMEN:  soft, NT/ND, BS audible. no mass appreciated on palpation.   M/S:  Brace in RLL.   SKIN:  No rash.   NEURO:   No NFD appreciated on observation. Hand  5/5 b/l  PSYCH:  affect and mood normal    Lab/Diagnostic data: Reviewed in the chart and EHR.          ASSESSMENT/PLAN:  ----------------------------  * Pt with hx of left Jose Elias's fx (3/7/2022)pt lost balance and fell while gardening, landed on her right thigh, resulted in intractable pain, brought to ED and was found to have \"traumatic complete tear of the right hamstring conjoined portion at the ischial tuberosity attachment with distal retraction approximately 4 cm.\" Ortho consulted, recommended non surgical approach.  - * Evaluated by PMR and felt to benefit from TCU placement.       - started rehab program, making a progress, continue until desired goal is achieved.   - analgesia optimal  - followed with Ortho team.   - on Ca and Vit D supplement.   - will start senna one daily        Order: senna once daily for constipation.   Electronically signed by:  Brandon Aviles MD   "

## 2022-07-14 VITALS
BODY MASS INDEX: 22.14 KG/M2 | WEIGHT: 125 LBS | DIASTOLIC BLOOD PRESSURE: 64 MMHG | OXYGEN SATURATION: 96 % | RESPIRATION RATE: 18 BRPM | TEMPERATURE: 97.8 F | SYSTOLIC BLOOD PRESSURE: 114 MMHG | HEART RATE: 75 BPM

## 2022-07-14 DIAGNOSIS — S76.319A HAMSTRING TEAR: ICD-10-CM

## 2022-07-14 RX ORDER — OXYCODONE HYDROCHLORIDE 5 MG/1
5 TABLET ORAL EVERY 4 HOURS PRN
Qty: 30 TABLET | Refills: 0 | Status: SHIPPED | OUTPATIENT
Start: 2022-07-14 | End: 2022-07-25

## 2022-07-15 ENCOUNTER — TRANSITIONAL CARE UNIT VISIT (OUTPATIENT)
Dept: GERIATRICS | Facility: CLINIC | Age: 72
End: 2022-07-15
Payer: COMMERCIAL

## 2022-07-15 ENCOUNTER — LAB REQUISITION (OUTPATIENT)
Dept: LAB | Facility: CLINIC | Age: 72
End: 2022-07-15

## 2022-07-15 DIAGNOSIS — S76.311D STRAIN OF RIGHT HAMSTRING MUSCLE, SUBSEQUENT ENCOUNTER: ICD-10-CM

## 2022-07-15 DIAGNOSIS — K21.00 GASTROESOPHAGEAL REFLUX DISEASE WITH ESOPHAGITIS WITHOUT HEMORRHAGE: ICD-10-CM

## 2022-07-15 DIAGNOSIS — I10 ESSENTIAL (PRIMARY) HYPERTENSION: ICD-10-CM

## 2022-07-15 DIAGNOSIS — F32.5 MAJOR DEPRESSIVE DISORDER IN REMISSION, UNSPECIFIED WHETHER RECURRENT (H): ICD-10-CM

## 2022-07-15 DIAGNOSIS — K59.03 DRUG-INDUCED CONSTIPATION: ICD-10-CM

## 2022-07-15 DIAGNOSIS — A69.20 LYME DISEASE: ICD-10-CM

## 2022-07-15 DIAGNOSIS — R52 PAIN: ICD-10-CM
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
## 2022-07-15 PROBLEM — S76.311A STRAIN OF RIGHT HAMSTRING MUSCLE: Status: ACTIVE | Noted: 2022-07-15

## 2022-07-15 PROCEDURE — 99309 SBSQ NF CARE MODERATE MDM 30: CPT | Performed by: NURSE PRACTITIONER

## 2022-07-15 RX ORDER — ACETAMINOPHEN 500 MG
1000 TABLET ORAL 3 TIMES DAILY
COMMUNITY
End: 2022-10-12

## 2022-07-15 RX ORDER — AMOXICILLIN 250 MG
1 CAPSULE ORAL DAILY
Status: ON HOLD | COMMUNITY
End: 2023-11-06

## 2022-07-15 NOTE — LETTER
7/15/2022        RE: Kylah Britt  [address]  [facility] [address]        [facility]    Chief Complaint   Patient presents with     RECHECK     HPI:  Kylah Britt is a 71 year old  (1950), who is being seen today for an episodic care visit at: [facility] AND REHAB [facility] ([facility]) [[id number]].     This is a 70 yo female with a PMHx of depression, DJD and Lyme dx who presented after mechanical fall while gardening. No LOC. Pain in R thigh, XR negative, MRI showed complete tear of the right hamstring conjoined portion at the ischial tuberosity attachment with distal retraction approx 4cm. Ortho consulted, non-operative management with WBAT and pain control. MRI showed showed revealed mild grade 1 anterolisthesis, DJD, mild to moderate lumbar spinal stenosis but nothing acute. F/u with Dr Gordon on 7/25. No other changes. Discharged to [facility] for rehab.    Allergies, and PMH/PSH reviewed in [facility] today.  REVIEW OF SYSTEMS:  10 point ROS of systems including Constitutional, Eyes, Respiratory, Cardiovascular, Gastroenterology, Genitourinary, Integumentary, Musculoskeletal, Psychiatric were all negative except for pertinent positives noted in my HPI.    Objective:   /64   Pulse 75   Temp 97.8  F (36.6  C)   Resp 18   Wt 56.7 kg (125 lb)   SpO2 96%   BMI 22.14 kg/m    GENERAL APPEARANCE:  Alert, oriented, cooperative, R leg pain  ENT:  Mouth and posterior oropharynx normal, moist mucous membranes, normal hearing acuity  NECK:  No adenopathy,masses or thyromegaly  RESP:  lungs clear to auscultation , no respiratory distress  CV:  regular rate and rhythm, no murmur, rub, or gallop, no edema  ABDOMEN:  normal bowel sounds, soft, nontender, no hepatosplenomegaly or other masses, has constipation  M/S:   WBAT  SKIN:  Inspection of skin and subcutaneous tissue baseline  NEURO:   No focal deficits  PSYCH:  oriented X 3, memory impaired       Most Recent 3 CBC's:  Recent Labs    Lab Test 07/06/22  1344 06/08/17  1546 12/17/14  2255   WBC 7.5 7.1 3.0*   HGB 13.7 12.6 13.9   MCV 88 88 86    388 305     Most Recent 3 BMP's:  Recent Labs   Lab Test 07/06/22  1344 03/07/22  1239 06/08/17  1546    141 143   POTASSIUM 3.5 3.8 4.0   CHLORIDE 104 111* 106   CO2 24 24 27   BUN 8 21 13   CR 0.67 0.59 0.79   ANIONGAP 9 6 10   NARA 8.7 9.1 9.3   GLC 85 127* 112*       Assessment/Plan:    (W19.XXXD) Fall, subsequent encounter    (S76.311D) Strain of right hamstring muscle  Wearing leg immobilizer, WBAT, f/u with Dr Gordon on 7/25    (R52) Pain  Change tylenol to 1000mg TID and decrease oxycodone to 2.5mg q4h PRN. Continue naproxen 500mg BID and gabapentin 100mg TID and encourage icing    DVT prophylaxis  Cardioprotection  Continue ASA 81mg daily    (K59.03) Drug-induced constipation  Continue senna S 1 tab daily and daily PRN    (F32.5) Major depressive disorder in remission, unspecified whether recurrent (H)  Stable, no on medication    GERD  Continue calcium carbonate 600mg EOD    (A69.20) Hx of Lyme disease  resolved per patient report    Orders:  Increase tylenol, decrease oxycodone    Electronically signed by: Bishnu Polo NP             Sincerely,        Bishnu Polo NP

## 2022-07-15 NOTE — PROGRESS NOTES
[facility]ERIATRICS    Chief Complaint   Patient presents with     RECHECK     HPI:  Kylah Britt is a 71 year old  (1950), who is being seen today for an episodic care visit at: [facility] AND REHAB [facility] ([facility]) [[id number]].     This is a 70 yo female with a PMHx of depression, DJD and Lyme dx who presented after mechanical fall while gardening. No LOC. Pain in R thigh, XR negative, MRI showed complete tear of the right hamstring conjoined portion at the ischial tuberosity attachment with distal retraction approx 4cm. Ortho consulted, non-operative management with WBAT and pain control. MRI showed showed revealed mild grade 1 anterolisthesis, DJD, mild to moderate lumbar spinal stenosis but nothing acute. F/u with Dr Gordon on 7/25. No other changes. Discharged to [facility] for rehab.    Allergies, and PMH/PSH reviewed in [facility] today.  REVIEW OF SYSTEMS:  10 point ROS of systems including Constitutional, Eyes, Respiratory, Cardiovascular, Gastroenterology, Genitourinary, Integumentary, Musculoskeletal, Psychiatric were all negative except for pertinent positives noted in my HPI.    Objective:   /64   Pulse 75   Temp 97.8  F (36.6  C)   Resp 18   Wt 56.7 kg (125 lb)   SpO2 96%   BMI 22.14 kg/m    GENERAL APPEARANCE:  Alert, oriented, cooperative, R leg pain  ENT:  Mouth and posterior oropharynx normal, moist mucous membranes, normal hearing acuity  NECK:  No adenopathy,masses or thyromegaly  RESP:  lungs clear to auscultation , no respiratory distress  CV:  regular rate and rhythm, no murmur, rub, or gallop, no edema  ABDOMEN:  normal bowel sounds, soft, nontender, no hepatosplenomegaly or other masses, has constipation  M/S:   WBAT  SKIN:  Inspection of skin and subcutaneous tissue baseline  NEURO:   No focal deficits  PSYCH:  oriented X 3, memory impaired       Most Recent 3 CBC's:  Recent Labs   Lab Test 07/06/22  1344 06/08/17  1546 12/17/14  2255   WBC 7.5 7.1 3.0*   HGB 13.7  12.6 13.9   MCV 88 88 86    388 305     Most Recent 3 BMP's:  Recent Labs   Lab Test 07/06/22  1344 03/07/22  1239 06/08/17  1546    141 143   POTASSIUM 3.5 3.8 4.0   CHLORIDE 104 111* 106   CO2 24 24 27   BUN 8 21 13   CR 0.67 0.59 0.79   ANIONGAP 9 6 10   NARA 8.7 9.1 9.3   GLC 85 127* 112*       Assessment/Plan:    (W19.XXXD) Fall, subsequent encounter    (S76.311D) Strain of right hamstring muscle  Wearing leg immobilizer, WBAT, f/u with Dr Gordon on 7/25    (R52) Pain  Change tylenol to 1000mg TID and decrease oxycodone to 2.5mg q4h PRN. Continue naproxen 500mg BID and gabapentin 100mg TID and encourage icing    DVT prophylaxis  Cardioprotection  Continue ASA 81mg daily    (K59.03) Drug-induced constipation  Continue senna S 1 tab daily and daily PRN    (F32.5) Major depressive disorder in remission, unspecified whether recurrent (H)  Stable, no on medication    GERD  Continue calcium carbonate 600mg EOD    (A69.20) Hx of Lyme disease  resolved per patient report    Orders:  Increase tylenol, decrease oxycodone    Electronically signed by: Bishnu Polo NP

## 2022-07-18 LAB
ANION GAP SERPL CALCULATED.3IONS-SCNC: 4 MMOL/L (ref 3–14)
BUN SERPL-MCNC: 19 MG/DL (ref 7–30)
CALCIUM SERPL-MCNC: 9.2 MG/DL (ref 8.5–10.1)
CHLORIDE BLD-SCNC: 107 MMOL/L (ref 94–109)
CO2 SERPL-SCNC: 28 MMOL/L (ref 20–32)
CREAT SERPL-MCNC: 0.7 MG/DL (ref 0.52–1.04)
ERYTHROCYTE [DISTWIDTH] IN BLOOD BY AUTOMATED COUNT: 12.5 % (ref 10–15)
GFR SERPL CREATININE-BSD FRML MDRD: >90 ML/MIN/1.73M2
GLUCOSE BLD-MCNC: 95 MG/DL (ref 70–99)
HCT VFR BLD AUTO: 40.5 % (ref 35–47)
HGB BLD-MCNC: 13 G/DL (ref 11.7–15.7)
MCH RBC QN AUTO: 29.6 PG (ref 26.5–33)
MCHC RBC AUTO-ENTMCNC: 32.1 G/DL (ref 31.5–36.5)
MCV RBC AUTO: 92 FL (ref 78–100)
PLATELET # BLD AUTO: 410 10E3/UL (ref 150–450)
POTASSIUM BLD-SCNC: 4.8 MMOL/L (ref 3.4–5.3)
RBC # BLD AUTO: 4.39 10E6/UL (ref 3.8–5.2)
SODIUM SERPL-SCNC: 139 MMOL/L (ref 133–144)
WBC # BLD AUTO: 6.3 10E3/UL (ref 4–11)

## 2022-07-18 PROCEDURE — P9604 ONE-WAY ALLOW PRORATED TRIP: HCPCS | Performed by: NURSE PRACTITIONER

## 2022-07-18 PROCEDURE — 80048 BASIC METABOLIC PNL TOTAL CA: CPT | Performed by: NURSE PRACTITIONER

## 2022-07-18 PROCEDURE — 36415 COLL VENOUS BLD VENIPUNCTURE: CPT | Performed by: NURSE PRACTITIONER

## 2022-07-18 PROCEDURE — 85014 HEMATOCRIT: CPT | Performed by: NURSE PRACTITIONER

## 2022-07-20 ENCOUNTER — TELEPHONE (OUTPATIENT)
Dept: GERIATRICS | Facility: CLINIC | Age: 72
End: 2022-07-20

## 2022-07-20 RX ORDER — TRAZODONE HYDROCHLORIDE 50 MG/1
50 TABLET, FILM COATED ORAL AT BEDTIME
COMMUNITY
End: 2022-11-27

## 2022-07-20 NOTE — TELEPHONE ENCOUNTER
[facility] Triage Nurse Telephone Encounter    Provider: Bishnu Polo NP   Facility: [facility] Facility Type:  TCU    Caller: Selam   Call Back Number: [phone]    Allergies:    Allergies   Allergen Reactions     No Known Drug Allergies         Reason for call:   Pt is having a hard time sleeping, she has tried melatonin in the past and that has not worked. She is looking for something else. She had only 4 hours of sleep last night. Per the pt this has been going on for awhile.      Verbal Order/Direction given by Provider: Trazodone 25mg po at bedtime prn    Provider giving Order:  Bishnu Polo NP     Verbal Order given to: Selam Fallon RN

## 2022-07-21 VITALS
DIASTOLIC BLOOD PRESSURE: 66 MMHG | OXYGEN SATURATION: 97 % | WEIGHT: 121.8 LBS | BODY MASS INDEX: 21.58 KG/M2 | HEART RATE: 88 BPM | SYSTOLIC BLOOD PRESSURE: 108 MMHG | TEMPERATURE: 98 F | RESPIRATION RATE: 17 BRPM

## 2022-07-22 ENCOUNTER — TRANSITIONAL CARE UNIT VISIT (OUTPATIENT)
Dept: GERIATRICS | Facility: CLINIC | Age: 72
End: 2022-07-22
Payer: COMMERCIAL

## 2022-07-22 DIAGNOSIS — S76.311D STRAIN OF RIGHT HAMSTRING MUSCLE, SUBSEQUENT ENCOUNTER: ICD-10-CM

## 2022-07-22 DIAGNOSIS — F51.02 ADJUSTMENT INSOMNIA: ICD-10-CM

## 2022-07-22 DIAGNOSIS — K59.03 DRUG-INDUCED CONSTIPATION: ICD-10-CM

## 2022-07-22 DIAGNOSIS — R52 PAIN: Primary | ICD-10-CM

## 2022-07-22 PROCEDURE — 99309 SBSQ NF CARE MODERATE MDM 30: CPT | Performed by: NURSE PRACTITIONER

## 2022-07-22 RX ORDER — LANOLIN ALCOHOL/MO/W.PET/CERES
6 CREAM (GRAM) TOPICAL AT BEDTIME
COMMUNITY
End: 2022-09-30

## 2022-07-22 NOTE — PROGRESS NOTES
[facility] GERIATRICS    Chief Complaint   Patient presents with     RECHECK     HPI:  Kylah Britt is a 71 year old  (1950), who is being seen today for an episodic care visit at: [facility] AND REHAB [facility] ([facility]) [[id number]].     This is a 70 yo female with a PMHx of depression, DJD and Lyme dx who presented after mechanical fall while gardening. No LOC. Pain in R thigh, XR negative, MRI showed complete tear of the right hamstring conjoined portion at the ischial tuberosity attachment with distal retraction approx 4cm. Ortho consulted, non-operative management with WBAT and pain control. MRI showed showed revealed mild grade 1 anterolisthesis, DJD, mild to moderate lumbar spinal stenosis but nothing acute. F/u with Dr Gordon on 7/25. No other changes. Discharged to [facility] for rehab.    Today's concern is:  Pain control, insomnia, therapy progress    Allergies, and PMH/PSH reviewed in [facility] today.  REVIEW OF SYSTEMS:  4 point ROS including Respiratory, CV, GI and , other than that noted in the HPI,  is negative    Objective:   /66   Pulse 88   Temp 98  F (36.7  C)   Resp 17   Wt 55.2 kg (121 lb 12.8 oz)   SpO2 97%   BMI 21.58 kg/m    GENERAL APPEARANCE:  Alert, oriented, cooperative, R leg pain controlled, wearing immobilizer  RESP:  lungs clear to auscultation , no respiratory distress  CV:  regular rate and rhythm, no murmur, rub, or gallop, no edema  PSYCH:  oriented X 3, memory impaired       Most Recent 3 CBC's:  Recent Labs   Lab Test 07/18/22  0730 07/06/22  1344 06/08/17  1546   WBC 6.3 7.5 7.1   HGB 13.0 13.7 12.6   MCV 92 88 88    373 388     Most Recent 3 BMP's:  Recent Labs   Lab Test 07/18/22  0730 07/06/22  1344 03/07/22  1239    137 141   POTASSIUM 4.8 3.5 3.8   CHLORIDE 107 104 111*   CO2 28 24 24   BUN 19 8 21   CR 0.70 0.67 0.59   ANIONGAP 4 9 6   NARA 9.2 8.7 9.1   GLC 95 85 127*       Assessment/Plan:    (W19.XXXD) Fall, subsequent encounter     (S74.552D) Strain of right hamstring muscle  Wearing leg immobilizer, WBAT, f/u with Dr Gordon on 7/25     (R52) Pain  Using tylenol 1000mg TID and oxycodone to 2.5mg q4h PRN. Continue naproxen 500mg BID and gabapentin 100mg TID and encourage icing. Pain mostly controlled     DVT prophylaxis  Cardioprotection  Continue ASA 81mg daily     (K59.03) Drug-induced constipation  Continue senna S 1 tab daily and daily PRN, also uses prune juice     (F32.5) Major depressive disorder in remission, unspecified whether recurrent (H)  Stable, no on medication     GERD  Continue calcium carbonate 600mg EOD, stable    Insomnia  Start melatonin 6mg and increase trazodone to 50mg at HS     (A69.20) Hx of Lyme disease  resolved per patient report    Therapy  Ambulating up to 60ft with immobilizer, CGA    Orders:  Melatonin and trazodone    Electronically signed by: Bishnu Polo NP

## 2022-07-22 NOTE — LETTER
7/22/2022        RE: Kylah Britt  [address]  [facility] [address]        [facility] GERIATRICS    Chief Complaint   Patient presents with     RECHECK     HPI:  Kylah Britt is a 71 year old  (1950), who is being seen today for an episodic care visit at: [facility] AND REHAB [facility] ([facility]) [[id number]].     This is a 72 yo female with a PMHx of depression, DJD and Lyme dx who presented after mechanical fall while gardening. No LOC. Pain in R thigh, XR negative, MRI showed complete tear of the right hamstring conjoined portion at the ischial tuberosity attachment with distal retraction approx 4cm. Ortho consulted, non-operative management with WBAT and pain control. MRI showed showed revealed mild grade 1 anterolisthesis, DJD, mild to moderate lumbar spinal stenosis but nothing acute. F/u with Dr Gordon on 7/25. No other changes. Discharged to [facility] for rehab.    Today's concern is:  Pain control, insomnia, therapy progress    Allergies, and PMH/PSH reviewed in [facility] today.  REVIEW OF SYSTEMS:  4 point ROS including Respiratory, CV, GI and , other than that noted in the HPI,  is negative    Objective:   /66   Pulse 88   Temp 98  F (36.7  C)   Resp 17   Wt 55.2 kg (121 lb 12.8 oz)   SpO2 97%   BMI 21.58 kg/m    GENERAL APPEARANCE:  Alert, oriented, cooperative, R leg pain controlled, wearing immobilizer  RESP:  lungs clear to auscultation , no respiratory distress  CV:  regular rate and rhythm, no murmur, rub, or gallop, no edema  PSYCH:  oriented X 3, memory impaired       Most Recent 3 CBC's:  Recent Labs   Lab Test 07/18/22  0730 07/06/22  1344 06/08/17  1546   WBC 6.3 7.5 7.1   HGB 13.0 13.7 12.6   MCV 92 88 88    373 388     Most Recent 3 BMP's:  Recent Labs   Lab Test 07/18/22  0730 07/06/22  1344 03/07/22  1239    137 141   POTASSIUM 4.8 3.5 3.8   CHLORIDE 107 104 111*   CO2 28 24 24   BUN 19 8 21   CR 0.70 0.67 0.59   ANIONGAP 4 9 6   NARA 9.2 8.7 9.1    GLC 95 85 127*       Assessment/Plan:    (W19.XXXD) Fall, subsequent encounter    (O88.808Y) Strain of right hamstring muscle  Wearing leg immobilizer, WBAT, f/u with Dr Gordon on 7/25     (R52) Pain  Using tylenol 1000mg TID and oxycodone to 2.5mg q4h PRN. Continue naproxen 500mg BID and gabapentin 100mg TID and encourage icing. Pain mostly controlled     DVT prophylaxis  Cardioprotection  Continue ASA 81mg daily     (K59.03) Drug-induced constipation  Continue senna S 1 tab daily and daily PRN, also uses prune juice     (F32.5) Major depressive disorder in remission, unspecified whether recurrent (H)  Stable, no on medication     GERD  Continue calcium carbonate 600mg EOD, stable    Insomnia  Start melatonin 6mg and increase trazodone to 50mg at HS     (A69.20) Hx of Lyme disease  resolved per patient report    Therapy  Ambulating up to 60ft with immobilizer, CGA    Orders:  Melatonin and trazodone    Electronically signed by: Bishnu Polo NP            Sincerely,        Bishnu Polo NP

## 2022-07-25 ENCOUNTER — OFFICE VISIT (OUTPATIENT)
Dept: ORTHOPEDICS | Facility: CLINIC | Age: 72
End: 2022-07-25
Payer: COMMERCIAL

## 2022-07-25 VITALS
BODY MASS INDEX: 22.62 KG/M2 | SYSTOLIC BLOOD PRESSURE: 124 MMHG | WEIGHT: 127.64 LBS | DIASTOLIC BLOOD PRESSURE: 70 MMHG | HEIGHT: 63 IN

## 2022-07-25 DIAGNOSIS — S76.311A HAMSTRING TENDON RUPTURE, RIGHT, INITIAL ENCOUNTER: Primary | ICD-10-CM

## 2022-07-25 DIAGNOSIS — S76.319A HAMSTRING TEAR: ICD-10-CM

## 2022-07-25 PROCEDURE — 99213 OFFICE O/P EST LOW 20 MIN: CPT | Performed by: PHYSICIAN ASSISTANT

## 2022-07-25 RX ORDER — OXYCODONE HYDROCHLORIDE 5 MG/1
2.5 TABLET ORAL EVERY 4 HOURS PRN
Qty: 15 TABLET | Refills: 0 | Status: SHIPPED | OUTPATIENT
Start: 2022-07-25 | End: 2022-07-29

## 2022-07-25 ASSESSMENT — PAIN SCALES - GENERAL: PAINLEVEL: SEVERE PAIN (6)

## 2022-07-25 NOTE — PROGRESS NOTES
ORTHOPEDIC CONSULT      Chief Complaint: Kylah Britt is a 71 year old female who is retired.  She owns a house in [place] and 1 in [place].  Her daughter Nicholas works in the lab here    She is being seen for   Chief Complaints and History of Present Illnesses   Patient presents with     Musculoskeletal Problem     Right hamstring     Consult     Ref: hospital         History of Present Illness:   Mechanism of Injury: Patient was pulling weeds and working in the garden and fell on 5/7/2022, then she crawled into her place and [place].  She then called her daughter.  Location: Right hamstring origin  Duration of Pain: Since date of injury 7/5/2022  Rating of Pain: 6 out of 10, 8 out of 10 when ambulating  Pain Quality: Achy  Pain is better with: Rest  Pain is worse with: Ambulation or fire and hamstring  Treatment so far consists of: Formal physical therapy at [facility].   Associated Features: Denies numbness or tingling shooting burning or electric pain.  Prior history of related problems: No previous surgery or trauma to the right hamstring  Pain is Limiting: Nothing except for long distance ambulation  Here to: Orthopedic consultation  The Pain Has: Gotten slightly better  Additional History: Patient is here with family.  Patient may go into assisted living after discharge to [facility] secondary to multiple falls.      Patient's past medical, surgical, social and family histories reviewed.     Past Medical History:   Diagnosis Date     Abnormal Papanicolaou smear of cervix and cervical HPV 9/1/92    vaginitis with abnormal Pap     Depressive disorder, not elsewhere classified     depression     Female stress incontinence     urinary incontinence     Lyme disease      Osteoarthrosis, unspecified whether generalized or localized, unspecified site      monoarticular arthritis in the right first MTP joint     Other psychological or physical stress, not elsewhere classified(V62.89)     delayed stress  syndrome        Past Surgical History:   Procedure Laterality Date     COLONOSCOPY N/A 4/6/2022    Procedure: COLONOSCOPY, WITH POLYPECTOMY;  Surgeon: Bisnhu Tsai MD;  Location: [facility]     HC KNEE SCOPE, DIAGNOSTIC  1/31/89    Examination of right knee under anesthesia. Arthroscopy of right knee with debridement of torn anterior curciate ligament (medial meniscus not resected).     HC REMOVAL OF OVARY/TUBE(S)  2/20/90    Salpingo-Oophorectomy, bilateral     OPEN REDUCTION INTERNAL FIXATION CALCANEOUS Right 6/8/2017    Procedure: OPEN REDUCTION INTERNAL FIXATION CALCANEOUS;  Open Reduction Internal Fixation Right Calcaneous;  Surgeon: Darnell Kincaid DPM;  Location:  OR     ZZC APPENDECTOMY       ZZC LIGATE FALLOPIAN TUBE,POSTPARTUM  04/12/77    Bilateral tubal cauterization     ZZC TOTAL ABDOM HYSTERECTOMY  2/20/90    Hysterectomy, Total Abdominal     ZZHC COLONOSCOPY THRU STOMA, DIAGNOSTIC  06/24/98       Medications:  acetaminophen (TYLENOL) 500 MG tablet, Take 1,000 mg by mouth 3 times daily  BABY ASPIRIN PO, Take 81 mg by mouth daily  Calcium Carbonate (CALCIUM 600 PO), Take 1 tablet by mouth every other day  Cholecalciferol (VITAMIN D3 PO), Take 2,000 Units by mouth every other day  gabapentin (NEURONTIN) 100 MG capsule, Take 1 capsule (100 mg) by mouth 3 times daily  melatonin 3 MG tablet, Take 6 mg by mouth At Bedtime  MULTIPLE VITAMIN CAPS   OR, daily  naproxen (NAPROSYN) 500 MG tablet, Take 1 tablet (500 mg) by mouth 2 times daily (with meals)  senna-docusate (SENOKOT-S/PERICOLACE) 8.6-50 MG tablet, Take 1 tablet by mouth daily And daily PRN  traZODone (DESYREL) 50 MG tablet, Take 50 mg by mouth At Bedtime    No current facility-administered medications on file prior to visit.      Allergies   Allergen Reactions     No Known Drug Allergies        Social History     Occupational History     Not on file   Tobacco Use     Smoking status: Never Smoker     Smokeless tobacco: Never Used   Vaping Use  "    Vaping Use: Never used   Substance and Sexual Activity     Alcohol use: Yes     Comment: very rare     Drug use: No     Sexual activity: Not on file       Family History   Problem Relation Age of Onset     Hypertension Mother      Heart Disease Mother      Cancer Father      Cancer Son         Hodgkin's     Cancer Paternal Aunt         two with cervical cancer       REVIEW OF SYSTEMS  10 point review systems performed otherwise negative as noted as per history of present illness.    Physical Exam:  Vitals: /70   Ht 1.6 m (5' 3\")   Wt 57.9 kg (127 lb 10.3 oz)   BMI 22.61 kg/m    BMI= Body mass index is 22.61 kg/m .    Constitutional: healthy, alert and no acute distress   Psychiatric: mentation appears normal and affect normal/bright  NEURO: no focal deficits, CMS intact right lower extremity   RESP: Normal with easy respirations and no use of accessory muscles to breathe, no audible wheezing or retractions  CV: Calf soft and nontender to palpation, leg warm   SKIN: No erythema, rashes, excoriation, or breakdown. No evidence of infection.   MUSCULOSKELETAL:    INSPECTION of right thigh: No gross deformities, erythema, edema, ecchymosis, atrophy or fasciculations.     PALPATION: Tenderness to palpation at the hamstring origin.  No tenderness to palpation on the quad knee leg no increased warmth.  No tenderness to palpation of the hip laterally.    ROM: Passive: Flexion to 140 degrees, internal rotation to 35 degrees, external rotation to 80 degrees.  Extension full, flexion to 135 . All range of motion without catching, locking or pain except at maximal hip flexion..     STRENGTH: 5 out of 5 hip flexion, quad and 4 out of 5 hamstring compared to 5 out of 5 hamstring strength on the contralateral side.  Hamstring strength testing does cause some pain at the origin.    SPECIAL TEST: None  GAIT: Not observed today.  Lymph: no palpable lymph nodes    Diagnostic Modalities:  Recent Results (from the past 744 " hour(s))   XR Femur Right 2 Views    Narrative    Examination:  XR FEMUR RIGHT 2 VIEW    Date:  7/6/2022 11:29 AM     Clinical Information: fall and thigh pain cannot bear weight    Comparison: none.      Impression    Impression:    1.  Right pelvis and femur negative for fracture or bone lesion.  Normal hip and knee joint alignment with maintained joint spacing.    TITUS BENITO MD         SYSTEM ID:  [id number]                                         MR Femur Thigh Right wo Contrast    Narrative    Exam: MR FEMUR THIGH RIGHT W/O CONTRAST    History:  Fall 7/5, osteopenia, severe pain right lateral thigh with  weight bearing, unable to bear weight right leg, tender upper to mid  right thigh, XR negative for fracture, ?occult right femur fracture;    Techniques: Multiplanar multisequence imaging through the right thigh  was obtained without administration of intra-articular or intravenous  gadolinium contrast  using routine protocol.    Comparison: Radiographs 7/6/2022.    Findings:    Motion partially compromising assessment.    Lateral external marker placed approximately 19 cm from the top of the  right femoral head.    Bones:    No fracture, marrow contusion or infiltrative change.    Muscles/tendons:    Complete tear of the conjoined portion of the hamstring tendon at the  distal attachment with tearing of the sacrotuberous ligament.  Approximately 4 cm distal retraction of torn fibers. Associated  extensive muscle edema and epimysial fluid extending to the level of  the external marker.    Extensive edema gluteus nick muscle, consistent with muscle strain.  Similarly membranous portion of the tendon remains intact.    Gluteus minimus and medius tendinosis. Proximal rectus femoris tendon  is intact. Iliopsoas tendon is intact. No adductor muscle strain.    Edema surrounding the right sciatic nerve without change in caliber or  intrasubstance signal, presumably reactive edema from the  hamstring  injury.    Internal derangement of joints are not well assessed owing to chosen  field of view. A physiologic amount of joint fluid is present in the  right knee. Small popliteal cyst. Degenerative changes of knee  indicated by osteophytosis. Lateral patellar subluxation and tilting.  A physiologic amount of joint fluid present in right hip.    Evaluation of contralateral left thigh confined to large field view  coronal sequences demonstrate no fracture, marrow contusion or  infiltrative change. Milder edema of the left gluteus nick,  consistent with muscle strain. A physiologic amount of fluid in the  left hip.      Impression    IMPRESSION:  1. Complete tear of the right hamstring conjoined portion at the  ischial tuberosity attachment with distal retraction approximately 4  cm.   a. Associated sacrotuberous ligament tear.   b. Intact semimembranosus portion.   c. Muscle strain extending distally to the level of external marker.  2. Right greater than left gluteus nick strain.    [employer]         SYSTEM ID:  [id number]     I agree with the above readings.    Independent visualization of the images was performed.    Impression: 1.  20 days status post right complete hamstring tear with 4 cm of retraction.    Plan:  All of the above pertinent physical exam and imaging modalities findings was reviewed with Kylah and her daughter Nicholas and son Ehsan and daughter-in-law Ashley.    TCU PLAN:  Recommendations medication/treatment orders:  1. Incision/Dressing: Not needed  2. Anticoagulation: Not needed  3. Pain Medication: Continue oxycodone and trazodone at night, can also use a different muscle relaxant if trazodone is too much.  Melatonin is also excellent.  Continue to wean down and just take Tylenol as needed.  4. Weight Bearing: Weightbearing as tolerated right lower extremity  5.  Brace: Wean out of knee immobilizer over the next 1 to 2 weeks as the patient is comfortable and safe under the  direction of formal therapy.    6.  Activity/Therapy: Continue formal physical therapy working on range of motion and strengthening of right hip and hamstring/leg.  Safe wean out of right knee immobilizer as the patient is confident and safe.  I imagine this will be able to happen within the week based on my exam today.  6.  Outpatient formal therapy: I did order this for when the patient is discharged so she can continue therapy.  It is possible she might be in a different facility and need in-home therapy but we will wait to see at that time.  Family can call in and ask for home physical therapy order and see if this coincides with their insurance and their insurance will cover it.  Otherwise outpatient physical therapy will work.    Diagnosis: same as above    Follow-up:  Follow-up in 6 weeks.    FOCUSED PLAN:  Patient has a lot of family with her today.  Patient currently at [facility].  Discussed and showed MRI today.  Discussed nonoperative treatment and that operative treatment is done rarely and mostly on very demanding sports players.  They understand.  Patient has been ambulating almost 200 steps with a knee immobilizer without problems.  She does states she gets some pain with ambulation at the hamstring origin.  Patient is taking some oxycodone and trazodone to help her sleep at night and also taking Tylenol.  She is weaning down from that.  Today we discussed weaning out of the knee immobilizer as long as she is safe and stable, this will do be done under the guidance of formal physical therapy.  We will continue formal physical therapy we also ordered outpatient physical therapy for when she is discharged from [facility].  Family tells me she may go to another facility where there is some assistance and may need home care physical therapy.  I will have them talk to their insurance to make sure that that is covered and if it is we could put in that order at that time but we will wait to see  where she ends up after discharge from [facility].  Follow-up in 6 weeks for an assessment of strength and range of motion.    Re-x-ray on return: No      This note was dictated with [employer].    Bishnu Gordon PA-C

## 2022-07-25 NOTE — PATIENT INSTRUCTIONS
Encounter Diagnosis   Name Primary?    Hamstring tendon rupture, right, initial encounter Yes     Rest, ice and elevate above heart level as needed for pain control  Exam:  1.  4 out of 5 hamstring strength and 5 out of 5 quad and hip flexor strength.  Excellent range of motion today.      Recommendations medication/treatment orders:  1. Incision/Dressing: Not needed  2. Anticoagulation: Not needed  3. Pain Medication: Continue oxycodone and trazodone at night, can also use a different muscle relaxant if trazodone is too much.  Melatonin is also excellent.  Continue to wean down and just take Tylenol as needed.  4. Weight Bearing: Weightbearing as tolerated right lower extremity  5.  Brace: Wean out of knee immobilizer over the next 1 to 2 weeks as the patient is comfortable and safe under the direction of formal therapy.    6.  Activity/Therapy: Continue formal physical therapy working on range of motion and strengthening of right hip and hamstring/leg.  Safe wean out of right knee immobilizer as the patient is confident and safe.  I imagine this will be able to happen within the week based on my exam today.  6.  Outpatient formal therapy: I did order this for when the patient is discharged so she can continue therapy.  It is possible she might be in a different facility and need in-home therapy but we will wait to see at that time.  Family can call in and ask for home physical therapy order and see if this coincides with their insurance and their insurance will cover it.  Otherwise outpatient physical therapy will work.    Diagnosis: same as above    Follow-up:  Follow-up in 6 weeks.    [employer] and [employer] may offer reliable information regarding your diagnosis and treatment plan.    THANK YOU for coming in today. If you receive a survey via [facility] or mail please let us know if there was anything you especially appreciated today or if there is any way we can improve our clinic. We appreciate your  input.    GENERAL INFORMATION:  Our hours are:  Pending    [facility] for any issues or concerns: [phone]     We are not in the office Thursdays. Therefore non- urgent calls and medical messages received on Thursday will be addressed when we are back in the office on Wednesday. Urgent matters will be reviewed and addressed by one of our partners in the office as needed.    If lab work was done today as part of your evaluation you will generally be contacted via [facility], mail, or phone with the results within 1-5 days. If there is an alarming result we will contact you by phone. Lab results come back at varying times, I generally wait until all labs are resulted before making comments on results. Please note labs are automatically released to [facility] (if you have signed up for it) once available-at times you may see these prior to my having a chance to review them as well.    If you need refills please contact your pharmacist. They will send a refill request to me to review. Please allow 3 business days for us to process all refill requests. All narcotic refills should be handled in the clinic at the time of your visit.

## 2022-07-25 NOTE — LETTER
7/25/2022         RE: Kylah Britt  [address]  [facility] [address]        Dear Colleague,    Thank you for referring your patient, Kylah Britt, to the [facility]. Please see a copy of my visit note below.    ORTHOPEDIC CONSULT      Chief Complaint: Kylah Britt is a 71 year old female who is retired.  She owns a house in [place] and 1 in [place].  Her daughter Nicholas works in the lab here    She is being seen for   Chief Complaints and History of Present Illnesses   Patient presents with     Musculoskeletal Problem     Right hamstring     Consult     Ref: hospital         History of Present Illness:   Mechanism of Injury: Patient was pulling weeds and working in the garden and fell on 5/7/2022, then she crawled into her place and [place].  She then called her daughter.  Location: Right hamstring origin  Duration of Pain: Since date of injury 7/5/2022  Rating of Pain: 6 out of 10, 8 out of 10 when ambulating  Pain Quality: Achy  Pain is better with: Rest  Pain is worse with: Ambulation or fire and hamstring  Treatment so far consists of: Formal physical therapy at [facility].   Associated Features: Denies numbness or tingling shooting burning or electric pain.  Prior history of related problems: No previous surgery or trauma to the right hamstring  Pain is Limiting: Nothing except for long distance ambulation  Here to: Orthopedic consultation  The Pain Has: Gotten slightly better  Additional History: Patient is here with family.  Patient may go into assisted living after discharge to [facility] secondary to multiple falls.      Patient's past medical, surgical, social and family histories reviewed.     Past Medical History:   Diagnosis Date     Abnormal Papanicolaou smear of cervix and cervical HPV 9/1/92    vaginitis with abnormal Pap     Depressive disorder, not elsewhere classified     depression     Female stress incontinence     urinary incontinence      Lyme disease      Osteoarthrosis, unspecified whether generalized or localized, unspecified site      monoarticular arthritis in the right first MTP joint     Other psychological or physical stress, not elsewhere classified(V62.89)     delayed stress syndrome        Past Surgical History:   Procedure Laterality Date     COLONOSCOPY N/A 4/6/2022    Procedure: COLONOSCOPY, WITH POLYPECTOMY;  Surgeon: Bishnu Tsai MD;  Location:  GI     HC KNEE SCOPE, DIAGNOSTIC  1/31/89    Examination of right knee under anesthesia. Arthroscopy of right knee with debridement of torn anterior curciate ligament (medial meniscus not resected).     HC REMOVAL OF OVARY/TUBE(S)  2/20/90    Salpingo-Oophorectomy, bilateral     OPEN REDUCTION INTERNAL FIXATION CALCANEOUS Right 6/8/2017    Procedure: OPEN REDUCTION INTERNAL FIXATION CALCANEOUS;  Open Reduction Internal Fixation Right Calcaneous;  Surgeon: Darnell Kincaid DPM;  Location:  OR     Z APPENDECTOMY       ZZC LIGATE FALLOPIAN TUBE,POSTPARTUM  04/12/77    Bilateral tubal cauterization     ZZC TOTAL ABDOM HYSTERECTOMY  2/20/90    Hysterectomy, Total Abdominal     ZZHC COLONOSCOPY THRU STOMA, DIAGNOSTIC  06/24/98       Medications:  acetaminophen (TYLENOL) 500 MG tablet, Take 1,000 mg by mouth 3 times daily  BABY ASPIRIN PO, Take 81 mg by mouth daily  Calcium Carbonate (CALCIUM 600 PO), Take 1 tablet by mouth every other day  Cholecalciferol (VITAMIN D3 PO), Take 2,000 Units by mouth every other day  gabapentin (NEURONTIN) 100 MG capsule, Take 1 capsule (100 mg) by mouth 3 times daily  melatonin 3 MG tablet, Take 6 mg by mouth At Bedtime  MULTIPLE VITAMIN CAPS   OR, daily  naproxen (NAPROSYN) 500 MG tablet, Take 1 tablet (500 mg) by mouth 2 times daily (with meals)  senna-docusate (SENOKOT-S/PERICOLACE) 8.6-50 MG tablet, Take 1 tablet by mouth daily And daily PRN  traZODone (DESYREL) 50 MG tablet, Take 50 mg by mouth At Bedtime    No current facility-administered  "medications on file prior to visit.      Allergies   Allergen Reactions     No Known Drug Allergies        Social History     Occupational History     Not on file   Tobacco Use     Smoking status: Never Smoker     Smokeless tobacco: Never Used   Vaping Use     Vaping Use: Never used   Substance and Sexual Activity     Alcohol use: Yes     Comment: very rare     Drug use: No     Sexual activity: Not on file       Family History   Problem Relation Age of Onset     Hypertension Mother      Heart Disease Mother      Cancer Father      Cancer Son         Hodgkin's     Cancer Paternal Aunt         two with cervical cancer       REVIEW OF SYSTEMS  10 point review systems performed otherwise negative as noted as per history of present illness.    Physical Exam:  Vitals: /70   Ht 1.6 m (5' 3\")   Wt 57.9 kg (127 lb 10.3 oz)   BMI 22.61 kg/m    BMI= Body mass index is 22.61 kg/m .    Constitutional: healthy, alert and no acute distress   Psychiatric: mentation appears normal and affect normal/bright  NEURO: no focal deficits, CMS intact right lower extremity   RESP: Normal with easy respirations and no use of accessory muscles to breathe, no audible wheezing or retractions  CV: Calf soft and nontender to palpation, leg warm   SKIN: No erythema, rashes, excoriation, or breakdown. No evidence of infection.   MUSCULOSKELETAL:    INSPECTION of right thigh: No gross deformities, erythema, edema, ecchymosis, atrophy or fasciculations.     PALPATION: Tenderness to palpation at the hamstring origin.  No tenderness to palpation on the quad knee leg no increased warmth.  No tenderness to palpation of the hip laterally.    ROM: Passive: Flexion to 140 degrees, internal rotation to 35 degrees, external rotation to 80 degrees.  Extension full, flexion to 135 . All range of motion without catching, locking or pain except at maximal hip flexion..     STRENGTH: 5 out of 5 hip flexion, quad and 4 out of 5 hamstring compared to 5 out " of 5 hamstring strength on the contralateral side.  Hamstring strength testing does cause some pain at the origin.    SPECIAL TEST: None  GAIT: Not observed today.  Lymph: no palpable lymph nodes    Diagnostic Modalities:  Recent Results (from the past 744 hour(s))   XR Femur Right 2 Views    Narrative    Examination:  XR FEMUR RIGHT 2 VIEW    Date:  7/6/2022 11:29 AM     Clinical Information: fall and thigh pain cannot bear weight    Comparison: none.      Impression    Impression:    1.  Right pelvis and femur negative for fracture or bone lesion.  Normal hip and knee joint alignment with maintained joint spacing.    TITUS BENITO MD         SYSTEM ID:  [id number]                                         MR Femur Thigh Right wo Contrast    Narrative    Exam: MR FEMUR THIGH RIGHT W/O CONTRAST    History:  Fall 7/5, osteopenia, severe pain right lateral thigh with  weight bearing, unable to bear weight right leg, tender upper to mid  right thigh, XR negative for fracture, ?occult right femur fracture;    Techniques: Multiplanar multisequence imaging through the right thigh  was obtained without administration of intra-articular or intravenous  gadolinium contrast  using routine protocol.    Comparison: Radiographs 7/6/2022.    Findings:    Motion partially compromising assessment.    Lateral external marker placed approximately 19 cm from the top of the  right femoral head.    Bones:    No fracture, marrow contusion or infiltrative change.    Muscles/tendons:    Complete tear of the conjoined portion of the hamstring tendon at the  distal attachment with tearing of the sacrotuberous ligament.  Approximately 4 cm distal retraction of torn fibers. Associated  extensive muscle edema and epimysial fluid extending to the level of  the external marker.    Extensive edema gluteus nick muscle, consistent with muscle strain.  Similarly membranous portion of the tendon remains intact.    Gluteus minimus and medius  tendinosis. Proximal rectus femoris tendon  is intact. Iliopsoas tendon is intact. No adductor muscle strain.    Edema surrounding the right sciatic nerve without change in caliber or  intrasubstance signal, presumably reactive edema from the hamstring  injury.    Internal derangement of joints are not well assessed owing to chosen  field of view. A physiologic amount of joint fluid is present in the  right knee. Small popliteal cyst. Degenerative changes of knee  indicated by osteophytosis. Lateral patellar subluxation and tilting.  A physiologic amount of joint fluid present in right hip.    Evaluation of contralateral left thigh confined to large field view  coronal sequences demonstrate no fracture, marrow contusion or  infiltrative change. Milder edema of the left gluteus nick,  consistent with muscle strain. A physiologic amount of fluid in the  left hip.      Impression    IMPRESSION:  1. Complete tear of the right hamstring conjoined portion at the  ischial tuberosity attachment with distal retraction approximately 4  cm.   a. Associated sacrotuberous ligament tear.   b. Intact semimembranosus portion.   c. Muscle strain extending distally to the level of external marker.  2. Right greater than left gluteus nick strain.    [place]         SYSTEM ID:  [id number]     I agree with the above readings.    Independent visualization of the images was performed.    Impression: 1.  20 days status post right complete hamstring tear with 4 cm of retraction.    Plan:  All of the above pertinent physical exam and imaging modalities findings was reviewed with Kylah and her daughter Nicholas and son Ehsan and daughter-in-law Ashley.    TCU PLAN:  Recommendations medication/treatment orders:  1. Incision/Dressing: Not needed  2. Anticoagulation: Not needed  3. Pain Medication: Continue oxycodone and trazodone at night, can also use a different muscle relaxant if trazodone is too much.  Melatonin is also excellent.   Continue to wean down and just take Tylenol as needed.  4. Weight Bearing: Weightbearing as tolerated right lower extremity  5.  Brace: Wean out of knee immobilizer over the next 1 to 2 weeks as the patient is comfortable and safe under the direction of formal therapy.    6.  Activity/Therapy: Continue formal physical therapy working on range of motion and strengthening of right hip and hamstring/leg.  Safe wean out of right knee immobilizer as the patient is confident and safe.  I imagine this will be able to happen within the week based on my exam today.  6.  Outpatient formal therapy: I did order this for when the patient is discharged so she can continue therapy.  It is possible she might be in a different facility and need in-home therapy but we will wait to see at that time.  Family can call in and ask for home physical therapy order and see if this coincides with their insurance and their insurance will cover it.  Otherwise outpatient physical therapy will work.    Diagnosis: same as above    Follow-up:  Follow-up in 6 weeks.    FOCUSED PLAN:  Patient has a lot of family with her today.  Patient currently at [facility].  Discussed and showed MRI today.  Discussed nonoperative treatment and that operative treatment is done rarely and mostly on very demanding sports players.  They understand.  Patient has been ambulating almost 200 steps with a knee immobilizer without problems.  She does states she gets some pain with ambulation at the hamstring origin.  Patient is taking some oxycodone and trazodone to help her sleep at night and also taking Tylenol.  She is weaning down from that.  Today we discussed weaning out of the knee immobilizer as long as she is safe and stable, this will do be done under the guidance of formal physical therapy.  We will continue formal physical therapy we also ordered outpatient physical therapy for when she is discharged from [facility].  Family tells me she may go to  another facility where there is some assistance and may need home care physical therapy.  I will have them talk to their insurance to make sure that that is covered and if it is we could put in that order at that time but we will wait to see where she ends up after discharge from [facility].  Follow-up in 6 weeks for an assessment of strength and range of motion.    Re-x-ray on return: No      This note was dictated with "[employer].    Bishnu Gordon PA-C        Again, thank you for allowing me to participate in the care of your patient.        Sincerely,        Bishnu Gordon PA-C

## 2022-07-28 VITALS
OXYGEN SATURATION: 96 % | HEART RATE: 86 BPM | BODY MASS INDEX: 20.9 KG/M2 | WEIGHT: 118 LBS | SYSTOLIC BLOOD PRESSURE: 119 MMHG | RESPIRATION RATE: 16 BRPM | DIASTOLIC BLOOD PRESSURE: 77 MMHG | TEMPERATURE: 98.2 F

## 2022-07-29 ENCOUNTER — TRANSITIONAL CARE UNIT VISIT (OUTPATIENT)
Dept: GERIATRICS | Facility: CLINIC | Age: 72
End: 2022-07-29
Payer: COMMERCIAL

## 2022-07-29 DIAGNOSIS — R52 PAIN: Primary | ICD-10-CM

## 2022-07-29 DIAGNOSIS — K59.03 DRUG-INDUCED CONSTIPATION: ICD-10-CM

## 2022-07-29 DIAGNOSIS — F32.5 MAJOR DEPRESSIVE DISORDER IN REMISSION, UNSPECIFIED WHETHER RECURRENT (H): ICD-10-CM

## 2022-07-29 DIAGNOSIS — F51.02 ADJUSTMENT INSOMNIA: ICD-10-CM

## 2022-07-29 DIAGNOSIS — S76.311S STRAIN OF RIGHT HAMSTRING MUSCLE, SEQUELA: ICD-10-CM

## 2022-07-29 PROCEDURE — 99309 SBSQ NF CARE MODERATE MDM 30: CPT | Performed by: NURSE PRACTITIONER

## 2022-07-29 NOTE — LETTER
7/29/2022        RE: Kylah Britt  [address]  [facility] [address]        [facility]    Chief Complaint   Patient presents with     RECHECK     HPI:  Kylah Britt is a 71 year old  (1950), who is being seen today for an episodic care visit at: [facility] ([facility]) [[id number]].     This is a 72 yo female with a PMHx of depression, DJD and Lyme dx who presented after mechanical fall while gardening. No LOC. Pain in R thigh, XR negative, MRI showed complete tear of the right hamstring conjoined portion at the ischial tuberosity attachment with distal retraction approx 4cm. Ortho consulted, non-operative management with WBAT and pain control. MRI showed showed revealed mild grade 1 anterolisthesis, DJD, mild to moderate lumbar spinal stenosis but nothing acute. F/u with Dr Gordon on 7/25. No other changes. Discharged to [facility] for rehab.    Today's concern is:   Pain control, insomnia    Allergies, and PMH/PSH reviewed in [facility] today.  REVIEW OF SYSTEMS:  4 point ROS including Respiratory, CV, GI and , other than that noted in the HPI,  is negative    Objective:   /77   Pulse 86   Temp 98.2  F (36.8  C)   Resp 16   Wt 53.5 kg (118 lb)   SpO2 96%   BMI 20.90 kg/m    GENERAL APPEARANCE:  Alert, oriented, cooperative, R leg pain controlled, wearing immobilizer  RESP:  lungs clear to auscultation , no respiratory distress, RA  CV:  regular rate and rhythm, no murmur, rub, or gallop, no edema  PSYCH:  oriented X 3, memory impaired     Most Recent 3 CBC's:Recent Labs   Lab Test 07/18/22  0730 07/06/22  1344 06/08/17  1546   WBC 6.3 7.5 7.1   HGB 13.0 13.7 12.6   MCV 92 88 88    373 388     Most Recent 3 BMP's:Recent Labs   Lab Test 07/18/22  0730 07/06/22  1344 03/07/22  1239    137 141   POTASSIUM 4.8 3.5 3.8   CHLORIDE 107 104 111*   CO2 28 24 24   BUN 19 8 21   CR 0.70 0.67 0.59   ANIONGAP 4 9 6   NARA 9.2 8.7 9.1   GLC 95 85 127*        Assessment/Plan:    (W19.XXXD) Fall, subsequent encounter    (S75.553D) Strain of right hamstring muscle  No longer using immobilizer (being weaned in next 1-2 wks), WBAT. F/u with ortho as directed     (R52) Pain  Continue naproxen 500mg BID and gabapentin 100mg TID and encourage icing.  Today discontinue oxycodone and increase tylenol to 1000mg TID and daily PRN. Pain mostly controlled     DVT prophylaxis  Cardioprotection  Continue ASA 81mg daily, no change     (K59.03) Drug-induced constipation  Continue senna S 1 tab daily and daily PRN, also uses prune juice, no change today     (F32.5) Major depressive disorder in remission, unspecified whether recurrent (H)  Stable, no medication needed     GERD  Continue calcium carbonate 600mg EOD, stable     Insomnia  Continue melatonin 6mg with trazodone to 50mg at HS, adequate     (A69.20) Hx of Lyme disease  resolved per patient report     Therapy  Ambulating up to 100ft with FWW, CGA/SBA    Orders:  Discontinue oxycodone and increase tylenol    Electronically signed by: Bishnu Polo NP             Sincerely,        Bishnu Polo NP

## 2022-07-29 NOTE — PROGRESS NOTES
[facility]    Chief Complaint   Patient presents with     RECHECK     HPI:  Kylah Britt is a 71 year old  (1950), who is being seen today for an episodic care visit at: [facility] ([facility]) [[id number]].     This is a 72 yo female with a PMHx of depression, DJD and Lyme dx who presented after mechanical fall while gardening. No LOC. Pain in R thigh, XR negative, MRI showed complete tear of the right hamstring conjoined portion at the ischial tuberosity attachment with distal retraction approx 4cm. Ortho consulted, non-operative management with WBAT and pain control. MRI showed showed revealed mild grade 1 anterolisthesis, DJD, mild to moderate lumbar spinal stenosis but nothing acute. F/u with Dr Gordon on 7/25. No other changes. Discharged to [facility] for rehab.    Today's concern is:   Pain control, insomnia    Allergies, and PMH/PSH reviewed in [facility] today.  REVIEW OF SYSTEMS:  4 point ROS including Respiratory, CV, GI and , other than that noted in the HPI,  is negative    Objective:   /77   Pulse 86   Temp 98.2  F (36.8  C)   Resp 16   Wt 53.5 kg (118 lb)   SpO2 96%   BMI 20.90 kg/m    GENERAL APPEARANCE:  Alert, oriented, cooperative, R leg pain controlled, wearing immobilizer  RESP:  lungs clear to auscultation , no respiratory distress, RA  CV:  regular rate and rhythm, no murmur, rub, or gallop, no edema  PSYCH:  oriented X 3, memory impaired     Most Recent 3 CBC's:Recent Labs   Lab Test 07/18/22  0730 07/06/22  1344 06/08/17  1546   WBC 6.3 7.5 7.1   HGB 13.0 13.7 12.6   MCV 92 88 88    373 388     Most Recent 3 BMP's:Recent Labs   Lab Test 07/18/22  0730 07/06/22  1344 03/07/22  1239    137 141   POTASSIUM 4.8 3.5 3.8   CHLORIDE 107 104 111*   CO2 28 24 24   BUN 19 8 21   CR 0.70 0.67 0.59   ANIONGAP 4 9 6   NARA 9.2 8.7 9.1   GLC 95 85 127*       Assessment/Plan:    (W19.XXXD) Fall, subsequent encounter    (S76.311D) Strain of right  hamstring muscle  No longer using immobilizer (being weaned in next 1-2 wks), WBAT. F/u with ortho as directed     (R52) Pain  Continue naproxen 500mg BID and gabapentin 100mg TID and encourage icing.  Today discontinue oxycodone and increase tylenol to 1000mg TID and daily PRN. Pain mostly controlled     DVT prophylaxis  Cardioprotection  Continue ASA 81mg daily, no change     (K59.03) Drug-induced constipation  Continue senna S 1 tab daily and daily PRN, also uses prune juice, no change today     (F32.5) Major depressive disorder in remission, unspecified whether recurrent (H)  Stable, no medication needed     GERD  Continue calcium carbonate 600mg EOD, stable     Insomnia  Continue melatonin 6mg with trazodone to 50mg at HS, adequate     (A69.20) Hx of Lyme disease  resolved per patient report     Therapy  Ambulating up to 100ft with FWW, CGA/SBA    Orders:  Discontinue oxycodone and increase tylenol    Electronically signed by: Bishnu Polo NP

## 2022-07-30 ENCOUNTER — TELEPHONE (OUTPATIENT)
Dept: GERIATRICS | Facility: CLINIC | Age: 72
End: 2022-07-30

## 2022-07-30 DIAGNOSIS — R26.89 UNABLE TO BEAR WEIGHT ON RIGHT LOWER EXTREMITY: Primary | ICD-10-CM

## 2022-07-30 RX ORDER — OXYCODONE HYDROCHLORIDE 5 MG/1
2.5 TABLET ORAL EVERY 4 HOURS PRN
Qty: 12 TABLET | Refills: 0 | Status: SHIPPED | OUTPATIENT
Start: 2022-07-30 | End: 2022-08-19

## 2022-07-30 NOTE — TELEPHONE ENCOUNTER
Kylah has uncontrolled pain and cry today.  PRN medication already used.    See that the PRN oxycodone was discontinued yesterday and increase of the Tylenol ES.    Will reinstate the oxycodone 2.5mg every 4 hours PRN and send a script to [facility].  Staff have the medication still so will ask for no sending of the medication.    Electronically signed by Gina Mejia RN, CNP

## 2022-08-04 ENCOUNTER — DOCUMENTATION ONLY (OUTPATIENT)
Dept: OTHER | Facility: CLINIC | Age: 72
End: 2022-08-04

## 2022-08-05 ENCOUNTER — DOCUMENTATION ONLY (OUTPATIENT)
Dept: OTHER | Facility: CLINIC | Age: 72
End: 2022-08-05

## 2022-08-11 ENCOUNTER — PATIENT OUTREACH (OUTPATIENT)
Dept: CARE COORDINATION | Facility: CLINIC | Age: 72
End: 2022-08-11

## 2022-08-11 VITALS
OXYGEN SATURATION: 96 % | BODY MASS INDEX: 23.35 KG/M2 | DIASTOLIC BLOOD PRESSURE: 66 MMHG | RESPIRATION RATE: 16 BRPM | SYSTOLIC BLOOD PRESSURE: 119 MMHG | WEIGHT: 131.8 LBS | HEART RATE: 75 BPM | TEMPERATURE: 97.8 F

## 2022-08-11 NOTE — PROGRESS NOTES
Clinic Care Coordination Contact    Situation: Patient chart reviewed by care coordinator.    Background: Patient was admitted to [facility] TCU on 7/8/2022.    Assessment: RN CC called [facility]. Patient is still inpatient there.     Plan/Recommendations: RN CC will follow up in 2 weeks.     Mai Sesay RN Care Coordination   [facility] HospitalTerrence Rogers  Email: [email or web address]  Phone: [phone]

## 2022-08-12 ENCOUNTER — TRANSITIONAL CARE UNIT VISIT (OUTPATIENT)
Dept: GERIATRICS | Facility: CLINIC | Age: 72
End: 2022-08-12
Payer: COMMERCIAL

## 2022-08-12 DIAGNOSIS — F51.02 ADJUSTMENT INSOMNIA: ICD-10-CM

## 2022-08-12 DIAGNOSIS — S76.311S STRAIN OF RIGHT HAMSTRING MUSCLE, SEQUELA: ICD-10-CM

## 2022-08-12 DIAGNOSIS — K59.03 DRUG-INDUCED CONSTIPATION: ICD-10-CM

## 2022-08-12 DIAGNOSIS — R52 PAIN: Primary | ICD-10-CM

## 2022-08-12 PROCEDURE — 99309 SBSQ NF CARE MODERATE MDM 30: CPT | Performed by: NURSE PRACTITIONER

## 2022-08-12 RX ORDER — TROLAMINE SALICYLATE 10 G/100G
CREAM TOPICAL 4 TIMES DAILY
COMMUNITY
End: 2022-10-07

## 2022-08-12 NOTE — LETTER
8/12/2022        RE: Kylah Britt  [address]  [facility] [address]        [facility]    Chief Complaint   Patient presents with     RECHECK     HPI:  Kylah Britt is a 71 year old  (1950), who is being seen today for an episodic care visit at: [facility] ([facility]) [[id number]].     This is a 70 yo female with a PMHx of depression, DJD and Lyme dx who presented after mechanical fall while gardening. No LOC. Pain in R thigh, XR negative, MRI showed complete tear of the right hamstring conjoined portion at the ischial tuberosity attachment with distal retraction approx 4cm. Ortho consulted, non-operative management with WBAT and pain control. MRI showed showed revealed mild grade 1 anterolisthesis, DJD, mild to moderate lumbar spinal stenosis but nothing acute. F/u with Dr Gordon on 7/25. No other changes. Discharged to [facility] for rehab.    Today's concern is:  Pain control, therapy progress    Allergies, and PMH/PSH reviewed in [facility] today.  REVIEW OF SYSTEMS:  4 point ROS including Respiratory, CV, GI and , other than that noted in the HPI,  is negative    Objective:   /66   Pulse 75   Temp 97.8  F (36.6  C)   Resp 16   Wt 59.8 kg (131 lb 12.8 oz)   SpO2 96%   BMI 23.35 kg/m    GENERAL APPEARANCE:  Alert, oriented, cooperative, R leg painn persists at times  RESP:  lungs clear to auscultation , no respiratory distress, RA  CV:  regular rate and rhythm, no murmur, rub, or gallop, no edema  PSYCH:  oriented X 3, memory impaired       Most Recent 3 CBC's:  Recent Labs   Lab Test 07/18/22  0730 07/06/22  1344 06/08/17  1546   WBC 6.3 7.5 7.1   HGB 13.0 13.7 12.6   MCV 92 88 88    373 388     Most Recent 3 BMP's:  Recent Labs   Lab Test 07/18/22  0730 07/06/22  1344 03/07/22  1239    137 141   POTASSIUM 4.8 3.5 3.8   CHLORIDE 107 104 111*   CO2 28 24 24   BUN 19 8 21   CR 0.70 0.67 0.59   ANIONGAP 4 9 6   NARA 9.2 8.7 9.1   GLC 95 85 127*        Assessment/Plan:    (W19.XXXD) Fall, subsequent encounter    (S76.311D) Strain of right hamstring muscle  No longer using immobilizer, WBAT. F/u with ortho as directed     (R52) Pain  Continue tylenol 1000mg TID and daily PRN, naproxen 500mg BID and gabapentin 100mg TID with icing. Attempted to discontinue oxycodone, through restarted, today decrease to 2.5mg q8h PRN and start aspercreme 10% to R leg     DVT prophylaxis  Cardioprotection  Continue ASA 81mg daily, advised to see PCP about if need to continue     (K59.03) Drug-induced constipation  Continue senna S 1 tab daily and daily PRN, also uses prune juice, again no change today     (F32.5) Major depressive disorder in remission, unspecified whether recurrent (H)  Stable, no medication needed     GERD  Continue calcium carbonate 600mg EOD, stable     Insomnia  Continue melatonin 6mg with trazodone to 50mg at HS, adequate. No change     (A69.20) Hx of Lyme disease  resolved per patient report     Therapy  Ambulating more than 100ft with FWW, CGA/SBA    Orders:  Aspercreme, decrease oxycodone dosing    Electronically signed by: Bishnu Polo NP             Sincerely,        Bishnu Polo NP

## 2022-08-12 NOTE — PROGRESS NOTES
[facility] GERIATRICS    Chief Complaint   Patient presents with     RECHECK     HPI:  Kylah Britt is a 71 year old  (1950), who is being seen today for an episodic care visit at: [facility] AND REH[facility] ([facility]) [[id number]].     This is a 70 yo female with a PMHx of depression, DJD and Lyme dx who presented after mechanical fall while gardening. No LOC. Pain in R thigh, XR negative, MRI showed complete tear of the right hamstring conjoined portion at the ischial tuberosity attachment with distal retraction approx 4cm. Ortho consulted, non-operative management with WBAT and pain control. MRI showed showed revealed mild grade 1 anterolisthesis, DJD, mild to moderate lumbar spinal stenosis but nothing acute. F/u with Dr Gordon on 7/25. No other changes. Discharged to [facility] for rehab.    Today's concern is:  Pain control, therapy progress    Allergies, and PMH/PSH reviewed in EPIC today.  REVIEW OF SYSTEMS:  4 point ROS including Respiratory, CV, GI and , other than that noted in the HPI,  is negative    Objective:   /66   Pulse 75   Temp 97.8  F (36.6  C)   Resp 16   Wt 59.8 kg (131 lb 12.8 oz)   SpO2 96%   BMI 23.35 kg/m    GENERAL APPEARANCE:  Alert, oriented, cooperative, R leg painn persists at times  RESP:  lungs clear to auscultation , no respiratory distress, RA  CV:  regular rate and rhythm, no murmur, rub, or gallop, no edema  PSYCH:  oriented X 3, memory impaired       Most Recent 3 CBC's:  Recent Labs   Lab Test 07/18/22  0730 07/06/22  1344 06/08/17  1546   WBC 6.3 7.5 7.1   HGB 13.0 13.7 12.6   MCV 92 88 88    373 388     Most Recent 3 BMP's:  Recent Labs   Lab Test 07/18/22  0730 07/06/22  1344 03/07/22  1239    137 141   POTASSIUM 4.8 3.5 3.8   CHLORIDE 107 104 111*   CO2 28 24 24   BUN 19 8 21   CR 0.70 0.67 0.59   ANIONGAP 4 9 6   NARA 9.2 8.7 9.1   GLC 95 85 127*       Assessment/Plan:    (W19.XXXD) Fall, subsequent encounter    (A78.201D) Strain of  right hamstring muscle  No longer using immobilizer, WBAT. F/u with ortho as directed     (R52) Pain  Continue tylenol 1000mg TID and daily PRN, naproxen 500mg BID and gabapentin 100mg TID with icing. Attempted to discontinue oxycodone, through restarted, today decrease to 2.5mg q8h PRN and start aspercreme 10% to R leg     DVT prophylaxis  Cardioprotection  Continue ASA 81mg daily, advised to see PCP about if need to continue     (K59.03) Drug-induced constipation  Continue senna S 1 tab daily and daily PRN, also uses prune juice, again no change today     (F32.5) Major depressive disorder in remission, unspecified whether recurrent (H)  Stable, no medication needed     GERD  Continue calcium carbonate 600mg EOD, stable     Insomnia  Continue melatonin 6mg with trazodone to 50mg at HS, adequate. No change     (A69.20) Hx of Lyme disease  resolved per patient report     Therapy  Ambulating more than 100ft with FWW, CGA/SBA    Orders:  Aspercreme, decrease oxycodone dosing    Electronically signed by: Bishnu Polo NP

## 2022-08-18 VITALS
TEMPERATURE: 97.8 F | BODY MASS INDEX: 23.03 KG/M2 | HEART RATE: 61 BPM | OXYGEN SATURATION: 94 % | SYSTOLIC BLOOD PRESSURE: 119 MMHG | DIASTOLIC BLOOD PRESSURE: 70 MMHG | RESPIRATION RATE: 18 BRPM | WEIGHT: 130 LBS

## 2022-08-18 NOTE — PROGRESS NOTES
[facility] GERIATRICS    Chief Complaint   Patient presents with     RECHECK     HPI:  Kylah Britt is a 71 year old  (1950), who is being seen today for an episodic care visit at: [facility] [[id number]].     This is a 72 yo female with a PMHx of depression, DJD and Lyme dx who presented after mechanical fall while gardening. No LOC. Pain in R thigh, XR negative, MRI showed complete tear of the right hamstring conjoined portion at the ischial tuberosity attachment with distal retraction approx 4cm. Ortho consulted, non-operative management with WBAT and pain control. MRI showed showed revealed mild grade 1 anterolisthesis, DJD, mild to moderate lumbar spinal stenosis but nothing acute. F/u with Dr Gordon on 7/25. No other changes. Discharged to [facility] for rehab.    Today's concern is:  Pain control, therapy progress    Allergies, and PMH/PSH reviewed in EPIC today.  REVIEW OF SYSTEMS:  4 point ROS including Respiratory, CV, GI and , other than that noted in the HPI,  is negative    Objective:   /70   Pulse 61   Temp 97.8  F (36.6  C)   Resp 18   Wt 59 kg (130 lb)   SpO2 94%   BMI 23.03 kg/m    GENERAL APPEARANCE:  Alert, oriented, cooperative, R leg pain improved  RESP:  lungs clear to auscultation , no respiratory distress, RA  CV:  regular rate and rhythm, no murmur, rub, or gallop, no edema  PSYCH:  oriented X 3, memory impaired       Most Recent 3 CBC's:  Recent Labs   Lab Test 07/18/22  0730 07/06/22  1344 06/08/17  1546   WBC 6.3 7.5 7.1   HGB 13.0 13.7 12.6   MCV 92 88 88    373 388     Most Recent 3 BMP's:  Recent Labs   Lab Test 07/18/22  0730 07/06/22  1344 03/07/22  1239    137 141   POTASSIUM 4.8 3.5 3.8   CHLORIDE 107 104 111*   CO2 28 24 24   BUN 19 8 21   CR 0.70 0.67 0.59   ANIONGAP 4 9 6   NAAR 9.2 8.7 9.1   GLC 95 85 127*       Assessment/Plan:    (W19.XXXD) Fall, subsequent encounter    (S76.568D) Strain of right hamstring  muscle  No longer using immobilizer, WBAT. F/u with ortho as directed     (R52) Pain  Continue tylenol 1000mg TID and daily PRN, naproxen 500mg BID and gabapentin 100mg TID with icing.  Today will discontinue oxycodone again and continue Aspercreme 10% 4 times daily (okay to leave at bedside).  Pain mostly controlled     DVT prophylaxis  Cardioprotection  Continue ASA 81mg daily, advised to see PCP about if need to continue     (K59.03) Drug-induced constipation  Continue senna S 1 tab daily and daily PRN, also uses prune juice, no change needed     (F32.5) Major depressive disorder in remission, unspecified whether recurrent (H)  Stable, no medication needed     GERD  Continue calcium carbonate 600mg EOD, adequate     Insomnia  Continue melatonin 6mg with trazodone to 50mg at HS, adequate. No change     (A69.20) Hx of Lyme disease  resolved per patient report     Therapy  Ambulating more than 500ft with FWW, SBA    Orders:  OK for aspercreme at bedside    Electronically signed by: Bishnu Polo NP

## 2022-08-19 ENCOUNTER — PATIENT OUTREACH (OUTPATIENT)
Dept: CARE COORDINATION | Facility: CLINIC | Age: 72
End: 2022-08-19

## 2022-08-19 ENCOUNTER — TRANSITIONAL CARE UNIT VISIT (OUTPATIENT)
Dept: GERIATRICS | Facility: CLINIC | Age: 72
End: 2022-08-19
Payer: COMMERCIAL

## 2022-08-19 DIAGNOSIS — R52 PAIN: ICD-10-CM

## 2022-08-19 DIAGNOSIS — K59.03 DRUG-INDUCED CONSTIPATION: ICD-10-CM

## 2022-08-19 DIAGNOSIS — K21.00 GASTROESOPHAGEAL REFLUX DISEASE WITH ESOPHAGITIS WITHOUT HEMORRHAGE: ICD-10-CM

## 2022-08-19 DIAGNOSIS — S76.311S STRAIN OF RIGHT HAMSTRING MUSCLE, SEQUELA: ICD-10-CM

## 2022-08-19 DIAGNOSIS — F51.02 ADJUSTMENT INSOMNIA: ICD-10-CM

## 2022-08-19 DIAGNOSIS — M79.651 PAIN OF RIGHT THIGH: Primary | ICD-10-CM

## 2022-08-19 PROCEDURE — 99309 SBSQ NF CARE MODERATE MDM 30: CPT | Performed by: NURSE PRACTITIONER

## 2022-08-19 NOTE — PROGRESS NOTES
Clinic Care Coordination - Chart Review Only    Situation/Background: Patient chart reviewed by care coordinator related to [facility] conversion.    Assessment: Patient continues to be followed by Clinic Care Coordination.    Plan: Patient's chart updated to align with [facility] program for ongoing patient management.    Mai Sesay RN Care Coordination   [facility]  Email: [email or web address]  Phone: [phone]

## 2022-08-19 NOTE — LETTER
8/19/2022        RE: Kylah Britt  [address]  [facility] [address]        [facility] GERIATRICS    Chief Complaint   Patient presents with     RECHECK     HPI:  Kylah Britt is a 71 year old  (1950), who is being seen today for an episodic care visit at: [facility] AND REHAB [facility] ([facility]) [[id number]].     This is a 70 yo female with a PMHx of depression, DJD and Lyme dx who presented after mechanical fall while gardening. No LOC. Pain in R thigh, XR negative, MRI showed complete tear of the right hamstring conjoined portion at the ischial tuberosity attachment with distal retraction approx 4cm. Ortho consulted, non-operative management with WBAT and pain control. MRI showed showed revealed mild grade 1 anterolisthesis, DJD, mild to moderate lumbar spinal stenosis but nothing acute. F/u with Dr Gordon on 7/25. No other changes. Discharged to [facility] for rehab.    Today's concern is:  Pain control, therapy progress    Allergies, and PMH/PSH reviewed in [facility] today.  REVIEW OF SYSTEMS:  4 point ROS including Respiratory, CV, GI and , other than that noted in the HPI,  is negative    Objective:   /70   Pulse 61   Temp 97.8  F (36.6  C)   Resp 18   Wt 59 kg (130 lb)   SpO2 94%   BMI 23.03 kg/m    GENERAL APPEARANCE:  Alert, oriented, cooperative, R leg pain improved  RESP:  lungs clear to auscultation , no respiratory distress, RA  CV:  regular rate and rhythm, no murmur, rub, or gallop, no edema  PSYCH:  oriented X 3, memory impaired       Most Recent 3 CBC's:  Recent Labs   Lab Test 07/18/22  0730 07/06/22  1344 06/08/17  1546   WBC 6.3 7.5 7.1   HGB 13.0 13.7 12.6   MCV 92 88 88    373 388     Most Recent 3 BMP's:  Recent Labs   Lab Test 07/18/22  0730 07/06/22  1344 03/07/22  1239    137 141   POTASSIUM 4.8 3.5 3.8   CHLORIDE 107 104 111*   CO2 28 24 24   BUN 19 8 21   CR 0.70 0.67 0.59   ANIONGAP 4 9 6   NARA 9.2 8.7 9.1   GLC 95 85 127*        Assessment/Plan:    (W19.XXXD) Fall, subsequent encounter    (S76.311D) Strain of right hamstring muscle  No longer using immobilizer, WBAT. F/u with ortho as directed     (R52) Pain  Continue tylenol 1000mg TID and daily PRN, naproxen 500mg BID and gabapentin 100mg TID with icing.  Today will discontinue oxycodone again and continue Aspercreme 10% 4 times daily (okay to leave at bedside).  Pain mostly controlled     DVT prophylaxis  Cardioprotection  Continue ASA 81mg daily, advised to see PCP about if need to continue     (K59.03) Drug-induced constipation  Continue senna S 1 tab daily and daily PRN, also uses prune juice, no change needed     (F32.5) Major depressive disorder in remission, unspecified whether recurrent (H)  Stable, no medication needed     GERD  Continue calcium carbonate 600mg EOD, adequate     Insomnia  Continue melatonin 6mg with trazodone to 50mg at HS, adequate. No change     (A69.20) Hx of Lyme disease  resolved per patient report     Therapy  Ambulating more than 500ft with FWW, SBA    Orders:  OK for aspercreme at bedside    Electronically signed by: Bishnu Polo NP             Sincerely,        Bishnu Polo NP

## 2022-08-30 ENCOUNTER — TRANSITIONAL CARE UNIT VISIT (OUTPATIENT)
Dept: GERIATRICS | Facility: CLINIC | Age: 72
End: 2022-08-30
Payer: COMMERCIAL

## 2022-08-30 VITALS
HEIGHT: 63 IN | RESPIRATION RATE: 19 BRPM | DIASTOLIC BLOOD PRESSURE: 78 MMHG | OXYGEN SATURATION: 98 % | HEART RATE: 75 BPM | BODY MASS INDEX: 23.18 KG/M2 | SYSTOLIC BLOOD PRESSURE: 148 MMHG | TEMPERATURE: 97.8 F | WEIGHT: 130.8 LBS

## 2022-08-30 DIAGNOSIS — K59.03 DRUG-INDUCED CONSTIPATION: ICD-10-CM

## 2022-08-30 DIAGNOSIS — M79.651 PAIN OF RIGHT THIGH: Primary | ICD-10-CM

## 2022-08-30 DIAGNOSIS — S76.311S STRAIN OF RIGHT HAMSTRING MUSCLE, SEQUELA: ICD-10-CM

## 2022-08-30 DIAGNOSIS — F32.5 MAJOR DEPRESSIVE DISORDER IN REMISSION, UNSPECIFIED WHETHER RECURRENT (H): ICD-10-CM

## 2022-08-30 DIAGNOSIS — F51.02 ADJUSTMENT INSOMNIA: ICD-10-CM

## 2022-08-30 PROCEDURE — 99315 NF DSCHRG MGMT 30 MIN/LESS: CPT | Mod: 25 | Performed by: FAMILY MEDICINE

## 2022-08-30 NOTE — PROGRESS NOTES
"[place] GERIATRIC SERVICES DISCHARGE SUMMARY  PATIENT'S NAME: Kylah Britt  YOB: 1950  MEDICAL RECORD NUMBER:  [id number]  Place of Service where encounter took place:  [facility] AND REHAB [facility] (TCU) [[id number]]    PRIMARY CARE PROVIDER AND CLINIC RESPONSIBLE AFTER TRANSFER:   [facility], [address] / [facility] [address]        Transferring providers: Brandon Aviles MD  Recent Hospitalization/ED:  [facility] stay 7/6/22 to 7/8/22.  Date of SNF Admission: July 08, 2022  Date of SNF (anticipated) Discharge: August 30, 2022  Discharged to: previous assisted living      CODE STATUS/ADVANCE DIRECTIVES DISCUSSION:  DNR / DNI   ALLERGIES: No known drug allergies      Brief Summary of Hospital Course:  Hospital stay 7/6/22 through 7/8/22..    * pt lost balance and fell while gardening, landed on her right thigh, resulted in intractable pain, brought to ED and was found to have \"traumatic complete tear of the right hamstring conjoined portion at the ischial tuberosity attachment with distal retraction approximately 4 cm.\" Ortho consulted, recommended non surgical approach.  - * Evaluated by PMR and felt to benefit from TCU placement.       DISCHARGE DIAGNOSIS/NURSING FACILITY COURSE:     - started rehab program, made  a progress. Ambulating more than 100 ft with FWW.   -  Will be moving to next door long-term.   -  analgesia optimal with the current regimen. Will continue. PCP to follow.   - analgesia optimal with the current regimen.   - On Ca and Vit D supplement.   - Constipation, improved. Continue current regiment. Continue to follow.   - Adjustment insomnia: started on trazodone 25 mg, increased to 50 mg and melatonin  6 mg started.   -       Past Medical History:  has a past medical history of Abnormal Papanicolaou smear of cervix and cervical HPV (9/1/92), Depressive disorder, not elsewhere classified, Female stress " "incontinence, Lyme disease, Osteoarthrosis, unspecified whether generalized or localized, unspecified site, and Other psychological or physical stress, not elsewhere classified(V62.89).    She has no past medical history of Complication of anesthesia or Motion sickness.    Discharge Medications:  Current Outpatient Medications   Medication Sig Dispense Refill     acetaminophen (TYLENOL) 500 MG tablet Take 1,000 mg by mouth 3 times daily And 1000mg daily PRN, max 4gm/day       BABY ASPIRIN PO Take 81 mg by mouth daily       Calcium Carbonate (CALCIUM 600 PO) Take 1 tablet by mouth every other day       Cholecalciferol (VITAMIN D3 PO) Take 2,000 Units by mouth every other day       gabapentin (NEURONTIN) 100 MG capsule Take 1 capsule (100 mg) by mouth 3 times daily 21 capsule 0     melatonin 3 MG tablet Take 6 mg by mouth At Bedtime       MULTIPLE VITAMIN CAPS   OR daily  0     naproxen (NAPROSYN) 500 MG tablet Take 1 tablet (500 mg) by mouth 2 times daily (with meals) 10 tablet 0     senna-docusate (SENOKOT-S/PERICOLACE) 8.6-50 MG tablet Take 1 tablet by mouth daily And daily PRN       traZODone (DESYREL) 50 MG tablet Take 50 mg by mouth At Bedtime       trolamine salicylate (ASPERCREME) 10 % external cream Apply topically 4 times daily Apply to R leg         ROS:   4 point ROS including Respiratory, CV, GI and , other than that noted in the HPI,  is negative    Physical Exam:   Vitals: BP (!) 148/78   Pulse 75   Temp 97.8  F (36.6  C)   Resp 19   Ht 1.6 m (5' 3\")   Wt 59.3 kg (130 lb 12.8 oz)   SpO2 98%   BMI 23.17 kg/m    BMI= Body mass index is 23.17 kg/m .   GENERAL APPEARANCE:  in no distress, cooperative  CV:  S1S2 audible, regular HR, no murmur appreciated.   ABDOMEN:  soft, NT/ND, BS audible. no mass appreciated on palpation.   M/S:   no joint deformity noted on observation.   SKIN:  No rash.   NEURO:   No NFD appreciated on observation. Hand  5/5 b/l  PSYCH: affect and mood normal      SNF labs: " Labs done in SNF are in [facility] EPIC. Please refer to them using EPIC/Care Everywhere.    DISCHARGE PLAN:    Follow up labs: none    Medical Follow Up:        Current [facility] scheduled appointments:  Next 5 appointments (look out 90 days)    Sep 06, 2022  3:20 PM  (Arrive by 3:05 PM)  Return Visit with Bishnu Gordon PA-C  [facility] ([facility] ) [address] [place] [id number] [phone]          Discharge Services: none  Discharge Instructions Verbalized to Patient at Discharge:  Yes.       TOTAL DISCHARGE TIME: Less than 30 minutes  Electronically signed by:  Brandon Aviles MD

## 2022-08-30 NOTE — LETTER
"    8/30/2022        RE: Kylah Britt  [address]  [facility] [address]        [place] GERIATRIC SERVICES DISCHARGE SUMMARY  PATIENT'S NAME: Kylah Britt  YOB: 1950  MEDICAL RECORD NUMBER:  [id number]  Place of Service where encounter took place:  [facility] ([facility]) [[id number]]    PRIMARY CARE PROVIDER AND CLINIC RESPONSIBLE AFTER TRANSFER:   [facility], [address] / [facility] [address]        Transferring providers: Brandon Aviles MD  Recent Hospitalization/ED:  [facility] stay 7/6/22 to 7/8/22.  Date of SNF Admission: July 08, 2022  Date of SNF (anticipated) Discharge: August 30, 2022  Discharged to: previous assisted living      CODE STATUS/ADVANCE DIRECTIVES DISCUSSION:  DNR / DNI   ALLERGIES: No known drug allergies      Brief Summary of Hospital Course:  Hospital stay 7/6/22 through 7/8/22..    * pt lost balance and fell while gardening, landed on her right thigh, resulted in intractable pain, brought to ED and was found to have \"traumatic complete tear of the right hamstring conjoined portion at the ischial tuberosity attachment with distal retraction approximately 4 cm.\" Ortho consulted, recommended non surgical approach.  - * Evaluated by PMR and felt to benefit from TCU placement.       DISCHARGE DIAGNOSIS/NURSING FACILITY COURSE:     - started rehab program, made  a progress. Ambulating more than 100 ft with FWW.   -  Will be moving to next door custodial.   -  analgesia optimal with the current regimen. Will continue. PCP to follow.   - analgesia optimal with the current regimen.   - On Ca and Vit D supplement.   - Constipation, improved. Continue current regiment. Continue to follow.   - Adjustment insomnia: started on trazodone 25 mg, increased to 50 mg and melatonin  6 mg started.   -       Past Medical History:  has a past medical history of Abnormal Papanicolaou smear of cervix and " "cervical HPV (9/1/92), Depressive disorder, not elsewhere classified, Female stress incontinence, Lyme disease, Osteoarthrosis, unspecified whether generalized or localized, unspecified site, and Other psychological or physical stress, not elsewhere classified(V62.89).    She has no past medical history of Complication of anesthesia or Motion sickness.    Discharge Medications:  Current Outpatient Medications   Medication Sig Dispense Refill     acetaminophen (TYLENOL) 500 MG tablet Take 1,000 mg by mouth 3 times daily And 1000mg daily PRN, max 4gm/day       BABY ASPIRIN PO Take 81 mg by mouth daily       Calcium Carbonate (CALCIUM 600 PO) Take 1 tablet by mouth every other day       Cholecalciferol (VITAMIN D3 PO) Take 2,000 Units by mouth every other day       gabapentin (NEURONTIN) 100 MG capsule Take 1 capsule (100 mg) by mouth 3 times daily 21 capsule 0     melatonin 3 MG tablet Take 6 mg by mouth At Bedtime       MULTIPLE VITAMIN CAPS   OR daily  0     naproxen (NAPROSYN) 500 MG tablet Take 1 tablet (500 mg) by mouth 2 times daily (with meals) 10 tablet 0     senna-docusate (SENOKOT-S/PERICOLACE) 8.6-50 MG tablet Take 1 tablet by mouth daily And daily PRN       traZODone (DESYREL) 50 MG tablet Take 50 mg by mouth At Bedtime       trolamine salicylate (ASPERCREME) 10 % external cream Apply topically 4 times daily Apply to R leg         ROS:   4 point ROS including Respiratory, CV, GI and , other than that noted in the HPI,  is negative    Physical Exam:   Vitals: BP (!) 148/78   Pulse 75   Temp 97.8  F (36.6  C)   Resp 19   Ht 1.6 m (5' 3\")   Wt 59.3 kg (130 lb 12.8 oz)   SpO2 98%   BMI 23.17 kg/m    BMI= Body mass index is 23.17 kg/m .   GENERAL APPEARANCE:  in no distress, cooperative  CV:  S1S2 audible, regular HR, no murmur appreciated.   ABDOMEN:  soft, NT/ND, BS audible. no mass appreciated on palpation.   M/S:   no joint deformity noted on observation.   SKIN:  No rash.   NEURO:   No NFD " appreciated on observation. Hand  5/5 b/l  PSYCH: affect and mood normal      SNF labs: Labs done in SNF are in [facility] EPIC. Please refer to them using EPIC/Care Everywhere.    DISCHARGE PLAN:    Follow up labs: none    Medical Follow Up:        Current [facility] scheduled appointments:  Next 5 appointments (look out 90 days)    Sep 06, 2022  3:20 PM  (Arrive by 3:05 PM)  Return Visit with Bishnu Gordon PA-C  [facility] ([facility] ) [address] [place] [address] [phone]          Discharge Services: none  Discharge Instructions Verbalized to Patient at Discharge:  Yes.       TOTAL DISCHARGE TIME: Less than 30 minutes  Electronically signed by:  Brandon Aviles MD        Sincerely,        Brandon Aviles MD

## 2022-09-02 ENCOUNTER — PATIENT OUTREACH (OUTPATIENT)
Dept: CARE COORDINATION | Facility: CLINIC | Age: 72
End: 2022-09-02

## 2022-09-02 NOTE — LETTER
[facility] CARE COORDINATION  [address] [address]  Phone: [phone]      September 6, 2022      Kylah Britt  [address]   [facility] [address]    Dear Kylah,    We have been trying to reach you to introduce you to [facility] s Care Coordination program.  The goal of care coordination is to help you manage your health and improve access to the [facility] system in the most efficient manner.  The Care Coordinator is a nurse who understands the healthcare system and will assist you in improving your access to care.     As your Physician and Care Coordinator we partner to help you achieve your health care goals.     We will continue to reach out; however, if you are able to call your Care Coordinator at [phone], that would be appreciated.  We at [facility] are focused on providing you with the highest-quality healthcare experience possible.      It is a pleasure to partner with you as we work towards achieving your optimal state of wellness.        Sincerely,    Mai Sesay RN Care Coordination   [facility]  [place], [place], [place]  Phone: [phone]        Clinic - [facility]  None   [address] / [facility] [address]

## 2022-09-02 NOTE — PROGRESS NOTES
Clinic Care Coordination Contact  [facility]/Voicemail       Clinical Data: Care Coordinator Outreach  Outreach attempted x 1.  Left message on patient's voicemail with call back information and requested return call.  Plan: Care Coordinator will try to reach patient again in 1-2 business days.    Mai Sesay RN Care Coordination   [facility]  Email: [email or web address]  Phone: [phone]

## 2022-09-06 ENCOUNTER — OFFICE VISIT (OUTPATIENT)
Dept: ORTHOPEDICS | Facility: CLINIC | Age: 72
End: 2022-09-06
Payer: COMMERCIAL

## 2022-09-06 VITALS
HEIGHT: 63 IN | WEIGHT: 131.5 LBS | SYSTOLIC BLOOD PRESSURE: 132 MMHG | BODY MASS INDEX: 23.3 KG/M2 | DIASTOLIC BLOOD PRESSURE: 80 MMHG

## 2022-09-06 DIAGNOSIS — S76.319A HAMSTRING TEAR: Primary | ICD-10-CM

## 2022-09-06 PROCEDURE — 99214 OFFICE O/P EST MOD 30 MIN: CPT | Performed by: PHYSICIAN ASSISTANT

## 2022-09-06 RX ORDER — GABAPENTIN 100 MG/1
100 CAPSULE ORAL 3 TIMES DAILY
Qty: 42 CAPSULE | Refills: 1 | Status: SHIPPED | OUTPATIENT
Start: 2022-09-06 | End: 2022-10-16

## 2022-09-06 RX ORDER — NAPROXEN 500 MG/1
500 TABLET ORAL 2 TIMES DAILY WITH MEALS
Qty: 40 TABLET | Refills: 1 | Status: SHIPPED | OUTPATIENT
Start: 2022-09-06 | End: 2022-11-02

## 2022-09-06 ASSESSMENT — PAIN SCALES - GENERAL: PAINLEVEL: EXTREME PAIN (8)

## 2022-09-06 NOTE — LETTER
9/6/2022         RE: Kylah Britt  [address]  [facility] [address]        Dear Colleague,    Thank you for referring your patient, Kylah Britt, to the [facility]. Please see a copy of my visit note below.    Office Visit-Follow up    Chief Complaint: Kylah Britt is a 71 year old female who is being seen for   Chief Complaint   Patient presents with     RECHECK     right complete hamstring tear with 4 cm of retraction- DOI: 7/5/2022       History of Present Illness:   Mechanism of Injury: Working in the garden and fell  Location: Right hamstring  Duration of Pain: Since date of injury 7/5/2022  Rating of Pain: 8 out of 10  Pain Quality: Achy  Pain is better with: Physical therapy and gabapentin and naproxen  Pain is worse with: Heavy use of the right leg  Treatment so far consists of: Patient was last seen by myself on 7/25/2022 and we went through the MRI.  At this time we are treating nonoperatively.  Patient was weaning down on oxycodone and trazodone in the p.m.  She was weaning out of knee immobilizer with physical therapy guidance.  Outpatient physical therapy was ordered and it was the plan to follow-up in 6 weeks for recheck of range of motion and strength.  Patient is out of the knee immobilizer now.  Patient transferred from [place] and was discharged on last Tuesday and now she has moved into [facility].  She feels the pain is getting slightly better and she is moving better.  Associated Features: Patient denies any shooting burning or electric pain or numbness or tingling  Pain is Limiting: Heavy use of the right leg  Here to: Orthopedic follow-up  Additional History: Patient is here with her daughter Nicholas.  Patient is now living at [facility] but unsure if they are going to use [facility] nurse practitioner as their main primary care provider or stay with the 1 may have here.    REVIEW OF SYSTEMS  General: negative for, night sweats, dizziness,  "fatigue  Resp: No shortness of breath and no cough  CV: negative for chest pain, syncope or near-syncope  GI: negative for nausea, vomiting and diarrhea  : negative for dysuria and hematuria  Musculoskeletal: as above  Neurologic: negative for syncope   Hematologic: negative for bleeding disorder    Physical Exam:  Vitals: /80   Ht 1.6 m (5' 3\")   Wt 59.6 kg (131 lb 8 oz)   BMI 23.29 kg/m    BMI= Body mass index is 23.29 kg/m .  Constitutional: healthy, alert and no acute distress   Psychiatric: mentation appears normal and affect normal/bright  NEURO: no focal deficits, CMS intact right lower extremity   RESP: Normal with easy respirations and no use of accessory muscles to breathe, no audible wheezing or retractions  CV: Calf soft and nontender to palpation, leg warm   SKIN: No erythema, rashes, excoriation, or breakdown. No evidence of infection.   MUSCULOSKELETAL:    INSPECTION of right leg: No gross deformities, erythema, edema, ecchymosis, atrophy or fasciculations.     PALPATION: no tenderness over the lateral hip and greater trochanteric region, thigh or lower leg.  Slight tenderness to palpation at the proximal posterior thigh at about mid thigh.  No tenderness to palpation of the posterior knee.    ROM: Extension full, flexion to 125 . All range of motion without catching, locking or pain.  Passive: Flexion to 120 degrees, internal rotation to 35 degrees, external rotation to 80 degrees.  All range of motion without catching locking or pain.       STRENGTH: 5 out of 5 hip flexion, quad and hamstring.  Patient did have some pain with hamstring strength testing.    SPECIAL TEST: None.  GAIT: non-antalgic  Lymph: no palpable lymph nodes      Diagnostic Modalities:  No radiographs necessary today.      Impression: 1.  2 months and 1 day status post right complete hamstring tear with 4 cm of retraction.    Plan:  All of the above pertinent physical exam and imaging modalities findings was reviewed " with Kylah and her daughter Nicholas.    FOCUSED PLAN:   Discussed patient's progress with patient and daughter Nicholas.  We did discuss about since this is a ligament and will take a long time to heal but will scar down eventually.  We did a prescription for gabapentin and naproxen for her to take to [facility], going forward this could be prescribed by the provider there.  We did a physical therapy order that was printed out for [facility].  Encouraging range of motion and strengthening and gait training 2 times a week or more for 6 weeks.  Patient can follow-up in 6 weeks if needed but if she continues to get better she can follow-up on an as-needed basis.    Re-x-ray on return: No      This note was dictated with [employer].    Bishnu Gordon PA-C                 Again, thank you for allowing me to participate in the care of your patient.        Sincerely,        Bishnu Gordon PA-C

## 2022-09-06 NOTE — PROGRESS NOTES
Clinic Care Coordination Contact  [facility]/Voicemail       Clinical Data: Care Coordinator Outreach  Outreach attempted x 2.  Unable to leave message as no voicemail picked up.   Plan: Care Coordinator will send unable to contact letter with care coordinator contact information via BTR. Care Coordinator will do no further outreaches at this time.    Mai Sesay, RN Care Coordination   [facility]  Email: [email or web address]  Phone: [phone]

## 2022-09-06 NOTE — PROGRESS NOTES
Office Visit-Follow up    Chief Complaint: Kylah Britt is a 71 year old female who is being seen for   Chief Complaint   Patient presents with     RECHECK     right complete hamstring tear with 4 cm of retraction- DOI: 7/5/2022       History of Present Illness:   Mechanism of Injury: Working in the garden and fell  Location: Right hamstring  Duration of Pain: Since date of injury 7/5/2022  Rating of Pain: 8 out of 10  Pain Quality: Achy  Pain is better with: Physical therapy and gabapentin and naproxen  Pain is worse with: Heavy use of the right leg  Treatment so far consists of: Patient was last seen by myself on 7/25/2022 and we went through the MRI.  At this time we are treating nonoperatively.  Patient was weaning down on oxycodone and trazodone in the p.m.  She was weaning out of knee immobilizer with physical therapy guidance.  Outpatient physical therapy was ordered and it was the plan to follow-up in 6 weeks for recheck of range of motion and strength.  Patient is out of the knee immobilizer now.  Patient transferred from [place] and was discharged on last Tuesday and now she has moved into [facility].  She feels the pain is getting slightly better and she is moving better.  Associated Features: Patient denies any shooting burning or electric pain or numbness or tingling  Pain is Limiting: Heavy use of the right leg  Here to: Orthopedic follow-up  Additional History: Patient is here with her daughter Nicholas.  Patient is now living at [facility] but unsure if they are going to use [facility] nurse practitioner as their main primary care provider or stay with the 1 may have here.    REVIEW OF SYSTEMS  General: negative for, night sweats, dizziness, fatigue  Resp: No shortness of breath and no cough  CV: negative for chest pain, syncope or near-syncope  GI: negative for nausea, vomiting and diarrhea  : negative for dysuria and hematuria  Musculoskeletal: as above  Neurologic: negative for syncope  "  Hematologic: negative for bleeding disorder    Physical Exam:  Vitals: /80   Ht 1.6 m (5' 3\")   Wt 59.6 kg (131 lb 8 oz)   BMI 23.29 kg/m    BMI= Body mass index is 23.29 kg/m .  Constitutional: healthy, alert and no acute distress   Psychiatric: mentation appears normal and affect normal/bright  NEURO: no focal deficits, CMS intact right lower extremity   RESP: Normal with easy respirations and no use of accessory muscles to breathe, no audible wheezing or retractions  CV: Calf soft and nontender to palpation, leg warm   SKIN: No erythema, rashes, excoriation, or breakdown. No evidence of infection.   MUSCULOSKELETAL:    INSPECTION of right leg: No gross deformities, erythema, edema, ecchymosis, atrophy or fasciculations.     PALPATION: no tenderness over the lateral hip and greater trochanteric region, thigh or lower leg.  Slight tenderness to palpation at the proximal posterior thigh at about mid thigh.  No tenderness to palpation of the posterior knee.    ROM: Extension full, flexion to 125 . All range of motion without catching, locking or pain.  Passive: Flexion to 120 degrees, internal rotation to 35 degrees, external rotation to 80 degrees.  All range of motion without catching locking or pain.       STRENGTH: 5 out of 5 hip flexion, quad and hamstring.  Patient did have some pain with hamstring strength testing.    SPECIAL TEST: None.  GAIT: non-antalgic  Lymph: no palpable lymph nodes      Diagnostic Modalities:  No radiographs necessary today.      Impression: 1.  2 months and 1 day status post right complete hamstring tear with 4 cm of retraction.    Plan:  All of the above pertinent physical exam and imaging modalities findings was reviewed with Kylah and her daughter Nicholas.    FOCUSED PLAN:   Discussed patient's progress with patient and daughter Nicholas.  We did discuss about since this is a ligament and will take a long time to heal but will scar down eventually.  We did a prescription for " gabapentin and naproxen for her to take to [facility], going forward this could be prescribed by the provider there.  We did a physical therapy order that was printed out for [facility].  Encouraging range of motion and strengthening and gait training 2 times a week or more for 6 weeks.  Patient can follow-up in 6 weeks if needed but if she continues to get better she can follow-up on an as-needed basis.    Re-x-ray on return: No      This note was dictated with [employer].    Bishnu Gordon PA-C

## 2022-09-06 NOTE — PATIENT INSTRUCTIONS
Encounter Diagnosis   Name Primary?    Hamstring tear Yes     Rest, ice and elevate above heart level as needed for pain control  1.  Exam: I do believe her range of motion and strength are excellent for the injury.  2.  Therapy: I would like her to do at least 2 times a week physical therapy.  It sounds like she can do this at [facility].  If they can do it more frequently that would be great.  Range of motion and strengthening and possible ultrasound and gait training would be great.  2 times a week x6 weeks at least and if more that would be great.  3.  Medication: We did a prescription of printed out of gabapentin as well as naproxen.  These prescriptions can be followed by the provider at [facility] if she does not follow-up with me.  4.  Follow-up: In 6 weeks or as needed if the hamstring is getting better.  [facility] may offer reliable information regarding your diagnosis and treatment plan.    THANK YOU for coming in today. If you receive a survey via [employer] or mail please let us know if there was anything you especially appreciated today or if there is any way we can improve our clinic. We appreciate your input.    GENERAL INFORMATION:  Our hours are:  (Pending)    [facility] for any issues or concerns: [phone]      We are not in the office Thursdays. Therefore non- urgent calls and medical messages received on Thursday will be addressed when we are back in the office on Wednesday. Urgent matters will be reviewed and addressed by one of our partners in the office as needed.    If lab work was done today as part of your evaluation you will generally be contacted via Dejamor, mail, or phone with the results within 1-5 days. If there is an alarming result we will contact you by phone. Lab results come back at varying times, I generally wait until all labs are resulted before making comments on results. Please note labs are automatically released to [employer]  (if you have signed up for it) once available-at times you may see these prior to my having a chance to review them as well.    If you need refills please contact your pharmacist. They will send a refill request to me to review. Please allow 3 business days for us to process all refill requests. All narcotic refills should be handled in the clinic at the time of your visit.

## 2022-09-27 ENCOUNTER — ASSISTED LIVING VISIT (OUTPATIENT)
Dept: GERIATRICS | Facility: CLINIC | Age: 72
End: 2022-09-27
Payer: COMMERCIAL

## 2022-09-27 VITALS
TEMPERATURE: 97.5 F | SYSTOLIC BLOOD PRESSURE: 130 MMHG | BODY MASS INDEX: 22.89 KG/M2 | OXYGEN SATURATION: 97 % | DIASTOLIC BLOOD PRESSURE: 71 MMHG | WEIGHT: 129.2 LBS | RESPIRATION RATE: 18 BRPM | HEART RATE: 72 BPM

## 2022-09-27 DIAGNOSIS — F51.02 ADJUSTMENT INSOMNIA: ICD-10-CM

## 2022-09-27 DIAGNOSIS — M85.851 OSTEOPENIA OF NECKS OF BOTH FEMURS: ICD-10-CM

## 2022-09-27 DIAGNOSIS — K59.03 DRUG-INDUCED CONSTIPATION: ICD-10-CM

## 2022-09-27 DIAGNOSIS — S76.311S STRAIN OF RIGHT HAMSTRING MUSCLE, SEQUELA: ICD-10-CM

## 2022-09-27 DIAGNOSIS — F32.5 MAJOR DEPRESSIVE DISORDER IN REMISSION, UNSPECIFIED WHETHER RECURRENT (H): ICD-10-CM

## 2022-09-27 DIAGNOSIS — M85.852 OSTEOPENIA OF NECKS OF BOTH FEMURS: ICD-10-CM

## 2022-09-27 DIAGNOSIS — M79.661 PAIN OF RIGHT LOWER LEG: Primary | ICD-10-CM

## 2022-09-27 NOTE — PROGRESS NOTES
[facility] GERIATRICS  INITIAL VISIT NOTE  September 27, 2022      PRIMARY CARE PROVIDER AND CLINIC:  Gina Mejia [address] [address]    [facility] Medical Record Number:  [id number]  Place of Service where encounter took place:  [facility] [[id number]]    Chief Complaint   Patient presents with     Establish Care       HPI:    Kylah Britt is a 71 year old  (1950) female was admitted to the above facility from  [facility]. Hospital stay 7/6/22 through 7/8/22 where they were admitted for right hamstring tear.  Now admitted to this facility for  medical management and nursing care.  Is doing exercises here as well.  Therapies is most likely figuring out her insurance.        History obtained from: facility chart records, facility staff, patient report and [facility] chart review.      Brief Hospital Course: after her hospital stay she went to the TCU at [facility] until admission at [facility] on 9/2/22.  Non-operative approach was done for the hamstring with a knee brace, walker or crutches, and WBAT.  Pain managed with Tylenol, Naproxen, Gabapentin and oxycodone.  Currently off the oxycodone.      Currently:  Met Anamaria today in her new apartment [place].  She has a nice deck off her living area and states that she uses that as a track to ambulate as her goal is to get rid of the limp she is experiencing.  Has 4WW that she had pushed into her laundry room when this NP and NP student came to visit.      Anamaria stated that she was gardening and suffered the Hamstring tear.  She mentioned she has two houses.  One outside of [place] and the other in [place]. She is a .  Her family helps her out.  Spoke with Anamaria's daughter as well as reason for being here is that she has had a few falls in a certain time frame and needs more assistance and visually to be seen.      Anamaria states she  has discomfort up and down her right lower leg. Asked her how she sleeps and not the best.  Has a PRN Trazodone order and do see it has been used.  She is willing to have the Melatonin increased to 9mg at HS.    Otherwise no stomach concerns.  No chest pain, nor SOB.  So far she is enjoying [facility].  She knows her daughter works close by.  She has used the exercise bike down in the therapy room as well.        CODE STATUS/ADVANCE DIRECTIVES: DNR / DNI    ALLERGIES:  Allergies   Allergen Reactions     No Known Drug Allergies        PAST MEDICAL HISTORY:   Past Medical History:   Diagnosis Date     Abnormal Papanicolaou smear of cervix and cervical HPV 9/1/92    vaginitis with abnormal Pap     Depressive disorder, not elsewhere classified     depression     Female stress incontinence     urinary incontinence     Lyme disease      Osteoarthrosis, unspecified whether generalized or localized, unspecified site      monoarticular arthritis in the right first MTP joint     Other psychological or physical stress, not elsewhere classified(V62.89)     delayed stress syndrome       PAST SURGICAL HISTORY:   Past Surgical History:   Procedure Laterality Date     COLONOSCOPY N/A 4/6/2022    Procedure: COLONOSCOPY, WITH POLYPECTOMY;  Surgeon: Bishnu Tsai MD;  Location:  GI      KNEE SCOPE, DIAGNOSTIC  1/31/89    Examination of right knee under anesthesia. Arthroscopy of right knee with debridement of torn anterior curciate ligament (medial meniscus not resected).      REMOVAL OF OVARY/TUBE(S)  2/20/90    Salpingo-Oophorectomy, bilateral     OPEN REDUCTION INTERNAL FIXATION CALCANEOUS Right 6/8/2017    Procedure: OPEN REDUCTION INTERNAL FIXATION CALCANEOUS;  Open Reduction Internal Fixation Right Calcaneous;  Surgeon: Darnell Kincaid DPM;  Location:  OR     [facility] APPENDECTOMY       Z LIGATE FALLOPIAN TUBE,POSTPARTUM  04/12/77    Bilateral tubal cauterization     Z TOTAL ABDOM HYSTERECTOMY  2/20/90     Hysterectomy, Total Abdominal     ZZHC COLONOSCOPY THRU STOMA, DIAGNOSTIC  06/24/98       FAMILY HISTORY:   Family History   Problem Relation Age of Onset     Hypertension Mother      Heart Disease Mother      Cancer Father      Cancer Son         Hodgkin's     Cancer Paternal Aunt         two with cervical cancer       SOCIAL HISTORY:   Patient's living condition: lived alone and now in new assisted living facility.    MEDICATIONS  Post Discharge Medication Reconciliation Status: discharge medications reconciled and changed, per note/orders.  Current Outpatient Medications   Medication Sig Dispense Refill     melatonin 3 MG tablet Take 3 tablets (9 mg) by mouth At Bedtime       acetaminophen (TYLENOL) 500 MG tablet Take 1,000 mg by mouth 3 times daily And 1000mg daily PRN, max 4gm/day       BABY ASPIRIN PO Take 81 mg by mouth daily       Calcium Carbonate (CALCIUM 600 PO) Take 1 tablet by mouth every other day       Cholecalciferol (VITAMIN D3 PO) Take 2,000 Units by mouth every other day       gabapentin (NEURONTIN) 100 MG capsule Take 1 capsule (100 mg) by mouth 3 times daily 42 capsule 1     naproxen (NAPROSYN) 500 MG tablet Take 1 tablet (500 mg) by mouth 2 times daily (with meals) 40 tablet 1     senna-docusate (SENOKOT-S/PERICOLACE) 8.6-50 MG tablet Take 1 tablet by mouth daily And daily PRN       traZODone (DESYREL) 50 MG tablet Take 50 mg by mouth At Bedtime       trolamine salicylate (ASPERCREME) 10 % external cream Apply topically 4 times daily Apply to R leg         ROS:  10 point ROS neg other than the symptoms noted above in the HPI.  In no acute distress.    PHYSICAL EXAM:  /71   Pulse 72   Temp 97.5  F (36.4  C)   Resp 18   Wt 58.6 kg (129 lb 3.2 oz)   SpO2 97%   BMI 22.89 kg/m    Physical Exam  Constitutional:       Appearance: Normal appearance. She is normal weight.   HENT:      Head: Normocephalic.      Nose: Nose normal.      Mouth/Throat:      Mouth: Mucous membranes are moist.       Pharynx: Oropharynx is clear.   Eyes:      Extraocular Movements: Extraocular movements intact.      Conjunctiva/sclera: Conjunctivae normal.      Pupils: Pupils are equal, round, and reactive to light.   Cardiovascular:      Rate and Rhythm: Normal rate and regular rhythm.      Heart sounds: Normal heart sounds.   Pulmonary:      Effort: Pulmonary effort is normal.      Breath sounds: Normal breath sounds.   Abdominal:      General: Abdomen is flat. Bowel sounds are normal.      Palpations: Abdomen is soft.   Genitourinary:     Comments: Continent of bowel and bladder as she takes herself  Musculoskeletal:      Cervical back: Normal range of motion.      Comments: Walking in apartment without device but has the 4WW when she leaves out into facility.  Noted a slight limp favoring right leg.  Otherwise other joints are not enlarged and good ROM     Skin:     General: Skin is warm and dry.   Neurological:      General: No focal deficit present.      Mental Status: She is alert and oriented to person, place, and time.   Psychiatric:         Mood and Affect: Mood normal.      Comments: Family states they do not believe some of her stories and so suspect memory deficits most likely occuring          LABORATORY/IMAGING DATA:  Reviewed as per [facility] and/or [facility]    ASSESSMENT/PLAN:  (M79.661) Pain of right lower leg  (primary encounter diagnosis)  (S76.311S) Strain of right hamstring muscle, sequela  Comment: remains on Tylenol ES 1000mg TID and daily prn, Aspercreme in which she uses in her apartment at bedside, Gabapentin 100mg TID, and Naproxen 500mg po BID.  Will need to monitor stomach with the Naproxen.    Does take ASA 81mg daily for DVT prophylaxis.      (M85.851,  M85.852) Osteopenia of necks of both femurs-per DEXA March 2022  Comment: currently taking Calcium 600mg every other day and Vitamin D3 2000 units daily.  The Calcium also has a GERD dx attached to it for the facility.    (F51.02) Adjustment  insomnia  Comment: not sleeping the best and using the PRN Trazodone.  Will increase the melatonin to 9mg po at HS.  Will see if this helps.  Plan: melatonin 3 MG tablet    (F32.5) Major depressive disorder in remission, unspecified whether recurrent (H)  Comment: no scheduled antidepressant.  Mood seems upbeat.  Is getting the PRN trazodone and so that may be helping as well.  Going through a transition now of somewhere new to live and so expect her to have some rough spots as family navigates with her two homes and her being safe.      (K59.03) Drug-induced constipation  Comment: denies issues with constipation.  Has a PRN order for Senna-S if needed.  She should let staff know if any issues.      Orders:   1.  Increase the Melatonin to 9mg po at HS for adjustment insomnia.    Total time with a new patient visit in the assisted livin minutes including discussions about the POC and care coordination with the patient, family, daughter and staff. Greater than 50% of total time spent with counseling and coordinating care due to gathering history, plan of care going forward and explanation of how the NP works out here in the building.    Electronically signed by:  ESPERANZA Fuentes CNP

## 2022-09-27 NOTE — LETTER
9/27/2022        RE: Kylah Britt  [address]  [facility] [address]        M [facility] GERIATRICS  INITIAL VISIT NOTE  September 27, 2022      PRIMARY CARE PROVIDER AND CLINIC:  Gina Mejia [address] [facility] / [place] [address]    M [facility] Medical Record Number:  [id number]  Place of Service where encounter took place:  [facility] [[id number]]    Chief Complaint   Patient presents with     Establish Care       HPI:    Kylah Britt is a 71 year old  (1950) female was admitted to the above facility from  [facility] TCU. Hospital stay 7/6/22 through 7/8/22 where they were admitted for right hamstring tear.  Now admitted to this facility for  medical management and nursing care.  Is doing exercises here as well.  Therapies is most likely figuring out her insurance.        History obtained from: facility chart records, facility staff, patient report and [facility] chart review.      Brief Hospital Course: after her hospital stay she went to the TCU at [facility] until admission at [facility] on 9/2/22.  Non-operative approach was done for the hamstring with a knee brace, walker or crutches, and WBAT.  Pain managed with Tylenol, Naproxen, Gabapentin and oxycodone.  Currently off the oxycodone.      Currently:  Met Anamaria today in [place] new apartment [place].  She has a nice deck off her living area and states that she uses that as a track to ambulate as her goal is to get rid of the limp she is experiencing.  Has 4WW that she had pushed into her laundry room when this NP and NP student came to visit.      Anamaria stated that she was gardening and suffered the Hamstring tear.  She mentioned she has two houses.  One outside of [place] and the other in [place]. She is a .  Her family helps her out.  Spoke with Anamaria's daughter as well as reason for being here is that she has had a few falls in a certain  time frame and needs more assistance and visually to be seen.      Anamaria states she has discomfort up and down her right lower leg. Asked her how she sleeps and not the best.  Has a PRN Trazodone order and do see it has been used.  She is willing to have the Melatonin increased to 9mg at HS.    Otherwise no stomach concerns.  No chest pain, nor SOB.  So far she is enjoying [employer].  She knows her daughter works close by.  She has used the exercise bike down in the therapy room as well.        CODE STATUS/ADVANCE DIRECTIVES: DNR / DNI    ALLERGIES:  Allergies   Allergen Reactions     No Known Drug Allergies        PAST MEDICAL HISTORY:   Past Medical History:   Diagnosis Date     Abnormal Papanicolaou smear of cervix and cervical HPV 9/1/92    vaginitis with abnormal Pap     Depressive disorder, not elsewhere classified     depression     Female stress incontinence     urinary incontinence     Lyme disease      Osteoarthrosis, unspecified whether generalized or localized, unspecified site      monoarticular arthritis in the right first MTP joint     Other psychological or physical stress, not elsewhere classified(V62.89)     delayed stress syndrome       PAST SURGICAL HISTORY:   Past Surgical History:   Procedure Laterality Date     COLONOSCOPY N/A 4/6/2022    Procedure: COLONOSCOPY, WITH POLYPECTOMY;  Surgeon: Bishnu Tsai MD;  Location:  GI      KNEE SCOPE, DIAGNOSTIC  1/31/89    Examination of right knee under anesthesia. Arthroscopy of right knee with debridement of torn anterior curciate ligament (medial meniscus not resected).      REMOVAL OF OVARY/TUBE(S)  2/20/90    Salpingo-Oophorectomy, bilateral     OPEN REDUCTION INTERNAL FIXATION CALCANEOUS Right 6/8/2017    Procedure: OPEN REDUCTION INTERNAL FIXATION CALCANEOUS;  Open Reduction Internal Fixation Right Calcaneous;  Surgeon: Darnell Kincaid DPM;  Location:  OR     [facility] APPENDECTOMY       ZZC LIGATE FALLOPIAN TUBE,POSTPARTUM   04/12/77    Bilateral tubal cauterization     ZZC TOTAL ABDOM HYSTERECTOMY  2/20/90    Hysterectomy, Total Abdominal     ZZHC COLONOSCOPY THRU STOMA, DIAGNOSTIC  06/24/98       FAMILY HISTORY:   Family History   Problem Relation Age of Onset     Hypertension Mother      Heart Disease Mother      Cancer Father      Cancer Son         Hodgkin's     Cancer Paternal Aunt         two with cervical cancer       SOCIAL HISTORY:   Patient's living condition: lived alone and now in new assisted living facility.    MEDICATIONS  Post Discharge Medication Reconciliation Status: discharge medications reconciled and changed, per note/orders.  Current Outpatient Medications   Medication Sig Dispense Refill     melatonin 3 MG tablet Take 3 tablets (9 mg) by mouth At Bedtime       acetaminophen (TYLENOL) 500 MG tablet Take 1,000 mg by mouth 3 times daily And 1000mg daily PRN, max 4gm/day       BABY ASPIRIN PO Take 81 mg by mouth daily       Calcium Carbonate (CALCIUM 600 PO) Take 1 tablet by mouth every other day       Cholecalciferol (VITAMIN D3 PO) Take 2,000 Units by mouth every other day       gabapentin (NEURONTIN) 100 MG capsule Take 1 capsule (100 mg) by mouth 3 times daily 42 capsule 1     naproxen (NAPROSYN) 500 MG tablet Take 1 tablet (500 mg) by mouth 2 times daily (with meals) 40 tablet 1     senna-docusate (SENOKOT-S/PERICOLACE) 8.6-50 MG tablet Take 1 tablet by mouth daily And daily PRN       traZODone (DESYREL) 50 MG tablet Take 50 mg by mouth At Bedtime       trolamine salicylate (ASPERCREME) 10 % external cream Apply topically 4 times daily Apply to R leg         ROS:  10 point ROS neg other than the symptoms noted above in the HPI.  In no acute distress.    PHYSICAL EXAM:  /71   Pulse 72   Temp 97.5  F (36.4  C)   Resp 18   Wt 58.6 kg (129 lb 3.2 oz)   SpO2 97%   BMI 22.89 kg/m    Physical Exam  Constitutional:       Appearance: Normal appearance. She is normal weight.   HENT:      Head: Normocephalic.       Nose: Nose normal.      Mouth/Throat:      Mouth: Mucous membranes are moist.      Pharynx: Oropharynx is clear.   Eyes:      Extraocular Movements: Extraocular movements intact.      Conjunctiva/sclera: Conjunctivae normal.      Pupils: Pupils are equal, round, and reactive to light.   Cardiovascular:      Rate and Rhythm: Normal rate and regular rhythm.      Heart sounds: Normal heart sounds.   Pulmonary:      Effort: Pulmonary effort is normal.      Breath sounds: Normal breath sounds.   Abdominal:      General: Abdomen is flat. Bowel sounds are normal.      Palpations: Abdomen is soft.   Genitourinary:     Comments: Continent of bowel and bladder as she takes herself  Musculoskeletal:      Cervical back: Normal range of motion.      Comments: Walking in apartment without device but has the 4WW when she leaves out into facility.  Noted a slight limp favoring right leg.  Otherwise other joints are not enlarged and good ROM     Skin:     General: Skin is warm and dry.   Neurological:      General: No focal deficit present.      Mental Status: She is alert and oriented to person, place, and time.   Psychiatric:         Mood and Affect: Mood normal.      Comments: Family states they do not believe some of her stories and so suspect memory deficits most likely occuring          LABORATORY/IMAGING DATA:  [facility]    ASSESSMENT/PLAN:  (M79.661) Pain of right lower leg  (primary encounter diagnosis)  (S76.311S) Strain of right hamstring muscle, sequela  Comment: remains on Tylenol ES 1000mg TID and daily prn, Aspercreme in which she uses in her apartment at bedside, Gabapentin 100mg TID, and Naproxen 500mg po BID.  Will need to monitor stomach with the Naproxen.    Does take ASA 81mg daily for DVT prophylaxis.      (M85.851,  M85.852) Osteopenia of necks of both femurs-per DEXA March 2022  Comment: currently taking Calcium 600mg every other day and Vitamin D3 2000 units daily.  The  Calcium also has a GERD dx attached to it for the facility.    (F51.02) Adjustment insomnia  Comment: not sleeping the best and using the PRN Trazodone.  Will increase the melatonin to 9mg po at HS.  Will see if this helps.  Plan: melatonin 3 MG tablet    (F32.5) Major depressive disorder in remission, unspecified whether recurrent (H)  Comment: no scheduled antidepressant.  Mood seems upbeat.  Is getting the PRN trazodone and so that may be helping as well.  Going through a transition now of somewhere new to live and so expect her to have some rough spots as family navigates with her two homes and her being safe.      (K59.03) Drug-induced constipation  Comment: denies issues with constipation.  Has a PRN order for Senna-S if needed.  She should let staff know if any issues.      Orders:   1.  Increase the Melatonin to 9mg po at HS for adjustment insomnia.    Total time with a new patient visit in the assisted livin minutes including discussions about the POC and care coordination with the patient, family, daughter and staff. Greater than 50% of total time spent with counseling and coordinating care due to gathering history, plan of care going forward and explanation of how the NP works out here in the building.    Electronically signed by:  ESPERANZA Fuentes CNP                   Sincerely,        ESPERANZA Fuentes CNP

## 2022-09-28 ENCOUNTER — TRANSFERRED RECORDS (OUTPATIENT)
Dept: HEALTH INFORMATION MANAGEMENT | Facility: CLINIC | Age: 72
End: 2022-09-28

## 2022-09-30 RX ORDER — LANOLIN ALCOHOL/MO/W.PET/CERES
9 CREAM (GRAM) TOPICAL AT BEDTIME
Start: 2022-09-30

## 2022-10-04 ENCOUNTER — MEDICAL CORRESPONDENCE (OUTPATIENT)
Dept: HEALTH INFORMATION MANAGEMENT | Facility: CLINIC | Age: 72
End: 2022-10-04

## 2022-10-07 DIAGNOSIS — R52 PAIN: Primary | ICD-10-CM

## 2022-10-07 RX ORDER — TROLAMINE SALICYLATE 10 G/100G
CREAM TOPICAL 3 TIMES DAILY
Qty: 85 G | Refills: 3 | Status: SHIPPED | OUTPATIENT
Start: 2022-10-07 | End: 2023-01-02

## 2022-10-09 ENCOUNTER — HEALTH MAINTENANCE LETTER (OUTPATIENT)
Age: 72
End: 2022-10-09

## 2022-10-10 ENCOUNTER — MEDICAL CORRESPONDENCE (OUTPATIENT)
Dept: HEALTH INFORMATION MANAGEMENT | Facility: CLINIC | Age: 72
End: 2022-10-10

## 2022-10-10 ENCOUNTER — TRANSFERRED RECORDS (OUTPATIENT)
Dept: HEALTH INFORMATION MANAGEMENT | Facility: CLINIC | Age: 72
End: 2022-10-10

## 2022-10-12 ENCOUNTER — ASSISTED LIVING VISIT (OUTPATIENT)
Dept: GERIATRICS | Facility: CLINIC | Age: 72
End: 2022-10-12
Payer: COMMERCIAL

## 2022-10-12 VITALS
SYSTOLIC BLOOD PRESSURE: 127 MMHG | WEIGHT: 140 LBS | BODY MASS INDEX: 24.8 KG/M2 | RESPIRATION RATE: 19 BRPM | HEART RATE: 65 BPM | DIASTOLIC BLOOD PRESSURE: 77 MMHG | OXYGEN SATURATION: 97 % | TEMPERATURE: 98 F

## 2022-10-12 DIAGNOSIS — F51.02 ADJUSTMENT INSOMNIA: ICD-10-CM

## 2022-10-12 DIAGNOSIS — M79.651 PAIN OF RIGHT THIGH: Primary | ICD-10-CM

## 2022-10-12 DIAGNOSIS — M79.661 PAIN OF RIGHT LOWER LEG: Primary | ICD-10-CM

## 2022-10-12 DIAGNOSIS — S76.319A HAMSTRING TEAR: ICD-10-CM

## 2022-10-12 RX ORDER — ACETAMINOPHEN 500 MG
TABLET ORAL
Qty: 186 TABLET | Refills: 11 | Status: SHIPPED | OUTPATIENT
Start: 2022-10-12 | End: 2022-12-06

## 2022-10-12 NOTE — PROGRESS NOTES
[facility] GERIATRICS  ACUTE/EPISODIC VISIT    [facility] Medical Record Number:  [id number]  Place of Service where encounter took place:  [facility] [[id number]]    Chief Complaint   Patient presents with     RECHECK       HPI:    Kylah Britt is a 72 year old  (1950), who is being seen today for an episodic care visit.  HPI information obtained from: patient report, [place] Epic chart review and PT report of gait.    Today's concern is:    Diagnoses       Codes Comments    Pain of right lower leg    -  Primary M79.661     Strain of right hamstring muscle, sequela     S76.311S     Adjustment insomnia     F51.02     Hamstring tear     S76.319A         Came to see Anamaria today as nursing gave a note stating that she does not want to take all of her gabapentin.    This nurse practitioner happened to be sitting in the dining room with the NP student looking over paperwork and turned to look at the lady sitting at the table by herself and asked if that was Anamaria.  This NP is only met Anamaria 1 time and thought this person's care looked like Anamaria's hair.  She confirmed it was her and so did talk to her at the table as no one was around.    Right away she brought up the fact that she wanted to trim down her gabapentin dose to just to receive it at bedtime.  Currently she is on 100 mg 3 times a day.  She did mention that at night she does have the discomfort in that left leg and so spoke to her about having the bedtime dose at 200 mg to see if that will help her sleep better.  She knew that she was on melatonin 9 mg and will keep that the same.  Anamaria brought up about the Aspercreme and sounded like she wanted it more frequent and so she spoke about doing that 3 times a day, only to see that it already is scheduled 3 times a day.    Physical therapy Spoke with this nurse practitioner today as hearing a murmur that the family was going to reach out to this NP and talk  about doing some scans possibly because of her abnormal muscle movements.  The physical therapist was physically showing this nurse practitioner some of the movements that will occur.  She has some control if you asked to lift her arm in the air but then all of a sudden there is no control it goes off in a different direction.  Same with her legs.  So today when she left the dining room area, watched her walk with her 4WW and saw a little bit of un-coordination with the left leg.      ALLERGIES:    Allergies   Allergen Reactions     No Known Drug Allergies         MEDICATIONS:  Post Discharge Medication Reconciliation Status: patient was not discharged from an inpatient facility or TCU. Medications reconciled.    Current Outpatient Medications   Medication Sig Dispense Refill     gabapentin (NEURONTIN) 100 MG capsule Take 2 capsules (200 mg) by mouth At Bedtime 42 capsule 1     acetaminophen (TYLENOL) 500 MG tablet Take 2 tablets (1,000 mg) by mouth 3 times daily. May also take 2 tablets (1,000 mg) daily as needed for mild pain. 186 tablet 11     BABY ASPIRIN PO Take 81 mg by mouth daily       Calcium Carbonate (CALCIUM 600 PO) Take 1 tablet by mouth every other day       Cholecalciferol (VITAMIN D3 PO) Take 2,000 Units by mouth every other day       melatonin 3 MG tablet Take 3 tablets (9 mg) by mouth At Bedtime       naproxen (NAPROSYN) 500 MG tablet Take 1 tablet (500 mg) by mouth 2 times daily (with meals) 40 tablet 1     senna-docusate (SENOKOT-S/PERICOLACE) 8.6-50 MG tablet Take 1 tablet by mouth daily And daily PRN       traZODone (DESYREL) 50 MG tablet Take 50 mg by mouth At Bedtime       trolamine salicylate (ASPERCREME) 10 % external cream Apply topically 3 times daily APPLY TOPICALLY BEHIND RIGHT KNEE TO HIP THREE TIMES DAILY. OK TO LEAVE AT BEDSIDE. DX PAIN. 85 g 3         REVIEW OF SYSTEMS:  4 point ROS neg other than the symptoms noted above in the HPI. No chest pain, shortness of breath.  Stomach has  no issues.    PHYSICAL EXAM:  /77   Pulse 65   Temp 98  F (36.7  C)   Resp 19   Wt 63.5 kg (140 lb)   SpO2 97%   BMI 24.80 kg/m    Anamaria is a short stature female, neatly groomed with abundant amount of shoulderlength hair.  She does make eye contact, mood is appropriate with smiling and laughing.  Heart rate is regular and strong.  No edema in her lower extremities  Lungs are clear to auscultation  Skin is pink dry and no open areas  Anamaria enjoys walking around the facility in order to help her rehab process with the hamstring strain.  Did see she had a extra gallop in her gait at 1 point but otherwise became normal.  Ambulates with a walker      ASSESSMENT / PLAN:  (M79.814) Pain of right lower leg  (primary encounter diagnosis)  (S76.246A) Hamstring tear  Comment:,Per request of Anamaria will change her gabapentin orders to be 200 mg at bedtime.  She feels too tired during the day to receive the gabapentin.  She also knows that this should help her sleep better at night as well.  Did give an order to increase the Aspercreme 2 g to behind her knee left side 3 times a day.  Already has this order and staff dispense some in a cup and give it to her that she can apply herself  Plan: gabapentin (NEURONTIN) 100 MG capsule      (F51.02) Adjustment insomnia  Comment: We will remain on melatonin 9 mg at bedtime to help assist with falling asleep.    Orders:  1.  Discontinue currently order of Gabapentin  2.  Gabapentin 100mg - give 2 caps (200mg) at HS for pain of lower leg  3.  aspercreme 10% give 2GM to behind left knee 3x day.  - already as the order.    Electronically signed by  ESPERANZA Fuentes CNP

## 2022-10-12 NOTE — LETTER
10/12/2022        RE: yKlah Britt  [address]  [facility] [address]        [facility] GERIATRICS  ACUTE/EPISODIC VISIT    [facility] Medical Record Number:  [id number]  Place of Service where encounter took place:  [facility] [[id number]]    Chief Complaint   Patient presents with     RECHECK       HPI:    Kylah Britt is a 72 year old  (1950), who is being seen today for an episodic care visit.  HPI information obtained from: patient report, [place] Epic chart review and PT report of gait.    Today's concern is:    Diagnoses       Codes Comments    Pain of right lower leg    -  Primary M79.661     Strain of right hamstring muscle, sequela     S76.311S     Adjustment insomnia     F51.02     Hamstring tear     S76.319A         Came to see Anamaria today as nursing gave a note stating that she does not want to take all of her gabapentin.    This nurse practitioner happened to be sitting in the dining room with the NP student looking over paperwork and turned to look at the lady sitting at the table by herself and asked if that was Anamaria.  This NP is only met Anamaria 1 time and thought this person's care looked like Anamaria's hair.  She confirmed it was her and so did talk to her at the table as no one was around.    Right away she brought up the fact that she wanted to trim down her gabapentin dose to just to receive it at bedtime.  Currently she is on 100 mg 3 times a day.  She did mention that at night she does have the discomfort in that left leg and so spoke to her about having the bedtime dose at 200 mg to see if that will help her sleep better.  She knew that she was on melatonin 9 mg and will keep that the same.  Anamaria brought up about the Aspercreme and sounded like she wanted it more frequent and so she spoke about doing that 3 times a day, only to see that it already is scheduled 3 times a day.    Physical therapy Spoke with this nurse  practitioner today as hearing a murmur that the family was going to reach out to this NP and talk about doing some scans possibly because of her abnormal muscle movements.  The physical therapist was physically showing this nurse practitioner some of the movements that will occur.  She has some control if you asked to lift her arm in the air but then all of a sudden there is no control it goes off in a different direction.  Same with her legs.  So today when she left the dining room area, watched her walk with her 4WW and saw a little bit of un-coordination with the left leg.      ALLERGIES:    Allergies   Allergen Reactions     No Known Drug Allergies         MEDICATIONS:  Post Discharge Medication Reconciliation Status: patient was not discharged from an inpatient facility or TCU. Medications reconciled.    Current Outpatient Medications   Medication Sig Dispense Refill     gabapentin (NEURONTIN) 100 MG capsule Take 2 capsules (200 mg) by mouth At Bedtime 42 capsule 1     acetaminophen (TYLENOL) 500 MG tablet Take 2 tablets (1,000 mg) by mouth 3 times daily. May also take 2 tablets (1,000 mg) daily as needed for mild pain. 186 tablet 11     BABY ASPIRIN PO Take 81 mg by mouth daily       Calcium Carbonate (CALCIUM 600 PO) Take 1 tablet by mouth every other day       Cholecalciferol (VITAMIN D3 PO) Take 2,000 Units by mouth every other day       melatonin 3 MG tablet Take 3 tablets (9 mg) by mouth At Bedtime       naproxen (NAPROSYN) 500 MG tablet Take 1 tablet (500 mg) by mouth 2 times daily (with meals) 40 tablet 1     senna-docusate (SENOKOT-S/PERICOLACE) 8.6-50 MG tablet Take 1 tablet by mouth daily And daily PRN       traZODone (DESYREL) 50 MG tablet Take 50 mg by mouth At Bedtime       trolamine salicylate (ASPERCREME) 10 % external cream Apply topically 3 times daily APPLY TOPICALLY BEHIND RIGHT KNEE TO HIP THREE TIMES DAILY. OK TO LEAVE AT BEDSIDE. DX PAIN. 85 g 3         REVIEW OF SYSTEMS:  4 point ROS neg  other than the symptoms noted above in the HPI. No chest pain, shortness of breath.  Stomach has no issues.    PHYSICAL EXAM:  /77   Pulse 65   Temp 98  F (36.7  C)   Resp 19   Wt 63.5 kg (140 lb)   SpO2 97%   BMI 24.80 kg/m    Anamaria is a short stature female, neatly groomed with abundant amount of shoulderlength hair.  She does make eye contact, mood is appropriate with smiling and laughing.  Heart rate is regular and strong.  No edema in her lower extremities  Lungs are clear to auscultation  Skin is pink dry and no open areas  Anamaria enjoys walking around the facility in order to help her rehab process with the hamstring strain.  Did see she had a extra gallop in her gait at 1 point but otherwise became normal.  Ambulates with a walker      ASSESSMENT / PLAN:  (M79.661) Pain of right lower leg  (primary encounter diagnosis)  (S76.187A) Hamstring tear  Comment:,Per request of Anamaria will change her gabapentin orders to be 200 mg at bedtime.  She feels too tired during the day to receive the gabapentin.  She also knows that this should help her sleep better at night as well.  Did give an order to increase the Aspercreme 2 g to behind her knee left side 3 times a day.  Already has this order and staff dispense some in a cup and give it to her that she can apply herself  Plan: gabapentin (NEURONTIN) 100 MG capsule      (F51.02) Adjustment insomnia  Comment: We will remain on melatonin 9 mg at bedtime to help assist with falling asleep.    Orders:  1.  Discontinue currently order of Gabapentin  2.  Gabapentin 100mg - give 2 caps (200mg) at HS for pain of lower leg  3.  aspercreme 10% give 2GM to behind left knee 3x day.  - already as the order.    Electronically signed by  ESPERANZA Fuentes CNP             Sincerely,        ESPERANZA Fuentes CNP

## 2022-10-16 RX ORDER — GABAPENTIN 100 MG/1
200 CAPSULE ORAL AT BEDTIME
Qty: 42 CAPSULE | Refills: 1
Start: 2022-10-12 | End: 2023-01-18

## 2022-10-21 DIAGNOSIS — R29.6 RECURRENT FALLS: ICD-10-CM

## 2022-10-21 DIAGNOSIS — R26.9 ABNORMAL GAIT: ICD-10-CM

## 2022-10-21 DIAGNOSIS — R41.0 CONFUSION: ICD-10-CM

## 2022-10-21 DIAGNOSIS — R25.2 SPASTICITY: Primary | ICD-10-CM

## 2022-10-21 NOTE — CONFIDENTIAL NOTE
Placed an order to have a MRI done due to therapies seeing unusual behaviors when working with her.     Anamaria has a hx of falls, unsteady gait, when asked to do a command like lift arms above her head, her arm will start to spas and go into other directions.  Cognitively Anamaria has deficits as well.  Her story may not be correct which has been noticed by family and staff [facility].    Requested to have a MRI to be done and family on board with decision.      Place an order into epic and left the daughter a VM on her phone that she can make the appointment anytime around her schedule.  Left phone number so she can call this NP with any questions.    ESPERANZA Fuentes CNP

## 2022-10-26 ENCOUNTER — TRANSFERRED RECORDS (OUTPATIENT)
Dept: HEALTH INFORMATION MANAGEMENT | Facility: CLINIC | Age: 72
End: 2022-10-26

## 2022-10-31 ENCOUNTER — HOSPITAL ENCOUNTER (OUTPATIENT)
Dept: MRI IMAGING | Facility: CLINIC | Age: 72
Discharge: HOME | End: 2022-10-31
Attending: NURSE PRACTITIONER | Admitting: NURSE PRACTITIONER
Payer: MEDICARE

## 2022-10-31 DIAGNOSIS — R25.2 SPASTICITY: ICD-10-CM

## 2022-10-31 DIAGNOSIS — R29.6 RECURRENT FALLS: ICD-10-CM

## 2022-10-31 DIAGNOSIS — R41.0 CONFUSION: ICD-10-CM

## 2022-10-31 DIAGNOSIS — R26.9 ABNORMAL GAIT: ICD-10-CM

## 2022-10-31 PROCEDURE — 255N000002 HC RX 255 OP 636: Performed by: NURSE PRACTITIONER

## 2022-10-31 PROCEDURE — G1010 CDSM STANSON: HCPCS

## 2022-10-31 PROCEDURE — A9585 GADOBUTROL INJECTION: HCPCS | Performed by: NURSE PRACTITIONER

## 2022-10-31 RX ORDER — GADOBUTROL 604.72 MG/ML
7.5 INJECTION INTRAVENOUS ONCE
Status: COMPLETED | OUTPATIENT
Start: 2022-10-31 | End: 2022-10-31

## 2022-10-31 RX ADMIN — GADOBUTROL 6.5 ML: 604.72 INJECTION INTRAVENOUS at 13:09

## 2022-11-02 ENCOUNTER — ASSISTED LIVING VISIT (OUTPATIENT)
Dept: GERIATRICS | Facility: CLINIC | Age: 72
End: 2022-11-02
Payer: COMMERCIAL

## 2022-11-02 VITALS
BODY MASS INDEX: 24.87 KG/M2 | RESPIRATION RATE: 18 BRPM | HEART RATE: 77 BPM | DIASTOLIC BLOOD PRESSURE: 79 MMHG | TEMPERATURE: 97.2 F | WEIGHT: 140.4 LBS | SYSTOLIC BLOOD PRESSURE: 140 MMHG | OXYGEN SATURATION: 96 %

## 2022-11-02 DIAGNOSIS — R26.9 ABNORMAL GAIT: ICD-10-CM

## 2022-11-02 DIAGNOSIS — F32.5 MAJOR DEPRESSIVE DISORDER IN REMISSION, UNSPECIFIED WHETHER RECURRENT (H): ICD-10-CM

## 2022-11-02 DIAGNOSIS — R29.6 RECURRENT FALLS: ICD-10-CM

## 2022-11-02 DIAGNOSIS — R25.2 SPASTICITY: ICD-10-CM

## 2022-11-02 DIAGNOSIS — S76.311S STRAIN OF RIGHT HAMSTRING MUSCLE, SEQUELA: ICD-10-CM

## 2022-11-02 DIAGNOSIS — K29.00 OTHER ACUTE GASTRITIS WITHOUT HEMORRHAGE: Primary | ICD-10-CM

## 2022-11-02 RX ORDER — CALCIUM CARBONATE 500 MG/1
2 TABLET, CHEWABLE ORAL EVERY 4 HOURS PRN
Qty: 60 TABLET | Refills: 4 | Status: SHIPPED | OUTPATIENT
Start: 2022-11-02 | End: 2023-11-03

## 2022-11-02 NOTE — PROGRESS NOTES
[facility] GERIATRICS  ACUTE/EPISODIC VISIT    [facility] Medical Record Number:  [id number]  Place of Service where encounter took place:  [facility] [[id number]]    Chief Complaint   Patient presents with     RECHECK       HPI:    Kylah Britt is a 72 year old  (1950), who is being seen today for an episodic care visit.  HPI information obtained from: facility chart records, facility staff, patient report and [facility] chart review.    Today's concern is:    Diagnoses       Codes Comments    Other acute gastritis without hemorrhage    -  Primary K29.00     Spasticity     R25.2     Abnormal gait     R26.9     Recurrent falls     R29.6     Strain of right hamstring muscle, sequela     S76.311S     Major depressive disorder in remission, unspecified whether recurrent (H)     F32.5         Came to see Anamaria today to talk to her about her MRI result and what the next step will be taken.  Had spoke with her daughter the day before and she knows that this NP would find a Neurology clinic as first visits are booked out.    Found to Anamaria today in her apartment with the door unlocked and she was coming out of her bedroom to go sit on her couch.  Anamaria mentions that she has not been sleeping very well lately.  She is complaining of upset stomach and staff would not give her any Tums.  Anamaria does not have an order for any as needed Tums.  Reviewed with her GERD symptoms.  She is on a NSAID routinely and suspect that is bothering her stomach.  Explained to her what will be ordered and still she was focusing on the Tums.  Assured her that those are ordered    Did briefly explain to her the MRI results.  No tumors, no bleeding, no masses.  Regular degenerative white matter loss.  There is something questionable that typically is seen with progressive supranuclear palsy that might be explaining why when she has to follow directions from moving her extremities, it is  almost that she has a spasms and the extremity goes in a different direction.  Regardless feel it would be best for her to see a neurologist    ALLERGIES:    Allergies   Allergen Reactions     No Known Drug Allergies         MEDICATIONS:  Post Discharge Medication Reconciliation Status: patient was not discharged from an inpatient facility or TCU. Medications reviewed today.    Current Outpatient Medications   Medication Sig Dispense Refill     calcium carbonate (TUMS) 500 MG chewable tablet Take 2 tablets (1,000 mg) by mouth every 4 hours as needed for heartburn 60 tablet 4     omeprazole (PRILOSEC) 20 MG DR capsule 20mg by mouth twice a day for 2 weeks and then 20mg po daily in AM for 3 weeks.  Discontinue after the three weeks. 49 capsule 0     acetaminophen (TYLENOL) 500 MG tablet Take 2 tablets (1,000 mg) by mouth 3 times daily. May also take 2 tablets (1,000 mg) daily as needed for mild pain. 186 tablet 11     BABY ASPIRIN PO Take 81 mg by mouth daily       Calcium Carbonate (CALCIUM 600 PO) Take 1 tablet by mouth every other day       Cholecalciferol (VITAMIN D3 PO) Take 2,000 Units by mouth every other day       gabapentin (NEURONTIN) 100 MG capsule Take 2 capsules (200 mg) by mouth At Bedtime 42 capsule 1     melatonin 3 MG tablet Take 3 tablets (9 mg) by mouth At Bedtime       senna-docusate (SENOKOT-S/PERICOLACE) 8.6-50 MG tablet Take 1 tablet by mouth daily And daily PRN       traZODone (DESYREL) 50 MG tablet Take 50 mg by mouth At Bedtime       trolamine salicylate (ASPERCREME) 10 % external cream Apply topically 3 times daily APPLY TOPICALLY BEHIND RIGHT KNEE TO HIP THREE TIMES DAILY. OK TO LEAVE AT BEDSIDE. DX PAIN. 85 g 3         REVIEW OF SYSTEMS:  4 point ROS neg other than the symptoms noted above in the HPI.  No sleeping well due to abdomen hurting.  Agrees with the symptoms of GERD>    PHYSICAL EXAM:  BP (!) 140/79   Pulse 77   Temp 97.2  F (36.2  C)   Resp 18   Wt 63.7 kg (140 lb 6.4 oz)    SpO2 96%   BMI 24.87 kg/m    Petite female in her housecoat walking in her apartment and can see she has an abnormal gait due to her hx of strained hamstring.  She sits on her couch and holding her stomach as if it hurts.  She is not crying but conversation is focused on her abdomen.    Heart rate regular and strong.  Lungs are clear.  No coughing.    Abdomen is round, soft and nontender.  Bowel sounds present.  Uses a 4WW for mobility.      HISTORY:  Spasticity. Abnormal gait. Confusion. Recurrent falls.      TECHNIQUE:  Multiplanar, multisequence MRI of the brain without and  with 6.5 mL Gadavist.     COMPARISON: Head CT 7/6/2022     FINDINGS:  Mild generalized parenchymal volume loss is present.  Scattered nonspecific frontoparietal predominant white matter T2  hyperintensities likely represent chronic small vessel ischemic  change. Brainstem volume loss is present with slight concave margin of  the superior midbrain on sagittal T1-weighted imaging, presumably  incidental, but this finding can be seen in the setting of progressive  supranuclear palsy. No evidence of recent ischemia, hemorrhage, mass,  mass effect, or hydrocephalus. No abnormal enhancement or diffusion  resection.     Marrow signal is within normal limits. The visualized paranasal  sinuses, tympanic cavities, and mastoid cavities are unremarkable.                                                                      IMPRESSION:  1. No acute abnormality.  2. Chronic changes as detailed.     CHANDLER BATEMAN MD   ASSESSMENT / PLAN:  (K29.00) Other acute gastritis without hemorrhage  (primary encounter diagnosis)  Comment: Went down to the nurses office and ordered up the Tums and omeprazole right away so pharmacy could deliver it tonight.  1.  Tums 500 mg shoes to choose every 4 hours as needed for gastritis  2.  Omeprazole 20 mg twice a day for 2 weeks then down to 20 mg daily for 3 weeks then discontinue.  3.  Discontinue the naproxen that has been  scheduled  Plan: calcium carbonate (TUMS) 500 MG chewable         tablet, omeprazole (PRILOSEC) 20 MG DR capsule    (R25.2) Spasticity  (R26.9) Abnormal gait  (R29.6) Recurrent falls  (S76.311S) Strain of right hamstring muscle, sequela  Comment: Encouraged Anamaria to continue to ambulate with a four-wheel walker for her safety.  The daughter stated that Anamaria is determined to return home but it is not felt to be a safe situation as she is at risk for further falls.  Family has had to go to either house to help her up at times.  They also believe Anamaria may not remember when she has had some falls.  Did place a phone call out to [facility], [facility] and they requested to have a referral sent.  Did fax over a referral and asked them to call this nurse practitioner for the initial appointment and then will let family know.  Most likely their first appointment will be in January 2023    (F32.5) Major depressive disorder in remission, unspecified whether recurrent (H)  Comment: Currently back he does take trazodone 50 mg at bedtime and melatonin to help her sleep.  Otherwise she does not take a different antidepressant during the daytime.  Continue to monitor her behaviors.  Do feel that she is managing in her environment but now needs to get her GERD symptoms in control to feel better.      Orders:  1.  Referral faxed to [facility] in [place]  2.  Omeprazole 20mg po BID for 2 weeks then 20mg po daily for 3 weeks then discontinue.  Dx of gastritis without bleeding  3.  TUMS 500mg 2 chews po every 4 hours prn - gastritis  4.  Discontinue Naproxen    Electronically signed by  ESPERANZA Fuentes CNP

## 2022-11-02 NOTE — LETTER
11/2/2022        RE: Kylah Britt  [address]  [place] [address]        [facility] GERIATRICS  ACUTE/EPISODIC VISIT    [facility] Medical Record Number:  [id number]  Place of Service where encounter took place:  [facility] [[id number]]    Chief Complaint   Patient presents with     RECHECK       HPI:    Kylah Britt is a 72 year old  (1950), who is being seen today for an episodic care visit.  HPI information obtained from: facility chart records, facility staff, patient report and [facility] chart review.    Today's concern is:    Diagnoses       Codes Comments    Other acute gastritis without hemorrhage    -  Primary K29.00     Spasticity     R25.2     Abnormal gait     R26.9     Recurrent falls     R29.6     Strain of right hamstring muscle, sequela     S76.311S     Major depressive disorder in remission, unspecified whether recurrent (H)     F32.5         Came to see Anamaria today to talk to her about her MRI result and what the next step will be taken.  Had spoke with her daughter the day before and she knows that this NP would find a Neurology clinic as first visits are booked out.    Found to Anamaria today in her apartment with the door unlocked and she was coming out of her bedroom to go sit on her couch.  Anamaria mentions that she has not been sleeping very well lately.  She is complaining of upset stomach and staff would not give her any Tums.  Anamaria does not have an order for any as needed Tums.  Reviewed with her GERD symptoms.  She is on a NSAID routinely and suspect that is bothering her stomach.  Explained to her what will be ordered and still she was focusing on the Tums.  Assured her that those are ordered    Did briefly explain to her the MRI results.  No tumors, no bleeding, no masses.  Regular degenerative white matter loss.  There is something questionable that typically is seen with progressive supranuclear palsy that might be  explaining why when she has to follow directions from moving her extremities, it is almost that she has a spasms and the extremity goes in a different direction.  Regardless feel it would be best for her to see a neurologist    ALLERGIES:    Allergies   Allergen Reactions     No Known Drug Allergies         MEDICATIONS:  Post Discharge Medication Reconciliation Status: patient was not discharged from an inpatient facility or TCU. Medications reviewed today.    Current Outpatient Medications   Medication Sig Dispense Refill     calcium carbonate (TUMS) 500 MG chewable tablet Take 2 tablets (1,000 mg) by mouth every 4 hours as needed for heartburn 60 tablet 4     omeprazole (PRILOSEC) 20 MG DR capsule 20mg by mouth twice a day for 2 weeks and then 20mg po daily in AM for 3 weeks.  Discontinue after the three weeks. 49 capsule 0     acetaminophen (TYLENOL) 500 MG tablet Take 2 tablets (1,000 mg) by mouth 3 times daily. May also take 2 tablets (1,000 mg) daily as needed for mild pain. 186 tablet 11     BABY ASPIRIN PO Take 81 mg by mouth daily       Calcium Carbonate (CALCIUM 600 PO) Take 1 tablet by mouth every other day       Cholecalciferol (VITAMIN D3 PO) Take 2,000 Units by mouth every other day       gabapentin (NEURONTIN) 100 MG capsule Take 2 capsules (200 mg) by mouth At Bedtime 42 capsule 1     melatonin 3 MG tablet Take 3 tablets (9 mg) by mouth At Bedtime       senna-docusate (SENOKOT-S/PERICOLACE) 8.6-50 MG tablet Take 1 tablet by mouth daily And daily PRN       traZODone (DESYREL) 50 MG tablet Take 50 mg by mouth At Bedtime       trolamine salicylate (ASPERCREME) 10 % external cream Apply topically 3 times daily APPLY TOPICALLY BEHIND RIGHT KNEE TO HIP THREE TIMES DAILY. OK TO LEAVE AT BEDSIDE. DX PAIN. 85 g 3         REVIEW OF SYSTEMS:  4 point ROS neg other than the symptoms noted above in the HPI.  No sleeping well due to abdomen hurting.  Agrees with the symptoms of GERD>    PHYSICAL EXAM:  BP (!)  140/79   Pulse 77   Temp 97.2  F (36.2  C)   Resp 18   Wt 63.7 kg (140 lb 6.4 oz)   SpO2 96%   BMI 24.87 kg/m    Petite female in her housecoat walking in her apartment and can see she has an abnormal gait due to her hx of strained hamstring.  She sits on her couch and holding her stomach as if it hurts.  She is not crying but conversation is focused on her abdomen.    Heart rate regular and strong.  Lungs are clear.  No coughing.    Abdomen is round, soft and nontender.  Bowel sounds present.  Uses a 4WW for mobility.      HISTORY:  Spasticity. Abnormal gait. Confusion. Recurrent falls.      TECHNIQUE:  Multiplanar, multisequence MRI of the brain without and  with 6.5 mL Gadavist.     COMPARISON: Head CT 7/6/2022     FINDINGS:  Mild generalized parenchymal volume loss is present.  Scattered nonspecific frontoparietal predominant white matter T2  hyperintensities likely represent chronic small vessel ischemic  change. Brainstem volume loss is present with slight concave margin of  the superior midbrain on sagittal T1-weighted imaging, presumably  incidental, but this finding can be seen in the setting of progressive  supranuclear palsy. No evidence of recent ischemia, hemorrhage, mass,  mass effect, or hydrocephalus. No abnormal enhancement or diffusion  resection.     Marrow signal is within normal limits. The visualized paranasal  sinuses, tympanic cavities, and mastoid cavities are unremarkable.                                                                      IMPRESSION:  1. No acute abnormality.  2. Chronic changes as detailed.     CHANDLER BATEMAN MD   ASSESSMENT / PLAN:  (K29.00) Other acute gastritis without hemorrhage  (primary encounter diagnosis)  Comment: Went down to the nurses office and ordered up the Tums and omeprazole right away so pharmacy could deliver it tonight.  1.  Tums 500 mg shoes to choose every 4 hours as needed for gastritis  2.  Omeprazole 20 mg twice a day for 2 weeks then down  to 20 mg daily for 3 weeks then discontinue.  3.  Discontinue the naproxen that has been scheduled  Plan: calcium carbonate (TUMS) 500 MG chewable         tablet, omeprazole (PRILOSEC) 20 MG DR capsule    (R25.2) Spasticity  (R26.9) Abnormal gait  (R29.6) Recurrent falls  (S76.311S) Strain of right hamstring muscle, sequela  Comment: Encouraged Anamaria to continue to ambulate with a four-wheel walker for her safety.  The daughter stated that Anamaria is determined to return home but it is not felt to be a safe situation as she is at risk for further falls.  Family has had to go to either house to help her up at times.  They also believe Anamaria may not remember when she has had some falls.  Did place a phone call out to [facility], [facility] [place] and they requested to have a referral sent.  Did fax over a referral and asked them to call this nurse practitioner for the initial appointment and then will let family know.  Most likely their first appointment will be in January 2023    (F32.5) Major depressive disorder in remission, unspecified whether recurrent (H)  Comment: Currently back he does take trazodone 50 mg at bedtime and melatonin to help her sleep.  Otherwise she does not take a different antidepressant during the daytime.  Continue to monitor her behaviors.  Do feel that she is managing in her environment but now needs to get her GERD symptoms in control to feel better.      Orders:  1.  Referral faxed to [facility] in [place]  2.  Omeprazole 20mg po BID for 2 weeks then 20mg po daily for 3 weeks then discontinue.  Dx of gastritis without bleeding  3.  TUMS 500mg 2 chews po every 4 hours prn - gastritis  4.  Discontinue Naproxen    Electronically signed by  ESPERANZA Fuentes CNP             Sincerely,        ESPERANZA Fuentes CNP

## 2022-11-03 ENCOUNTER — DOCUMENTATION ONLY (OUTPATIENT)
Dept: GERIATRICS | Facility: CLINIC | Age: 72
End: 2022-11-03

## 2022-11-04 ENCOUNTER — TELEPHONE (OUTPATIENT)
Dept: GERIATRICS | Facility: CLINIC | Age: 72
End: 2022-11-04

## 2022-11-06 NOTE — TELEPHONE ENCOUNTER
This nurse practitioner did end up calling back [facility].  The  stated that she just got off the phone with the daughter.  This nurse practitioner [employer] a voicemail that was received from the clinic.    The  stated that they called the assisted living and most likely the assisted living called the daughter to make the appointment.    Did reach out to daughter to acknowledge that she called the clinic and her the appointment is set up for mid January 2023.    Daughter did respond back to say thank you for initiating the contact and glad that appointment was made    ESPERANZA Fuentes CNP

## 2022-11-22 ENCOUNTER — MEDICAL CORRESPONDENCE (OUTPATIENT)
Dept: HEALTH INFORMATION MANAGEMENT | Facility: CLINIC | Age: 72
End: 2022-11-22

## 2022-11-23 ENCOUNTER — ASSISTED LIVING VISIT (OUTPATIENT)
Dept: GERIATRICS | Facility: CLINIC | Age: 72
End: 2022-11-23
Payer: COMMERCIAL

## 2022-11-23 DIAGNOSIS — M79.651 PAIN OF RIGHT THIGH: ICD-10-CM

## 2022-11-23 DIAGNOSIS — R29.6 RECURRENT FALLS: ICD-10-CM

## 2022-11-23 DIAGNOSIS — M62.838 MUSCLE SPASM: Primary | ICD-10-CM

## 2022-11-23 DIAGNOSIS — R26.9 ABNORMAL GAIT: ICD-10-CM

## 2022-11-23 DIAGNOSIS — R53.83 FEELING TIRED: ICD-10-CM

## 2022-11-23 DIAGNOSIS — R25.2 SPASTICITY: ICD-10-CM

## 2022-11-23 RX ORDER — BACLOFEN 10 MG/1
5 TABLET ORAL 3 TIMES DAILY
Qty: 45 TABLET | Refills: 3 | Status: SHIPPED | OUTPATIENT
Start: 2022-11-23 | End: 2022-12-11

## 2022-11-23 NOTE — PROGRESS NOTES
[facility] GERIATRICS  ACUTE/EPISODIC VISIT    [facility] Medical Record Number:  [id number]  Place of Service where encounter took place:  [facility] [[id number]]    Chief Complaint   Patient presents with     RECHECK       HPI:    Kylah Britt is a 72 year old  (1950), who is being seen today for an episodic care visit.  HPI information obtained from: facility chart records, facility staff and patient report.    Today's concern is:    Diagnoses       Codes Comments    Muscle spasm    -  Primary M62.838     Abnormal gait     R26.9     Spasticity     R25.2     Recurrent falls     R29.6     Feeling tired     R53.83     Pain of right thigh     M79.651         Came to see Anamaria today as she requested a decrease in the Tylenol order as it makes her tired, but also the [facility] communicated a concern from Physical Therapy as he is not here today.    Anamaria has a neurology appointment in January.  The concern is her spastic movements and questioning if her MRI showed a area that resembles Supranuclear Palsy.  Now PT is seeing her right toes starting to curl and so questioning if able to try Baclofen.  Anamaria is aware of what is happening to her right toes as she was given directions from therapy of how to try to flatten them.      ALLERGIES:    Allergies   Allergen Reactions     No Known Drug Allergies         MEDICATIONS:  Post Discharge Medication Reconciliation Status: patient was not discharged from an inpatient facility or TCU. Medications reconciled helena    Current Outpatient Medications   Medication Sig Dispense Refill     acetaminophen (TYLENOL) 500 MG tablet Take 2 tablets (1,000 mg) by mouth 2 times daily. May also take 2 tablets (1,000 mg) daily as needed for mild pain. 186 tablet 11     baclofen (LIORESAL) 10 MG tablet Take 0.5 tablets (5 mg) by mouth 3 times daily 45 tablet 3     BABY ASPIRIN PO Take 81 mg by mouth daily       Calcium Carbonate (CALCIUM 600 PO)  Take 1 tablet by mouth every other day       calcium carbonate (TUMS) 500 MG chewable tablet Take 2 tablets (1,000 mg) by mouth every 4 hours as needed for heartburn 60 tablet 4     Cholecalciferol (VITAMIN D3 PO) Take 2,000 Units by mouth every other day       gabapentin (NEURONTIN) 100 MG capsule Take 2 capsules (200 mg) by mouth At Bedtime 42 capsule 1     melatonin 3 MG tablet Take 3 tablets (9 mg) by mouth At Bedtime       omeprazole (PRILOSEC) 20 MG DR capsule 20mg by mouth twice a day for 2 weeks and then 20mg po daily in AM for 3 weeks.  Discontinue after the three weeks. 49 capsule 0     senna-docusate (SENOKOT-S/PERICOLACE) 8.6-50 MG tablet Take 1 tablet by mouth daily And daily PRN       traZODone (DESYREL) 50 MG tablet Take 1 tablet (50 mg) by mouth At Bedtime 31 tablet 11     trolamine salicylate (ASPERCREME) 10 % external cream Apply topically 3 times daily APPLY TOPICALLY BEHIND RIGHT KNEE TO HIP THREE TIMES DAILY. OK TO LEAVE AT BEDSIDE. DX PAIN. 85 g 3       REVIEW OF SYSTEMS:  4 point ROS neg other than the symptoms noted above in the HPI.  No chest pain or shortness of breath.      PHYSICAL EXAM:  BP (!) 140/79   Pulse 77   Temp 97.2  F (36.2  C)   Resp 18   Wt 63.7 kg (140 lb 6.4 oz)   BMI 24.87 kg/m    Anamaria was in her apartment and door unlocked.  Able to come in and she was in her living area as she sat down on her couch.  Alert and pleasant.  Able to express her needs.  Do feel there is some forgetfulness noted in conversation.  Speech is clear and loud.  Heart rate regular and strong.  Lungs are clear.    Abdomen soft and non-tender.  Wearing socks and see her often set her left foot on the right toes.  This is what she was told to do by therapy to flatten out the toes.    Her movements seem purposeful but not coordinated well.  She moved her arm to demonstrate what can happen at times when trying to do a movement with her arm.  It will shows that it may not always do the command  smoothly.      No recent labs    ASSESSMENT / PLAN:  (M62.838) Muscle spasm  (primary encounter diagnosis)  Comment: best way to describe her toes is a spasm with hopes that the Baclofen would relax the muscle.  Baclofen was either therapies idea or the [facility] with their discussion.  Do feel it is worth a try.  Anamaria is able to say if it is working or not.  Left a message for daughter on her cell phone about the new medication and purpose.  Plan: baclofen (LIORESAL) 10 MG tablet    (R26.9) Abnormal gait  (R25.2) Spasticity  (R29.6) Recurrent falls  Comment: continues to ambulate with the 4WW and attends activities.  She is determined to keep walking despite her ham string injury.  Her apartment is way at the end and so she does get her exercise which she likes to walk the hallways.  Risk for falls   Furniture in her apartment is still minimal which can be helpful if she falls.     (R53.83) Feeling tired  (M79.651) Pain of right thigh  Comment: per request of Anamaria, she would like not to take the afternoon dose of Tylenol as it makes her feel tired.    Will cut down the tylenol ES to twice a day and the keep daily PRN.  Plan: acetaminophen (TYLENOL) 500 MG tablet      Orders:  Baclofen 5mg po TID for muscle spasms  Decrease Tylenol ES to 1000mg po twice a day and daily prn.    Electronically signed by  ESPERANZA Fuentes CNP

## 2022-11-23 NOTE — LETTER
11/23/2022        RE: Kylah Britt  [address]  [facility] [address]        [facility] GERIATRICS  ACUTE/EPISODIC VISIT    [facility] Medical Record Number:  [id number]  Place of Service where encounter took place:  [facility] ([facility] [[id number]]    Chief Complaint   Patient presents with     RECHECK       HPI:    Kylah Britt is a 72 year old  (1950), who is being seen today for an episodic care visit.  HPI information obtained from: facility chart records, facility staff and patient report.    Today's concern is:    Diagnoses       Codes Comments    Muscle spasm    -  Primary M62.838     Abnormal gait     R26.9     Spasticity     R25.2     Recurrent falls     R29.6     Feeling tired     R53.83     Pain of right thigh     M79.651         Came to see Anamaria today as she requested a decrease in the Tylenol order as it makes her tired, but also the [facility] communicated a concern from Physical Therapy as he is not here today.    Anamaria has a neurology appointment in January.  The concern is her spastic movements and questioning if her MRI showed a area that resembles Supranuclear Palsy.  Now PT is seeing her right toes starting to curl and so questioning if able to try Baclofen.  Anamaria is aware of what is happening to her right toes as she was given directions from therapy of how to try to flatten them.      ALLERGIES:    Allergies   Allergen Reactions     No Known Drug Allergies         MEDICATIONS:  Post Discharge Medication Reconciliation Status: patient was not discharged from an inpatient facility or TCU. Medications reconciled helena    Current Outpatient Medications   Medication Sig Dispense Refill     acetaminophen (TYLENOL) 500 MG tablet Take 2 tablets (1,000 mg) by mouth 2 times daily. May also take 2 tablets (1,000 mg) daily as needed for mild pain. 186 tablet 11     baclofen (LIORESAL) 10 MG tablet Take 0.5 tablets (5 mg) by mouth 3 times daily 45 tablet 3      BABY ASPIRIN PO Take 81 mg by mouth daily       Calcium Carbonate (CALCIUM 600 PO) Take 1 tablet by mouth every other day       calcium carbonate (TUMS) 500 MG chewable tablet Take 2 tablets (1,000 mg) by mouth every 4 hours as needed for heartburn 60 tablet 4     Cholecalciferol (VITAMIN D3 PO) Take 2,000 Units by mouth every other day       gabapentin (NEURONTIN) 100 MG capsule Take 2 capsules (200 mg) by mouth At Bedtime 42 capsule 1     melatonin 3 MG tablet Take 3 tablets (9 mg) by mouth At Bedtime       omeprazole (PRILOSEC) 20 MG DR capsule 20mg by mouth twice a day for 2 weeks and then 20mg po daily in AM for 3 weeks.  Discontinue after the three weeks. 49 capsule 0     senna-docusate (SENOKOT-S/PERICOLACE) 8.6-50 MG tablet Take 1 tablet by mouth daily And daily PRN       traZODone (DESYREL) 50 MG tablet Take 1 tablet (50 mg) by mouth At Bedtime 31 tablet 11     trolamine salicylate (ASPERCREME) 10 % external cream Apply topically 3 times daily APPLY TOPICALLY BEHIND RIGHT KNEE TO HIP THREE TIMES DAILY. OK TO LEAVE AT BEDSIDE. DX PAIN. 85 g 3       REVIEW OF SYSTEMS:  4 point ROS neg other than the symptoms noted above in the HPI.  No chest pain or shortness of breath.      PHYSICAL EXAM:  BP (!) 140/79   Pulse 77   Temp 97.2  F (36.2  C)   Resp 18   Wt 63.7 kg (140 lb 6.4 oz)   BMI 24.87 kg/m    Anamaria was in her apartment and door unlocked.  Able to come in and she was in her living area as she sat down on her couch.  Alert and pleasant.  Able to express her needs.  Do feel there is some forgetfulness noted in conversation.  Speech is clear and loud.  Heart rate regular and strong.  Lungs are clear.    Abdomen soft and non-tender.  Wearing socks and see her often set her left foot on the right toes.  This is what she was told to do by therapy to flatten out the toes.    Her movements seem purposeful but not coordinated well.  She moved her arm to demonstrate what can happen at times when trying to  do a movement with her arm.  It will shows that it may not always do the command smoothly.      No recent labs    ASSESSMENT / PLAN:  (M62.838) Muscle spasm  (primary encounter diagnosis)  Comment: best way to describe her toes is a spasm with hopes that the Baclofen would relax the muscle.  Baclofen was either therapies idea or the [facility] with their discussion.  Do feel it is worth a try.  Anamaria is able to say if it is working or not.  Left a message for daughter on her cell phone about the new medication and purpose.  Plan: baclofen (LIORESAL) 10 MG tablet    (R26.9) Abnormal gait  (R25.2) Spasticity  (R29.6) Recurrent falls  Comment: continues to ambulate with the 4WW and attends activities.  She is determined to keep walking despite her ham string injury.  Her apartment is way at the end and so she does get her exercise which she likes to walk the hallways.  Risk for falls   Furniture in her apartment is still minimal which can be helpful if she falls.     (R53.83) Feeling tired  (M79.651) Pain of right thigh  Comment: per request of Anamaria, she would like not to take the afternoon dose of Tylenol as it makes her feel tired.    Will cut down the tylenol ES to twice a day and the keep daily PRN.  Plan: acetaminophen (TYLENOL) 500 MG tablet      Orders:  Baclofen 5mg po TID for muscle spasms  Decrease Tylenol ES to 1000mg po twice a day and daily prn.    Electronically signed by  ESPERANZA Fuentes CNP             Sincerely,        ESPERANZA Fuentes CNP

## 2022-11-27 DIAGNOSIS — F51.02 ADJUSTMENT INSOMNIA: Primary | ICD-10-CM

## 2022-11-27 RX ORDER — TRAZODONE HYDROCHLORIDE 50 MG/1
50 TABLET, FILM COATED ORAL AT BEDTIME
Qty: 31 TABLET | Refills: 11 | Status: SHIPPED | OUTPATIENT
Start: 2022-11-27

## 2022-11-30 ENCOUNTER — ASSISTED LIVING VISIT (OUTPATIENT)
Dept: GERIATRICS | Facility: CLINIC | Age: 72
End: 2022-11-30
Payer: COMMERCIAL

## 2022-11-30 VITALS
OXYGEN SATURATION: 96 % | WEIGHT: 140.4 LBS | TEMPERATURE: 97.2 F | BODY MASS INDEX: 24.87 KG/M2 | DIASTOLIC BLOOD PRESSURE: 79 MMHG | HEART RATE: 77 BPM | SYSTOLIC BLOOD PRESSURE: 140 MMHG | RESPIRATION RATE: 18 BRPM

## 2022-11-30 DIAGNOSIS — R26.9 ABNORMAL GAIT: ICD-10-CM

## 2022-11-30 DIAGNOSIS — R25.2 SPASTICITY: ICD-10-CM

## 2022-11-30 DIAGNOSIS — R29.6 RECURRENT FALLS: ICD-10-CM

## 2022-11-30 DIAGNOSIS — M62.838 MUSCLE SPASM: Primary | ICD-10-CM

## 2022-11-30 NOTE — LETTER
11/30/2022        RE: Kylah Britt  [address]  [facility] [address]        [facility] GERIATRICS  ACUTE/EPISODIC VISIT    [facility] Medical Record Number:  [id number]  Place of Service where encounter took place:  [facility] [[id number]]    Chief Complaint   Patient presents with     RECHECK       HPI:    Kylah Britt is a 72 year old  (1950), who is being seen today for an episodic care visit.  HPI information obtained from: facility chart records, facility staff and patient report.    Today's concern is:    Diagnoses       Codes Comments    Muscle spasm    -  Primary M62.838     Abnormal gait     R26.9     Spasticity     R25.2     Recurrent falls     R29.6         Anamaria approached this NP while she was leaving the great room after a book club event.  Anamaria requested an increase in the Baclofen that was started last week.  She stated that she can feel a difference with the medications and her toes.  Currently on Baclofen 5mg po TID as her right toes were curling and not able to relax.    ALLERGIES:    Allergies   Allergen Reactions     No Known Drug Allergies         MEDICATIONS:  Post Discharge Medication Reconciliation Status: patient was not discharged from an inpatient facility or TCU. Medication reconciled.    Current Outpatient Medications   Medication Sig Dispense Refill     baclofen (LIORESAL) 10 MG tablet Take 1 tablet (10 mg) by mouth 3 times daily 45 tablet 3     acetaminophen (TYLENOL) 500 MG tablet Take 2 tablets (1,000 mg) by mouth 2 times daily. May also take 2 tablets (1,000 mg) daily as needed for mild pain. 186 tablet 11     BABY ASPIRIN PO Take 81 mg by mouth daily       Calcium Carbonate (CALCIUM 600 PO) Take 1 tablet by mouth every other day       calcium carbonate (TUMS) 500 MG chewable tablet Take 2 tablets (1,000 mg) by mouth every 4 hours as needed for heartburn 60 tablet 4     Cholecalciferol (VITAMIN D3 PO) Take 2,000  Units by mouth every other day       gabapentin (NEURONTIN) 100 MG capsule Take 2 capsules (200 mg) by mouth At Bedtime 42 capsule 1     melatonin 3 MG tablet Take 3 tablets (9 mg) by mouth At Bedtime       omeprazole (PRILOSEC) 20 MG DR capsule 20mg by mouth twice a day for 2 weeks and then 20mg po daily in AM for 3 weeks.  Discontinue after the three weeks. 49 capsule 0     senna-docusate (SENOKOT-S/PERICOLACE) 8.6-50 MG tablet Take 1 tablet by mouth daily And daily PRN       traZODone (DESYREL) 50 MG tablet Take 1 tablet (50 mg) by mouth At Bedtime 31 tablet 11     trolamine salicylate (ASPERCREME) 10 % external cream Apply topically 3 times daily APPLY TOPICALLY BEHIND RIGHT KNEE TO HIP THREE TIMES DAILY. OK TO LEAVE AT BEDSIDE. DX PAIN. 85 g 3         REVIEW OF SYSTEMS:  4 point ROS neg other than the symptoms noted above in the HPI.      PHYSICAL EXAM:  BP (!) 140/79   Pulse 77   Temp 97.2  F (36.2  C)   Resp 18   Wt 63.7 kg (140 lb 6.4 oz)   SpO2 96%   BMI 24.87 kg/m    Ambulating with his 4WW independently.    Coloring is pink, dry and warm.    Heart rate regular and strong.    No respiratory distress.    Has shoes on and so this visit not able to see toes.      ASSESSMENT / PLAN:  (M65.611) Muscle spasm  (primary encounter diagnosis)  Comment: glad to hear she feels the medication has helped and will increase the dose to 10mg po 3x day.  Assured her the tylenol was cut to twice a day as she felt the Tylenol was making her tired.  Do sense that it could be other health issues that could contribute.    Plan: baclofen (LIORESAL) 10 MG tablet    (R26.9) Abnormal gait  (R25.2) Spasticity  (R29.6) Recurrent falls  Comment: have not heard of any falls.  Feel she tries to be careful.  Neurological appointment set for January.  Did not see any spastic movements but her abnormal gait is notable.  Anamaria continues to ambulate to and from her room to activities which is wonderful as she is determined to continue  to do.      Orders:  1.  Increase Baclofen to 10mg po TID for muscle spasms    Electronically signed by  ESPERANZA Fuentes CNP             Sincerely,        ESPERANZA Fuentes CNP

## 2022-11-30 NOTE — PROGRESS NOTES
[facility] GERIATRICS  ACUTE/EPISODIC VISIT    [facility] Medical Record Number:  [id number]  Place of Service where encounter took place:  [facility] [[id number]]    Chief Complaint   Patient presents with     RECHECK       HPI:    Kylah Britt is a 72 year old  (1950), who is being seen today for an episodic care visit.  HPI information obtained from: facility chart records, facility staff and patient report.    Today's concern is:    Diagnoses       Codes Comments    Muscle spasm    -  Primary M62.838     Abnormal gait     R26.9     Spasticity     R25.2     Recurrent falls     R29.6         Anamaria approached this NP while she was leaving the great room after a book club event.  Anamaria requested an increase in the Baclofen that was started last week.  She stated that she can feel a difference with the medications and her toes.  Currently on Baclofen 5mg po TID as her right toes were curling and not able to relax.    ALLERGIES:    Allergies   Allergen Reactions     No Known Drug Allergies         MEDICATIONS:  Post Discharge Medication Reconciliation Status: patient was not discharged from an inpatient facility or TCU. Medication reconciled.    Current Outpatient Medications   Medication Sig Dispense Refill     baclofen (LIORESAL) 10 MG tablet Take 1 tablet (10 mg) by mouth 3 times daily 45 tablet 3     acetaminophen (TYLENOL) 500 MG tablet Take 2 tablets (1,000 mg) by mouth 2 times daily. May also take 2 tablets (1,000 mg) daily as needed for mild pain. 186 tablet 11     BABY ASPIRIN PO Take 81 mg by mouth daily       Calcium Carbonate (CALCIUM 600 PO) Take 1 tablet by mouth every other day       calcium carbonate (TUMS) 500 MG chewable tablet Take 2 tablets (1,000 mg) by mouth every 4 hours as needed for heartburn 60 tablet 4     Cholecalciferol (VITAMIN D3 PO) Take 2,000 Units by mouth every other day       gabapentin (NEURONTIN) 100 MG capsule Take 2  capsules (200 mg) by mouth At Bedtime 42 capsule 1     melatonin 3 MG tablet Take 3 tablets (9 mg) by mouth At Bedtime       omeprazole (PRILOSEC) 20 MG DR capsule 20mg by mouth twice a day for 2 weeks and then 20mg po daily in AM for 3 weeks.  Discontinue after the three weeks. 49 capsule 0     senna-docusate (SENOKOT-S/PERICOLACE) 8.6-50 MG tablet Take 1 tablet by mouth daily And daily PRN       traZODone (DESYREL) 50 MG tablet Take 1 tablet (50 mg) by mouth At Bedtime 31 tablet 11     trolamine salicylate (ASPERCREME) 10 % external cream Apply topically 3 times daily APPLY TOPICALLY BEHIND RIGHT KNEE TO HIP THREE TIMES DAILY. OK TO LEAVE AT BEDSIDE. DX PAIN. 85 g 3         REVIEW OF SYSTEMS:  4 point ROS neg other than the symptoms noted above in the HPI.      PHYSICAL EXAM:  BP (!) 140/79   Pulse 77   Temp 97.2  F (36.2  C)   Resp 18   Wt 63.7 kg (140 lb 6.4 oz)   SpO2 96%   BMI 24.87 kg/m    Ambulating with his 4WW independently.    Coloring is pink, dry and warm.    Heart rate regular and strong.    No respiratory distress.    Has shoes on and so this visit not able to see toes.      ASSESSMENT / PLAN:  (M62.538) Muscle spasm  (primary encounter diagnosis)  Comment: glad to hear she feels the medication has helped and will increase the dose to 10mg po 3x day.  Assured her the tylenol was cut to twice a day as she felt the Tylenol was making her tired.  Do sense that it could be other health issues that could contribute.    Plan: baclofen (LIORESAL) 10 MG tablet    (R26.9) Abnormal gait  (R25.2) Spasticity  (R29.6) Recurrent falls  Comment: have not heard of any falls.  Feel she tries to be careful.  Neurological appointment set for January.  Did not see any spastic movements but her abnormal gait is notable.  Anamaria continues to ambulate to and from her room to activities which is wonderful as she is determined to continue to do.      Orders:  1.  Increase Baclofen to 10mg po TID for muscle  spasms    Electronically signed by  ESPERANZA Fuentes CNP

## 2022-12-06 VITALS
WEIGHT: 140.4 LBS | TEMPERATURE: 97.2 F | SYSTOLIC BLOOD PRESSURE: 140 MMHG | BODY MASS INDEX: 24.87 KG/M2 | HEART RATE: 77 BPM | DIASTOLIC BLOOD PRESSURE: 79 MMHG | RESPIRATION RATE: 18 BRPM

## 2022-12-06 RX ORDER — ACETAMINOPHEN 500 MG
TABLET ORAL
Qty: 186 TABLET | Refills: 11 | Status: SHIPPED | OUTPATIENT
Start: 2022-11-23 | End: 2023-07-08

## 2022-12-11 RX ORDER — BACLOFEN 10 MG/1
10 TABLET ORAL 3 TIMES DAILY
Qty: 45 TABLET | Refills: 3
Start: 2022-11-30 | End: 2023-02-07

## 2023-01-02 DIAGNOSIS — R52 PAIN: ICD-10-CM

## 2023-01-02 RX ORDER — TROLAMINE SALICYLATE 10 G/100G
CREAM TOPICAL 3 TIMES DAILY
Qty: 85 G | Refills: 3 | Status: SHIPPED | OUTPATIENT
Start: 2023-01-02 | End: 2023-03-17

## 2023-01-05 ENCOUNTER — ASSISTED LIVING VISIT (OUTPATIENT)
Dept: GERIATRICS | Facility: CLINIC | Age: 73
End: 2023-01-05
Payer: COMMERCIAL

## 2023-01-05 VITALS
OXYGEN SATURATION: 96 % | TEMPERATURE: 97.9 F | WEIGHT: 137.2 LBS | RESPIRATION RATE: 19 BRPM | BODY MASS INDEX: 24.3 KG/M2 | HEART RATE: 80 BPM

## 2023-01-05 DIAGNOSIS — M79.661 PAIN OF RIGHT LOWER LEG: Primary | ICD-10-CM

## 2023-01-05 DIAGNOSIS — S76.319A HAMSTRING TEAR: ICD-10-CM

## 2023-01-05 DIAGNOSIS — F51.02 ADJUSTMENT INSOMNIA: ICD-10-CM

## 2023-01-05 DIAGNOSIS — F33.40 RECURRENT MAJOR DEPRESSIVE DISORDER, IN REMISSION (H): ICD-10-CM

## 2023-01-05 DIAGNOSIS — R41.0 CONFUSION: ICD-10-CM

## 2023-01-05 PROCEDURE — 99349 HOME/RES VST EST MOD MDM 40: CPT | Performed by: NURSE PRACTITIONER

## 2023-01-05 NOTE — PROGRESS NOTES
[facility] GERIATRICS  ACUTE/EPISODIC VISIT    [facility] Medical Record Number:  [id number]  Place of Service where encounter took place:  [facility] [[id number]]    Chief Complaint   Patient presents with     RECHECK       HPI:    Kylah Britt is a 72 year old  (1950), who is being seen today for an episodic care visit.  HPI information obtained from: facility chart records, patient report and [facility] chart review.    Today's concern is:    Diagnoses       Codes Comments    Pain of right lower leg    -  Primary M79.661     Hamstring tear     S76.319A     Adjustment insomnia     F51.02     Recurrent major depressive disorder, in remission (H)     F33.40     Confusion     R41.0         Anamaria asked for this NP to come see her as she wanted to talk about her pain in the right leg as it is waking her up at night.  Already on Gabapentin 200mg at bedtime.  Anamaria tries to cut back on her medications usually and so for her ask to increase something, feel she is in pain.    ALLERGIES:    Allergies   Allergen Reactions     No Known Drug Allergies         MEDICATIONS:  Post Discharge Medication Reconciliation Status: patient was not discharged from an inpatient facility or TCU. Medications reviewed today    Current Outpatient Medications   Medication Sig Dispense Refill     gabapentin (NEURONTIN) 100 MG capsule Take 3 capsules (300 mg) by mouth At Bedtime 42 capsule 1     acetaminophen (TYLENOL) 500 MG tablet Take 2 tablets (1,000 mg) by mouth 2 times daily. May also take 2 tablets (1,000 mg) daily as needed for mild pain. 186 tablet 11     BABY ASPIRIN PO Take 81 mg by mouth daily       baclofen (LIORESAL) 10 MG tablet Take 1 tablet (10 mg) by mouth 3 times daily 45 tablet 3     Calcium Carbonate (CALCIUM 600 PO) Take 1 tablet by mouth every other day       calcium carbonate (TUMS) 500 MG chewable tablet Take 2 tablets (1,000 mg) by mouth every 4 hours as needed  for heartburn 60 tablet 4     Cholecalciferol (VITAMIN D3 PO) Take 2,000 Units by mouth every other day       melatonin 3 MG tablet Take 3 tablets (9 mg) by mouth At Bedtime       omeprazole (PRILOSEC) 20 MG DR capsule 20mg by mouth twice a day for 2 weeks and then 20mg po daily in AM for 3 weeks.  Discontinue after the three weeks. 49 capsule 0     senna-docusate (SENOKOT-S/PERICOLACE) 8.6-50 MG tablet Take 1 tablet by mouth daily And daily PRN       traZODone (DESYREL) 50 MG tablet Take 1 tablet (50 mg) by mouth At Bedtime 31 tablet 11     trolamine salicylate (ASPERCREME) 10 % external cream Apply topically 3 times daily APPLY TOPICALLY BEHIND RIGHT KNEE TO HIP THREE TIMES DAILY. OK TO LEAVE AT BEDSIDE. DX PAIN. 85 g 3         REVIEW OF SYSTEMS:  4 point ROS neg other than the symptoms noted above in the HPI.  No chest pain, no shortness of breath.     PHYSICAL EXAM:  Pulse 80   Temp 97.9  F (36.6  C)   Resp 19   Wt 62.2 kg (137 lb 3.2 oz)   SpO2 96%   BMI 24.30 kg/m    Found Anamaria in her apartment as she was sitting on her couch.  Watched her legs moving around which appears to be restless.  She denies her legs moving around and bothering her.    Alert and pleasant.  Holds a conversation but sometimes she is a miss with information.    Heart rate regular and strong.    No respiratory distress.   No edema in lower legs.      Ambulates with her walker that is 4 wheeled.  Typically when she walks can see her legs do not move smoothly but not going to trip the other either.      ASSESSMENT / PLAN:  (M77.043) Pain of right lower leg  (primary encounter diagnosis)  (S76.020A) Hamstring tear  Comment: originally started on Neurontin for her hamstring tear discomfort.  Now her pain is more general of the right leg.  Do feel it could be part of her involuntary muscle movements seen that she wakes up at night and c/o the pain.  1.  Per request will increase the Gabapentin to 300mg at bedtime and see how she responds.     Plan: gabapentin (NEURONTIN) 100 MG capsule    (F51.02) Adjustment insomnia  (F33.40) Recurrent major depressive disorder, in remission (H)  Comment: is on Trazodone 50mg po at HS.  No other depression medication.  Overall her mood seems bright.  Smiles and interacts with others.    Trazodone used for sleeping assistance.  Hoping the increase of the Gabapentin will help her sleep better at night.    (R41.0) Confusion  Comment: able to manage in her current environment.  Seems to do well at the facility but still speak as she may go home.  Goal is to make her environment safe without falls.      Orders:   1.  Increase Gabapentin to 300mg po at HS - for right leg pain    Electronically signed by  ESPERANZA Fuentes CNP

## 2023-01-05 NOTE — LETTER
1/5/2023        RE: Kylah Britt  [address]  [facility] [address]        [facility] GERIATRICS  ACUTE/EPISODIC VISIT    [facility] Medical Record Number:  [id number]  Place of Service where encounter took place:  [facility] [[id number]]    Chief Complaint   Patient presents with     RECHECK       HPI:    Kylah Britt is a 72 year old  (1950), who is being seen today for an episodic care visit.  HPI information obtained from: facility chart records, patient report and [facility] chart review.    Today's concern is:    Diagnoses       Codes Comments    Pain of right lower leg    -  Primary M79.661     Hamstring tear     S76.319A     Adjustment insomnia     F51.02     Recurrent major depressive disorder, in remission (H)     F33.40     Confusion     R41.0         Anamaria asked for this NP to come see her as she wanted to talk about her pain in the right leg as it is waking her up at night.  Already on Gabapentin 200mg at bedtime.  Anamaria tries to cut back on her medications usually and so for her ask to increase something, feel she is in pain.    ALLERGIES:    Allergies   Allergen Reactions     No Known Drug Allergies         MEDICATIONS:  Post Discharge Medication Reconciliation Status: patient was not discharged from an inpatient facility or TCU. Medications reviewed today    Current Outpatient Medications   Medication Sig Dispense Refill     gabapentin (NEURONTIN) 100 MG capsule Take 3 capsules (300 mg) by mouth At Bedtime 42 capsule 1     acetaminophen (TYLENOL) 500 MG tablet Take 2 tablets (1,000 mg) by mouth 2 times daily. May also take 2 tablets (1,000 mg) daily as needed for mild pain. 186 tablet 11     BABY ASPIRIN PO Take 81 mg by mouth daily       baclofen (LIORESAL) 10 MG tablet Take 1 tablet (10 mg) by mouth 3 times daily 45 tablet 3     Calcium Carbonate (CALCIUM 600 PO) Take 1 tablet by mouth every other day       calcium carbonate  (TUMS) 500 MG chewable tablet Take 2 tablets (1,000 mg) by mouth every 4 hours as needed for heartburn 60 tablet 4     Cholecalciferol (VITAMIN D3 PO) Take 2,000 Units by mouth every other day       melatonin 3 MG tablet Take 3 tablets (9 mg) by mouth At Bedtime       omeprazole (PRILOSEC) 20 MG DR capsule 20mg by mouth twice a day for 2 weeks and then 20mg po daily in AM for 3 weeks.  Discontinue after the three weeks. 49 capsule 0     senna-docusate (SENOKOT-S/PERICOLACE) 8.6-50 MG tablet Take 1 tablet by mouth daily And daily PRN       traZODone (DESYREL) 50 MG tablet Take 1 tablet (50 mg) by mouth At Bedtime 31 tablet 11     trolamine salicylate (ASPERCREME) 10 % external cream Apply topically 3 times daily APPLY TOPICALLY BEHIND RIGHT KNEE TO HIP THREE TIMES DAILY. OK TO LEAVE AT BEDSIDE. DX PAIN. 85 g 3         REVIEW OF SYSTEMS:  4 point ROS neg other than the symptoms noted above in the HPI.  No chest pain, no shortness of breath.     PHYSICAL EXAM:  Pulse 80   Temp 97.9  F (36.6  C)   Resp 19   Wt 62.2 kg (137 lb 3.2 oz)   SpO2 96%   BMI 24.30 kg/m    Found Anamaria in her apartment as she was sitting on her couch.  Watched her legs moving around which appears to be restless.  She denies her legs moving around and bothering her.    Alert and pleasant.  Holds a conversation but sometimes she is a miss with information.    Heart rate regular and strong.    No respiratory distress.   No edema in lower legs.      Ambulates with her walker that is 4 wheeled.  Typically when she walks can see her legs do not move smoothly but not going to trip the other either.      ASSESSMENT / PLAN:  (A79.552) Pain of right lower leg  (primary encounter diagnosis)  (S76.613A) Hamstring tear  Comment: originally started on Neurontin for her hamstring tear discomfort.  Now her pain is more general of the right leg.  Do feel it could be part of her involuntary muscle movements seen that she wakes up at night and c/o the  pain.  1.  Per request will increase the Gabapentin to 300mg at bedtime and see how she responds.    Plan: gabapentin (NEURONTIN) 100 MG capsule    (F51.02) Adjustment insomnia  (F33.40) Recurrent major depressive disorder, in remission (H)  Comment: is on Trazodone 50mg po at HS.  No other depression medication.  Overall her mood seems bright.  Smiles and interacts with others.    Trazodone used for sleeping assistance.  Hoping the increase of the Gabapentin will help her sleep better at night.    (R41.0) Confusion  Comment: able to manage in her current environment.  Seems to do well at the facility but still speak as she may go home.  Goal is to make her environment safe without falls.      Orders:   1.  Increase Gabapentin to 300mg po at HS - for right leg pain    Electronically signed by  ESPERANZA Fuentes CNP             Sincerely,        ESPERANZA Fuentes CNP

## 2023-01-05 NOTE — LETTER
1/5/2023        RE: Kylah Britt  [address]  [facility] [address]        [facility] GERIATRICS  ACUTE/EPISODIC VISIT    [facility] Medical Record Number:  [id number]  Place of Service where encounter took place:  [facility] [[id number]]    Chief Complaint   Patient presents with     RECHECK       HPI:    Kylah Britt is a 72 year old  (1950), who is being seen today for an episodic care visit.  HPI information obtained from: facility chart records, patient report and [facility] chart review.    Today's concern is:    Diagnoses       Codes Comments    Pain of right lower leg    -  Primary M79.661     Hamstring tear     S76.319A     Adjustment insomnia     F51.02     Recurrent major depressive disorder, in remission (H)     F33.40     Confusion     R41.0         Anamaria asked for this NP to come see her as she wanted to talk about her pain in the right leg as it is waking her up at night.  Already on Gabapentin 200mg at bedtime.  Anamaria tries to cut back on her medications usually and so for her ask to increase something, feel she is in pain.    ALLERGIES:    Allergies   Allergen Reactions     No Known Drug Allergies         MEDICATIONS:  Post Discharge Medication Reconciliation Status: patient was not discharged from an inpatient facility or TCU. Medications reviewed today    Current Outpatient Medications   Medication Sig Dispense Refill     gabapentin (NEURONTIN) 100 MG capsule Take 3 capsules (300 mg) by mouth At Bedtime 42 capsule 1     acetaminophen (TYLENOL) 500 MG tablet Take 2 tablets (1,000 mg) by mouth 2 times daily. May also take 2 tablets (1,000 mg) daily as needed for mild pain. 186 tablet 11     BABY ASPIRIN PO Take 81 mg by mouth daily       baclofen (LIORESAL) 10 MG tablet Take 1 tablet (10 mg) by mouth 3 times daily 45 tablet 3     Calcium Carbonate (CALCIUM 600 PO) Take 1 tablet by mouth every other day       calcium carbonate  (TUMS) 500 MG chewable tablet Take 2 tablets (1,000 mg) by mouth every 4 hours as needed for heartburn 60 tablet 4     Cholecalciferol (VITAMIN D3 PO) Take 2,000 Units by mouth every other day       melatonin 3 MG tablet Take 3 tablets (9 mg) by mouth At Bedtime       omeprazole (PRILOSEC) 20 MG DR capsule 20mg by mouth twice a day for 2 weeks and then 20mg po daily in AM for 3 weeks.  Discontinue after the three weeks. 49 capsule 0     senna-docusate (SENOKOT-S/PERICOLACE) 8.6-50 MG tablet Take 1 tablet by mouth daily And daily PRN       traZODone (DESYREL) 50 MG tablet Take 1 tablet (50 mg) by mouth At Bedtime 31 tablet 11     trolamine salicylate (ASPERCREME) 10 % external cream Apply topically 3 times daily APPLY TOPICALLY BEHIND RIGHT KNEE TO HIP THREE TIMES DAILY. OK TO LEAVE AT BEDSIDE. DX PAIN. 85 g 3         REVIEW OF SYSTEMS:  4 point ROS neg other than the symptoms noted above in the HPI.  No chest pain, no shortness of breath.     PHYSICAL EXAM:  Pulse 80   Temp 97.9  F (36.6  C)   Resp 19   Wt 62.2 kg (137 lb 3.2 oz)   SpO2 96%   BMI 24.30 kg/m    Found Anamaria in her apartment as she was sitting on her couch.  Watched her legs moving around which appears to be restless.  She denies her legs moving around and bothering her.    Alert and pleasant.  Holds a conversation but sometimes she is a miss with information.    Heart rate regular and strong.    No respiratory distress.   No edema in lower legs.      Ambulates with her walker that is 4 wheeled.  Typically when she walks can see her legs do not move smoothly but not going to trip the other either.      ASSESSMENT / PLAN:  (K73.776) Pain of right lower leg  (primary encounter diagnosis)  (S76.252A) Hamstring tear  Comment: originally started on Neurontin for her hamstring tear discomfort.  Now her pain is more general of the right leg.  Do feel it could be part of her involuntary muscle movements seen that she wakes up at night and c/o the  pain.  1.  Per request will increase the Gabapentin to 300mg at bedtime and see how she responds.    Plan: gabapentin (NEURONTIN) 100 MG capsule    (F51.02) Adjustment insomnia  (F33.40) Recurrent major depressive disorder, in remission (H)  Comment: is on Trazodone 50mg po at HS.  No other depression medication.  Overall her mood seems bright.  Smiles and interacts with others.    Trazodone used for sleeping assistance.  Hoping the increase of the Gabapentin will help her sleep better at night.    (R41.0) Confusion  Comment: able to manage in her current environment.  Seems to do well at the facility but still speak as she may go home.  Goal is to make her environment safe without falls.      Orders:   1.  Increase Gabapentin to 300mg po at HS - for right leg pain    Electronically signed by  ESPERANZA Fuentes CNP             Sincerely,        ESPERANZA Fuentes CNP

## 2023-01-12 ENCOUNTER — LAB (OUTPATIENT)
Dept: LAB | Facility: CLINIC | Age: 73
End: 2023-01-12
Payer: COMMERCIAL

## 2023-01-12 DIAGNOSIS — R41.3 MEMORY LOSS: ICD-10-CM

## 2023-01-12 DIAGNOSIS — R27.0 ATAXIA: ICD-10-CM

## 2023-01-12 DIAGNOSIS — R25.2 CRAMP IN MUSCLE: ICD-10-CM

## 2023-01-12 DIAGNOSIS — R27.0 ATAXIA: Primary | ICD-10-CM

## 2023-01-12 LAB — FOLATE SERPL-MCNC: 11.4 NG/ML (ref 4.6–34.8)

## 2023-01-12 PROCEDURE — 86800 THYROGLOBULIN ANTIBODY: CPT

## 2023-01-12 PROCEDURE — 82607 VITAMIN B-12: CPT

## 2023-01-12 PROCEDURE — 83921 ORGANIC ACID SINGLE QUANT: CPT

## 2023-01-12 PROCEDURE — 86376 MICROSOMAL ANTIBODY EACH: CPT

## 2023-01-12 PROCEDURE — 86255 FLUORESCENT ANTIBODY SCREEN: CPT | Mod: 90

## 2023-01-12 PROCEDURE — 82525 ASSAY OF COPPER: CPT | Mod: 90

## 2023-01-12 PROCEDURE — 84207 ASSAY OF VITAMIN B-6: CPT | Mod: 90

## 2023-01-12 PROCEDURE — 82746 ASSAY OF FOLIC ACID SERUM: CPT

## 2023-01-12 PROCEDURE — 36415 COLL VENOUS BLD VENIPUNCTURE: CPT

## 2023-01-12 PROCEDURE — 86341 ISLET CELL ANTIBODY: CPT | Mod: 90

## 2023-01-12 PROCEDURE — 99000 SPECIMEN HANDLING OFFICE-LAB: CPT

## 2023-01-13 LAB
THYROGLOB AB SERPL IA-ACNC: <20 IU/ML
THYROPEROXIDASE AB SERPL-ACNC: <10 IU/ML
VIT B12 SERPL-MCNC: 740 PG/ML (ref 232–1245)

## 2023-01-15 LAB — PARANEOPLASTIC AB SER QL IF: NORMAL

## 2023-01-16 LAB
COPPER SERPL-MCNC: 98.4 UG/DL
GAD65 AB SER IA-ACNC: <5 IU/ML

## 2023-01-18 LAB — PYRIDOXAL PHOS SERPL-SCNC: 34.5 NMOL/L

## 2023-01-18 RX ORDER — GABAPENTIN 100 MG/1
300 CAPSULE ORAL AT BEDTIME
Qty: 42 CAPSULE | Refills: 1
Start: 2023-01-06 | End: 2023-04-24

## 2023-01-20 LAB — METHYLMALONATE SERPL-SCNC: 0.14 UMOL/L (ref 0–0.4)

## 2023-01-30 ENCOUNTER — TRANSFERRED RECORDS (OUTPATIENT)
Dept: HEALTH INFORMATION MANAGEMENT | Facility: CLINIC | Age: 73
End: 2023-01-30

## 2023-02-06 ENCOUNTER — TRANSFERRED RECORDS (OUTPATIENT)
Dept: HEALTH INFORMATION MANAGEMENT | Facility: CLINIC | Age: 73
End: 2023-02-06

## 2023-02-07 ENCOUNTER — ASSISTED LIVING VISIT (OUTPATIENT)
Dept: GERIATRICS | Facility: CLINIC | Age: 73
End: 2023-02-07
Payer: COMMERCIAL

## 2023-02-07 VITALS
TEMPERATURE: 97.6 F | SYSTOLIC BLOOD PRESSURE: 150 MMHG | BODY MASS INDEX: 22.43 KG/M2 | DIASTOLIC BLOOD PRESSURE: 86 MMHG | RESPIRATION RATE: 23 BRPM | OXYGEN SATURATION: 97 % | WEIGHT: 126.6 LBS | HEART RATE: 82 BPM

## 2023-02-07 DIAGNOSIS — F01.A3 MILD VASCULAR DEMENTIA WITH MOOD DISTURBANCE (H): ICD-10-CM

## 2023-02-07 DIAGNOSIS — M79.661 PAIN OF RIGHT LOWER LEG: ICD-10-CM

## 2023-02-07 DIAGNOSIS — M62.838 MUSCLE SPASM: Primary | ICD-10-CM

## 2023-02-07 DIAGNOSIS — R26.9 ABNORMAL GAIT: ICD-10-CM

## 2023-02-07 PROCEDURE — 99349 HOME/RES VST EST MOD MDM 40: CPT | Performed by: NURSE PRACTITIONER

## 2023-02-07 RX ORDER — BACLOFEN 10 MG/1
5 TABLET ORAL 3 TIMES DAILY
Start: 2023-02-07 | End: 2023-08-11

## 2023-02-07 NOTE — LETTER
2/7/2023        RE: Kylah Britt  [address]  [facility] [address]        No notes on file      Sincerely,        ESPERANZA Fuentes CNP

## 2023-02-07 NOTE — LETTER
2/7/2023        RE: Kylah Britt  [address]  [facility] [address]        [facility] GERIATRICS  ACUTE/EPISODIC VISIT    [facility] Medical Record Number:  [id number]  Place of Service where encounter took place:  [facility] [[id number]]    Chief Complaint   Patient presents with     RECHECK       HPI:    Kylah Britt is a 72 year old  (1950), who is being seen today for an episodic care visit.  HPI information obtained from: facility chart records, facility staff, patient report and family/first contact daughter report.    Today's concern is:    Diagnoses       Codes Comments    Muscle spasm    -  Primary M62.838     Abnormal gait     R26.9     Pain of right lower leg     M79.661     Confusion     R41.0             ALLERGIES:    Allergies   Allergen Reactions     No Known Drug Allergies         MEDICATIONS:  Post Discharge Medication Reconciliation Status: patient was not discharged from an inpatient facility or TCU.     Current Outpatient Medications   Medication Sig Dispense Refill     baclofen (LIORESAL) 10 MG tablet Take 0.5 tablets (5 mg) by mouth 3 times daily       acetaminophen (TYLENOL) 500 MG tablet Take 2 tablets (1,000 mg) by mouth 2 times daily. May also take 2 tablets (1,000 mg) daily as needed for mild pain. 186 tablet 11     BABY ASPIRIN PO Take 81 mg by mouth daily       Calcium Carbonate (CALCIUM 600 PO) Take 1 tablet by mouth every other day       calcium carbonate (TUMS) 500 MG chewable tablet Take 2 tablets (1,000 mg) by mouth every 4 hours as needed for heartburn 60 tablet 4     Cholecalciferol (VITAMIN D3 PO) Take 2,000 Units by mouth every other day       gabapentin (NEURONTIN) 100 MG capsule Take 3 capsules (300 mg) by mouth At Bedtime 42 capsule 1     melatonin 3 MG tablet Take 3 tablets (9 mg) by mouth At Bedtime       omeprazole (PRILOSEC) 20 MG DR capsule 20mg by mouth twice a day for 2 weeks and then 20mg po daily in AM  for 3 weeks.  Discontinue after the three weeks. 49 capsule 0     senna-docusate (SENOKOT-S/PERICOLACE) 8.6-50 MG tablet Take 1 tablet by mouth daily And daily PRN       traZODone (DESYREL) 50 MG tablet Take 1 tablet (50 mg) by mouth At Bedtime 31 tablet 11     trolamine salicylate (ASPERCREME) 10 % external cream Apply topically 3 times daily APPLY TOPICALLY BEHIND RIGHT KNEE TO HIP THREE TIMES DAILY. OK TO LEAVE AT BEDSIDE. DX PAIN. 85 g 3         REVIEW OF SYSTEMS:  4 point ROS neg other than the symptoms noted above in the HPI.    PHYSICAL EXAM:  BP (!) 150/86   Pulse 82   Temp 97.6  F (36.4  C)   Resp 23   Wt 57.4 kg (126 lb 9.6 oz)   SpO2 97%   BMI 22.43 kg/m    Found Anamaria in her apartment.  She saw this NP as she was leaving the dining room and asked to come see her.  Ambulates with a 4WW and gait is abnormal.    Voice is not the smoothest when saying sentences.    Neatly groomed.  Alert and oriented to self and surroundings.  Time and place if vague. She talks about moving back home.     Legs move around but do not think they are spastic-like big jerks.   Heart rate regular and strong.  Lungs are clear.    Abdomen is round, soft, and non-tender. No obesity.      Able to see lab drawn on 1/12/23 from another provider.      ASSESSMENT / PLAN:  (P23.395) Muscle spasm  (primary encounter diagnosis)  Comment: per request of Kylah, she would like her Baclofen decreased.  She complains of being tired and does not know if it works like she thinks it is going too. This is typical behavior of hers to start something and then she modifies it. Feel she has time to think about things and rightfully so as within the last year, her health has changed.  Plan: baclofen (LIORESAL) 10 MG tablet    (R26.9) Abnormal gait  Comment: able to read notes placed in her chart from the 1/12/23 visit with Neurology.  Multiple systems affecting each other along with atrophy of muscles.  Labs were done as well.      (B36.333) Pain  of right lower leg  Comment: remains on Gabapentin 300mg at HS.  Have offered her to have it increased and she knows she can ask but risk is being tired more.    (F01.A3) Mild vascular dementia with mood disturbance  Comment: it is clear that Kylah's memory is forgetful.   She can voice her needs but staff do watch for her as well since physically she is changing.  Does fine in her environment.    Spoke with her daughter today for 20 minutes and daughter did not feel Neurology gave the answers hoping for.  Knows her mother can not go home, she is safe here at the [facility].  She is aware of the changes her mother requests.    Remains on ASA 81mg po daily.      Orders:  1.  Decrease Baclofen to 5mg po TID per request of patient    Electronically signed by  ESPERANZA Fuentes CNP             Sincerely,        ESPERANZA Fuentes CNP

## 2023-02-07 NOTE — PROGRESS NOTES
[facility] GERIATRICS  ACUTE/EPISODIC VISIT    [facility] Medical Record Number:  [id number]  Place of Service where encounter took place:  [facility] [[id number]]    Chief Complaint   Patient presents with     RECHECK       HPI:    Kylah Britt is a 72 year old  (1950), who is being seen today for an episodic care visit.  HPI information obtained from: facility chart records, facility staff, patient report and family/first contact daughter report.    Today's concern is:    Diagnoses       Codes Comments    Muscle spasm    -  Primary M62.838     Abnormal gait     R26.9     Pain of right lower leg     M79.661     Confusion     R41.0             ALLERGIES:    Allergies   Allergen Reactions     No Known Drug Allergies         MEDICATIONS:  Post Discharge Medication Reconciliation Status: patient was not discharged from an inpatient facility or TCU.     Current Outpatient Medications   Medication Sig Dispense Refill     baclofen (LIORESAL) 10 MG tablet Take 0.5 tablets (5 mg) by mouth 3 times daily       acetaminophen (TYLENOL) 500 MG tablet Take 2 tablets (1,000 mg) by mouth 2 times daily. May also take 2 tablets (1,000 mg) daily as needed for mild pain. 186 tablet 11     BABY ASPIRIN PO Take 81 mg by mouth daily       Calcium Carbonate (CALCIUM 600 PO) Take 1 tablet by mouth every other day       calcium carbonate (TUMS) 500 MG chewable tablet Take 2 tablets (1,000 mg) by mouth every 4 hours as needed for heartburn 60 tablet 4     Cholecalciferol (VITAMIN D3 PO) Take 2,000 Units by mouth every other day       gabapentin (NEURONTIN) 100 MG capsule Take 3 capsules (300 mg) by mouth At Bedtime 42 capsule 1     melatonin 3 MG tablet Take 3 tablets (9 mg) by mouth At Bedtime       omeprazole (PRILOSEC) 20 MG DR capsule 20mg by mouth twice a day for 2 weeks and then 20mg po daily in AM for 3 weeks.  Discontinue after the three weeks. 49 capsule 0     senna-docusate  (SENOKOT-S/PERICOLACE) 8.6-50 MG tablet Take 1 tablet by mouth daily And daily PRN       traZODone (DESYREL) 50 MG tablet Take 1 tablet (50 mg) by mouth At Bedtime 31 tablet 11     trolamine salicylate (ASPERCREME) 10 % external cream Apply topically 3 times daily APPLY TOPICALLY BEHIND RIGHT KNEE TO HIP THREE TIMES DAILY. OK TO LEAVE AT BEDSIDE. DX PAIN. 85 g 3         REVIEW OF SYSTEMS:  4 point ROS neg other than the symptoms noted above in the HPI.    PHYSICAL EXAM:  BP (!) 150/86   Pulse 82   Temp 97.6  F (36.4  C)   Resp 23   Wt 57.4 kg (126 lb 9.6 oz)   SpO2 97%   BMI 22.43 kg/m    Found Anamaria in her apartment.  She saw this NP as she was leaving the dining room and asked to come see her.  Ambulates with a 4WW and gait is abnormal.    Voice is not the smoothest when saying sentences.    Neatly groomed.  Alert and oriented to self and surroundings.  Time and place if vague. She talks about moving back home.     Legs move around but do not think they are spastic-like big jerks.   Heart rate regular and strong.  Lungs are clear.    Abdomen is round, soft, and non-tender. No obesity.      Able to see lab drawn on 1/12/23 from another provider.      ASSESSMENT / PLAN:  (F10.898) Muscle spasm  (primary encounter diagnosis)  Comment: per request of Kylah, she would like her Baclofen decreased.  She complains of being tired and does not know if it works like she thinks it is going too. This is typical behavior of hers to start something and then she modifies it. Feel she has time to think about things and rightfully so as within the last year, her health has changed.  Plan: baclofen (LIORESAL) 10 MG tablet    (R26.9) Abnormal gait  Comment: able to read notes placed in her chart from the 1/12/23 visit with Neurology.  Multiple systems affecting each other along with atrophy of muscles.  Labs were done as well.      (E73.691) Pain of right lower leg  Comment: remains on Gabapentin 300mg at HS.  Have offered  her to have it increased and she knows she can ask but risk is being tired more.    (F01.A3) Mild vascular dementia with mood disturbance  Comment: it is clear that Kylah's memory is forgetful.   She can voice her needs but staff do watch for her as well since physically she is changing.  Does fine in her environment.    Spoke with her daughter today for 20 minutes and daughter did not feel Neurology gave the answers hoping for.  Knows her mother can not go home, she is safe here at the [facility].  She is aware of the changes her mother requests.    Remains on ASA 81mg po daily.      Orders:  1.  Decrease Baclofen to 5mg po TID per request of patient    Electronically signed by  ESPERANZA Fuentes CNP

## 2023-02-17 ENCOUNTER — TRANSFERRED RECORDS (OUTPATIENT)
Dept: HEALTH INFORMATION MANAGEMENT | Facility: CLINIC | Age: 73
End: 2023-02-17

## 2023-03-17 DIAGNOSIS — R52 PAIN: ICD-10-CM

## 2023-03-17 RX ORDER — TROLAMINE SALICYLATE 10 G/100G
CREAM TOPICAL 3 TIMES DAILY
Qty: 85 G | Refills: 3 | Status: SHIPPED | OUTPATIENT
Start: 2023-03-17 | End: 2023-05-09

## 2023-04-05 ENCOUNTER — ASSISTED LIVING VISIT (OUTPATIENT)
Dept: GERIATRICS | Facility: CLINIC | Age: 73
End: 2023-04-05
Payer: COMMERCIAL

## 2023-04-05 VITALS
HEART RATE: 73 BPM | BODY MASS INDEX: 23.28 KG/M2 | RESPIRATION RATE: 16 BRPM | WEIGHT: 131.4 LBS | DIASTOLIC BLOOD PRESSURE: 63 MMHG | SYSTOLIC BLOOD PRESSURE: 128 MMHG | TEMPERATURE: 98 F

## 2023-04-05 DIAGNOSIS — R27.0 ATAXIA: ICD-10-CM

## 2023-04-05 DIAGNOSIS — G31.9 NEURODEGENERATIVE DISORDER (H): Primary | ICD-10-CM

## 2023-04-05 DIAGNOSIS — F01.A3 MILD VASCULAR DEMENTIA WITH MOOD DISTURBANCE (H): ICD-10-CM

## 2023-04-05 DIAGNOSIS — F43.23 ADJUSTMENT REACTION WITH ANXIETY AND DEPRESSION: ICD-10-CM

## 2023-04-05 PROCEDURE — 99349 HOME/RES VST EST MOD MDM 40: CPT | Performed by: NURSE PRACTITIONER

## 2023-04-05 RX ORDER — CITALOPRAM HYDROBROMIDE 10 MG/1
10 TABLET ORAL DAILY
Qty: 30 TABLET | Refills: 4 | Status: SHIPPED | OUTPATIENT
Start: 2023-04-06 | End: 2023-05-29

## 2023-04-05 NOTE — LETTER
4/5/2023        RE: Kylah Britt  [address]  [facility] [address]        [facility] GERIATRICS  ACUTE/EPISODIC VISIT    [facility] Medical Record Number:  [id number]  Place of Service where encounter took place:  [facility] [[id number]]    Chief Complaint   Patient presents with     RECHECK       HPI:    Kylah Britt is a 72 year old  (1950), who is being seen today for an episodic care visit.  HPI information obtained from: facility chart records, facility staff, patient report, [facility] chart review and family/first contact daughter report.    Today's concern is:  Received a message from daughter through the staff about starting Kylah on medication for depression as she is being told she can not go home and to enforce the fact she will be here long term due to her Nonregenerative disorder/ataxia that she is not able to function like she did about a year ago.      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  They also question how many times did she fall and got up herself.  As they are going through this journey with their mother, they are thinking about things and feeling like this may have been going on longer than thought.    ALLERGIES:    Allergies   Allergen Reactions     No Known Drug Allergies         MEDICATIONS:  Post Discharge Medication Reconciliation Status: patient was not discharged from an inpatient facility or TCU. Medications reviewed today    Current Outpatient Medications   Medication Sig Dispense Refill     [START ON 4/6/2023] citalopram (CELEXA) 10 MG tablet Take 1 tablet (10 mg) by mouth daily 30 tablet 4     acetaminophen (TYLENOL) 500 MG tablet Take 2 tablets (1,000 mg) by mouth 2 times daily. May also take 2 tablets (1,000 mg) daily as needed for mild pain. 186 tablet 11     BABY ASPIRIN PO Take 81 mg by mouth daily       baclofen (LIORESAL) 10 MG tablet Take  0.5 tablets (5 mg) by mouth 3 times daily       Calcium Carbonate (CALCIUM 600 PO) Take 1 tablet by mouth every other day       calcium carbonate (TUMS) 500 MG chewable tablet Take 2 tablets (1,000 mg) by mouth every 4 hours as needed for heartburn 60 tablet 4     Cholecalciferol (VITAMIN D3 PO) Take 2,000 Units by mouth every other day       gabapentin (NEURONTIN) 100 MG capsule Take 2 capsules (200 mg) by mouth At Bedtime 42 capsule 1     melatonin 3 MG tablet Take 3 tablets (9 mg) by mouth At Bedtime       omeprazole (PRILOSEC) 20 MG DR capsule 20mg by mouth twice a day for 2 weeks and then 20mg po daily in AM for 3 weeks.  Discontinue after the three weeks. 49 capsule 0     senna-docusate (SENOKOT-S/PERICOLACE) 8.6-50 MG tablet Take 1 tablet by mouth daily And daily PRN       traZODone (DESYREL) 50 MG tablet Take 1 tablet (50 mg) by mouth At Bedtime 31 tablet 11     trolamine salicylate (ASPERCREME) 10 % external cream Apply topically 3 times daily APPLY TOPICALLY BEHIND RIGHT KNEE TO HIP THREE TIMES DAILY. OK TO LEAVE AT BEDSIDE. DX PAIN. 85 g 3     Medications reviewed:  Medications reconciled to facility chart and changes were made to reflect current medications as identified as above med list. Below are the changes that were made:   Medications stopped since last EPIC medication reconciliation:   There are no discontinued medications.    Medications started since last [facility] medication reconciliation:  Orders Placed This Encounter   Medications     citalopram (CELEXA) 10 MG tablet     Sig: Take 1 tablet (10 mg) by mouth daily     Dispense:  30 tablet     Refill:  4         REVIEW OF SYSTEMS:  4 point ROS neg other than the symptoms noted above in the HPI.  Denied chest pain, shortness of breath.    PHYSICAL EXAM:  /63   Pulse 73   Temp 98  F (36.7  C)   Resp 16   Wt 59.6 kg (131 lb 6.4 oz)   BMI 23.28 kg/m    Kylah was sitting in the dining room at her table before lunch was starting.  Pulled up a  chair to talk with her.    She knows whom this NP is and calls by name.  Told her that she was going to start a medication to help her mood as she is going through changes and even bigger ones in the future.      Heart rate regular and strong.  Lungs are clear.  Does not use any oxygen.  Abdomen is flat soft and states her bowels are good.  No edema in lower extremities. Wearing shoes.  Ambulates with a 4WW.  Abnormal gait as sometimes it looks like she drags a leg or it kicks out.      Labs present are from 1/12/23.      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 She is on Baclofen 5mg 3x a day and seems to tolerate it though has been on a higher dose.  Does take Gabapentin 200mg at HS.  She has been on more before.  No changes at this time.    (F43.23) Adjustment reaction with anxiety and depression  (F01.A3) Mild vascular dementia with mood disturbance (H)  Comment: will add Celexa 10mg po daily for adjustment reaction. Does have anxiety already.  Today repeated questions about the medications and making sure it was the right thing for her.  Assured her that she will be monitored.  If she is tired from it than can give in the evening time.    Plan: citalopram (CELEXA) 10 MG tablet      Orders:  1.  Celexa 10mg po daily for reactive depression/anxiety    Electronically signed by  ESPERANZA Fuentes CNP             Sincerely,        ESPERANZA Fuentes CNP

## 2023-04-06 NOTE — PROGRESS NOTES
[facility] GERIATRICS  ACUTE/EPISODIC VISIT    [facility] Medical Record Number:  [id number]  Place of Service where encounter took place:  [facility] [[id number]]    Chief Complaint   Patient presents with     RECHECK       HPI:    Kylah Britt is a 72 year old  (1950), who is being seen today for an episodic care visit.  HPI information obtained from: facility chart records, facility staff, patient report, [facility] chart review and family/first contact daughter report.    Today's concern is:  Received a message from daughter through the staff about starting Kylah on medication for depression as she is being told she can not go home and to enforce the fact she will be here long term due to her Nonregenerative disorder/ataxia that she is not able to function like she did about a year ago.      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  They also question how many times did she fall and got up herself.  As they are going through this journey with their mother, they are thinking about things and feeling like this may have been going on longer than thought.    ALLERGIES:    Allergies   Allergen Reactions     No Known Drug Allergies         MEDICATIONS:  Post Discharge Medication Reconciliation Status: patient was not discharged from an inpatient facility or TCU. Medications reviewed today    Current Outpatient Medications   Medication Sig Dispense Refill     [START ON 4/6/2023] citalopram (CELEXA) 10 MG tablet Take 1 tablet (10 mg) by mouth daily 30 tablet 4     acetaminophen (TYLENOL) 500 MG tablet Take 2 tablets (1,000 mg) by mouth 2 times daily. May also take 2 tablets (1,000 mg) daily as needed for mild pain. 186 tablet 11     BABY ASPIRIN PO Take 81 mg by mouth daily       baclofen (LIORESAL) 10 MG tablet Take 0.5 tablets (5 mg) by mouth 3 times daily       Calcium Carbonate (CALCIUM 600 PO)  Take 1 tablet by mouth every other day       calcium carbonate (TUMS) 500 MG chewable tablet Take 2 tablets (1,000 mg) by mouth every 4 hours as needed for heartburn 60 tablet 4     Cholecalciferol (VITAMIN D3 PO) Take 2,000 Units by mouth every other day       gabapentin (NEURONTIN) 100 MG capsule Take 2 capsules (200 mg) by mouth At Bedtime 42 capsule 1     melatonin 3 MG tablet Take 3 tablets (9 mg) by mouth At Bedtime       omeprazole (PRILOSEC) 20 MG DR capsule 20mg by mouth twice a day for 2 weeks and then 20mg po daily in AM for 3 weeks.  Discontinue after the three weeks. 49 capsule 0     senna-docusate (SENOKOT-S/PERICOLACE) 8.6-50 MG tablet Take 1 tablet by mouth daily And daily PRN       traZODone (DESYREL) 50 MG tablet Take 1 tablet (50 mg) by mouth At Bedtime 31 tablet 11     trolamine salicylate (ASPERCREME) 10 % external cream Apply topically 3 times daily APPLY TOPICALLY BEHIND RIGHT KNEE TO HIP THREE TIMES DAILY. OK TO LEAVE AT BEDSIDE. DX PAIN. 85 g 3     Medications reviewed:  Medications reconciled to facility chart and changes were made to reflect current medications as identified as above med list. Below are the changes that were made:   Medications stopped since last EPIC medication reconciliation:   There are no discontinued medications.    Medications started since last [facility] medication reconciliation:  Orders Placed This Encounter   Medications     citalopram (CELEXA) 10 MG tablet     Sig: Take 1 tablet (10 mg) by mouth daily     Dispense:  30 tablet     Refill:  4         REVIEW OF SYSTEMS:  4 point ROS neg other than the symptoms noted above in the HPI.  Denied chest pain, shortness of breath.    PHYSICAL EXAM:  /63   Pulse 73   Temp 98  F (36.7  C)   Resp 16   Wt 59.6 kg (131 lb 6.4 oz)   BMI 23.28 kg/m    Kylah was sitting in the dining room at her table before lunch was starting.  Pulled up a chair to talk with her.    She knows whom this NP is and calls by name.  Told her  that she was going to start a medication to help her mood as she is going through changes and even bigger ones in the future.      Heart rate regular and strong.  Lungs are clear.  Does not use any oxygen.  Abdomen is flat soft and states her bowels are good.  No edema in lower extremities. Wearing shoes.  Ambulates with a 4WW.  Abnormal gait as sometimes it looks like she drags a leg or it kicks out.      Labs present are from 1/12/23.      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 She is on Baclofen 5mg 3x a day and seems to tolerate it though has been on a higher dose.  Does take Gabapentin 200mg at HS.  She has been on more before.  No changes at this time.    (F43.23) Adjustment reaction with anxiety and depression  (F01.A3) Mild vascular dementia with mood disturbance (H)  Comment: will add Celexa 10mg po daily for adjustment reaction. Does have anxiety already.  Today repeated questions about the medications and making sure it was the right thing for her.  Assured her that she will be monitored.  If she is tired from it than can give in the evening time.    Plan: citalopram (CELEXA) 10 MG tablet      Orders:  1.  Celexa 10mg po daily for reactive depression/anxiety    Electronically signed by  ESPERANZA Fuentes CNP

## 2023-04-19 ENCOUNTER — ASSISTED LIVING VISIT (OUTPATIENT)
Dept: GERIATRICS | Facility: CLINIC | Age: 73
End: 2023-04-19
Payer: COMMERCIAL

## 2023-04-19 VITALS
TEMPERATURE: 98 F | WEIGHT: 131.4 LBS | HEART RATE: 73 BPM | BODY MASS INDEX: 23.28 KG/M2 | RESPIRATION RATE: 16 BRPM | SYSTOLIC BLOOD PRESSURE: 128 MMHG | DIASTOLIC BLOOD PRESSURE: 63 MMHG | OXYGEN SATURATION: 97 %

## 2023-04-19 DIAGNOSIS — F43.23 ADJUSTMENT REACTION WITH ANXIETY AND DEPRESSION: ICD-10-CM

## 2023-04-19 DIAGNOSIS — R42 DIZZINESS: Primary | ICD-10-CM

## 2023-04-19 DIAGNOSIS — G31.9 NEURODEGENERATIVE DISORDER (H): ICD-10-CM

## 2023-04-19 DIAGNOSIS — F01.A3 MILD VASCULAR DEMENTIA WITH MOOD DISTURBANCE (H): ICD-10-CM

## 2023-04-19 PROCEDURE — 99349 HOME/RES VST EST MOD MDM 40: CPT | Performed by: NURSE PRACTITIONER

## 2023-04-19 NOTE — LETTER
4/19/2023        RE: Kylah Britt  [address]  [facility] [address]        [facility] GERIATRICS  ACUTE/EPISODIC VISIT    [facility] Medical Record Number:  [id number]  Place of Service where encounter took place:  [facility] [[id number]]    Chief Complaint   Patient presents with     RECHECK       HPI:    Kylah Britt is a 72 year old  (1950), who is being seen today for an episodic care visit.  HPI information obtained from: facility chart records, facility staff and patient report.    Today's concern is:    Diagnoses       Codes Comments    Dizziness    -  Primary R42     Adjustment reaction with anxiety and depression     F43.23     Mild vascular dementia with mood disturbance (H)     F01.A3     Neurodegenerative disorder (H)     G31.9         Came to see Anamaria today per her request.  Had started her on 4/6/23 with Celexa due to anticipation of her behavior as she is being told she will not be returning home to live as she is not safe by herself anymore due to her neurodegenerative disorder.      Staff passed on the request she wanted to talk about being dizzy and she has refused her Celexa.  She is asking for the medication to stop.  Took the medication from 4/7 to 4/11.      Found Anamaria in her apartment laying in bed - not feeling the best.  She was willing to sit up on side of bed but told her she did not have to she can stay in bed.  She often would lift up her head and neck muscles were strong.    Anamaria states that she feels dizzy after she takes the medication which is the newest on her regimen.  Did look up s/e of the Celexa in front of her.  Dizziness is present on most common s/e list.      ALLERGIES:    Allergies   Allergen Reactions     No Known Drug Allergies         MEDICATIONS:  Post Discharge Medication Reconciliation Status: patient was not discharged from an inpatient facility or TCU. Medications reviewed today    Current  Outpatient Medications   Medication Sig Dispense Refill     acetaminophen (TYLENOL) 500 MG tablet Take 2 tablets (1,000 mg) by mouth 2 times daily. May also take 2 tablets (1,000 mg) daily as needed for mild pain. 186 tablet 11     BABY ASPIRIN PO Take 81 mg by mouth daily       baclofen (LIORESAL) 10 MG tablet Take 0.5 tablets (5 mg) by mouth 3 times daily       Calcium Carbonate (CALCIUM 600 PO) Take 1 tablet by mouth every other day       calcium carbonate (TUMS) 500 MG chewable tablet Take 2 tablets (1,000 mg) by mouth every 4 hours as needed for heartburn 60 tablet 4     Cholecalciferol (VITAMIN D3 PO) Take 2,000 Units by mouth every other day       gabapentin (NEURONTIN) 100 MG capsule Take 2 capsules (200 mg) by mouth At Bedtime 42 capsule 1     melatonin 3 MG tablet Take 3 tablets (9 mg) by mouth At Bedtime       omeprazole (PRILOSEC) 20 MG DR capsule 20mg by mouth twice a day for 2 weeks and then 20mg po daily in AM for 3 weeks.  Discontinue after the three weeks. 49 capsule 0     senna-docusate (SENOKOT-S/PERICOLACE) 8.6-50 MG tablet Take 1 tablet by mouth daily And daily PRN       traZODone (DESYREL) 50 MG tablet Take 1 tablet (50 mg) by mouth At Bedtime 31 tablet 11     trolamine salicylate (ASPERCREME) 10 % external cream Apply topically 3 times daily APPLY TOPICALLY BEHIND RIGHT KNEE TO HIP THREE TIMES DAILY. OK TO LEAVE AT BEDSIDE. DX PAIN. 85 g 3         REVIEW OF SYSTEMS:  4 point ROS neg other than the symptoms noted above in the HPI.  No chest pain.  No shortness of breath.    PHYSICAL EXAM:  /63   Pulse 73   Temp 98  F (36.7  C)   Resp 16   Wt 59.6 kg (131 lb 6.4 oz)   SpO2 97%   BMI 23.28 kg/m    In no acute distress when holding up her head.  Facial expressions would not indicate acute dizziness.  Skin is pink, warm and dry.  Heart rate regular and strong.  No edema in feet.  Anamaria did show her feet and toes are curled under.  Toes not straight.  No trouble breathing.  No oxygen.    Abdomen is flat and soft.    Ambulates with a 4WW as her gait is abnormal.  Last time watching her it appears like her right leg lags.      ASSESSMENT / PLAN:  (R42) Dizziness  (primary encounter diagnosis)  Comment: Anamaria has not taken the medication since 4/12 and the dizziness is less.  She firmly believes it is from the Celexa and does not want to take it.  Will remove from list of medications.    (F43.23) Adjustment reaction with anxiety and depression  (F01.A3) Mild vascular dementia with mood disturbance (H)  Comment: anticipate she will have depression. Her life has changed in a short time.  Daughter does feel her mom displays behaviors.  Questions if the dizziness is real.    For now she has Trazodone to help with sleep and can assist with anxiety.  Monitor her emotions.  Many changes in her life.    (G31.9) Neurodegenerative disorder (H)  Comment: no specific name is given to what she has.  Family has told her and neurology as well that she is not safe to live alone and not able to drive either.  Continue to monitor her mobility.  Risk of falls.  For comfort has Baclofen 5mg 3x day and Gabapentin 200mg at HS to help with spasms and comfort.        Orders:  1.  Discontinue Celexa due to dizziness    Electronically signed by  ESPERANZA Fuentes CNP           Sincerely,        ESPERANZA Fuentes CNP

## 2023-04-19 NOTE — PROGRESS NOTES
[facility] GERIATRICS  ACUTE/EPISODIC VISIT    [facility] Medical Record Number:  [id number]  Place of Service where encounter took place:  [facility] [[id number]]    Chief Complaint   Patient presents with     RECHECK       HPI:    Kylah Britt is a 72 year old  (1950), who is being seen today for an episodic care visit.  HPI information obtained from: facility chart records, facility staff and patient report.    Today's concern is:    Diagnoses       Codes Comments    Dizziness    -  Primary R42     Adjustment reaction with anxiety and depression     F43.23     Mild vascular dementia with mood disturbance (H)     F01.A3     Neurodegenerative disorder (H)     G31.9         Came to see Anamaria today per her request.  Had started her on 4/6/23 with Celexa due to anticipation of her behavior as she is being told she will not be returning home to live as she is not safe by herself anymore due to her neurodegenerative disorder.      Staff passed on the request she wanted to talk about being dizzy and she has refused her Celexa.  She is asking for the medication to stop.  Took the medication from 4/7 to 4/11.      Found Anamaria in her apartment laying in bed - not feeling the best.  She was willing to sit up on side of bed but told her she did not have to she can stay in bed.  She often would lift up her head and neck muscles were strong.    Anamaria states that she feels dizzy after she takes the medication which is the newest on her regimen.  Did look up s/e of the Celexa in front of her.  Dizziness is present on most common s/e list.      ALLERGIES:    Allergies   Allergen Reactions     No Known Drug Allergies         MEDICATIONS:  Post Discharge Medication Reconciliation Status: patient was not discharged from an inpatient facility or TCU. Medications reviewed today    Current Outpatient Medications   Medication Sig Dispense Refill     acetaminophen (TYLENOL) 500 MG  tablet Take 2 tablets (1,000 mg) by mouth 2 times daily. May also take 2 tablets (1,000 mg) daily as needed for mild pain. 186 tablet 11     BABY ASPIRIN PO Take 81 mg by mouth daily       baclofen (LIORESAL) 10 MG tablet Take 0.5 tablets (5 mg) by mouth 3 times daily       Calcium Carbonate (CALCIUM 600 PO) Take 1 tablet by mouth every other day       calcium carbonate (TUMS) 500 MG chewable tablet Take 2 tablets (1,000 mg) by mouth every 4 hours as needed for heartburn 60 tablet 4     Cholecalciferol (VITAMIN D3 PO) Take 2,000 Units by mouth every other day       gabapentin (NEURONTIN) 100 MG capsule Take 2 capsules (200 mg) by mouth At Bedtime 42 capsule 1     melatonin 3 MG tablet Take 3 tablets (9 mg) by mouth At Bedtime       omeprazole (PRILOSEC) 20 MG DR capsule 20mg by mouth twice a day for 2 weeks and then 20mg po daily in AM for 3 weeks.  Discontinue after the three weeks. 49 capsule 0     senna-docusate (SENOKOT-S/PERICOLACE) 8.6-50 MG tablet Take 1 tablet by mouth daily And daily PRN       traZODone (DESYREL) 50 MG tablet Take 1 tablet (50 mg) by mouth At Bedtime 31 tablet 11     trolamine salicylate (ASPERCREME) 10 % external cream Apply topically 3 times daily APPLY TOPICALLY BEHIND RIGHT KNEE TO HIP THREE TIMES DAILY. OK TO LEAVE AT BEDSIDE. DX PAIN. 85 g 3         REVIEW OF SYSTEMS:  4 point ROS neg other than the symptoms noted above in the HPI.  No chest pain.  No shortness of breath.    PHYSICAL EXAM:  /63   Pulse 73   Temp 98  F (36.7  C)   Resp 16   Wt 59.6 kg (131 lb 6.4 oz)   SpO2 97%   BMI 23.28 kg/m    In no acute distress when holding up her head.  Facial expressions would not indicate acute dizziness.  Skin is pink, warm and dry.  Heart rate regular and strong.  No edema in feet.  Anamaria did show her feet and toes are curled under.  Toes not straight.  No trouble breathing.  No oxygen.   Abdomen is flat and soft.    Ambulates with a 4WW as her gait is abnormal.  Last time  watching her it appears like her right leg lags.      ASSESSMENT / PLAN:  (R42) Dizziness  (primary encounter diagnosis)  Comment: Anamaria has not taken the medication since 4/12 and the dizziness is less.  She firmly believes it is from the Celexa and does not want to take it.  Will remove from list of medications.    (F43.23) Adjustment reaction with anxiety and depression  (F01.A3) Mild vascular dementia with mood disturbance (H)  Comment: anticipate she will have depression. Her life has changed in a short time.  Daughter does feel her mom displays behaviors.  Questions if the dizziness is real.    For now she has Trazodone to help with sleep and can assist with anxiety.  Monitor her emotions.  Many changes in her life.    (G31.9) Neurodegenerative disorder (H)  Comment: no specific name is given to what she has.  Family has told her and neurology as well that she is not safe to live alone and not able to drive either.  Continue to monitor her mobility.  Risk of falls.  For comfort has Baclofen 5mg 3x day and Gabapentin 200mg at HS to help with spasms and comfort.        Orders:  1.  Discontinue Celexa due to dizziness    Electronically signed by  ESPERANZA Fuentes CNP

## 2023-04-19 NOTE — LETTER
4/19/2023        RE: Kylah Britt  [address]  [facility] [address]        No notes on file      Sincerely,        ESPERANZA Fuentes CNP

## 2023-04-24 RX ORDER — GABAPENTIN 100 MG/1
200 CAPSULE ORAL AT BEDTIME
Qty: 42 CAPSULE | Refills: 1
Start: 2023-03-08 | End: 2024-02-18

## 2023-04-25 ENCOUNTER — TRANSFERRED RECORDS (OUTPATIENT)
Dept: HEALTH INFORMATION MANAGEMENT | Facility: CLINIC | Age: 73
End: 2023-04-25

## 2023-05-09 DIAGNOSIS — R52 PAIN: ICD-10-CM

## 2023-05-09 RX ORDER — TROLAMINE SALICYLATE 10 G/100G
CREAM TOPICAL 3 TIMES DAILY
Qty: 85 G | Refills: 3 | Status: SHIPPED | OUTPATIENT
Start: 2023-05-09 | End: 2023-07-03

## 2023-05-16 ENCOUNTER — TRANSFERRED RECORDS (OUTPATIENT)
Dept: HEALTH INFORMATION MANAGEMENT | Facility: CLINIC | Age: 73
End: 2023-05-16

## 2023-05-21 ENCOUNTER — HEALTH MAINTENANCE LETTER (OUTPATIENT)
Age: 73
End: 2023-05-21

## 2023-07-03 DIAGNOSIS — R52 PAIN: ICD-10-CM

## 2023-07-03 RX ORDER — TROLAMINE SALICYLATE 10 G/100G
CREAM TOPICAL 3 TIMES DAILY
Qty: 141 G | Refills: 11 | Status: SHIPPED | OUTPATIENT
Start: 2023-07-03 | End: 2023-11-30

## 2023-07-07 ENCOUNTER — ASSISTED LIVING VISIT (OUTPATIENT)
Dept: GERIATRICS | Facility: CLINIC | Age: 73
End: 2023-07-07
Payer: COMMERCIAL

## 2023-07-07 VITALS
OXYGEN SATURATION: 98 % | RESPIRATION RATE: 18 BRPM | WEIGHT: 130.2 LBS | SYSTOLIC BLOOD PRESSURE: 149 MMHG | BODY MASS INDEX: 23.06 KG/M2 | DIASTOLIC BLOOD PRESSURE: 84 MMHG | TEMPERATURE: 98.2 F | HEART RATE: 67 BPM

## 2023-07-07 DIAGNOSIS — M79.651 PAIN OF RIGHT THIGH: ICD-10-CM

## 2023-07-07 DIAGNOSIS — G31.9 NEURODEGENERATIVE DISORDER (H): Primary | ICD-10-CM

## 2023-07-07 DIAGNOSIS — F01.A3 MILD VASCULAR DEMENTIA WITH MOOD DISTURBANCE (H): ICD-10-CM

## 2023-07-07 DIAGNOSIS — F43.23 ADJUSTMENT REACTION WITH ANXIETY AND DEPRESSION: ICD-10-CM

## 2023-07-07 PROCEDURE — 99349 HOME/RES VST EST MOD MDM 40: CPT | Performed by: NURSE PRACTITIONER

## 2023-07-07 NOTE — PROGRESS NOTES
[facility] GERIATRICS  ACUTE/EPISODIC VISIT    [facility] Medical Record Number:  [id number]  Place of Service where encounter took place:  [facility] [[id number]]    Chief Complaint   Patient presents with     RECHECK       HPI:    Kylah Britt is a 72 year old  (1950), who is being seen today for an episodic care visit.  HPI information obtained from: facility chart records, facility staff, patient report and [facility] chart review.    Today's concern is:    Diagnoses       Codes Comments    Neurodegenerative disorder (H)    -  Primary G31.9     Adjustment reaction with anxiety and depression     F43.23     Mild vascular dementia with mood disturbance (H)     F01.A3     Pain of right thigh     M79.651         Came to see Anamaria today and how she was doing overall.  Anamaria now resides long-term at the [facility].  She knows that she will not be going home anymore due to safety reasons.    Anamaria was about to do exercise class and asked her to come out in order to speak with this nurse practitioner.  Anamaria ambulates with her 4 wheeled walker and appropriately locks the brakes in order to transfer to a chair.  Anamaria did not really have any acute concerns.  She did ask if it was appropriate to stop the gabapentin.  Currently she has no complaints of discomfort, she is sleeping good, no concerns with nursing.  Stressed to her that she is stable with the current regimen for pain and not going to be going back and forth with her medications like she has done in the past.  She was excepting of this answer      ALLERGIES:    Allergies   Allergen Reactions     No Known Drug Allergy         MEDICATIONS:  Post Discharge Medication Reconciliation Status: patient was not discharged from an inpatient facility or TCU. Medications reconciled.    Current Outpatient Medications   Medication Sig Dispense Refill     acetaminophen (TYLENOL) 500 MG tablet Take 2 tablets (1,000 mg) by  mouth 3 times daily as needed for mild pain 186 tablet 11     BABY ASPIRIN PO Take 81 mg by mouth daily       baclofen (LIORESAL) 10 MG tablet Take 0.5 tablets (5 mg) by mouth 3 times daily       Calcium Carbonate (CALCIUM 600 PO) Take 1 tablet by mouth every other day       calcium carbonate (TUMS) 500 MG chewable tablet Take 2 tablets (1,000 mg) by mouth every 4 hours as needed for heartburn 60 tablet 4     Cholecalciferol (VITAMIN D3 PO) Take 2,000 Units by mouth every other day       gabapentin (NEURONTIN) 100 MG capsule Take 2 capsules (200 mg) by mouth At Bedtime 42 capsule 1     melatonin 3 MG tablet Take 3 tablets (9 mg) by mouth At Bedtime       senna-docusate (SENOKOT-S/PERICOLACE) 8.6-50 MG tablet Take 1 tablet by mouth daily And daily PRN       traZODone (DESYREL) 50 MG tablet Take 1 tablet (50 mg) by mouth At Bedtime 31 tablet 11     trolamine salicylate (ASPERCREME) 10 % external cream Apply topically 3 times daily APPLY TOPICALLY BEHIND RIGHT KNEE TO HIP THREE TIMES DAILY. OK TO LEAVE AT BEDSIDE. DX PAIN. 141 g 11       REVIEW OF SYSTEMS:  4 point ROS neg other than the symptoms noted above in the HPI.  Denied chest pain, shortness of breath, no bowel complaints.    PHYSICAL EXAM:  BP (!) 149/84   Pulse 67   Temp 98.2  F (36.8  C)   Resp 18   Wt 59.1 kg (130 lb 3.2 oz)   SpO2 98%   BMI 23.06 kg/m    Anamaria is of short stature and petite.  Typically she is neatly groomed but today her hair is a little bit in disarray.  Anamaria does not wear glasses or at least she was not wearing any.  Made eye contact when appropriate.  Sclera's are clear conjunctiva pink  No issues with her nose as far as any clear drainage.  Speech is clear but her voice gets broken up during the sentences.  Heart rate regular and strong with S1 and S2 heard  Lungs clear with good expansion  No edema in her lower extremities.  Skin is pink dry and intact  Abdomen is flat soft and nontender with active bowel sounds    At the end  of the visit watched Anamaria get out of the regular chair in which she does use her hands to push herself up off of the armrests, but then she fans her hands out and they are little shaky as she unlocks the brakes of the four-wheel walker and then ambulates across the room.  Have not heard of any recent falls      ASSESSMENT / PLAN:  (G31.9) Neurodegenerative disorder (H)  (primary encounter diagnosis)  Comment: Do not feel Anamaria is neurodegenerative situation has changed.  She continues to use a 4 wheeled walker for mobility.  She attends exercise classes and other activities within the building.  Her apartment is on the end of the facility and so she does have to ambulate quite a ways which is good for her.  No changes to baclofen 5 mg 3 times a day, aspirin 81 mg daily, and gabapentin 200 mg at bedtime.  We will not be going back to neurology anytime soon as they were not able to give a different definitive answer to her neuro degenerative state      (F43.23) Adjustment reaction with anxiety and depression  (F01.A3) Mild vascular dementia with mood disturbance (H)  Comment: Since last seen, feel that he has adjusted to being at the facility.  She is unsafe to be home alone for high risk of falls as well as memory issues that would be difficult for her to get out of harmful situations.  The only antidepressant she takes is trazodone 50 mg at bedtime to help with sleep and the melatonin 9 mg at bedtime as well.  Did attempt to start her on Celexa but she complained of dizziness with the medication.  Do believe her with this but also since that she did not want to be on anything either.  Not uncommon that she would want her medications adjusted quite a bit.    (M79.651) Pain of right thigh  Comment: When reviewing medications with Anamaria, she states that the Aspercreme continues to work on the back of her knee and wants to continue with that regimen.  She no longer takes scheduled Tylenol in which it is just as  needed  Plan: acetaminophen (TYLENOL) 500 MG tablet      Orders:  1.  Next week on lab day please draw CBC and a BMP.  Dx of neurodegenerative disorder.    Electronically signed by  ESPERANZA Fuentes CNP

## 2023-07-07 NOTE — LETTER
7/7/2023        RE: Kylah Britt  [address]  [facility] [address]        [facility] GERIATRICS  ACUTE/EPISODIC VISIT    [facility] Medical Record Number:  [id number]  Place of Service where encounter took place:  [facility] ([facility] [[id number]]    Chief Complaint   Patient presents with     RECHECK       HPI:    Kylah Britt is a 72 year old  (1950), who is being seen today for an episodic care visit.  HPI information obtained from: facility chart records, facility staff, patient report and [facility] chart review.    Today's concern is:    Diagnoses       Codes Comments    Neurodegenerative disorder (H)    -  Primary G31.9     Adjustment reaction with anxiety and depression     F43.23     Mild vascular dementia with mood disturbance (H)     F01.A3     Pain of right thigh     M79.651         Came to see Anamaria today and how she was doing overall.  Anamaria now resides long-term at the [facility].  She knows that she will not be going home anymore due to safety reasons.    Anamaria was about to do exercise class and asked her to come out in order to speak with this nurse practitioner.  Anamaria ambulates with her 4 wheeled walker and appropriately locks the brakes in order to transfer to a chair.  Anamaria did not really have any acute concerns.  She did ask if it was appropriate to stop the gabapentin.  Currently she has no complaints of discomfort, she is sleeping good, no concerns with nursing.  Stressed to her that she is stable with the current regimen for pain and not going to be going back and forth with her medications like she has done in the past.  She was excepting of this answer      ALLERGIES:    Allergies   Allergen Reactions     No Known Drug Allergy         MEDICATIONS:  Post Discharge Medication Reconciliation Status: patient was not discharged from an inpatient facility or TCU. Medications reconciled.    Current Outpatient Medications   Medication Sig  Dispense Refill     acetaminophen (TYLENOL) 500 MG tablet Take 2 tablets (1,000 mg) by mouth 3 times daily as needed for mild pain 186 tablet 11     BABY ASPIRIN PO Take 81 mg by mouth daily       baclofen (LIORESAL) 10 MG tablet Take 0.5 tablets (5 mg) by mouth 3 times daily       Calcium Carbonate (CALCIUM 600 PO) Take 1 tablet by mouth every other day       calcium carbonate (TUMS) 500 MG chewable tablet Take 2 tablets (1,000 mg) by mouth every 4 hours as needed for heartburn 60 tablet 4     Cholecalciferol (VITAMIN D3 PO) Take 2,000 Units by mouth every other day       gabapentin (NEURONTIN) 100 MG capsule Take 2 capsules (200 mg) by mouth At Bedtime 42 capsule 1     melatonin 3 MG tablet Take 3 tablets (9 mg) by mouth At Bedtime       senna-docusate (SENOKOT-S/PERICOLACE) 8.6-50 MG tablet Take 1 tablet by mouth daily And daily PRN       traZODone (DESYREL) 50 MG tablet Take 1 tablet (50 mg) by mouth At Bedtime 31 tablet 11     trolamine salicylate (ASPERCREME) 10 % external cream Apply topically 3 times daily APPLY TOPICALLY BEHIND RIGHT KNEE TO HIP THREE TIMES DAILY. OK TO LEAVE AT BEDSIDE. DX PAIN. 141 g 11       REVIEW OF SYSTEMS:  4 point ROS neg other than the symptoms noted above in the HPI.  Denied chest pain, shortness of breath, no bowel complaints.    PHYSICAL EXAM:  BP (!) 149/84   Pulse 67   Temp 98.2  F (36.8  C)   Resp 18   Wt 59.1 kg (130 lb 3.2 oz)   SpO2 98%   BMI 23.06 kg/m    Anamaria is of short stature and petite.  Typically she is neatly groomed but today her hair is a little bit in disarray.  Anamaria does not wear glasses or at least she was not wearing any.  Made eye contact when appropriate.  Sclera's are clear conjunctiva pink  No issues with her nose as far as any clear drainage.  Speech is clear but her voice gets broken up during the sentences.  Heart rate regular and strong with S1 and S2 heard  Lungs clear with good expansion  No edema in her lower extremities.  Skin is pink dry  and intact  Abdomen is flat soft and nontender with active bowel sounds    At the end of the visit watched Anamaria get out of the regular chair in which she does use her hands to push herself up off of the armrests, but then she fans her hands out and they are little shaky as she unlocks the brakes of the four-wheel walker and then ambulates across the room.  Have not heard of any recent falls      ASSESSMENT / PLAN:  (G31.9) Neurodegenerative disorder (H)  (primary encounter diagnosis)  Comment: Do not feel Anamaria is neurodegenerative situation has changed.  She continues to use a 4 wheeled walker for mobility.  She attends exercise classes and other activities within the building.  Her apartment is on the end of the facility and so she does have to ambulate quite a ways which is good for her.  No changes to baclofen 5 mg 3 times a day, aspirin 81 mg daily, and gabapentin 200 mg at bedtime.  We will not be going back to neurology anytime soon as they were not able to give a different definitive answer to her neuro degenerative state      (F43.23) Adjustment reaction with anxiety and depression  (F01.A3) Mild vascular dementia with mood disturbance (H)  Comment: Since last seen, feel that he has adjusted to being at the facility.  She is unsafe to be home alone for high risk of falls as well as memory issues that would be difficult for her to get out of harmful situations.  The only antidepressant she takes is trazodone 50 mg at bedtime to help with sleep and the melatonin 9 mg at bedtime as well.  Did attempt to start her on Celexa but she complained of dizziness with the medication.  Do believe her with this but also since that she did not want to be on anything either.  Not uncommon that she would want her medications adjusted quite a bit.    (M79.651) Pain of right thigh  Comment: When reviewing medications with Anamaria, she states that the Aspercreme continues to work on the back of her knee and wants to continue  with that regimen.  She no longer takes scheduled Tylenol in which it is just as needed  Plan: acetaminophen (TYLENOL) 500 MG tablet      Orders:  1.  Next week on lab day please draw CBC and a BMP.  Dx of neurodegenerative disorder.    Electronically signed by  ESPERANZA Fuentes CNP         Sincerely,        ESPERANZA Fuentes CNP

## 2023-07-08 RX ORDER — ACETAMINOPHEN 500 MG
1000 TABLET ORAL 3 TIMES DAILY PRN
Qty: 186 TABLET | Refills: 11 | Status: ON HOLD
Start: 2022-12-30 | End: 2023-11-06

## 2023-07-09 ENCOUNTER — LAB REQUISITION (OUTPATIENT)
Dept: LAB | Facility: CLINIC | Age: 73
End: 2023-07-09
Payer: MEDICARE

## 2023-07-09 DIAGNOSIS — I11.9 HYPERTENSIVE HEART DISEASE WITHOUT HEART FAILURE: ICD-10-CM

## 2023-07-10 LAB
ANION GAP SERPL CALCULATED.3IONS-SCNC: 9 MMOL/L (ref 7–15)
BASOPHILS # BLD AUTO: 0.1 10E3/UL (ref 0–0.2)
BASOPHILS NFR BLD AUTO: 1 %
BUN SERPL-MCNC: 15 MG/DL (ref 8–23)
CALCIUM SERPL-MCNC: 9.5 MG/DL (ref 8.8–10.2)
CHLORIDE SERPL-SCNC: 105 MMOL/L (ref 98–107)
CREAT SERPL-MCNC: 0.76 MG/DL (ref 0.51–0.95)
DEPRECATED HCO3 PLAS-SCNC: 25 MMOL/L (ref 22–29)
EOSINOPHIL # BLD AUTO: 0.2 10E3/UL (ref 0–0.7)
EOSINOPHIL NFR BLD AUTO: 4 %
ERYTHROCYTE [DISTWIDTH] IN BLOOD BY AUTOMATED COUNT: 12.8 % (ref 10–15)
GFR SERPL CREATININE-BSD FRML MDRD: 82 ML/MIN/1.73M2
GLUCOSE SERPL-MCNC: 106 MG/DL (ref 70–99)
HCT VFR BLD AUTO: 39.9 % (ref 35–47)
HGB BLD-MCNC: 13 G/DL (ref 11.7–15.7)
IMM GRANULOCYTES # BLD: 0 10E3/UL
IMM GRANULOCYTES NFR BLD: 0 %
LYMPHOCYTES # BLD AUTO: 1.7 10E3/UL (ref 0.8–5.3)
LYMPHOCYTES NFR BLD AUTO: 38 %
MCH RBC QN AUTO: 28.5 PG (ref 26.5–33)
MCHC RBC AUTO-ENTMCNC: 32.6 G/DL (ref 31.5–36.5)
MCV RBC AUTO: 88 FL (ref 78–100)
MONOCYTES # BLD AUTO: 0.4 10E3/UL (ref 0–1.3)
MONOCYTES NFR BLD AUTO: 8 %
NEUTROPHILS # BLD AUTO: 2.2 10E3/UL (ref 1.6–8.3)
NEUTROPHILS NFR BLD AUTO: 49 %
NRBC # BLD AUTO: 0 10E3/UL
NRBC BLD AUTO-RTO: 0 /100
PLATELET # BLD AUTO: 326 10E3/UL (ref 150–450)
POTASSIUM SERPL-SCNC: 4.4 MMOL/L (ref 3.4–5.3)
RBC # BLD AUTO: 4.56 10E6/UL (ref 3.8–5.2)
SODIUM SERPL-SCNC: 139 MMOL/L (ref 136–145)
WBC # BLD AUTO: 4.5 10E3/UL (ref 4–11)

## 2023-07-10 PROCEDURE — 36415 COLL VENOUS BLD VENIPUNCTURE: CPT | Mod: ORL | Performed by: NURSE PRACTITIONER

## 2023-07-10 PROCEDURE — 80048 BASIC METABOLIC PNL TOTAL CA: CPT | Mod: ORL | Performed by: NURSE PRACTITIONER

## 2023-07-10 PROCEDURE — P9604 ONE-WAY ALLOW PRORATED TRIP: HCPCS | Mod: ORL | Performed by: NURSE PRACTITIONER

## 2023-07-10 PROCEDURE — 85025 COMPLETE CBC W/AUTO DIFF WBC: CPT | Mod: ORL | Performed by: NURSE PRACTITIONER

## 2023-07-20 ENCOUNTER — TRANSFERRED RECORDS (OUTPATIENT)
Dept: HEALTH INFORMATION MANAGEMENT | Facility: CLINIC | Age: 73
End: 2023-07-20
Payer: COMMERCIAL

## 2023-07-26 ENCOUNTER — ASSISTED LIVING VISIT (OUTPATIENT)
Dept: GERIATRICS | Facility: CLINIC | Age: 73
End: 2023-07-26
Payer: COMMERCIAL

## 2023-07-26 VITALS
TEMPERATURE: 98.2 F | DIASTOLIC BLOOD PRESSURE: 84 MMHG | BODY MASS INDEX: 23.06 KG/M2 | HEART RATE: 67 BPM | RESPIRATION RATE: 18 BRPM | OXYGEN SATURATION: 98 % | SYSTOLIC BLOOD PRESSURE: 149 MMHG | WEIGHT: 130.2 LBS

## 2023-07-26 DIAGNOSIS — G31.9 NEURODEGENERATIVE DISORDER (H): ICD-10-CM

## 2023-07-26 DIAGNOSIS — M62.838 MUSCLE SPASM: Primary | ICD-10-CM

## 2023-07-26 DIAGNOSIS — F43.23 ADJUSTMENT REACTION WITH ANXIETY AND DEPRESSION: ICD-10-CM

## 2023-07-26 DIAGNOSIS — F01.A3 MILD VASCULAR DEMENTIA WITH MOOD DISTURBANCE (H): ICD-10-CM

## 2023-07-26 DIAGNOSIS — R26.9 ABNORMAL GAIT: ICD-10-CM

## 2023-07-26 PROCEDURE — 99349 HOME/RES VST EST MOD MDM 40: CPT | Performed by: NURSE PRACTITIONER

## 2023-07-26 NOTE — LETTER
"    7/26/2023        RE: Kylah Britt  [address]  [facility] [address]        [facility] GERIATRICS  ACUTE/EPISODIC VISIT    [facility] Medical Record Number:  [id number]  Place of Service where encounter took place:  [facility] [[id number]]    Chief Complaint   Patient presents with     RECHECK       HPI:    Kylah Britt is a 72 year old  (1950), who is being seen today for an episodic care visit.  HPI information obtained from: facility chart records, facility staff, patient report, and [facility] chart review.    Today's concern is:    Diagnoses         Codes Comments    Muscle spasm    -  Primary M62.838     Neurodegenerative disorder (H)     G31.9     Abnormal gait     R26.9     Mild vascular dementia with mood disturbance (H)     F01.A3     Adjustment reaction with anxiety and depression     F43.23           Came to see Anamaria today as received a note concerns from therapy as having more spasticity in her movements/legs.  Asking if an increase in the Baclofen may help.  She is also noted to by more nervous/depressed and wondered if she needed to try another antidepressant.  Had tried Celexa on her and right away she did not feel right and wanted off the medication.  Was she truly ill?  Possible but have noted behaviors so not uncommon medications changes and she wants to get off the medication.  Her thought process is not clear.  Staff can see it but Anamaria thinks she is fine.      Found Anamaria in her apartment sitting on her couch.  Had a conversation with her about what staff are seeing with her and she denied most of it.  Pointed out to her that she pulls on her hair and she stated, \"it is the haircut she did not do a great job\".  She did not stop pulling and arranging her hair.  Anamaria's legs were moving around and she kept touching her left toes and straightening them out.  She thinks they are curled where this NP thinks they look normal but " she persistently is straightening them out.  Feel they hurt or this really bothers her how they bend.  These things are habitual for her and cognitively do not think she connects what she does.      ALLERGIES:    Allergies   Allergen Reactions     No Known Drug Allergy         MEDICATIONS:  Post Discharge Medication Reconciliation Status: patient was not discharged from an inpatient facility or TCU.     Current Outpatient Medications   Medication Sig Dispense Refill     acetaminophen (TYLENOL) 500 MG tablet Take 2 tablets (1,000 mg) by mouth 3 times daily as needed for mild pain 186 tablet 11     BABY ASPIRIN PO Take 81 mg by mouth daily       baclofen (LIORESAL) 10 MG tablet Take 0.5 tablets (5 mg) by mouth 2 times daily and 1 tab at bedtime       Calcium Carbonate (CALCIUM 600 PO) Take 1 tablet by mouth every other day       calcium carbonate (TUMS) 500 MG chewable tablet Take 2 tablets (1,000 mg) by mouth every 4 hours as needed for heartburn 60 tablet 4     Cholecalciferol (VITAMIN D3 PO) Take 2,000 Units by mouth every other day       gabapentin (NEURONTIN) 100 MG capsule Take 2 capsules (200 mg) by mouth At Bedtime 42 capsule 1     melatonin 3 MG tablet Take 3 tablets (9 mg) by mouth At Bedtime       senna-docusate (SENOKOT-S/PERICOLACE) 8.6-50 MG tablet Take 1 tablet by mouth daily And daily PRN       traZODone (DESYREL) 50 MG tablet Take 1 tablet (50 mg) by mouth At Bedtime 31 tablet 11     trolamine salicylate (ASPERCREME) 10 % external cream Apply topically 3 times daily APPLY TOPICALLY BEHIND RIGHT KNEE TO HIP THREE TIMES DAILY. OK TO LEAVE AT BEDSIDE. DX PAIN. 141 g 11       REVIEW OF SYSTEMS:  4 point ROS neg other than the symptoms noted above in the HPI.  No chest pain, no SOB.  Denies constipation issues.      PHYSICAL EXAM:  BP (!) 149/84   Pulse 67   Temp 98.2  F (36.8  C)   Resp 18   Wt 59.1 kg (130 lb 3.2 oz)   SpO2 98%   BMI 23.06 kg/m    Anamaria is sitting on her couch and can see what all  are noticing about her habits of pulling hair and fixing her toes.    Skin is pale/pink, warm and dry.    Heart rate regular and strong.  No edema.  Observed her toes and feel they bend at the middle but not curved under.    Lungs are clear.  Abdomen is flat, soft and non-tender.    Does have abnormal movements of her legs.  Uses a 4WW for mobility.  Watching her use her hands, can see they do not function like a typical person, low dexterity.        ASSESSMENT / PLAN:  (M62.638) Muscle spasm  (primary encounter diagnosis)  (G31.9) Neurodegenerative disorder (H)  (R26.9) Abnormal gait  Comment: currently on Baclofen 5mg 3x a day.  Had to negotiate with her about an increase and told her this will not be changed because she thinks she wants to come off medication.    Remains on Gabapentin 200mg at bedtime.  Has been on a higher dose of this as well.    Family not interested going back to Neurology as they were unable to give a definite dx of what Anamaria was demonstrating.   Increase Baclofen 5mg AM and afternoon and 10mg at bedtime.  Do not want to hear that it makes her tired and so will go slow and increase in time.      (F01.A3) Mild vascular dementia with mood disturbance (H)  (F43.23) Adjustment reaction with anxiety and depression  Comment: feel that Anamaria is slowly advancing with memory issues.  She still seems safe in her surroundings.  Apartment at the end of the [place] and so she does come out and ambulate to activities.  She is always smiling it seems.  Approached again about a antidepressant to work on her OCD behavior with pulling her hair and repositioning it.  Touching her toes as well to fix them.    Anamaria would not allow a medication to start.  Do not feel starting one without telling her would be a good situation.  Will keep working on communicating with her to think about it.    Attempted to call daughter to update of visit but unable to leave her a VM on her phone.     Orders:  Change the Baclofen to  5mg morning, afternoon and 10mg at bedtime for muscle spasms    Electronically signed by  ESPERANZA Fuentes CNP            Sincerely,        ESPERANZA Fuentes CNP

## 2023-07-26 NOTE — LETTER
7/26/2023        RE: Kylah Britt  [address]  [facility] [address]        No notes on file      Sincerely,        ESPERANZA Fuentes CNP

## 2023-07-26 NOTE — PROGRESS NOTES
"[facility] GERIATRICS  ACUTE/EPISODIC VISIT    [facility] Medical Record Number:  [id number]  Place of Service where encounter took place:  [facility] ([facility] [[id number]]    Chief Complaint   Patient presents with    RECHECK       HPI:    Kylah Britt is a 72 year old  (1950), who is being seen today for an episodic care visit.  HPI information obtained from: facility chart records, facility staff, patient report, and [facility] chart review.    Today's concern is:    Diagnoses         Codes Comments    Muscle spasm    -  Primary M62.838     Neurodegenerative disorder (H)     G31.9     Abnormal gait     R26.9     Mild vascular dementia with mood disturbance (H)     F01.A3     Adjustment reaction with anxiety and depression     F43.23           Came to see Anamaria today as received a note concerns from therapy as having more spasticity in her movements/legs.  Asking if an increase in the Baclofen may help.  She is also noted to by more nervous/depressed and wondered if she needed to try another antidepressant.  Had tried Celexa on her and right away she did not feel right and wanted off the medication.  Was she truly ill?  Possible but have noted behaviors so not uncommon medications changes and she wants to get off the medication.  Her thought process is not clear.  Staff can see it but Anamaria thinks she is fine.      Found Anamaria in her apartment sitting on her couch.  Had a conversation with her about what staff are seeing with her and she denied most of it.  Pointed out to her that she pulls on her hair and she stated, \"it is the haircut she did not do a great job\".  She did not stop pulling and arranging her hair.  Anamaria's legs were moving around and she kept touching her left toes and straightening them out.  She thinks they are curled where this NP thinks they look normal but she persistently is straightening them out.  Feel they hurt or this really bothers her " how they bend.  These things are habitual for her and cognitively do not think she connects what she does.      ALLERGIES:    Allergies   Allergen Reactions    No Known Drug Allergy         MEDICATIONS:  Post Discharge Medication Reconciliation Status: patient was not discharged from an inpatient facility or TCU.     Current Outpatient Medications   Medication Sig Dispense Refill    acetaminophen (TYLENOL) 500 MG tablet Take 2 tablets (1,000 mg) by mouth 3 times daily as needed for mild pain 186 tablet 11    BABY ASPIRIN PO Take 81 mg by mouth daily      baclofen (LIORESAL) 10 MG tablet Take 0.5 tablets (5 mg) by mouth 2 times daily and 1 tab at bedtime      Calcium Carbonate (CALCIUM 600 PO) Take 1 tablet by mouth every other day      calcium carbonate (TUMS) 500 MG chewable tablet Take 2 tablets (1,000 mg) by mouth every 4 hours as needed for heartburn 60 tablet 4    Cholecalciferol (VITAMIN D3 PO) Take 2,000 Units by mouth every other day      gabapentin (NEURONTIN) 100 MG capsule Take 2 capsules (200 mg) by mouth At Bedtime 42 capsule 1    melatonin 3 MG tablet Take 3 tablets (9 mg) by mouth At Bedtime      senna-docusate (SENOKOT-S/PERICOLACE) 8.6-50 MG tablet Take 1 tablet by mouth daily And daily PRN      traZODone (DESYREL) 50 MG tablet Take 1 tablet (50 mg) by mouth At Bedtime 31 tablet 11    trolamine salicylate (ASPERCREME) 10 % external cream Apply topically 3 times daily APPLY TOPICALLY BEHIND RIGHT KNEE TO HIP THREE TIMES DAILY. OK TO LEAVE AT BEDSIDE. DX PAIN. 141 g 11       REVIEW OF SYSTEMS:  4 point ROS neg other than the symptoms noted above in the HPI.  No chest pain, no SOB.  Denies constipation issues.      PHYSICAL EXAM:  BP (!) 149/84   Pulse 67   Temp 98.2  F (36.8  C)   Resp 18   Wt 59.1 kg (130 lb 3.2 oz)   SpO2 98%   BMI 23.06 kg/m    Anamaria is sitting on her couch and can see what all are noticing about her habits of pulling hair and fixing her toes.    Skin is pale/pink, warm and  dry.    Heart rate regular and strong.  No edema.  Observed her toes and feel they bend at the middle but not curved under.    Lungs are clear.  Abdomen is flat, soft and non-tender.    Does have abnormal movements of her legs.  Uses a 4WW for mobility.  Watching her use her hands, can see they do not function like a typical person, low dexterity.        ASSESSMENT / PLAN:  (M62.698) Muscle spasm  (primary encounter diagnosis)  (G31.9) Neurodegenerative disorder (H)  (R26.9) Abnormal gait  Comment: currently on Baclofen 5mg 3x a day.  Had to negotiate with her about an increase and told her this will not be changed because she thinks she wants to come off medication.    Remains on Gabapentin 200mg at bedtime.  Has been on a higher dose of this as well.    Family not interested going back to Neurology as they were unable to give a definite dx of what Anamaria was demonstrating.   Increase Baclofen 5mg AM and afternoon and 10mg at bedtime.  Do not want to hear that it makes her tired and so will go slow and increase in time.      (F01.A3) Mild vascular dementia with mood disturbance (H)  (F43.23) Adjustment reaction with anxiety and depression  Comment: feel that Anamaria is slowly advancing with memory issues.  She still seems safe in her surroundings.  Apartment at the end of the [place] and so she does come out and ambulate to activities.  She is always smiling it seems.  Approached again about a antidepressant to work on her OCD behavior with pulling her hair and repositioning it.  Touching her toes as well to fix them.    Anamaria would not allow a medication to start.  Do not feel starting one without telling her would be a good situation.  Will keep working on communicating with her to think about it.    Attempted to call daughter to update of visit but unable to leave her a VM on her phone.     Orders:  Change the Baclofen to 5mg morning, afternoon and 10mg at bedtime for muscle spasms    Electronically signed by  Gina  ESPERANZA Dillard CNP

## 2023-08-11 ENCOUNTER — TRANSFERRED RECORDS (OUTPATIENT)
Dept: HEALTH INFORMATION MANAGEMENT | Facility: CLINIC | Age: 73
End: 2023-08-11
Payer: COMMERCIAL

## 2023-08-11 DIAGNOSIS — M62.838 MUSCLE SPASM: ICD-10-CM

## 2023-08-11 RX ORDER — BACLOFEN 10 MG/1
TABLET ORAL
Qty: 65 TABLET | Refills: 4 | Status: SHIPPED | OUTPATIENT
Start: 2023-08-11 | End: 2023-11-03

## 2023-08-11 NOTE — PROGRESS NOTES
Received a note form nursing that Anamaria wanted to add a 5mg dose of Baclofen to the afternoon for her shaky legs.    Orders place in [facility] and sent to [facility]:    Baclofen 10mg tab - give 1/2 tabs every morning and 1 tabs afternoon and bedtime (BID) for muscle spasms.    Did see Anamaria's daughter today and so spoke with her and PT about the general decline cognitively and physically noted by all.  Everyone is very well aware of her actions and just going along with what Anamaria wants as she will refuse things if it is brought up to her.  Needs to be her idea.    ESPERANZA Fuentes CNP

## 2023-10-04 ENCOUNTER — ASSISTED LIVING VISIT (OUTPATIENT)
Dept: GERIATRICS | Facility: CLINIC | Age: 73
End: 2023-10-04
Payer: COMMERCIAL

## 2023-10-04 VITALS
HEART RATE: 81 BPM | OXYGEN SATURATION: 98 % | RESPIRATION RATE: 16 BRPM | TEMPERATURE: 96.3 F | SYSTOLIC BLOOD PRESSURE: 142 MMHG | BODY MASS INDEX: 23.24 KG/M2 | WEIGHT: 131.2 LBS | DIASTOLIC BLOOD PRESSURE: 77 MMHG

## 2023-10-04 DIAGNOSIS — G31.9 NEURODEGENERATIVE DISORDER (H): Primary | ICD-10-CM

## 2023-10-04 DIAGNOSIS — F01.A3 MILD VASCULAR DEMENTIA WITH MOOD DISTURBANCE (H): ICD-10-CM

## 2023-10-04 DIAGNOSIS — R26.9 ABNORMAL GAIT: ICD-10-CM

## 2023-10-04 DIAGNOSIS — F33.40 RECURRENT MAJOR DEPRESSIVE DISORDER, IN REMISSION (H): ICD-10-CM

## 2023-10-04 DIAGNOSIS — R25.2 SPASTICITY: ICD-10-CM

## 2023-10-04 PROCEDURE — 99349 HOME/RES VST EST MOD MDM 40: CPT | Performed by: NURSE PRACTITIONER

## 2023-10-04 NOTE — LETTER
10/4/2023        RE: Kylah Britt  [address]  [facility] [address]        No notes on file      Sincerely,        ESPERANZA Fuentes CNP

## 2023-10-04 NOTE — LETTER
10/4/2023        RE: Kylah Britt  [address]  [facility] [address]        [facility] GERIATRICS  ACUTE/EPISODIC VISIT    [facility] Medical Record Number:  [id number]  Place of Service where encounter took place:  [facility] [[id number]]    Chief Complaint   Patient presents with     RECHECK       HPI:    Kylah Britt is a 72 year old  (1950), who is being seen today for an episodic care visit.  HPI information obtained from: facility chart records, facility staff, and patient report.    Today's concern is:    Diagnoses         Codes Comments    Neurodegenerative disorder (H24)    -  Primary G31.9     Abnormal gait     R26.9     Spasticity     R25.2     Mild vascular dementia with mood disturbance (H)     F01.A3     Recurrent major depressive disorder, in remission (H24)     F33.40           Came to see Anamaria today and how she has been doing.  Found her out in the dining room waiting for supper as she was early and the only one sitting there.    Sat down besides her to talk.  Let her voice any concerns and she did not complain of anything.  Maybe over the fact she can not go home anymore.  Anamaria did not even try to negotiate changing her medications.      ALLERGIES:    Allergies   Allergen Reactions     No Known Drug Allergy         MEDICATIONS:  Post Discharge Medication Reconciliation Status: patient was not discharged from an inpatient facility or TCU.     Current Outpatient Medications   Medication Sig Dispense Refill     acetaminophen (TYLENOL) 500 MG tablet Take 2 tablets (1,000 mg) by mouth 3 times daily as needed for mild pain 186 tablet 11     BABY ASPIRIN PO Take 81 mg by mouth daily       baclofen (LIORESAL) 10 MG tablet Take 0.5 tablets (5 mg) by mouth every morning AND 1 tablet (10 mg) 2 times daily. 65 tablet 4     Calcium Carbonate (CALCIUM 600 PO) Take 1 tablet by mouth every other day       calcium carbonate (TUMS) 500 MG chewable  tablet Take 2 tablets (1,000 mg) by mouth every 4 hours as needed for heartburn 60 tablet 4     Cholecalciferol (VITAMIN D3 PO) Take 2,000 Units by mouth every other day       gabapentin (NEURONTIN) 100 MG capsule Take 2 capsules (200 mg) by mouth At Bedtime 42 capsule 1     melatonin 3 MG tablet Take 3 tablets (9 mg) by mouth At Bedtime       senna-docusate (SENOKOT-S/PERICOLACE) 8.6-50 MG tablet Take 1 tablet by mouth daily And daily PRN       traZODone (DESYREL) 50 MG tablet Take 1 tablet (50 mg) by mouth At Bedtime 31 tablet 11     trolamine salicylate (ASPERCREME) 10 % external cream Apply topically 3 times daily APPLY TOPICALLY BEHIND RIGHT KNEE TO HIP THREE TIMES DAILY. OK TO LEAVE AT BEDSIDE. DX PAIN. 141 g 11       REVIEW OF SYSTEMS:  4 point ROS neg other than the symptoms noted above in the HPI.  denied chest pain, no issues with breathing either.  Risk for falls.      PHYSICAL EXAM:  BP (!) 142/77   Pulse 81   Temp (!) 96.3  F (35.7  C)   Resp 16   Wt 59.5 kg (131 lb 3.2 oz)   SpO2 98%   BMI 23.24 kg/m    Petite female whom ambulates with her 4WW and has an abnormal gait.  Wears tennis shoes for good support.    Skin is pale, dry and warm.  Noted she continues to move her hair out of her eyes to the side.  Marvell that was a nervous thing she did but actually pulled on her hair.    Heart rate regular and strong.  No edema.  Lungs are clear.  No coughing, no oxygen.  Abdomen is round, soft and non-tender.  States bowels are moving.    No recent labs      ASSESSMENT / PLAN:  (G31.9) Neurodegenerative disorder (H24)  (primary encounter diagnosis)  (R26.9) Abnormal gait  (R25.2) Spasticity  Comment: remains on Baclofen 5mg in the AM and 10mg BID.  Has Gabapentin 200mg at bedtime.  Did not ask to change the medications.  Tolerates doses.  Apartment is way at the end of the [place] and so keep walking helps from being wheelchair bound.  No future appointments with Neurology as they did not have a clear answer  to her abnormal gait.    (F01.A3) Mild vascular dementia with mood disturbance (H)  (F33.40) Recurrent major depressive disorder, in remission (H24)  Comment: has always been persistent to move out and hard on her kids to deal with listening to her be unreasonable.  Did not hear it today.  She was pleasant and had a wonderful conversation about all other things.  No changes with medications today.  Feel she is content finally.     Orders:  No new orders    Electronically signed by  ESPERANZA Fuentes CNP            Sincerely,        ESPERANZA Fuentes CNP

## 2023-10-04 NOTE — PROGRESS NOTES
[facility] GERIATRICS  ACUTE/EPISODIC VISIT    [facility] Medical Record Number:  [id number]  Place of Service where encounter took place:  [facility] [[id number]]    Chief Complaint   Patient presents with    RECHECK       HPI:    Kylah Britt is a 72 year old  (1950), who is being seen today for an episodic care visit.  HPI information obtained from: facility chart records, facility staff, and patient report.    Today's concern is:    Diagnoses         Codes Comments    Neurodegenerative disorder (H24)    -  Primary G31.9     Abnormal gait     R26.9     Spasticity     R25.2     Mild vascular dementia with mood disturbance (H)     F01.A3     Recurrent major depressive disorder, in remission (H24)     F33.40           Came to see Anamaria today and how she has been doing.  Found her out in the dining room waiting for supper as she was early and the only one sitting there.    Sat down besides her to talk.  Let her voice any concerns and she did not complain of anything.  Maybe over the fact she can not go home anymore.  Anamaria did not even try to negotiate changing her medications.      ALLERGIES:    Allergies   Allergen Reactions    No Known Drug Allergy         MEDICATIONS:  Post Discharge Medication Reconciliation Status: patient was not discharged from an inpatient facility or TCU.     Current Outpatient Medications   Medication Sig Dispense Refill    acetaminophen (TYLENOL) 500 MG tablet Take 2 tablets (1,000 mg) by mouth 3 times daily as needed for mild pain 186 tablet 11    BABY ASPIRIN PO Take 81 mg by mouth daily      baclofen (LIORESAL) 10 MG tablet Take 0.5 tablets (5 mg) by mouth every morning AND 1 tablet (10 mg) 2 times daily. 65 tablet 4    Calcium Carbonate (CALCIUM 600 PO) Take 1 tablet by mouth every other day      calcium carbonate (TUMS) 500 MG chewable tablet Take 2 tablets (1,000 mg) by mouth every 4 hours as needed for heartburn 60 tablet 4     Cholecalciferol (VITAMIN D3 PO) Take 2,000 Units by mouth every other day      gabapentin (NEURONTIN) 100 MG capsule Take 2 capsules (200 mg) by mouth At Bedtime 42 capsule 1    melatonin 3 MG tablet Take 3 tablets (9 mg) by mouth At Bedtime      senna-docusate (SENOKOT-S/PERICOLACE) 8.6-50 MG tablet Take 1 tablet by mouth daily And daily PRN      traZODone (DESYREL) 50 MG tablet Take 1 tablet (50 mg) by mouth At Bedtime 31 tablet 11    trolamine salicylate (ASPERCREME) 10 % external cream Apply topically 3 times daily APPLY TOPICALLY BEHIND RIGHT KNEE TO HIP THREE TIMES DAILY. OK TO LEAVE AT BEDSIDE. DX PAIN. 141 g 11       REVIEW OF SYSTEMS:  4 point ROS neg other than the symptoms noted above in the HPI.  denied chest pain, no issues with breathing either.  Risk for falls.      PHYSICAL EXAM:  BP (!) 142/77   Pulse 81   Temp (!) 96.3  F (35.7  C)   Resp 16   Wt 59.5 kg (131 lb 3.2 oz)   SpO2 98%   BMI 23.24 kg/m    Petite female whom ambulates with her 4WW and has an abnormal gait.  Wears tennis shoes for good support.    Skin is pale, dry and warm.  Noted she continues to move her hair out of her eyes to the side.  [place] that was a nervous thing she did but actually pulled on her hair.    Heart rate regular and strong.  No edema.  Lungs are clear.  No coughing, no oxygen.  Abdomen is round, soft and non-tender.  States bowels are moving.    No recent labs      ASSESSMENT / PLAN:  (G31.9) Neurodegenerative disorder (H24)  (primary encounter diagnosis)  (R26.9) Abnormal gait  (R25.2) Spasticity  Comment: remains on Baclofen 5mg in the AM and 10mg BID.  Has Gabapentin 200mg at bedtime.  Did not ask to change the medications.  Tolerates doses.  Apartment is way at the end of the [place] and so keep walking helps from being wheelchair bound.  No future appointments with Neurology as they did not have a clear answer to her abnormal gait.    (F01.A3) Mild vascular dementia with mood disturbance (H)  (F33.40)  Recurrent major depressive disorder, in remission (H24)  Comment: has always been persistent to move out and hard on her kids to deal with listening to her be unreasonable.  Did not hear it today.  She was pleasant and had a wonderful conversation about all other things.  No changes with medications today.  Feel she is content finally.     Orders:  No new orders    Electronically signed by  ESPERANZA Fuentes CNP

## 2023-10-12 ENCOUNTER — TRANSFERRED RECORDS (OUTPATIENT)
Dept: HEALTH INFORMATION MANAGEMENT | Facility: CLINIC | Age: 73
End: 2023-10-12

## 2023-11-03 ENCOUNTER — HOSPITAL ENCOUNTER (INPATIENT)
Facility: CLINIC | Age: 73
LOS: 3 days | Discharge: NURSING, REHAB OR LONG-TERM CARE | DRG: 481 | End: 2023-11-06
Attending: EMERGENCY MEDICINE | Admitting: PEDIATRICS
Payer: MEDICARE

## 2023-11-03 ENCOUNTER — TELEPHONE (OUTPATIENT)
Dept: ORTHOPEDICS | Facility: CLINIC | Age: 73
End: 2023-11-03

## 2023-11-03 ENCOUNTER — APPOINTMENT (OUTPATIENT)
Dept: GENERAL RADIOLOGY | Facility: CLINIC | Age: 73
DRG: 481 | End: 2023-11-03
Attending: EMERGENCY MEDICINE
Payer: MEDICARE

## 2023-11-03 ENCOUNTER — ANESTHESIA EVENT (OUTPATIENT)
Dept: SURGERY | Facility: CLINIC | Age: 73
DRG: 481 | End: 2023-11-03
Payer: MEDICARE

## 2023-11-03 ENCOUNTER — ANESTHESIA (OUTPATIENT)
Dept: SURGERY | Facility: CLINIC | Age: 73
DRG: 481 | End: 2023-11-03
Payer: MEDICARE

## 2023-11-03 DIAGNOSIS — S72.002A CLOSED FRACTURE OF LEFT HIP, INITIAL ENCOUNTER (H): Primary | ICD-10-CM

## 2023-11-03 DIAGNOSIS — M79.651 PAIN OF RIGHT THIGH: ICD-10-CM

## 2023-11-03 DIAGNOSIS — S72.142A CLOSED DISPLACED INTERTROCHANTERIC FRACTURE OF LEFT FEMUR, INITIAL ENCOUNTER (H): Primary | ICD-10-CM

## 2023-11-03 PROBLEM — T39.015A PLATELET DYSFUNCTION DUE TO ASPIRIN (H): Status: ACTIVE | Noted: 2023-11-03

## 2023-11-03 PROBLEM — R26.9 ABNORMAL GAIT: Status: ACTIVE | Noted: 2023-11-03

## 2023-11-03 PROBLEM — D69.1 PLATELET DYSFUNCTION DUE TO ASPIRIN (H): Status: ACTIVE | Noted: 2023-11-03

## 2023-11-03 PROBLEM — M80.052A: Status: ACTIVE | Noted: 2023-11-03

## 2023-11-03 LAB
ABO/RH(D): NORMAL
ALBUMIN SERPL BCG-MCNC: 4.1 G/DL (ref 3.5–5.2)
ALP SERPL-CCNC: 53 U/L (ref 35–104)
ALT SERPL W P-5'-P-CCNC: 17 U/L (ref 0–50)
ANION GAP SERPL CALCULATED.3IONS-SCNC: 11 MMOL/L (ref 7–15)
ANTIBODY SCREEN: NEGATIVE
AST SERPL W P-5'-P-CCNC: 21 U/L (ref 0–45)
BASOPHILS # BLD AUTO: 0 10E3/UL (ref 0–0.2)
BASOPHILS NFR BLD AUTO: 0 %
BILIRUB SERPL-MCNC: 0.4 MG/DL
BUN SERPL-MCNC: 19.6 MG/DL (ref 8–23)
CALCIUM SERPL-MCNC: 9.6 MG/DL (ref 8.8–10.2)
CHLORIDE SERPL-SCNC: 104 MMOL/L (ref 98–107)
CREAT SERPL-MCNC: 0.72 MG/DL (ref 0.51–0.95)
DEPRECATED HCO3 PLAS-SCNC: 24 MMOL/L (ref 22–29)
EGFRCR SERPLBLD CKD-EPI 2021: 87 ML/MIN/1.73M2
EOSINOPHIL # BLD AUTO: 0 10E3/UL (ref 0–0.7)
EOSINOPHIL NFR BLD AUTO: 0 %
ERYTHROCYTE [DISTWIDTH] IN BLOOD BY AUTOMATED COUNT: 12.9 % (ref 10–15)
GLUCOSE SERPL-MCNC: 138 MG/DL (ref 70–99)
HCT VFR BLD AUTO: 38 % (ref 35–47)
HGB BLD-MCNC: 12.2 G/DL (ref 11.7–15.7)
IMM GRANULOCYTES # BLD: 0 10E3/UL
IMM GRANULOCYTES NFR BLD: 0 %
INR PPP: 1.02 (ref 0.85–1.15)
LYMPHOCYTES # BLD AUTO: 0.9 10E3/UL (ref 0.8–5.3)
LYMPHOCYTES NFR BLD AUTO: 11 %
MAGNESIUM SERPL-MCNC: 2 MG/DL (ref 1.7–2.3)
MCH RBC QN AUTO: 28.4 PG (ref 26.5–33)
MCHC RBC AUTO-ENTMCNC: 32.1 G/DL (ref 31.5–36.5)
MCV RBC AUTO: 89 FL (ref 78–100)
MONOCYTES # BLD AUTO: 0.4 10E3/UL (ref 0–1.3)
MONOCYTES NFR BLD AUTO: 5 %
NEUTROPHILS # BLD AUTO: 6.9 10E3/UL (ref 1.6–8.3)
NEUTROPHILS NFR BLD AUTO: 84 %
NRBC # BLD AUTO: 0 10E3/UL
NRBC BLD AUTO-RTO: 0 /100
PLATELET # BLD AUTO: 362 10E3/UL (ref 150–450)
POTASSIUM SERPL-SCNC: 3.9 MMOL/L (ref 3.4–5.3)
PROT SERPL-MCNC: 6.3 G/DL (ref 6.4–8.3)
RBC # BLD AUTO: 4.29 10E6/UL (ref 3.8–5.2)
SODIUM SERPL-SCNC: 139 MMOL/L (ref 135–145)
SPECIMEN EXPIRATION DATE: NORMAL
TROPONIN T SERPL HS-MCNC: 10 NG/L
WBC # BLD AUTO: 8.2 10E3/UL (ref 4–11)

## 2023-11-03 PROCEDURE — 80053 COMPREHEN METABOLIC PANEL: CPT | Performed by: EMERGENCY MEDICINE

## 2023-11-03 PROCEDURE — 96376 TX/PRO/DX INJ SAME DRUG ADON: CPT | Performed by: EMERGENCY MEDICINE

## 2023-11-03 PROCEDURE — 93005 ELECTROCARDIOGRAM TRACING: CPT | Performed by: EMERGENCY MEDICINE

## 2023-11-03 PROCEDURE — 99222 1ST HOSP IP/OBS MODERATE 55: CPT | Performed by: PEDIATRICS

## 2023-11-03 PROCEDURE — 96374 THER/PROPH/DIAG INJ IV PUSH: CPT | Performed by: EMERGENCY MEDICINE

## 2023-11-03 PROCEDURE — 85610 PROTHROMBIN TIME: CPT | Performed by: EMERGENCY MEDICINE

## 2023-11-03 PROCEDURE — 83735 ASSAY OF MAGNESIUM: CPT | Performed by: EMERGENCY MEDICINE

## 2023-11-03 PROCEDURE — 36415 COLL VENOUS BLD VENIPUNCTURE: CPT | Performed by: PEDIATRICS

## 2023-11-03 PROCEDURE — 86900 BLOOD TYPING SEROLOGIC ABO: CPT | Performed by: PEDIATRICS

## 2023-11-03 PROCEDURE — 258N000003 HC RX IP 258 OP 636: Performed by: PEDIATRICS

## 2023-11-03 PROCEDURE — 93010 ELECTROCARDIOGRAM REPORT: CPT | Performed by: EMERGENCY MEDICINE

## 2023-11-03 PROCEDURE — 250N000011 HC RX IP 250 OP 636: Performed by: EMERGENCY MEDICINE

## 2023-11-03 PROCEDURE — 258N000003 HC RX IP 258 OP 636: Performed by: EMERGENCY MEDICINE

## 2023-11-03 PROCEDURE — 99285 EMERGENCY DEPT VISIT HI MDM: CPT | Mod: 25 | Performed by: EMERGENCY MEDICINE

## 2023-11-03 PROCEDURE — 250N000011 HC RX IP 250 OP 636: Performed by: PEDIATRICS

## 2023-11-03 PROCEDURE — 96361 HYDRATE IV INFUSION ADD-ON: CPT | Performed by: EMERGENCY MEDICINE

## 2023-11-03 PROCEDURE — 36415 COLL VENOUS BLD VENIPUNCTURE: CPT | Performed by: EMERGENCY MEDICINE

## 2023-11-03 PROCEDURE — 85025 COMPLETE CBC W/AUTO DIFF WBC: CPT | Performed by: EMERGENCY MEDICINE

## 2023-11-03 PROCEDURE — 84484 ASSAY OF TROPONIN QUANT: CPT | Performed by: EMERGENCY MEDICINE

## 2023-11-03 PROCEDURE — 73502 X-RAY EXAM HIP UNI 2-3 VIEWS: CPT

## 2023-11-03 PROCEDURE — 250N000013 HC RX MED GY IP 250 OP 250 PS 637: Performed by: PEDIATRICS

## 2023-11-03 PROCEDURE — 120N000001 HC R&B MED SURG/OB

## 2023-11-03 RX ORDER — ACETAMINOPHEN 500 MG
1000 TABLET ORAL 3 TIMES DAILY PRN
Status: DISCONTINUED | OUTPATIENT
Start: 2023-11-03 | End: 2023-11-04

## 2023-11-03 RX ORDER — BACLOFEN 10 MG/1
10 TABLET ORAL 2 TIMES DAILY
COMMUNITY

## 2023-11-03 RX ORDER — HYDROMORPHONE HYDROCHLORIDE 1 MG/ML
0.3 INJECTION, SOLUTION INTRAMUSCULAR; INTRAVENOUS; SUBCUTANEOUS
Status: DISCONTINUED | OUTPATIENT
Start: 2023-11-03 | End: 2023-11-06 | Stop reason: HOSPADM

## 2023-11-03 RX ORDER — NALOXONE HYDROCHLORIDE 0.4 MG/ML
0.2 INJECTION, SOLUTION INTRAMUSCULAR; INTRAVENOUS; SUBCUTANEOUS
Status: DISCONTINUED | OUTPATIENT
Start: 2023-11-03 | End: 2023-11-06 | Stop reason: HOSPADM

## 2023-11-03 RX ORDER — SODIUM CHLORIDE, SODIUM LACTATE, POTASSIUM CHLORIDE, CALCIUM CHLORIDE 600; 310; 30; 20 MG/100ML; MG/100ML; MG/100ML; MG/100ML
INJECTION, SOLUTION INTRAVENOUS CONTINUOUS
Status: DISCONTINUED | OUTPATIENT
Start: 2023-11-03 | End: 2023-11-04

## 2023-11-03 RX ORDER — ASPIRIN 81 MG/1
81 TABLET ORAL DAILY
Status: ON HOLD | COMMUNITY
End: 2023-11-06

## 2023-11-03 RX ORDER — ONDANSETRON 2 MG/ML
4 INJECTION INTRAMUSCULAR; INTRAVENOUS EVERY 6 HOURS PRN
Status: DISCONTINUED | OUTPATIENT
Start: 2023-11-03 | End: 2023-11-04

## 2023-11-03 RX ORDER — BACLOFEN 5 MG/1
5 TABLET ORAL EVERY MORNING
COMMUNITY
Start: 2023-08-11 | End: 2023-11-28

## 2023-11-03 RX ORDER — GABAPENTIN 100 MG/1
200 CAPSULE ORAL AT BEDTIME
Status: DISCONTINUED | OUTPATIENT
Start: 2023-11-03 | End: 2023-11-06 | Stop reason: HOSPADM

## 2023-11-03 RX ORDER — TROLAMINE SALICYLATE 10 G/100G
CREAM TOPICAL 3 TIMES DAILY
Status: DISCONTINUED | OUTPATIENT
Start: 2023-11-03 | End: 2023-11-06 | Stop reason: HOSPADM

## 2023-11-03 RX ORDER — NALOXONE HYDROCHLORIDE 0.4 MG/ML
0.4 INJECTION, SOLUTION INTRAMUSCULAR; INTRAVENOUS; SUBCUTANEOUS
Status: DISCONTINUED | OUTPATIENT
Start: 2023-11-03 | End: 2023-11-06 | Stop reason: HOSPADM

## 2023-11-03 RX ORDER — HYDROMORPHONE HYDROCHLORIDE 1 MG/ML
0.5 INJECTION, SOLUTION INTRAMUSCULAR; INTRAVENOUS; SUBCUTANEOUS
Status: DISCONTINUED | OUTPATIENT
Start: 2023-11-03 | End: 2023-11-06 | Stop reason: HOSPADM

## 2023-11-03 RX ORDER — PROCHLORPERAZINE 25 MG
12.5 SUPPOSITORY, RECTAL RECTAL EVERY 12 HOURS PRN
Status: DISCONTINUED | OUTPATIENT
Start: 2023-11-03 | End: 2023-11-04

## 2023-11-03 RX ORDER — CALCIUM CARBONATE 500 MG/1
500 TABLET, CHEWABLE ORAL 2 TIMES DAILY WITH MEALS
Status: DISCONTINUED | OUTPATIENT
Start: 2023-11-03 | End: 2023-11-06 | Stop reason: HOSPADM

## 2023-11-03 RX ORDER — HYDROMORPHONE HYDROCHLORIDE 1 MG/ML
0.5 INJECTION, SOLUTION INTRAMUSCULAR; INTRAVENOUS; SUBCUTANEOUS EVERY 30 MIN PRN
Status: COMPLETED | OUTPATIENT
Start: 2023-11-03 | End: 2023-11-03

## 2023-11-03 RX ORDER — CHOLECALCIFEROL (VITAMIN D3) 50 MCG
50 TABLET ORAL EVERY OTHER DAY
COMMUNITY

## 2023-11-03 RX ORDER — ONDANSETRON 4 MG/1
4 TABLET, ORALLY DISINTEGRATING ORAL EVERY 6 HOURS PRN
Status: DISCONTINUED | OUTPATIENT
Start: 2023-11-03 | End: 2023-11-04

## 2023-11-03 RX ORDER — AMOXICILLIN 250 MG
1 CAPSULE ORAL DAILY PRN
Status: ON HOLD | COMMUNITY
End: 2023-11-06

## 2023-11-03 RX ORDER — PROCHLORPERAZINE MALEATE 5 MG
5 TABLET ORAL EVERY 6 HOURS PRN
Status: DISCONTINUED | OUTPATIENT
Start: 2023-11-03 | End: 2023-11-04

## 2023-11-03 RX ORDER — HYDROMORPHONE HYDROCHLORIDE 1 MG/ML
0.5 INJECTION, SOLUTION INTRAMUSCULAR; INTRAVENOUS; SUBCUTANEOUS
Status: CANCELLED | OUTPATIENT
Start: 2023-11-03

## 2023-11-03 RX ORDER — VITAMIN B COMPLEX
50 TABLET ORAL DAILY
Status: DISCONTINUED | OUTPATIENT
Start: 2023-11-05 | End: 2023-11-06 | Stop reason: HOSPADM

## 2023-11-03 RX ORDER — TRAZODONE HYDROCHLORIDE 50 MG/1
50 TABLET, FILM COATED ORAL AT BEDTIME
Status: DISCONTINUED | OUTPATIENT
Start: 2023-11-03 | End: 2023-11-06 | Stop reason: HOSPADM

## 2023-11-03 RX ORDER — SODIUM CHLORIDE 9 MG/ML
INJECTION, SOLUTION INTRAVENOUS CONTINUOUS
Status: DISCONTINUED | OUTPATIENT
Start: 2023-11-03 | End: 2023-11-03

## 2023-11-03 RX ORDER — AMOXICILLIN 250 MG
1 CAPSULE ORAL DAILY
Status: DISCONTINUED | OUTPATIENT
Start: 2023-11-04 | End: 2023-11-04

## 2023-11-03 RX ADMIN — HYDROMORPHONE HYDROCHLORIDE 0.5 MG: 1 INJECTION, SOLUTION INTRAMUSCULAR; INTRAVENOUS; SUBCUTANEOUS at 12:04

## 2023-11-03 RX ADMIN — TRAZODONE HYDROCHLORIDE 50 MG: 50 TABLET ORAL at 21:12

## 2023-11-03 RX ADMIN — SODIUM CHLORIDE, POTASSIUM CHLORIDE, SODIUM LACTATE AND CALCIUM CHLORIDE: 600; 310; 30; 20 INJECTION, SOLUTION INTRAVENOUS at 17:21

## 2023-11-03 RX ADMIN — HYDROMORPHONE HYDROCHLORIDE 0.3 MG: 1 INJECTION, SOLUTION INTRAMUSCULAR; INTRAVENOUS; SUBCUTANEOUS at 21:12

## 2023-11-03 RX ADMIN — Medication: at 23:05

## 2023-11-03 RX ADMIN — SODIUM CHLORIDE: 9 INJECTION, SOLUTION INTRAVENOUS at 12:00

## 2023-11-03 RX ADMIN — HYDROMORPHONE HYDROCHLORIDE 0.5 MG: 1 INJECTION, SOLUTION INTRAMUSCULAR; INTRAVENOUS; SUBCUTANEOUS at 19:07

## 2023-11-03 RX ADMIN — HYDROMORPHONE HYDROCHLORIDE 0.3 MG: 1 INJECTION, SOLUTION INTRAMUSCULAR; INTRAVENOUS; SUBCUTANEOUS at 18:18

## 2023-11-03 RX ADMIN — GABAPENTIN 200 MG: 100 CAPSULE ORAL at 21:12

## 2023-11-03 RX ADMIN — HYDROMORPHONE HYDROCHLORIDE 0.5 MG: 1 INJECTION, SOLUTION INTRAMUSCULAR; INTRAVENOUS; SUBCUTANEOUS at 16:10

## 2023-11-03 RX ADMIN — HYDROMORPHONE HYDROCHLORIDE 0.5 MG: 1 INJECTION, SOLUTION INTRAMUSCULAR; INTRAVENOUS; SUBCUTANEOUS at 14:32

## 2023-11-03 ASSESSMENT — ACTIVITIES OF DAILY LIVING (ADL)
ADLS_ACUITY_SCORE: 26
ADLS_ACUITY_SCORE: 22
ADLS_ACUITY_SCORE: 35
ADLS_ACUITY_SCORE: 26
ADLS_ACUITY_SCORE: 26
ADLS_ACUITY_SCORE: 35

## 2023-11-03 NOTE — LETTER
Transition Communication Hand-off for Care Transitions to Next Level of Care Provider    Name: Kylah Britt  : 1950  MRN #: [id number]  Primary Care Provider: Gina Mejia     Primary Clinic: [address] [place] [id number]     Reason for Hospitalization:  Closed fracture of left hip, initial encounter (H) [S72.002A]  Admit Date/Time: 11/3/2023 11:16 AM  Discharge Date: 23  Payor Source: Payor: BCBS / Plan: BCBS PLATINUM BLUE / Product Type: PPO /     Readmission Assessment Measure (WILLIAM) Risk Score/category: Average         Reason for Communication Hand-off Referral: Fragility    Discharge Plan: [facility] (Main Phone: [phone] Admissions Phone: [phone] Fax: [phone])      Concern for non-adherence with plan of care:   Y/N : No    Discharge Needs Assessment:  Needs      Flowsheet Row Most Recent Value   Anticipated Changes Related to Illness inability to care for self   Equipment Currently Used at Home walker, rolling   # of Referrals Placed by  Internal Clinic Care Coordination, Post Acute Facilities          Follow-up plan:  No future appointments.    Any outstanding tests or procedures:        Referrals       Future Labs/Procedures    Occupational Therapy Adult Consult     Comments:    Evaluate and treat as clinically indicated.    Reason: Status Post Hip Surgery    Physical Therapy Adult Consult     Comments:    Evaluate and treat as clinically indicated.    Reason: Status Post Hip Surgery            BELINDA Rodrigues  [facility] [phone]/ [facility] [phone]  Care Management    AVS/Discharge Summary is the source of truth; this is a helpful guide for improved communication of patient story

## 2023-11-03 NOTE — PROGRESS NOTES
"S-(situation): Patient arrives to room 267 via cart from ED    B-(background): Fall with left hip fracture    A-(assessment): Pt arrived alert and oriented in the company of her son Ehsan. She was moved on a hover mat. Pt experienced an increase of pain in the left hip at this time. After moving she rates her pain at 8 out of 10. After resting for about 20 min Pt stated that she was sleepy and that her hip was no longer bothering her. There is some swelling at the left hip. There is no bruising at this time. CMS is in tact. IV fluids infusing.  Both patient and son were updated on the plan going forward.  Initial Vitals are stable.   BP (!) 144/63   Pulse 91   Temp 98  F (36.7  C) (Oral)   Resp 16   Ht 1.575 m (5' 2\")   Wt 60.8 kg (134 lb 0.6 oz)   SpO2 94%   BMI 24.52 kg/m        R-(recommendations): Orders reviewed with Pt and son. Will monitor patient per MD orders.     Inpatient nursing criteria listed below were met:    Health care directives status obtained and documented: Yes  VTE ordered/documented: Yes  Skin issues/needs documented:Yes  Isolation addressed and Signage used: NA  Fall Prevention: Care plan updated Yes Education given and documented Yes  Care Plan initiated and Co-Morbidities added: Yes  Education Assessment documented:Yes  Admission Education Documented: Yes  If present CAUTI/CLABI Education done: NA  New medication patient education completed and documented (Possible Side Effects of Common Medications handout): Yes  Allergies Reviewed: Yes  Admission Medication Reconciliation completed: Yes  Home medications if not able to send immediately home with family stored here: NA  Reminder note placed in discharge instructions regarding home meds: NA  Individualized care needs/preferences addressed and charted: Yes  Provider Notified that patient has arrived to the unit: Yes       "

## 2023-11-03 NOTE — MEDICATION SCRIBE - ADMISSION MEDICATION HISTORY
Medication Scribe Admission Medication History    Admission medication history is complete. The information provided in this note is only as accurate as the sources available at the time of the update.    Information Source(s): Patient, Family member, Caregiver, and CareEverywhere/SureScripts via in-person    Pertinent Information: daughter Nicholas, care facility [facility] by phone    Changes made to PTA medication list:  Added: None  Deleted: tums  Changed: None    Medication Affordability:  Not including over the counter (OTC) medications, was there a time in the past 3 months when you did not take your medications as prescribed because of cost?: Unable to Assess    Allergies reviewed with patient and updates made in EHR: yes    Medication History Completed By: DEB BAUMANN 11/3/2023 2:16 PM    PTA Med List   Medication Sig Last Dose    acetaminophen (TYLENOL) 500 MG tablet Take 2 tablets (1,000 mg) by mouth 3 times daily as needed for mild pain 11/2/2023 at 0800    aspirin 81 MG EC tablet Take 81 mg by mouth daily 11/3/2023 at 0730    baclofen (LIORESAL) 10 MG tablet Take 10 mg by mouth 2 times daily 3pm and 9pm, takes 5mg every morning 11/2/2023 at 2100    Baclofen (LIORESAL) 5 MG tablet Take 5 mg by mouth every morning And 10mg twice daily 11/3/2023 at 0730    Calcium Carbonate (CALCIUM 600 PO) Take 1 tablet by mouth every other day 11/3/2023 at 0730    gabapentin (NEURONTIN) 100 MG capsule Take 2 capsules (200 mg) by mouth At Bedtime 11/2/2023 at 2100    melatonin 3 MG tablet Take 3 tablets (9 mg) by mouth At Bedtime 11/2/2023 at 2100    senna-docusate (SENOKOT-S/PERICOLACE) 8.6-50 MG tablet Take 1 tablet by mouth daily as needed for constipation Also takes 1 daily scheduled Unknown at unkn    senna-docusate (SENOKOT-S/PERICOLACE) 8.6-50 MG tablet Take 1 tablet by mouth daily And daily PRN 11/3/2023 at 0730    traZODone (DESYREL) 50 MG tablet Take 1 tablet (50 mg) by mouth At Bedtime 11/2/2023 at 2100     trolamine salicylate (ASPERCREME) 10 % external cream Apply topically 3 times daily APPLY TOPICALLY BEHIND RIGHT KNEE TO HIP THREE TIMES DAILY. OK TO LEAVE AT BEDSIDE. DX PAIN. 11/2/2023 at hs    vitamin D3 (CHOLECALCIFEROL) 50 mcg (2000 units) tablet Take 50 mcg by mouth every other day 11/3/2023 at 0730

## 2023-11-03 NOTE — TELEPHONE ENCOUNTER
Type of surgery: INTERNAL FIXATION, FRACTURE, INTERTROCHANTERIC, HIP, USING GAMMA RODS (Left)   Location of surgery: [facility] OR  Date and time of surgery: 11/3  Surgeon: Joan   Pre-Op Appt Date: emergent   Post-Op Appt Date: will call    Packet sent out: No  Pre-cert/Authorization completed:  Not Applicable  Date:

## 2023-11-03 NOTE — ED TRIAGE NOTES
Pt arrives via EMS from [facility] after a fall with c/o left hip pain.   Pt was getting dressed when she tripped, fell and landed on left hip.   Denies hitting head. No blood thinners.   Pain and swelling to left hip. Received 2 doses of 100 mcg of IV fentanyl.     CMS intact, pulses palpable.      Triage Assessment (Adult)       Row Name 11/03/23 1118          Triage Assessment    Airway WDL WDL        Respiratory WDL    Respiratory WDL WDL        Peripheral/Neurovascular WDL    Peripheral Neurovascular WDL WDL

## 2023-11-03 NOTE — H&P
[facility]    History and Physical - Hospitalist Service       Date of Admission:  11/3/2023    Assessment & Plan      Kylah Britt is a 73 year old female with osteopenia, chronic gait abnormality for which she uses a walker during ambulation, and mild dementia living in assisted living facility ([facility]) who presented on 11/3/2023 with left hip pain after a fall while she was ambulating using her walker.  Clinical presentation and radiographic findings are suspicious for osteoporotic fracture of the left hip for which operative repair has been advised by orthopedic surgery.  Due to high risk for an adverse outcome, hospitalization is considered medically necessary.  Based on the presently available information, hospitalization for at least 2 midnights is anticipated.    Patient appears medically optimized to proceed with anesthesia and surgery as planned for operative repair of her hip fracture without additional preoperative diagnostic evaluation or intervention.    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  This is suspicious for osteoporotic fracture of left hip.  Orthopedics has advised operative repair which is scheduled for tomorrow morning.  Chronic osteopenia is treated with vitamin D and calcium supplementation.  -Admit to inpatient  -May take oral intake today but n.p.o. after midnight  -Start lactated Ringer's infusion for maintenance IV fluids perioperatively  -Use IV Dilaudid as needed for moderate or severe pain  -May take oral Tylenol as needed for  mild pain  -Bedrest preoperatively with frequent CMS monitoring of left lower extremity  -Hold chronic doses of baclofen but continue chronic dose of gabapentin  -Orthopedic surgery consulted  -Continue chronic vitamin D and calcium supplementation    Aspirin induced platelet function defect  Takes aspirin daily which causes platelet function defect that increases her bleeding risk.  She is not anemic at admission.  -Hold aspirin preoperatively  -Ordered recheck of hemoglobin in a.m.    Dementia  Chronic mild dementia residing at [facility].  Not taking any medication specific for dementia.  She will be at risk for superimposed delirium during hospitalization.  -Continue chronic medication for insomnia including trazodone  -Monitor closely for evolving delirium and treat accordingly if she develops delirium    Chronic gait abnormality with fall  Has chronic gait abnormality for which she normally uses a walker.  She fell today which is the first time she is fallen in over a year although she does have previous history of frequent falls.  -Anticipate PT evaluation and treatment postoperatively to assist with disposition planning    Constipation  Chronically taking senna for treatment of constipation and will be at risk for worsening constipation due to combination of decreased activity level, decreased oral intake, and use of opiates.  -Continue chronic dose of senna  -Anticipate either increasing dose of senna or adding MiraLAX perioperatively          Diet:  Regular diet, n.p.o. after midnight  DVT Prophylaxis: Pneumatic Compression Devices  Kee Catheter: Not present  Lines: None     Cardiac Monitoring: None  Code Status:  DNR/DNI    Clinically Significant Risk Factors Present on Admission                # Drug Induced Platelet Defect: home medication list includes an antiplatelet medication     # Dementia: noted on problem list               Disposition Plan      Expected Discharge Date:  11/05/2023                  Marvin Milligan MD  Hospitalist Service  [facility]  Securely message with [employer] (more info)  Text page via [facility] Paging/Directory     ______________________________________________________________________    Chief Complaint   Left hip pain    History is obtained from the patient, electronic health record, emergency department physician, and patient's children    History of Present Illness   Kylah Britt is a 73 year old female who lives at assisted living and uses a walker during ambulation and was ambulating in her apartment today when she says she just fell.  She is not sure why she fell.  She denies syncope or head injury after falling.  After falling she had immediate pain in her left hip area and could not get up.  She presented the emergency room and was found to have left hip fracture.  ER provider Dr. Carter reports that her case was discussed with orthopedic surgeon who advised operative repair which is planned for tomorrow morning.  Patient has received doses of IV Dilaudid in the ER and has been resting more comfortably with left hip pain after IV Dilaudid.  She offers no other complaints at this time.      Past Medical History    Past Medical History:   Diagnosis Date    Abnormal Papanicolaou smear of cervix and cervical HPV 9/1/92    vaginitis with abnormal Pap    Depressive disorder, not elsewhere classified     depression    Female stress incontinence     urinary incontinence    Lyme disease     Osteoarthrosis, unspecified whether generalized or localized, unspecified site      monoarticular arthritis in the right first MTP joint    Other psychological or physical stress, not elsewhere classified(V62.89)     delayed stress syndrome       Past Surgical History   Past Surgical History:   Procedure Laterality Date    COLONOSCOPY N/A 4/6/2022    Procedure: COLONOSCOPY, WITH POLYPECTOMY;  Surgeon: Bishnu Tsai MD;  Location: [facility]     HC KNEE SCOPE, DIAGNOSTIC  1/31/89    Examination of right knee under anesthesia. Arthroscopy of right knee with debridement of torn anterior curciate ligament (medial meniscus not resected).    HC REMOVAL OF OVARY/TUBE(S)  2/20/90    Salpingo-Oophorectomy, bilateral    OPEN REDUCTION INTERNAL FIXATION CALCANEOUS Right 6/8/2017    Procedure: OPEN REDUCTION INTERNAL FIXATION CALCANEOUS;  Open Reduction Internal Fixation Right Calcaneous;  Surgeon: Darnell Kincaid DPM;  Location: [facility] OR    ZZC APPENDECTOMY      ZZC LIGATE FALLOPIAN TUBE,POSTPARTUM  04/12/77    Bilateral tubal cauterization    ZZC TOTAL ABDOM HYSTERECTOMY  2/20/90    Hysterectomy, Total Abdominal    ZZHC COLONOSCOPY THRU STOMA, DIAGNOSTIC  06/24/98       Prior to Admission Medications   Prior to Admission Medications   Prescriptions Last Dose Informant Patient Reported? Taking?   Baclofen (LIORESAL) 5 MG tablet 11/3/2023 at 0730  Yes Yes   Sig: Take 5 mg by mouth every morning And 10mg twice daily   Calcium Carbonate (CALCIUM 600 PO) 11/3/2023 at 0730 Self Yes Yes   Sig: Take 1 tablet by mouth every other day   acetaminophen (TYLENOL) 500 MG tablet 11/2/2023 at 0800  No Yes   Sig: Take 2 tablets (1,000 mg) by mouth 3 times daily as needed for mild pain   aspirin 81 MG EC tablet 11/3/2023 at 0730  Yes Yes   Sig: Take 81 mg by mouth daily   baclofen (LIORESAL) 10 MG tablet 11/2/2023 at 2100  Yes Yes   Sig: Take 10 mg by mouth 2 times daily 3pm and 9pm, takes 5mg every morning   gabapentin (NEURONTIN) 100 MG capsule 11/2/2023 at 2100  No Yes   Sig: Take 2 capsules (200 mg) by mouth At Bedtime   melatonin 3 MG tablet 11/2/2023 at 2100  No Yes   Sig: Take 3 tablets (9 mg) by mouth At Bedtime   senna-docusate (SENOKOT-S/PERICOLACE) 8.6-50 MG tablet 11/3/2023 at 0730  Yes Yes   Sig: Take 1 tablet by mouth daily And daily PRN   senna-docusate (SENOKOT-S/PERICOLACE) 8.6-50 MG tablet Unknown at unkn  Yes Yes   Sig: Take 1 tablet by mouth daily as needed for  "constipation Also takes 1 daily scheduled   traZODone (DESYREL) 50 MG tablet 11/2/2023 at 2100  No Yes   Sig: Take 1 tablet (50 mg) by mouth At Bedtime   trolamine salicylate (ASPERCREME) 10 % external cream 11/2/2023 at hs  No Yes   Sig: Apply topically 3 times daily APPLY TOPICALLY BEHIND RIGHT KNEE TO HIP THREE TIMES DAILY. OK TO LEAVE AT BEDSIDE. DX PAIN.   vitamin D3 (CHOLECALCIFEROL) 50 mcg (2000 units) tablet 11/3/2023 at 0730  Yes Yes   Sig: Take 50 mcg by mouth every other day      Facility-Administered Medications: None        Review of Systems    The 10 point Review of Systems is negative other than noted in the HPI or here.    Social History   I have reviewed this patient's social history and updated it with pertinent information if needed.  Social History     Tobacco Use    Smoking status: Never    Smokeless tobacco: Never   Vaping Use    Vaping Use: Never used   Substance Use Topics    Alcohol use: Yes     Comment: very rare    Drug use: No         Allergies   Allergies   Allergen Reactions    No Known Drug Allergy         Physical Exam   Vital Signs: Temp: 97.7  F (36.5  C) Temp src: Oral BP: 114/59 Pulse: 76   Resp: 18 SpO2: 92 % O2 Device: None (Room air)    Patient Vitals for the past 24 hrs:   BP Temp Temp src Pulse Resp SpO2 Height Weight   11/03/23 1402 114/59 -- -- 76 -- 92 % -- --   11/03/23 1332 -- -- -- -- -- 92 % -- --   11/03/23 1300 125/62 -- -- 78 -- -- -- --   11/03/23 1226 132/66 -- -- 75 -- 96 % -- --   11/03/23 1150 -- -- -- -- -- 95 % -- --   11/03/23 1117 (!) 170/64 97.7  F (36.5  C) Oral 85 18 98 % 1.6 m (5' 3\") 63 kg (139 lb)     Weight: 139 lbs 0 oz Body mass index is 24.62 kg/m .    Constitutional: Elderly woman without signs of acute distress while lying supine in bed  Eyes: Anicteric sclerae, clear conjunctivae, symmetric pupils, normal extraocular movements  ENT: No signs of head injury, clear ear canals and nares, normal oropharynx  Neck: Trachea midline, no stridor, " nontender  Hematologic / Lymphatic: no cervical lymphadenopathy and no supraclavicular lymphadenopathy  Respiratory: No increased work of breathing, good air exchange, clear to auscultation bilaterally, no crackles or wheezing  Cardiovascular: Normal apical impulse, regular rate and rhythm, normal S1 and S2, no S3 or S4, and no murmur noted  GI:  normal bowel sounds, soft, non-distended, non-tender, no masses palpated, no hepatosplenomegally  Skin: Normal color and no rashes  Musculoskeletal: Left lower extremity is externally rotated and shorter than the right, both feet are pink and warm  Neurologic: Alert and appears to maintain wakefulness and attention although not eye contact often appearing to avert her eyes to the other side during conversation, able to follow simple instructions, wiggles toes on both feet symmetrically and reports intact touch sensation in the feet  Neuropsychiatric: Obvious memory problems with unusual affect    Medical Decision Making       60 MINUTES SPENT BY ME on the date of service doing chart review, history, exam, documentation & further activities per the note.      Data     I have personally reviewed the following data over the past 24 hrs:    8.2  \   12.2   / 362     139 104 19.6 /  138 (H)   3.9 24 0.72 \     ALT: 17 AST: 21 AP: 53 TBILI: 0.4   ALB: 4.1 TOT PROTEIN: 6.3 (L) LIPASE: N/A     Trop: 10 BNP: N/A     INR:  1.02 PTT:  N/A   D-dimer:  N/A Fibrinogen:  N/A       Reviewed EKG performed today demonstrating normal sinus rhythm, normal intervals, and no acute ischemic changes    Imaging results reviewed over the past 24 hrs:   Recent Results (from the past 24 hour(s))   XR Pelvis w Hip Left 1 View    Narrative    EXAM: XR PELVIS AND HIP LEFT 1 VIEW  DATE/TIME: 11/3/2023 12:22 PM    INDICATION: fall; pain  COMPARISON: None available.       Impression    IMPRESSION: Acute, comminuted, displaced fracture of the left femoral  neck and subtrochanteric extension.    Mild  degenerative arthritis both hips. Mild degenerative changes both  SI joints. Lower lumbar facet arthropathy.    NOTE: ABNORMAL REPORT    THE DICTATION ABOVE DESCRIBES AN ABNORMAL REPORT FOR WHICH FOLLOW-UP  IS NEEDED.    NICOLE BURROWS DO         SYSTEM ID:  [id number]     Recent Labs   Lab 11/03/23  1158   WBC 8.2   HGB 12.2   MCV 89      INR 1.02      POTASSIUM 3.9   CHLORIDE 104   CO2 24   BUN 19.6   CR 0.72   ANIONGAP 11   NARA 9.6   *   ALBUMIN 4.1   PROTTOTAL 6.3*   BILITOTAL 0.4   ALKPHOS 53   ALT 17   AST 21

## 2023-11-03 NOTE — ANESTHESIA PREPROCEDURE EVALUATION
Anesthesia Pre-Procedure Evaluation    Patient: Kylah Britt   MRN: [id number] : 1950        Procedure : Procedure(s):  INTERNAL FIXATION, FRACTURE, INTERTROCHANTERIC, HIP, USING GAMMA RODS          Past Medical History:   Diagnosis Date     Abnormal Papanicolaou smear of cervix and cervical HPV 92    vaginitis with abnormal Pap     Depressive disorder, not elsewhere classified     depression     Female stress incontinence     urinary incontinence     Lyme disease      Osteoarthrosis, unspecified whether generalized or localized, unspecified site      monoarticular arthritis in the right first MTP joint     Other psychological or physical stress, not elsewhere classified(V62.89)     delayed stress syndrome      Past Surgical History:   Procedure Laterality Date     COLONOSCOPY N/A 2022    Procedure: COLONOSCOPY, WITH POLYPECTOMY;  Surgeon: Bishnu Tsai MD;  Location: [facility]     HC KNEE SCOPE, DIAGNOSTIC  89    Examination of right knee under anesthesia. Arthroscopy of right knee with debridement of torn anterior curciate ligament (medial meniscus not resected).     HC REMOVAL OF OVARY/TUBE(S)  90    Salpingo-Oophorectomy, bilateral     OPEN REDUCTION INTERNAL FIXATION CALCANEOUS Right 2017    Procedure: OPEN REDUCTION INTERNAL FIXATION CALCANEOUS;  Open Reduction Internal Fixation Right Calcaneous;  Surgeon: Darnell Kincaid DPM;  Location:  OR     ZZ APPENDECTOMY       ZZC LIGATE FALLOPIAN TUBE,POSTPARTUM  77    Bilateral tubal cauterization     ZZC TOTAL ABDOM HYSTERECTOMY  90    Hysterectomy, Total Abdominal     ZZHC COLONOSCOPY THRU STOMA, DIAGNOSTIC  98      Allergies   Allergen Reactions     No Known Drug Allergy       Social History     Tobacco Use     Smoking status: Never     Smokeless tobacco: Never   Substance Use Topics     Alcohol use: Yes     Comment: very rare      Wt Readings from Last 1 Encounters:   23 63 kg (139 lb)     "    Anesthesia Evaluation   Pt has had prior anesthetic. Type: MAC.    No history of anesthetic complications       ROS/MED HX  ENT/Pulmonary:  - neg pulmonary ROS     Neurologic:     (+)   dementia,                             Cardiovascular:     (+) Dyslipidemia - -   -  - -                                 Previous cardiac testing   Echo: Date: Results:    Stress Test:  Date: Results:    ECG Reviewed:  Date: 11/3/23 Results:  Sinus Rhythm   -RSR(V1) -nondiagnostic.  Cath:  Date: Results:      METS/Exercise Tolerance:     Hematologic:  - neg hematologic  ROS     Musculoskeletal:  - neg musculoskeletal ROS     GI/Hepatic:     (+) GERD,       bowel prep, appendicitis,           Renal/Genitourinary:  - neg Renal ROS     Endo:  - neg endo ROS     Psychiatric/Substance Use:     (+) psychiatric history depression       Infectious Disease:  - neg infectious disease ROS     Malignancy:  - neg malignancy ROS     Other:            Physical Exam    Airway        Mallampati: II   TM distance: > 3 FB   Neck ROM: full   Mouth opening: > 3 cm    Respiratory Devices and Support         Dental           Cardiovascular   cardiovascular exam normal          Pulmonary   pulmonary exam normal              OUTSIDE LABS:  CBC:   Lab Results   Component Value Date    WBC 8.2 11/03/2023    WBC 4.5 07/10/2023    HGB 12.2 11/03/2023    HGB 13.0 07/10/2023    HCT 38.0 11/03/2023    HCT 39.9 07/10/2023     11/03/2023     07/10/2023     BMP:   Lab Results   Component Value Date     11/03/2023     07/10/2023    POTASSIUM 3.9 11/03/2023    POTASSIUM 4.4 07/10/2023    CHLORIDE 104 11/03/2023    CHLORIDE 105 07/10/2023    CO2 24 11/03/2023    CO2 25 07/10/2023    BUN 19.6 11/03/2023    BUN 15.0 07/10/2023    CR 0.72 11/03/2023    CR 0.76 07/10/2023     (H) 11/03/2023     (H) 07/10/2023     COAGS:   Lab Results   Component Value Date    INR 1.02 11/03/2023     POC: No results found for: \"BGM\", \"HCG\", " "\"HCGS\"  HEPATIC:   Lab Results   Component Value Date    ALBUMIN 4.1 11/03/2023    PROTTOTAL 6.3 (L) 11/03/2023    ALT 17 11/03/2023    AST 21 11/03/2023    ALKPHOS 53 11/03/2023    BILITOTAL 0.4 11/03/2023    BILIDIRECT 0.1 04/12/2002     OTHER:   Lab Results   Component Value Date    A1C 5.7 04/12/2002    NARA 9.6 11/03/2023    MAG 2.0 11/03/2023    TSH 0.96 04/12/2002    T4 0.9 04/12/2002    SED 15 04/12/2002       Anesthesia Plan    ASA Status:  2    NPO Status:  NPO Appropriate    Anesthesia Type: General.     - Airway: LMA   Induction: Intravenous, Propofol.   Maintenance: Balanced.        Consents    Anesthesia Plan(s) and associated risks, benefits, and realistic alternatives discussed. Questions answered and patient/representative(s) expressed understanding.     - Discussed:     - Discussed with:  Patient      - Extended Intubation/Ventilatory Support Discussed: No.      - Patient is DNR/DNI Status: No             Suspend during perioperative period? Yes.             Agree to: chemical resuscitation, chest compression/defibrillation.   Use of blood products discussed: Yes.     - Discussed with: Patient.     - Consented: consented to blood products     Postoperative Care    Pain management: IV analgesics, Multi-modal analgesia, Oral pain medications, Peripheral nerve block (Single Shot).   PONV prophylaxis: Ondansetron (or other 5HT-3), Background Propofol Infusion, Dexamethasone or Solumedrol     Comments:    Other Comments: The risks and benefits of anesthesia, and the alternatives where applicable, have been discussed with the patient, and they wish to proceed.    Patient has a No CPR and Do not intubate order prior to surgery. Discussed with patient the need for advanced airway placement and possibility of resuscitative efforts when under the effects of anesthesia. She verbalized understanding and wished to proceed with a Full Code in the OR throughout surgery.     Plan for pericapsular nerve group block " for postoperative pain management. All risks and benefits were discussed, and patient verbalized understanding and wishes to proceed with nerve block. Paper consent obtained.                ESPERANZA Carrero CRNA

## 2023-11-04 ENCOUNTER — APPOINTMENT (OUTPATIENT)
Dept: GENERAL RADIOLOGY | Facility: CLINIC | Age: 73
DRG: 481 | End: 2023-11-04
Attending: ORTHOPAEDIC SURGERY
Payer: MEDICARE

## 2023-11-04 LAB
HGB BLD-MCNC: 9.8 G/DL (ref 11.7–15.7)
HGB BLD-MCNC: 9.9 G/DL (ref 11.7–15.7)
HOLD SPECIMEN: NORMAL

## 2023-11-04 PROCEDURE — 250N000011 HC RX IP 250 OP 636: Performed by: PEDIATRICS

## 2023-11-04 PROCEDURE — 99222 1ST HOSP IP/OBS MODERATE 55: CPT | Mod: 57 | Performed by: ORTHOPAEDIC SURGERY

## 2023-11-04 PROCEDURE — 36415 COLL VENOUS BLD VENIPUNCTURE: CPT | Performed by: NURSE ANESTHETIST, CERTIFIED REGISTERED

## 2023-11-04 PROCEDURE — 258N000003 HC RX IP 258 OP 636: Performed by: PEDIATRICS

## 2023-11-04 PROCEDURE — 99232 SBSQ HOSP IP/OBS MODERATE 35: CPT | Performed by: NURSE PRACTITIONER

## 2023-11-04 PROCEDURE — 250N000013 HC RX MED GY IP 250 OP 250 PS 637: Performed by: NURSE ANESTHETIST, CERTIFIED REGISTERED

## 2023-11-04 PROCEDURE — 370N000017 HC ANESTHESIA TECHNICAL FEE, PER MIN: Performed by: ORTHOPAEDIC SURGERY

## 2023-11-04 PROCEDURE — 27245 TREAT THIGH FRACTURE: CPT | Mod: LT | Performed by: ORTHOPAEDIC SURGERY

## 2023-11-04 PROCEDURE — 999N000179 XR SURGERY CARM FLUORO LESS THAN 5 MIN W STILLS

## 2023-11-04 PROCEDURE — 360N000084 HC SURGERY LEVEL 4 W/ FLUORO, PER MIN: Performed by: ORTHOPAEDIC SURGERY

## 2023-11-04 PROCEDURE — 250N000013 HC RX MED GY IP 250 OP 250 PS 637: Performed by: ORTHOPAEDIC SURGERY

## 2023-11-04 PROCEDURE — 250N000025 HC SEVOFLURANE, PER MIN: Performed by: ORTHOPAEDIC SURGERY

## 2023-11-04 PROCEDURE — 258N000003 HC RX IP 258 OP 636: Performed by: NURSE ANESTHETIST, CERTIFIED REGISTERED

## 2023-11-04 PROCEDURE — 36415 COLL VENOUS BLD VENIPUNCTURE: CPT | Performed by: PEDIATRICS

## 2023-11-04 PROCEDURE — 250N000011 HC RX IP 250 OP 636: Mod: JZ | Performed by: NURSE ANESTHETIST, CERTIFIED REGISTERED

## 2023-11-04 PROCEDURE — 250N000011 HC RX IP 250 OP 636: Performed by: ORTHOPAEDIC SURGERY

## 2023-11-04 PROCEDURE — 250N000013 HC RX MED GY IP 250 OP 250 PS 637: Performed by: HOSPITALIST

## 2023-11-04 PROCEDURE — C1713 ANCHOR/SCREW BN/BN,TIS/BN: HCPCS | Performed by: ORTHOPAEDIC SURGERY

## 2023-11-04 PROCEDURE — 250N000011 HC RX IP 250 OP 636: Performed by: PHYSICIAN ASSISTANT

## 2023-11-04 PROCEDURE — 250N000013 HC RX MED GY IP 250 OP 250 PS 637: Performed by: PHYSICIAN ASSISTANT

## 2023-11-04 PROCEDURE — 120N000001 HC R&B MED SURG/OB

## 2023-11-04 PROCEDURE — 710N000010 HC RECOVERY PHASE 1, LEVEL 2, PER MIN: Performed by: ORTHOPAEDIC SURGERY

## 2023-11-04 PROCEDURE — 272N000001 HC OR GENERAL SUPPLY STERILE: Performed by: ORTHOPAEDIC SURGERY

## 2023-11-04 PROCEDURE — 85018 HEMOGLOBIN: CPT | Performed by: NURSE ANESTHETIST, CERTIFIED REGISTERED

## 2023-11-04 PROCEDURE — 250N000011 HC RX IP 250 OP 636: Performed by: NURSE ANESTHETIST, CERTIFIED REGISTERED

## 2023-11-04 PROCEDURE — 85018 HEMOGLOBIN: CPT | Performed by: PEDIATRICS

## 2023-11-04 PROCEDURE — 250N000009 HC RX 250: Performed by: NURSE ANESTHETIST, CERTIFIED REGISTERED

## 2023-11-04 PROCEDURE — 0QS704Z REPOSITION LEFT UPPER FEMUR WITH INTERNAL FIXATION DEVICE, OPEN APPROACH: ICD-10-PCS | Performed by: ORTHOPAEDIC SURGERY

## 2023-11-04 DEVICE — IMPLANTABLE DEVICE: Type: IMPLANTABLE DEVICE | Site: HIP | Status: FUNCTIONAL

## 2023-11-04 DEVICE — IMP WIRE KIRSCHNER STRK 3.0X285MM 1806-0050S: Type: IMPLANTABLE DEVICE | Site: HIP | Status: FUNCTIONAL

## 2023-11-04 DEVICE — IMP SCR BONE STRK G3 LAG 10.5X90MM TI 3060-0090S: Type: IMPLANTABLE DEVICE | Site: HIP | Status: FUNCTIONAL

## 2023-11-04 DEVICE — IMP SCR STRK LOCK 5.0X42.5MM FT 1896-5042S: Type: IMPLANTABLE DEVICE | Site: HIP | Status: FUNCTIONAL

## 2023-11-04 DEVICE — IMP WIRE KIRSCHNER 3.2X450MM 1210-6450S: Type: IMPLANTABLE DEVICE | Site: HIP | Status: FUNCTIONAL

## 2023-11-04 RX ORDER — ALBUTEROL SULFATE 0.83 MG/ML
2.5 SOLUTION RESPIRATORY (INHALATION) EVERY 4 HOURS PRN
Status: DISCONTINUED | OUTPATIENT
Start: 2023-11-04 | End: 2023-11-04 | Stop reason: HOSPADM

## 2023-11-04 RX ORDER — AMOXICILLIN 250 MG
1 CAPSULE ORAL 2 TIMES DAILY
Status: DISCONTINUED | OUTPATIENT
Start: 2023-11-04 | End: 2023-11-06 | Stop reason: HOSPADM

## 2023-11-04 RX ORDER — ONDANSETRON 2 MG/ML
4 INJECTION INTRAMUSCULAR; INTRAVENOUS EVERY 6 HOURS PRN
Status: DISCONTINUED | OUTPATIENT
Start: 2023-11-04 | End: 2023-11-06 | Stop reason: HOSPADM

## 2023-11-04 RX ORDER — HYDROMORPHONE HYDROCHLORIDE 1 MG/ML
0.2 INJECTION, SOLUTION INTRAMUSCULAR; INTRAVENOUS; SUBCUTANEOUS EVERY 5 MIN PRN
Status: DISCONTINUED | OUTPATIENT
Start: 2023-11-04 | End: 2023-11-04 | Stop reason: HOSPADM

## 2023-11-04 RX ORDER — DEXAMETHASONE SODIUM PHOSPHATE 10 MG/ML
INJECTION, SOLUTION INTRAMUSCULAR; INTRAVENOUS PRN
Status: DISCONTINUED | OUTPATIENT
Start: 2023-11-04 | End: 2023-11-04

## 2023-11-04 RX ORDER — LIDOCAINE HYDROCHLORIDE 10 MG/ML
INJECTION, SOLUTION INFILTRATION; PERINEURAL PRN
Status: DISCONTINUED | OUTPATIENT
Start: 2023-11-04 | End: 2023-11-04

## 2023-11-04 RX ORDER — FENTANYL CITRATE 50 UG/ML
INJECTION, SOLUTION INTRAMUSCULAR; INTRAVENOUS PRN
Status: DISCONTINUED | OUTPATIENT
Start: 2023-11-04 | End: 2023-11-04

## 2023-11-04 RX ORDER — LORAZEPAM 0.5 MG/1
0.25 TABLET ORAL EVERY 4 HOURS PRN
Status: DISCONTINUED | OUTPATIENT
Start: 2023-11-04 | End: 2023-11-06 | Stop reason: HOSPADM

## 2023-11-04 RX ORDER — ONDANSETRON 2 MG/ML
4 INJECTION INTRAMUSCULAR; INTRAVENOUS EVERY 30 MIN PRN
Status: DISCONTINUED | OUTPATIENT
Start: 2023-11-04 | End: 2023-11-04 | Stop reason: HOSPADM

## 2023-11-04 RX ORDER — ACETAMINOPHEN 325 MG/1
975 TABLET ORAL EVERY 6 HOURS PRN
Status: DISCONTINUED | OUTPATIENT
Start: 2023-11-04 | End: 2023-11-04 | Stop reason: HOSPADM

## 2023-11-04 RX ORDER — POLYETHYLENE GLYCOL 3350 17 G/17G
17 POWDER, FOR SOLUTION ORAL DAILY
Status: DISCONTINUED | OUTPATIENT
Start: 2023-11-05 | End: 2023-11-06 | Stop reason: HOSPADM

## 2023-11-04 RX ORDER — HYDROXYZINE HYDROCHLORIDE 10 MG/1
10 TABLET, FILM COATED ORAL EVERY 6 HOURS PRN
Status: DISCONTINUED | OUTPATIENT
Start: 2023-11-04 | End: 2023-11-04 | Stop reason: HOSPADM

## 2023-11-04 RX ORDER — HYDROMORPHONE HYDROCHLORIDE 1 MG/ML
0.5 INJECTION, SOLUTION INTRAMUSCULAR; INTRAVENOUS; SUBCUTANEOUS EVERY 5 MIN PRN
Status: DISCONTINUED | OUTPATIENT
Start: 2023-11-04 | End: 2023-11-04 | Stop reason: HOSPADM

## 2023-11-04 RX ORDER — CALCIUM CARBONATE 500 MG/1
500 TABLET, CHEWABLE ORAL 4 TIMES DAILY PRN
Status: DISCONTINUED | OUTPATIENT
Start: 2023-11-04 | End: 2023-11-06 | Stop reason: HOSPADM

## 2023-11-04 RX ORDER — PROPOFOL 10 MG/ML
INJECTION, EMULSION INTRAVENOUS CONTINUOUS PRN
Status: DISCONTINUED | OUTPATIENT
Start: 2023-11-04 | End: 2023-11-04

## 2023-11-04 RX ORDER — LIDOCAINE 40 MG/G
CREAM TOPICAL
Status: DISCONTINUED | OUTPATIENT
Start: 2023-11-04 | End: 2023-11-04 | Stop reason: HOSPADM

## 2023-11-04 RX ORDER — ONDANSETRON 4 MG/1
4 TABLET, ORALLY DISINTEGRATING ORAL EVERY 30 MIN PRN
Status: DISCONTINUED | OUTPATIENT
Start: 2023-11-04 | End: 2023-11-04 | Stop reason: HOSPADM

## 2023-11-04 RX ORDER — PROPOFOL 10 MG/ML
INJECTION, EMULSION INTRAVENOUS PRN
Status: DISCONTINUED | OUTPATIENT
Start: 2023-11-04 | End: 2023-11-04

## 2023-11-04 RX ORDER — FAMOTIDINE 20 MG/1
20 TABLET, FILM COATED ORAL 2 TIMES DAILY
Status: DISCONTINUED | OUTPATIENT
Start: 2023-11-04 | End: 2023-11-06 | Stop reason: HOSPADM

## 2023-11-04 RX ORDER — KETAMINE HYDROCHLORIDE 10 MG/ML
INJECTION INTRAMUSCULAR; INTRAVENOUS PRN
Status: DISCONTINUED | OUTPATIENT
Start: 2023-11-04 | End: 2023-11-04

## 2023-11-04 RX ORDER — LIDOCAINE 40 MG/G
CREAM TOPICAL
Status: DISCONTINUED | OUTPATIENT
Start: 2023-11-04 | End: 2023-11-06 | Stop reason: HOSPADM

## 2023-11-04 RX ORDER — BISACODYL 10 MG
10 SUPPOSITORY, RECTAL RECTAL DAILY PRN
Status: DISCONTINUED | OUTPATIENT
Start: 2023-11-04 | End: 2023-11-06 | Stop reason: HOSPADM

## 2023-11-04 RX ORDER — FENTANYL CITRATE 50 UG/ML
50 INJECTION, SOLUTION INTRAMUSCULAR; INTRAVENOUS EVERY 5 MIN PRN
Status: DISCONTINUED | OUTPATIENT
Start: 2023-11-04 | End: 2023-11-04 | Stop reason: HOSPADM

## 2023-11-04 RX ORDER — OXYCODONE HYDROCHLORIDE 5 MG/1
5 TABLET ORAL EVERY 4 HOURS PRN
Status: DISCONTINUED | OUTPATIENT
Start: 2023-11-04 | End: 2023-11-05

## 2023-11-04 RX ORDER — GABAPENTIN 100 MG/1
100 CAPSULE ORAL
Status: DISCONTINUED | OUTPATIENT
Start: 2023-11-04 | End: 2023-11-04 | Stop reason: HOSPADM

## 2023-11-04 RX ORDER — SODIUM CHLORIDE, SODIUM LACTATE, POTASSIUM CHLORIDE, CALCIUM CHLORIDE 600; 310; 30; 20 MG/100ML; MG/100ML; MG/100ML; MG/100ML
INJECTION, SOLUTION INTRAVENOUS CONTINUOUS
Status: DISCONTINUED | OUTPATIENT
Start: 2023-11-04 | End: 2023-11-04 | Stop reason: HOSPADM

## 2023-11-04 RX ORDER — ONDANSETRON 4 MG/1
4 TABLET, ORALLY DISINTEGRATING ORAL EVERY 6 HOURS PRN
Status: DISCONTINUED | OUTPATIENT
Start: 2023-11-04 | End: 2023-11-06 | Stop reason: HOSPADM

## 2023-11-04 RX ORDER — TRANEXAMIC ACID 650 MG/1
1950 TABLET ORAL ONCE
Status: COMPLETED | OUTPATIENT
Start: 2023-11-04 | End: 2023-11-04

## 2023-11-04 RX ORDER — HALOPERIDOL 5 MG/ML
1 INJECTION INTRAMUSCULAR
Status: DISCONTINUED | OUTPATIENT
Start: 2023-11-04 | End: 2023-11-04 | Stop reason: HOSPADM

## 2023-11-04 RX ORDER — CEFAZOLIN SODIUM/WATER 2 G/20 ML
2 SYRINGE (ML) INTRAVENOUS
Status: COMPLETED | OUTPATIENT
Start: 2023-11-04 | End: 2023-11-04

## 2023-11-04 RX ORDER — PROCHLORPERAZINE MALEATE 5 MG
5 TABLET ORAL EVERY 6 HOURS PRN
Status: DISCONTINUED | OUTPATIENT
Start: 2023-11-04 | End: 2023-11-06 | Stop reason: HOSPADM

## 2023-11-04 RX ORDER — ONDANSETRON 2 MG/ML
INJECTION INTRAMUSCULAR; INTRAVENOUS PRN
Status: DISCONTINUED | OUTPATIENT
Start: 2023-11-04 | End: 2023-11-04

## 2023-11-04 RX ORDER — ACETAMINOPHEN 325 MG/1
650 TABLET ORAL EVERY 4 HOURS PRN
Status: DISCONTINUED | OUTPATIENT
Start: 2023-11-07 | End: 2023-11-06 | Stop reason: HOSPADM

## 2023-11-04 RX ORDER — FENTANYL CITRATE 50 UG/ML
25 INJECTION, SOLUTION INTRAMUSCULAR; INTRAVENOUS EVERY 5 MIN PRN
Status: DISCONTINUED | OUTPATIENT
Start: 2023-11-04 | End: 2023-11-04 | Stop reason: HOSPADM

## 2023-11-04 RX ORDER — CEFAZOLIN SODIUM/WATER 2 G/20 ML
2 SYRINGE (ML) INTRAVENOUS SEE ADMIN INSTRUCTIONS
Status: DISCONTINUED | OUTPATIENT
Start: 2023-11-04 | End: 2023-11-04 | Stop reason: HOSPADM

## 2023-11-04 RX ORDER — CEFAZOLIN SODIUM 1 G/3ML
1 INJECTION, POWDER, FOR SOLUTION INTRAMUSCULAR; INTRAVENOUS EVERY 8 HOURS
Qty: 10 ML | Refills: 0 | Status: COMPLETED | OUTPATIENT
Start: 2023-11-04 | End: 2023-11-04

## 2023-11-04 RX ORDER — ASPIRIN 325 MG
325 TABLET, DELAYED RELEASE (ENTERIC COATED) ORAL 2 TIMES DAILY
Status: DISCONTINUED | OUTPATIENT
Start: 2023-11-04 | End: 2023-11-06 | Stop reason: HOSPADM

## 2023-11-04 RX ORDER — ACETAMINOPHEN 325 MG/1
975 TABLET ORAL ONCE
Status: COMPLETED | OUTPATIENT
Start: 2023-11-04 | End: 2023-11-04

## 2023-11-04 RX ORDER — ACETAMINOPHEN 325 MG/1
975 TABLET ORAL EVERY 8 HOURS
Qty: 27 TABLET | Refills: 0 | Status: DISCONTINUED | OUTPATIENT
Start: 2023-11-04 | End: 2023-11-06 | Stop reason: HOSPADM

## 2023-11-04 RX ORDER — OXYCODONE HYDROCHLORIDE 5 MG/1
10 TABLET ORAL EVERY 4 HOURS PRN
Status: DISCONTINUED | OUTPATIENT
Start: 2023-11-04 | End: 2023-11-05

## 2023-11-04 RX ADMIN — HYDROMORPHONE HYDROCHLORIDE 0.5 MG: 1 INJECTION, SOLUTION INTRAMUSCULAR; INTRAVENOUS; SUBCUTANEOUS at 14:25

## 2023-11-04 RX ADMIN — DEXAMETHASONE SODIUM PHOSPHATE 10 MG: 10 INJECTION, SOLUTION INTRAMUSCULAR; INTRAVENOUS at 09:12

## 2023-11-04 RX ADMIN — FENTANYL CITRATE 25 MCG: 50 INJECTION, SOLUTION INTRAMUSCULAR; INTRAVENOUS at 11:51

## 2023-11-04 RX ADMIN — TRAZODONE HYDROCHLORIDE 50 MG: 50 TABLET ORAL at 20:05

## 2023-11-04 RX ADMIN — CEFAZOLIN 1 G: 1 INJECTION, POWDER, FOR SOLUTION INTRAMUSCULAR; INTRAVENOUS at 17:49

## 2023-11-04 RX ADMIN — ACETAMINOPHEN 975 MG: 325 TABLET, FILM COATED ORAL at 14:32

## 2023-11-04 RX ADMIN — HYDROMORPHONE HYDROCHLORIDE 0.5 MG: 1 INJECTION, SOLUTION INTRAMUSCULAR; INTRAVENOUS; SUBCUTANEOUS at 12:06

## 2023-11-04 RX ADMIN — FENTANYL CITRATE 25 MCG: 50 INJECTION, SOLUTION INTRAMUSCULAR; INTRAVENOUS at 11:38

## 2023-11-04 RX ADMIN — OXYCODONE HYDROCHLORIDE 5 MG: 5 TABLET ORAL at 23:06

## 2023-11-04 RX ADMIN — OXYCODONE HYDROCHLORIDE 5 MG: 5 TABLET ORAL at 17:50

## 2023-11-04 RX ADMIN — FENTANYL CITRATE 50 MCG: 50 INJECTION INTRAMUSCULAR; INTRAVENOUS at 10:04

## 2023-11-04 RX ADMIN — SUGAMMADEX 200 MG: 100 INJECTION, SOLUTION INTRAVENOUS at 10:32

## 2023-11-04 RX ADMIN — ASPIRIN 325 MG: 325 TABLET, COATED ORAL at 20:05

## 2023-11-04 RX ADMIN — ONDANSETRON 4 MG: 2 INJECTION INTRAMUSCULAR; INTRAVENOUS at 10:32

## 2023-11-04 RX ADMIN — FENTANYL CITRATE 50 MCG: 50 INJECTION, SOLUTION INTRAMUSCULAR; INTRAVENOUS at 12:00

## 2023-11-04 RX ADMIN — TRANEXAMIC ACID 1950 MG: 650 TABLET ORAL at 08:14

## 2023-11-04 RX ADMIN — LORAZEPAM 0.25 MG: 0.5 TABLET ORAL at 22:09

## 2023-11-04 RX ADMIN — Medication 20 MG: at 08:45

## 2023-11-04 RX ADMIN — ROCURONIUM BROMIDE 50 MG: 50 INJECTION, SOLUTION INTRAVENOUS at 08:48

## 2023-11-04 RX ADMIN — ACETAMINOPHEN 975 MG: 325 TABLET, FILM COATED ORAL at 08:14

## 2023-11-04 RX ADMIN — PHENYLEPHRINE HYDROCHLORIDE 200 MCG: 10 INJECTION INTRAVENOUS at 09:20

## 2023-11-04 RX ADMIN — CEFAZOLIN 1 G: 1 INJECTION, POWDER, FOR SOLUTION INTRAMUSCULAR; INTRAVENOUS at 23:06

## 2023-11-04 RX ADMIN — PHENYLEPHRINE HYDROCHLORIDE 200 MCG: 10 INJECTION INTRAVENOUS at 09:13

## 2023-11-04 RX ADMIN — SODIUM CHLORIDE, POTASSIUM CHLORIDE, SODIUM LACTATE AND CALCIUM CHLORIDE: 600; 310; 30; 20 INJECTION, SOLUTION INTRAVENOUS at 05:48

## 2023-11-04 RX ADMIN — PROPOFOL 100 MCG/KG/MIN: 10 INJECTION, EMULSION INTRAVENOUS at 08:59

## 2023-11-04 RX ADMIN — PHENYLEPHRINE HYDROCHLORIDE 200 MCG: 10 INJECTION INTRAVENOUS at 09:03

## 2023-11-04 RX ADMIN — ACETAMINOPHEN 975 MG: 325 TABLET, FILM COATED ORAL at 22:09

## 2023-11-04 RX ADMIN — HYDROMORPHONE HYDROCHLORIDE 0.3 MG: 1 INJECTION, SOLUTION INTRAMUSCULAR; INTRAVENOUS; SUBCUTANEOUS at 01:20

## 2023-11-04 RX ADMIN — FAMOTIDINE 20 MG: 20 TABLET ORAL at 20:05

## 2023-11-04 RX ADMIN — SODIUM CHLORIDE, POTASSIUM CHLORIDE, SODIUM LACTATE AND CALCIUM CHLORIDE: 600; 310; 30; 20 INJECTION, SOLUTION INTRAVENOUS at 10:32

## 2023-11-04 RX ADMIN — CALCIUM CARBONATE (ANTACID) CHEW TAB 500 MG 500 MG: 500 CHEW TAB at 17:50

## 2023-11-04 RX ADMIN — PROPOFOL 200 MG: 10 INJECTION, EMULSION INTRAVENOUS at 08:48

## 2023-11-04 RX ADMIN — PHENYLEPHRINE HYDROCHLORIDE 200 MCG: 10 INJECTION INTRAVENOUS at 09:09

## 2023-11-04 RX ADMIN — SENNOSIDES AND DOCUSATE SODIUM 1 TABLET: 8.6; 5 TABLET ORAL at 14:33

## 2023-11-04 RX ADMIN — Medication 2 G: at 08:35

## 2023-11-04 RX ADMIN — HYDROMORPHONE HYDROCHLORIDE 0.5 MG: 1 INJECTION, SOLUTION INTRAMUSCULAR; INTRAVENOUS; SUBCUTANEOUS at 05:48

## 2023-11-04 RX ADMIN — LIDOCAINE HYDROCHLORIDE 50 MG: 10 INJECTION, SOLUTION INFILTRATION; PERINEURAL at 08:48

## 2023-11-04 RX ADMIN — HYDROMORPHONE HYDROCHLORIDE 0.5 MG: 1 INJECTION, SOLUTION INTRAMUSCULAR; INTRAVENOUS; SUBCUTANEOUS at 03:44

## 2023-11-04 RX ADMIN — Medication: at 20:04

## 2023-11-04 RX ADMIN — SENNOSIDES AND DOCUSATE SODIUM 1 TABLET: 8.6; 5 TABLET ORAL at 20:05

## 2023-11-04 RX ADMIN — FENTANYL CITRATE 50 MCG: 50 INJECTION INTRAMUSCULAR; INTRAVENOUS at 08:45

## 2023-11-04 RX ADMIN — GABAPENTIN 200 MG: 100 CAPSULE ORAL at 20:05

## 2023-11-04 ASSESSMENT — ACTIVITIES OF DAILY LIVING (ADL)
ADLS_ACUITY_SCORE: 26
ADLS_ACUITY_SCORE: 31
ADLS_ACUITY_SCORE: 26
ADLS_ACUITY_SCORE: 29
ADLS_ACUITY_SCORE: 31
ADLS_ACUITY_SCORE: 26

## 2023-11-04 NOTE — OP NOTE
Laz Clark MD Physician   Procedures    -   DATE OF SERVICE:  11/4/2023    SURGEON: LAZ CLARK MD   ASSISTANT: Bishnu Gordon PA-C     PREOPERATIVE DIAGNOSIS: Left intertrochanteric femur fracture   POSTOPERATIVE DIAGNOSIS: Left intertrochanteric femur fracture    PROCEDURE PERFORMED: Left intertrochanteric femur fracture open-reduction, internal fixation with long Gamma adrienne.       HISTORY   This is a 73 year old  female with 4 part left intertrochanteric femur fracture sustained in a fall when she was dressing yesterday morning.  She presents now   for open-reduction, internal fixation left 4 part intertrochanteric femur fracture.     Assistance of Bishnu Gordon PA-C was medically necessary given the technical complexity of the case; with assistance with patient positioning, retraction and hip  exposure,  assistance with implant placement, and wound closure.    DESCRIPTION OF PROCEDURE   After a smooth General anesthetic, the patient was placed on the fracture table with the left leg in traction and the right leg flexed on a leg pickering.  We performed closed reduction of the left hip, and determined that I would need a little pressure underneath the proximal thigh as we are doing surgery to hold reduction.  We were using the [employer] gamma adrienne system, but did not have the long guide adrienne available.  Thus, we placed the right leg down and measured a metal bar externally at the knee and the hip to get the length for the adrienne.  We were measuring 390 mm maximal.  To give a cushion we selected a 360 mm adrienne.  We then placed the right leg back up in flexion.  Patient's left hip and leg was prepped and   draped in sterile fashion. A pause was performed for patient verification.   A longitudinal incision was made just above the greater trochanter carried through subcutaneous tissue and through the iliotibial band.  A short guide adrienne was inserted through the tip of the greater trochanter into the shaft.  It was  difficult to get it to follow the shaft so we had to bend the short guidepin to direct down the shaft.  With this directed properly we then used the curved awl to create a path down into the shaft.  Without a long guide adrienne,  we then used flexible reamers with non-detachable cutting ends.  We advanced from 9 to 12-1/2 mm flexible reamers initially jumping by 1 mm at a time.  I reamed only through the isthmus and not all the way down to the knee.  Without a guide adrienne, we had no way to control the flexible reamers distally. I felt the distal bone would be soft enough to not have to ream.   Once we had reamed up to 12-1/2 mm we attached the insertion guide to a 360 x 11 mm gamma adrienne.  This was inserted completely.  We checked throughout with C arm.  We were holding up the back of the femur throughout reaming and insertion the compression screw was then inserted by placing a guide first into the center of the head and measuring this at 83 mm.  We selected a 90 mm compression screw.  We reamed to 90 mm and advanced a screw into it.  A locking screw was inserted, fully tightened, and then backed off one quarter turn.  With good position noted with that, we removed the insertion guide.    We then moved the C arm into lateral position at the knee for a perfect [place] technique for placing the distal locking screws.  Both distal locking screws were inserted with this technique.  Final x-rays were obtained with the C arm on AP and lateral projections at the knee and at the hip.  It was noted that the femoral head had spun a bit as I inserted the compression screw.  I elected to accept that.  The wounds were then thoroughly gated.  The IT band proximally closed with running 0 Vicryl suture.  The subcutaneous tissue closed with interrupted and running 2-0 Vicryl suture in layers.  Skin edges closed with 3-0 Monocryl subcuticular closure.  Dermabond applied.  Sterile dressings.  The patient was taken to the recovery room in  stable condition.   Due to the comminuted four-part fracture, we will keep her to 25% weightbearing for 6 weeks.    LAZ CLARK MD     cc:   Gina Mejia   PATIENT: Kylah Britt   MR#: [id number]   : 1950   ADMIT DATE: 11/3/2023

## 2023-11-04 NOTE — PROGRESS NOTES
[facility]    Medicine Progress Note - Hospitalist Service    Date of Admission:  11/3/2023    Assessment & Plan      Kylah Britt is a 73 year old female with osteopenia, chronic gait abnormality for which she uses a walker during ambulation, and mild dementia living in assisted living facility ([facility]) who presented on 11/3/2023 with left hip pain after a fall while she was ambulating using her walker.  Clinical presentation and radiographic findings are suspicious for osteoporotic fracture of the left hip for which operative repair has been advised by orthopedic surgery.  Due to high risk for an adverse outcome, hospitalization is considered medically necessary.  Based on the presently available information, hospitalization for at least 2 midnights is anticipated.    Patient underwent surgery, internal fixation of the intertrochanteric fracture, today (November 4).  Patient tolerated surgery well and is back up on the floor.    Principal Problem:    Osteoporotic fracture of left hip, initial encounter (H)  Active Problems:    Fall    Mild vascular dementia with mood disturbance (H)    Platelet dysfunction due to aspirin (H)    Abnormal gait    Osteopenia of necks of both femurs-per DEXA March 2022    Drug-induced constipation    Osteoporotic fracture of left hip is post internal fixation with gamma rods  Known osteopenia based on previous DEXA scan in March 2022 and now presenting with left hip fracture after a fall.  This is suspicious for osteoporotic fracture of left hip.  Orthopedics has advised operative repair which was completed today.  Chronic osteopenia is treated with vitamin D and calcium supplementation.  -Orthopedic surgery following, managing postoperative course including pain, VTE prophylaxis  -Tylenol scheduled with oral oxycodone as needed, continues to have IV hydromorphone available as needed also  -she is to be 25% weightbearing on her left lower  extremity  -Continue chronic vitamin D and calcium supplementation      Aspirin induced platelet function defect  Takes aspirin daily which causes platelet function defect that increases her bleeding risk.  She is not anemic at admission.  Aspirin held preoperatively  Plan:  -Aspirin for VTE prophylaxis as ordered by Ortho  -Ordered recheck of hemoglobin in a.m.    Dementia  Chronic mild dementia residing at [facility].  Not taking any medication specific for dementia.  She will be at risk for superimposed delirium during hospitalization.  Plan:   -Continue chronic medication for insomnia including trazodone  -Monitor closely for evolving delirium and treat accordingly if she develops delirium    Neurodegenerative disorder with spasticity  Per family patient has had chronic spasticity of both lower and upper extremities for which she usually takes baclofen for.  She has been seen by neurology in the past but has been unable to get a clear diagnosis.  Currently nurse practitioner provider at the [facility] has been managing.  Home dose of baclofen is 5 mg in the morning and 10 mg in the afternoon and at at bedtime.  This has not been reordered on admission.  Plan:  We will continue to monitor plan on restarting baclofen at some point during hospitalization.    Chronic gait abnormality with fall  Has chronic gait abnormality for which she normally uses a walker.  She fell today which is the first time she is fallen in over a year although she does have previous history of frequent falls.  Plan  -Anticipate PT evaluation and treatment postoperatively to assist with disposition planning    Constipation  Chronically taking senna for treatment of constipation and will be at risk for worsening constipation due to combination of decreased activity level, decreased oral intake, and use of opiates.  Plan:  -Continue chronic dose of senna  -Monitor bowels and adjust dose of patient's as needed           Diet: Advance Diet as Tolerated: Regular Diet Adult    DVT Prophylaxis: Per Ortho aspirin for VTE prophylaxis  Kee Catheter: Not present  Lines: None     Cardiac Monitoring: None  Code Status: No CPR- Do NOT Intubate      Clinically Significant Risk Factors Present on Admission                # Drug Induced Platelet Defect: home medication list includes an antiplatelet medication     # Dementia: noted on problem list               Disposition Plan      Expected Discharge Date: 11/06/2023      Destination: inpatient rehabilitation facility            The patient's care was discussed with the Patient and Patient's Family.    Lilia Harper CNP  Hospitalist Service  [facility]  Securely message with [employer] (more info)  Text page via [facility] Paging/Directory   ______________________________________________________________________    Interval History   Patient status post surgery this morning.  Operative noneventful.  Is currently resting.    Physical Exam   Vital Signs: Temp: 98.5  F (36.9  C) Temp src: Oral BP: 124/59 Pulse: 81   Resp: 16 SpO2: 91 % O2 Device: Nasal cannula Oxygen Delivery: 1 LPM  Weight: 134 lbs .63 oz    General Appearance: Lying in bed, alert and oriented to self.  No acute distress  Respiratory: Respiratory effort easy at rest.  No cough.  Lung sounds clear  Cardiovascular: Regular rate and rhythm, no murmur  GI: Abdomen nondistended, soft.  Bowel sounds active  Skin: Skin grossly intact, did not visualize left hip incision dressing on  Other: -     Medical Decision Making       35 MINUTES SPENT BY ME on the date of service doing chart review, history, exam, documentation & further activities per the note.      Data     I have personally reviewed the following data over the past 24 hrs:    N/A  \   9.9 (L)   / N/A     N/A N/A N/A /  N/A   N/A N/A N/A \       Imaging results reviewed over the past 24 hrs:   No results found for this or any previous visit (from the past  24 hour(s)).

## 2023-11-04 NOTE — PROGRESS NOTES
Patient medicated with tylenol and tranexamic acid with sips of water ok'd by CRNA. E-consent signed with family. ABIGAIL scrub was completed on nights. H & P in chart. Pre-op hgb 9.8. Dr. Franco did come in and see the patient/family and marked the surgical side left leg. Straight-cathed about 0400 for 600 ml. NPO since at least midnight. Currently in Menendez's traction 7 pounds.

## 2023-11-04 NOTE — PLAN OF CARE
Goal Outcome Evaluation:      Plan of Care Reviewed With: patient    Overall Patient Progress: no changeOverall Patient Progress: no change    Outcome Evaluation: Pt alert, disoriented to place intermittently. [place] traction applied to LLE, skin assessments performed. Pt c/o LLE moderate & severe pain, dilaudid x3 given with effectiveness. Pt diet switched to NPO. IV LR infusing at 75ml/hr. Weight shifting as Pt tolerates, bedrest maintained. Pt states she is unable to void, bladder scan 569cc & straight cath with 600ml urine output. CHG bath completed.

## 2023-11-04 NOTE — BRIEF OP NOTE
[facility]    Brief Operative Note    Pre-operative diagnosis: Closed displaced intertrochanteric fracture of left femur, initial encounter (H) [T17.414M]  Post-operative diagnosis Same as pre-operative diagnosis    Procedure: INTERNAL FIXATION, FRACTURE, INTERTROCHANTERIC, HIP, USING GAMMA RODS, Left - Hip    Surgeon: Surgeon(s) and Role:     * Yoni Franco MD - Primary     * Bishnu Gordon PA-C - Assisting  Anesthesia: General   Estimated Blood Loss: 200 ml    Drains: None  Specimens: * No specimens in log *  Findings:   Left hip intertrochanteric fracture .  Complications: None.  Implants:   Implant Name Type Inv. Item Serial No.  Lot No. LRB No. Used Action   IMP WIRE ROSANNE [id number] 1210-6450S - [id number]  IMP WIRE ROSANNE [id number] 1210-6450S  [employer] [id number] Left 1 Used as a Supply   IMP WIRE ROSANNE STRK [id number] 1806-0050S - [id number]  IMP WIRE ROSANNE STRK [id number] 1806-0050S  [employer] [id number] Left 1 Used as a Supply   KIT NAIL INTRAMEDULLARY LOCKING LONG 11MM 1.5MM RADIUS 3 - [id number] [employer]/[place] KIT NAIL INTRAMEDULLARY LOCKING LONG 11MM 1.5MM RADIUS 3  [employer] [id number] Left 1 Implanted   IMP SCR BONE STRK G3 LAG 10.5X90MM TI 3060-0090S - [id number] [employer] IMP SCR BONE STRK G3 LAG 10.5X90MM TI 3060-0090S  [employer] [id number] Left 1 Implanted   IMP SCR STRK LOCK 5.0X42.5MM FT 1896-5042S - [id number] [employer]/[place] IMP SCR STRK LOCK 5.0X42.5MM FT 1896-5042S  [place] [id number] Left 1 Implanted   IMP SCR STRK LOCKING T2 F/T 5X47.5MM 1896-5047S - [id number] Met[employer]/[place] IMP SCR STRK LOCKING T2 F/T 5X47.5MM 1896-5047S  [employer] [id number] Left 1 Implanted

## 2023-11-04 NOTE — PROGRESS NOTES
S-(situation): s/p left hip surgery    B-(background): left hip broken after a fall at her assisted living    A-(assessment): patient has been doing well since returning from procedure. She did have pain in left hip/leg, medicated with IV dilaudid x 1, then the next time she was given oral oxycodone 5 mg. Have medicated with oral tylenol as well, Has voided about 800 ml this afternoon/evening. Attempted up with walker after dangling at bedside, did have 3 assist at that time and pivot to commode, but was felt she was putting her weight on the left leg too much (should only be 25% weight bearing) so utilized the jorge steady to get her back to bed and a couple more times up to the commode again since then. Not sure she is comprehending the weight bearing status strategy, therefore, did not ambulate this evening. Good appetite this evening, ate 75% of mashed potatoes and meat loaf along with chocolate milk. Pillow between legs for support. Ancef IV.  Have asked for chocolate milk on her meal trays.    R-(recommendations): Continue surgical checks as ordered and medicate for pain when needed.

## 2023-11-04 NOTE — CONSULTS
[facility] Orthopedic Consultation    Kylah Britt MRN# [id number]   Age: 73 year old YOB: 1950     Date of Admission:  11/3/2023    Reason for consult: Intertrochanteric fracture       Requesting physician: Dr. Marvin Milligan        Level of consult: Consult, follow and place orders           Assessment and Plan:   Assessment:   Left 4-part displaced intertrochanteric fracture from a fall.  She has previously used a walker.  She has mild dementia.  This will require open-reduction, internal fixation with a long Gamma adrienne.  The challenge will be to keep enough pressure off the leg to prevent excess shortening.      Plan:   Open reduction and internal fixation of the hip with long Gamma adrienne.  We will plan 25% partial weight bearing for 6 weeks.            Chief Complaint:   Intertrochanteric fracture sustained in a fall.       History is obtained from the patient and chart.         History of Present Illness:   This patient is a 73 year old female who presents with the following condition requiring a hospital admission:      She was getting dressed at her assisted living facility yesterday morning when she fell.  She was brought to the emergency room where a four-part displaced intertrochanteric femur fracture was noted.  Surgery had no open time yesterday, so we are proceeding with repair this morning.           Past Medical History:     Past Medical History:   Diagnosis Date    Abnormal Papanicolaou smear of cervix and cervical HPV 9/1/92    vaginitis with abnormal Pap    Depressive disorder, not elsewhere classified     depression    Female stress incontinence     urinary incontinence    Lyme disease     Osteoarthrosis, unspecified whether generalized or localized, unspecified site      monoarticular arthritis in the right first MTP joint    Other psychological or physical stress, not elsewhere classified(V62.89)     delayed stress syndrome             Past Surgical  History:     Past Surgical History:   Procedure Laterality Date    COLONOSCOPY N/A 4/6/2022    Procedure: COLONOSCOPY, WITH POLYPECTOMY;  Surgeon: Bishnu Tsai MD;  Location: [facility]    HC KNEE SCOPE, DIAGNOSTIC  1/31/89    Examination of right knee under anesthesia. Arthroscopy of right knee with debridement of torn anterior curciate ligament (medial meniscus not resected).    HC REMOVAL OF OVARY/TUBE(S)  2/20/90    Salpingo-Oophorectomy, bilateral    OPEN REDUCTION INTERNAL FIXATION CALCANEOUS Right 6/8/2017    Procedure: OPEN REDUCTION INTERNAL FIXATION CALCANEOUS;  Open Reduction Internal Fixation Right Calcaneous;  Surgeon: Darnell Kincaid DPM;  Location:  OR    ZZC APPENDECTOMY      ZZC LIGATE FALLOPIAN TUBE,POSTPARTUM  04/12/77    Bilateral tubal cauterization    ZZC TOTAL ABDOM HYSTERECTOMY  2/20/90    Hysterectomy, Total Abdominal    ZZHC COLONOSCOPY THRU STOMA, DIAGNOSTIC  06/24/98             Social History:     Social History     Tobacco Use    Smoking status: Never    Smokeless tobacco: Never   Substance Use Topics    Alcohol use: Yes     Comment: very rare             Family History:     Family History   Problem Relation Age of Onset    Hypertension Mother     Heart Disease Mother     Cancer Father     Cancer Son         Hodgkin's    Cancer Paternal Aunt         two with cervical cancer     Family history reviewed          Immunizations:     Immunization History   Administered Date(s) Administered    COVID-19 MONOVALENT 12+ (Pfizer) 02/26/2021, 03/19/2021    COVID-19 Monovalent 18+ (Moderna) 12/16/2021    FLU 6-35 months 12/08/2009    Influenza (High Dose) 3 valent vaccine 10/30/2017    Influenza (IIV3) PF 11/10/2005, 11/03/2007, 10/15/2008, 09/21/2014    Influenza Vaccine 65+ (Fluzone HD) 11/04/2020    Pneumo Conj 13-V (2010&after) 10/30/2017    Pneumococcal 23 valent 03/07/2022    TD,PF 7+ (Tenivac) 04/25/1995, 03/07/2022             Allergies:     Allergies   Allergen Reactions    No Known  Drug Allergy              Medications:     Medications Prior to Admission   Medication Sig Dispense Refill Last Dose    acetaminophen (TYLENOL) 500 MG tablet Take 2 tablets (1,000 mg) by mouth 3 times daily as needed for mild pain 186 tablet 11 11/2/2023 at 0800    aspirin 81 MG EC tablet Take 81 mg by mouth daily   11/3/2023 at 0730    baclofen (LIORESAL) 10 MG tablet Take 10 mg by mouth 2 times daily 3pm and 9pm, takes 5mg every morning   11/2/2023 at 2100    Baclofen (LIORESAL) 5 MG tablet Take 5 mg by mouth every morning And 10mg twice daily   11/3/2023 at 0730    Calcium Carbonate (CALCIUM 600 PO) Take 1 tablet by mouth every other day   11/3/2023 at 0730    gabapentin (NEURONTIN) 100 MG capsule Take 2 capsules (200 mg) by mouth At Bedtime 42 capsule 1 11/2/2023 at 2100    melatonin 3 MG tablet Take 3 tablets (9 mg) by mouth At Bedtime   11/2/2023 at 2100    senna-docusate (SENOKOT-S/PERICOLACE) 8.6-50 MG tablet Take 1 tablet by mouth daily as needed for constipation Also takes 1 daily scheduled   Unknown at unkn    senna-docusate (SENOKOT-S/PERICOLACE) 8.6-50 MG tablet Take 1 tablet by mouth daily And daily PRN   11/3/2023 at 0730    traZODone (DESYREL) 50 MG tablet Take 1 tablet (50 mg) by mouth At Bedtime 31 tablet 11 11/2/2023 at 2100    trolamine salicylate (ASPERCREME) 10 % external cream Apply topically 3 times daily APPLY TOPICALLY BEHIND RIGHT KNEE TO HIP THREE TIMES DAILY. OK TO LEAVE AT BEDSIDE. DX PAIN. 141 g 11 11/2/2023 at hs    vitamin D3 (CHOLECALCIFEROL) 50 mcg (2000 units) tablet Take 50 mcg by mouth every other day   11/3/2023 at 0730             Review of Systems:   The Review of Systems is negative other than noted in the HPI.  She does have mild dementia and uses a walker preop.          Physical Exam:   Temp: 98.6  F (37  C) Temp src: Oral BP: (!) 158/71 Pulse: 85   Resp: 20 SpO2: 94 % O2 Device: None (Room air)    All vitals have been reviewed  Musculoskeletal:   LEFT HIP:  swelling  present  tenderness present  Circulation intact.  No skin lesions.  Pain with any movement.             Data:        Lab Results   Component Value Date     11/03/2023     06/08/2017    Lab Results   Component Value Date    CHLORIDE 104 11/03/2023    CHLORIDE 107 07/18/2022    CHLORIDE 106 06/08/2017    Lab Results   Component Value Date    BUN 19.6 11/03/2023    BUN 19 07/18/2022    BUN 13 06/08/2017      Lab Results   Component Value Date    POTASSIUM 3.9 11/03/2023    POTASSIUM 4.8 07/18/2022    POTASSIUM 4.0 06/08/2017    Lab Results   Component Value Date    CO2 24 11/03/2023    CO2 28 07/18/2022    CO2 27 06/08/2017    Lab Results   Component Value Date    CR 0.72 11/03/2023    CR 0.79 06/08/2017        Lab Results   Component Value Date    CR 0.72 11/03/2023     Lab Results   Component Value Date    HGB 9.8 (L) 11/04/2023     Lab Results   Component Value Date     11/03/2023     All imaging studies reviewed by me.  4-part intertrochanteric femur fracture with significant collapse.     Attestation:  Amount of time performed on this consult: 40 minutes.    Yoni Franco MD

## 2023-11-04 NOTE — PROGRESS NOTES
S- Transfer to 267 from PACU recovery room 216 at about 1230  .    B- left hip fracture, now repaired    A- Brief systems assessment: alert, confused to time and place. States her pain is about 7/10, reported she was 8/a0 for PACU and did not improve despite (2) 25 mg, Fentanyl, (1) 50 mg Fentanyl, and (1) 0.5 mg Dilaudid IV bumps. Ice to site, 3 dressings. Will be 25% partial weight bearing, will utilize walker. Family reports a challenge with the right leg/foot as well, so mobilization could prove to be an issue in this journey. Did discuss we will at the very least get her to dangle at the bedside this afternoon and stand if able.    R- Transfer to [facility] per physician orders. Continue to monitor pt and update physician as needed.      Code status: DNR / DNI  Skin: 3 aquacell dressings2 near the hip, 1 near the left knee, no shadowing/drainage noted currently.  Fall Risk: Yes- Department fall risk interventions implemented.  Isolation and Signage: None  Medication drips upon transfer: LR to gravity  Blue Bin checked and medications transfer out with patient: N/A

## 2023-11-04 NOTE — PROGRESS NOTES
[place] traction placed to the left leg with 7 Lbs of weight.   Treatment well tolerated.   Pain medication given prior to treatment.   Pt is now resting showing no signs of discomfort.   Family was here prior to application of traction and explained in detail what it is and why it is used.   Vitals have been stable.   Pt has tolerated oral intake.   Purwik in place and she has not voided since arrival.   CMS remains in tact throughout.   Will continue to assess and treat pain as able.

## 2023-11-04 NOTE — ED PROVIDER NOTES
History     Chief Complaint   Patient presents with    Fall    Hip Pain     HPI  History per EMS, daughter, patient, medical records    This is a 73-year-old female, history of mild dementia, GERD, osteopenia presenting with hip pain after a fall.  Patient states she was walking with her walker to go to breakfast and just fell onto her left side.  She denies hitting her head or loss of consciousness.  She denies any constitutional symptoms including dizziness or weakness prior to the fall.  She was noted to have significant pain in the left leg.  She was brought in by EMS who gave her 200 mcg of fentanyl in route.  No previous history of injury to the left hip.  No recent illnesses.  She denies any head pain neck pain, chest pain, abdominal pain, nausea, abdominal pain.  No numbness or tingling of the extremity.  She is not on blood thinners.    Allergies:  Allergies   Allergen Reactions    No Known Drug Allergy        Problem List:    Patient Active Problem List    Diagnosis Date Noted    Osteoporotic fracture of left hip, initial encounter (H) 11/03/2023     Priority: Medium    Closed fracture of left hip, initial encounter (H) 11/03/2023     Priority: Medium    Platelet dysfunction due to aspirin (H) 11/03/2023     Priority: Medium    Abnormal gait 11/03/2023     Priority: Medium    Mild vascular dementia with mood disturbance (H) 02/07/2023     Priority: Medium    Adjustment insomnia 07/22/2022     Priority: Medium    Strain of right hamstring muscle 07/15/2022     Priority: Medium    Pain 07/15/2022     Priority: Medium    Drug-induced constipation 07/15/2022     Priority: Medium    Major depressive disorder in remission (H24) 07/15/2022     Priority: Medium    Lyme disease 07/15/2022     Priority: Medium    Gastroesophageal reflux disease with esophagitis 07/15/2022     Priority: Medium    Pain of right thigh 07/06/2022     Priority: Medium    Unable to bear weight on right lower extremity 07/06/2022      Priority: Medium    Fall 07/06/2022     Priority: Medium    Osteopenia of necks of both femurs-per DEXA March 2022 07/06/2022     Priority: Medium    Allodynia-lateral right thigh 07/06/2022     Priority: Medium    Numbness of toes-right, chronic 07/06/2022     Priority: Medium    Closed Colles' fracture of left radius 03/12/2022     Priority: Medium    CARDIOVASCULAR SCREENING; LDL GOAL LESS THAN 160 10/31/2010     Priority: Medium    Tear meniscus knee 04/21/2010     Priority: Medium        Past Medical History:    Past Medical History:   Diagnosis Date    Abnormal Papanicolaou smear of cervix and cervical HPV 9/1/92    Depressive disorder, not elsewhere classified     Female stress incontinence     Lyme disease     Osteoarthrosis, unspecified whether generalized or localized, unspecified site     Other psychological or physical stress, not elsewhere classified(V62.89)        Past Surgical History:    Past Surgical History:   Procedure Laterality Date    COLONOSCOPY N/A 4/6/2022    Procedure: COLONOSCOPY, WITH POLYPECTOMY;  Surgeon: Bishnu Tsai MD;  Location:  GI    HC KNEE SCOPE, DIAGNOSTIC  1/31/89    Examination of right knee under anesthesia. Arthroscopy of right knee with debridement of torn anterior curciate ligament (medial meniscus not resected).    HC REMOVAL OF OVARY/TUBE(S)  2/20/90    Salpingo-Oophorectomy, bilateral    OPEN REDUCTION INTERNAL FIXATION CALCANEOUS Right 6/8/2017    Procedure: OPEN REDUCTION INTERNAL FIXATION CALCANEOUS;  Open Reduction Internal Fixation Right Calcaneous;  Surgeon: Darnell Kincaid DPM;  Location:  OR    [facility] APPENDECTOMY      Z LIGATE FALLOPIAN TUBE,POSTPARTUM  04/12/77    Bilateral tubal cauterization    ZZC TOTAL ABDOM HYSTERECTOMY  2/20/90    Hysterectomy, Total Abdominal    ZZHC COLONOSCOPY THRU STOMA, DIAGNOSTIC  06/24/98       Family History:    Family History   Problem Relation Age of Onset    Hypertension Mother     Heart Disease Mother     Cancer  "Father     Cancer Son         Hodgkin's    Cancer Paternal Aunt         two with cervical cancer       Social History:  Marital Status:   [5]  Social History     Tobacco Use    Smoking status: Never    Smokeless tobacco: Never   Vaping Use    Vaping Use: Never used   Substance Use Topics    Alcohol use: Yes     Comment: very rare    Drug use: No        Medications:    No current facility-administered medications on file prior to encounter.  acetaminophen (TYLENOL) 500 MG tablet, Take 2 tablets (1,000 mg) by mouth 3 times daily as needed for mild pain  aspirin 81 MG EC tablet, Take 81 mg by mouth daily  baclofen (LIORESAL) 10 MG tablet, Take 10 mg by mouth 2 times daily 3pm and 9pm, takes 5mg every morning  Baclofen (LIORESAL) 5 MG tablet, Take 5 mg by mouth every morning And 10mg twice daily  Calcium Carbonate (CALCIUM 600 PO), Take 1 tablet by mouth every other day  gabapentin (NEURONTIN) 100 MG capsule, Take 2 capsules (200 mg) by mouth At Bedtime  melatonin 3 MG tablet, Take 3 tablets (9 mg) by mouth At Bedtime  senna-docusate (SENOKOT-S/PERICOLACE) 8.6-50 MG tablet, Take 1 tablet by mouth daily as needed for constipation Also takes 1 daily scheduled  senna-docusate (SENOKOT-S/PERICOLACE) 8.6-50 MG tablet, Take 1 tablet by mouth daily And daily PRN  traZODone (DESYREL) 50 MG tablet, Take 1 tablet (50 mg) by mouth At Bedtime  trolamine salicylate (ASPERCREME) 10 % external cream, Apply topically 3 times daily APPLY TOPICALLY BEHIND RIGHT KNEE TO HIP THREE TIMES DAILY. OK TO LEAVE AT BEDSIDE. DX PAIN.  vitamin D3 (CHOLECALCIFEROL) 50 mcg (2000 units) tablet, Take 50 mcg by mouth every other day           Review of Systems  All other ROS reviewed and are negative or non-contributory except as stated in HPI.     Physical Exam   BP: (!) 170/64  Pulse: 85  Temp: 97.7  F (36.5  C)  Resp: 18  Height: 160 cm (5' 3\")  Weight: 63 kg (139 lb)  SpO2: 98 %      Physical Exam  Vitals and nursing note reviewed. "   Constitutional:       Appearance: She is normal weight.      Comments: Elderly female lying flat on the bed.  Conversant.   HENT:      Head: Normocephalic.      Nose: Nose normal.      Mouth/Throat:      Pharynx: Oropharynx is clear.   Eyes:      General: No scleral icterus.     Extraocular Movements: Extraocular movements intact.      Conjunctiva/sclera: Conjunctivae normal.   Cardiovascular:      Rate and Rhythm: Normal rate and regular rhythm.      Pulses: Normal pulses.      Heart sounds: Normal heart sounds.   Pulmonary:      Effort: Pulmonary effort is normal.      Breath sounds: Normal breath sounds.   Chest:      Chest wall: No tenderness.   Abdominal:      Palpations: Abdomen is soft.      Tenderness: There is no abdominal tenderness.   Musculoskeletal:      Cervical back: Normal range of motion and neck supple. No tenderness.      Comments: Left leg is shortened and slightly externally rotated.  Tenderness over the left hip.  She can wiggle the toes and ankles.  Normal sensation.  Strong peripheral pulses.   Skin:     General: Skin is warm and dry.      Findings: No bruising.   Neurological:      General: No focal deficit present.      Mental Status: She is alert.   Psychiatric:         Mood and Affect: Mood normal.         Behavior: Behavior normal.         ED Course (with Medical Decision Making)    Pt seen and examined by me.  RN and EPIC notes reviewed.       Patient with mechanical fall, left hip pain.  On exam her leg is shortened and slightly externally rotated.  I am concerned for hip fracture.  IV is in place.  Will start fluids, check labs, x-ray of the hip.  Pain meds if needed.    Patient's CMP is normal except for mildly elevated glucose at 138.  INR normal.  Normal troponin.  CBC normal.    X-ray shows an acute comminuted displaced fracture of the left femoral neck and subtrochanteric extension.    Results discussed with patient and family.  I spoke with Dr. Franco, orthopedics.   Unfortunately, there is not any OR time available this evening so plan will be to admit the patient for hospitalist clearance with plan for OR in the morning.  I spoke with Dr. Milligan who graciously excepted the patient for admission.              EKG Interpretation:      Interpreted by Jackie Carter MD  Time reviewed:1248   Symptoms at time of EKG: None   Rhythm: Normal sinus   Rate: 75  Axis: Normal  Ectopy: None  Conduction: Normal and RSR pattern in lead V1, nondiagnostic.  Left atrial enlargement  ST Segments/ T Waves: No ST-T wave changes and No acute ischemic changes  Q Waves: None  Comparison to prior: No old EKG available    Clinical Impression: normal EKG      Procedures    Results for orders placed or performed during the hospital encounter of 11/03/23 (from the past 24 hour(s))   CBC with platelets differential    Narrative    The following orders were created for panel order CBC with platelets differential.  Procedure                               Abnormality         Status                     ---------                               -----------         ------                     CBC with platelets and d...[[id number]]                      Final result                 Please view results for these tests on the individual orders.   Comprehensive metabolic panel   Result Value Ref Range    Sodium 139 135 - 145 mmol/L    Potassium 3.9 3.4 - 5.3 mmol/L    Carbon Dioxide (CO2) 24 22 - 29 mmol/L    Anion Gap 11 7 - 15 mmol/L    Urea Nitrogen 19.6 8.0 - 23.0 mg/dL    Creatinine 0.72 0.51 - 0.95 mg/dL    GFR Estimate 87 >60 mL/min/1.73m2    Calcium 9.6 8.8 - 10.2 mg/dL    Chloride 104 98 - 107 mmol/L    Glucose 138 (H) 70 - 99 mg/dL    Alkaline Phosphatase 53 35 - 104 U/L    AST 21 0 - 45 U/L    ALT 17 0 - 50 U/L    Protein Total 6.3 (L) 6.4 - 8.3 g/dL    Albumin 4.1 3.5 - 5.2 g/dL    Bilirubin Total 0.4 <=1.2 mg/dL   INR   Result Value Ref Range    INR 1.02 0.85 - 1.15   Troponin T, High Sensitivity   Result  Value Ref Range    Troponin T, High Sensitivity 10 <=14 ng/L   Magnesium   Result Value Ref Range    Magnesium 2.0 1.7 - 2.3 mg/dL   CBC with platelets and differential   Result Value Ref Range    WBC Count 8.2 4.0 - 11.0 10e3/uL    RBC Count 4.29 3.80 - 5.20 10e6/uL    Hemoglobin 12.2 11.7 - 15.7 g/dL    Hematocrit 38.0 35.0 - 47.0 %    MCV 89 78 - 100 fL    MCH 28.4 26.5 - 33.0 pg    MCHC 32.1 31.5 - 36.5 g/dL    RDW 12.9 10.0 - 15.0 %    Platelet Count 362 150 - 450 10e3/uL    % Neutrophils 84 %    % Lymphocytes 11 %    % Monocytes 5 %    % Eosinophils 0 %    % Basophils 0 %    % Immature Granulocytes 0 %    NRBCs per 100 WBC 0 <1 /100    Absolute Neutrophils 6.9 1.6 - 8.3 10e3/uL    Absolute Lymphocytes 0.9 0.8 - 5.3 10e3/uL    Absolute Monocytes 0.4 0.0 - 1.3 10e3/uL    Absolute Eosinophils 0.0 0.0 - 0.7 10e3/uL    Absolute Basophils 0.0 0.0 - 0.2 10e3/uL    Absolute Immature Granulocytes 0.0 <=0.4 10e3/uL    Absolute NRBCs 0.0 10e3/uL   XR Pelvis w Hip Left 1 View    Narrative    EXAM: XR PELVIS AND HIP LEFT 1 VIEW  DATE/TIME: 11/3/2023 12:22 PM    INDICATION: fall; pain  COMPARISON: None available.       Impression    IMPRESSION: Acute, comminuted, displaced fracture of the left femoral  neck and subtrochanteric extension.    Mild degenerative arthritis both hips. Mild degenerative changes both  SI joints. Lower lumbar facet arthropathy.    NOTE: ABNORMAL REPORT    THE DICTATION ABOVE DESCRIBES AN ABNORMAL REPORT FOR WHICH FOLLOW-UP  IS NEEDED.    NICOLE BURROWS DO         SYSTEM ID:  [id number]   ABO/Rh type and screen    Narrative    The following orders were created for panel order ABO/Rh type and screen.  Procedure                               Abnormality         Status                     ---------                               -----------         ------                     Adult Type and Screen[[id number]]                            Final result                 Please view results for these tests on the  individual orders.   Adult Type and Screen   Result Value Ref Range    ABO/RH(D) A POS     Antibody Screen Negative Negative    SPECIMEN EXPIRATION DATE [id number]        Medications   acetaminophen (TYLENOL) tablet 1,000 mg (has no administration in time range)   calcium carbonate (TUMS) chewable tablet 500 mg (500 mg Oral Not Given 11/3/23 1722)   gabapentin (NEURONTIN) capsule 200 mg (200 mg Oral $Given 11/3/23 2112)   senna-docusate (SENOKOT-S/PERICOLACE) 8.6-50 MG per tablet 1 tablet (has no administration in time range)   traZODone (DESYREL) tablet 50 mg (50 mg Oral $Given 11/3/23 2112)   trolamine salicylate (ASPERCREME) 10 % cream ( Topical $Given 11/3/23 2305)   Vitamin D3 (CHOLECALCIFEROL) tablet 50 mcg (has no administration in time range)   lactated ringers infusion ( Intravenous $New Bag 11/3/23 1721)   HYDROmorphone (PF) (DILAUDID) injection 0.3 mg (0.3 mg Intravenous $Given 11/3/23 2112)   HOLD: ALL Anticoagulant medications until AFTER surgery (has no administration in time range)   ondansetron (ZOFRAN ODT) ODT tab 4 mg (has no administration in time range)     Or   ondansetron (ZOFRAN) injection 4 mg (has no administration in time range)   prochlorperazine (COMPAZINE) injection 5 mg (has no administration in time range)     Or   prochlorperazine (COMPAZINE) tablet 5 mg (has no administration in time range)     Or   prochlorperazine (COMPAZINE) suppository 12.5 mg (has no administration in time range)   naloxone (NARCAN) injection 0.2 mg (has no administration in time range)     Or   naloxone (NARCAN) injection 0.4 mg (has no administration in time range)     Or   naloxone (NARCAN) injection 0.2 mg (has no administration in time range)     Or   naloxone (NARCAN) injection 0.4 mg (has no administration in time range)   HYDROmorphone (PF) (DILAUDID) injection 0.5 mg (0.5 mg Intravenous $Given 11/3/23 1907)   HYDROmorphone (PF) (DILAUDID) injection 0.5 mg (0.5 mg Intravenous $Given 11/3/23 7599)        Assessments & Plan    I have reviewed the findings, diagnosis, plan with the patient.    Current Discharge Medication List          Final diagnoses:   Closed fracture of left hip, initial encounter (H)     Disposition: Patient admitted in stable condition    Note: Chart documentation done in part with Dragon Voice Recognition software. Although reviewed after completion, some word and grammatical errors may remain.   11/3/2023   [address] MEDICAL SURGICAL       Jackie Carter MD  11/03/23 6971

## 2023-11-04 NOTE — ANESTHESIA CARE TRANSFER NOTE
Patient: Kylah Britt    Procedure: Procedure(s):  INTERNAL FIXATION, FRACTURE, INTERTROCHANTERIC, HIP, USING GAMMA RODS       Diagnosis: Closed displaced intertrochanteric fracture of left femur, initial encounter (H) [S72.142A]  Diagnosis Additional Information: No value filed.    Anesthesia Type:   General     Note:    Oropharynx: oropharynx clear of all foreign objects and spontaneously breathing  Level of Consciousness: drowsy  Oxygen Supplementation: face mask    Independent Airway: airway patency satisfactory and stable  Dentition: dentition unchanged  Vital Signs Stable: post-procedure vital signs reviewed and stable  Report to RN Given: handoff report given  Patient transferred to: PACU    Handoff Report: Identifed the Patient, Identified the Reponsible Provider, Reviewed the pertinent medical history, Discussed the surgical course, Reviewed Intra-OP anesthesia mangement and issues during anesthesia, Set expectations for post-procedure period and Allowed opportunity for questions and acknowledgement of understanding  Vitals:  Vitals Value Taken Time   /88 11/04/23 1125   Temp     Pulse 82 11/04/23 1130   Resp 17 11/04/23 1130   SpO2 98 % 11/04/23 1130   Vitals shown include unfiled device data.    Electronically Signed By: ESPERANZA Carrero CRNA  November 4, 2023  11:31 AM

## 2023-11-05 ENCOUNTER — APPOINTMENT (OUTPATIENT)
Dept: PHYSICAL THERAPY | Facility: CLINIC | Age: 73
DRG: 481 | End: 2023-11-05
Payer: MEDICARE

## 2023-11-05 LAB
GLUCOSE SERPL-MCNC: 135 MG/DL (ref 70–99)
HGB BLD-MCNC: 8.8 G/DL (ref 11.7–15.7)

## 2023-11-05 PROCEDURE — 120N000001 HC R&B MED SURG/OB

## 2023-11-05 PROCEDURE — 97161 PT EVAL LOW COMPLEX 20 MIN: CPT | Mod: GP | Performed by: PHYSICAL THERAPIST

## 2023-11-05 PROCEDURE — 85018 HEMOGLOBIN: CPT | Performed by: PHYSICIAN ASSISTANT

## 2023-11-05 PROCEDURE — 250N000013 HC RX MED GY IP 250 OP 250 PS 637: Performed by: NURSE PRACTITIONER

## 2023-11-05 PROCEDURE — 250N000013 HC RX MED GY IP 250 OP 250 PS 637: Performed by: PHYSICIAN ASSISTANT

## 2023-11-05 PROCEDURE — 97530 THERAPEUTIC ACTIVITIES: CPT | Mod: GP | Performed by: PHYSICAL THERAPIST

## 2023-11-05 PROCEDURE — 99232 SBSQ HOSP IP/OBS MODERATE 35: CPT | Performed by: NURSE PRACTITIONER

## 2023-11-05 PROCEDURE — 36415 COLL VENOUS BLD VENIPUNCTURE: CPT | Performed by: NURSE PRACTITIONER

## 2023-11-05 PROCEDURE — 82947 ASSAY GLUCOSE BLOOD QUANT: CPT | Performed by: NURSE PRACTITIONER

## 2023-11-05 PROCEDURE — 250N000013 HC RX MED GY IP 250 OP 250 PS 637: Performed by: HOSPITALIST

## 2023-11-05 RX ORDER — OXYCODONE HYDROCHLORIDE 5 MG/1
5 TABLET ORAL EVERY 4 HOURS PRN
Status: DISCONTINUED | OUTPATIENT
Start: 2023-11-05 | End: 2023-11-06 | Stop reason: HOSPADM

## 2023-11-05 RX ADMIN — LORAZEPAM 0.25 MG: 0.5 TABLET ORAL at 13:21

## 2023-11-05 RX ADMIN — Medication 50 MCG: at 08:30

## 2023-11-05 RX ADMIN — ACETAMINOPHEN 975 MG: 325 TABLET, FILM COATED ORAL at 15:05

## 2023-11-05 RX ADMIN — OXYCODONE HYDROCHLORIDE 5 MG: 5 TABLET ORAL at 01:53

## 2023-11-05 RX ADMIN — ASPIRIN 325 MG: 325 TABLET, COATED ORAL at 20:06

## 2023-11-05 RX ADMIN — SENNOSIDES AND DOCUSATE SODIUM 1 TABLET: 8.6; 5 TABLET ORAL at 08:30

## 2023-11-05 RX ADMIN — SENNOSIDES AND DOCUSATE SODIUM 1 TABLET: 8.6; 5 TABLET ORAL at 20:06

## 2023-11-05 RX ADMIN — FAMOTIDINE 20 MG: 20 TABLET ORAL at 08:29

## 2023-11-05 RX ADMIN — TRAZODONE HYDROCHLORIDE 50 MG: 50 TABLET ORAL at 20:06

## 2023-11-05 RX ADMIN — FAMOTIDINE 20 MG: 20 TABLET ORAL at 20:06

## 2023-11-05 RX ADMIN — Medication: at 08:27

## 2023-11-05 RX ADMIN — MAGNESIUM HYDROXIDE 30 ML: 400 SUSPENSION ORAL at 16:14

## 2023-11-05 RX ADMIN — OXYCODONE HYDROCHLORIDE 5 MG: 5 TABLET ORAL at 17:30

## 2023-11-05 RX ADMIN — GABAPENTIN 200 MG: 100 CAPSULE ORAL at 20:06

## 2023-11-05 RX ADMIN — CALCIUM CARBONATE (ANTACID) CHEW TAB 500 MG 500 MG: 500 CHEW TAB at 17:30

## 2023-11-05 RX ADMIN — LORAZEPAM 0.25 MG: 0.5 TABLET ORAL at 23:44

## 2023-11-05 RX ADMIN — Medication: at 13:25

## 2023-11-05 RX ADMIN — ACETAMINOPHEN 975 MG: 325 TABLET, FILM COATED ORAL at 08:28

## 2023-11-05 RX ADMIN — ASPIRIN 325 MG: 325 TABLET, COATED ORAL at 08:30

## 2023-11-05 RX ADMIN — OXYCODONE HYDROCHLORIDE 5 MG: 5 TABLET ORAL at 08:34

## 2023-11-05 RX ADMIN — CALCIUM CARBONATE (ANTACID) CHEW TAB 500 MG 500 MG: 500 CHEW TAB at 08:29

## 2023-11-05 RX ADMIN — ACETAMINOPHEN 975 MG: 325 TABLET, FILM COATED ORAL at 23:43

## 2023-11-05 RX ADMIN — POLYETHYLENE GLYCOL 3350 17 G: 17 POWDER, FOR SOLUTION ORAL at 08:30

## 2023-11-05 RX ADMIN — OXYCODONE HYDROCHLORIDE 2.5 MG: 5 TABLET ORAL at 13:20

## 2023-11-05 RX ADMIN — Medication: at 20:06

## 2023-11-05 ASSESSMENT — ACTIVITIES OF DAILY LIVING (ADL)
ADLS_ACUITY_SCORE: 35
ADLS_ACUITY_SCORE: 31
ADLS_ACUITY_SCORE: 38
ADLS_ACUITY_SCORE: 35

## 2023-11-05 NOTE — DISCHARGE INSTRUCTIONS
[facility] Discharge Instructions Hip Fracture Fixation  [phone]  Bone and Joint Service Line for issues or concerns     Discharge disposition:  Discharged to transitional care   Wound Care/Dressings: Keep the Aquacel (surgical) dressing(s) on until follow-up   Diet: Orders Placed This Encounter      Advance Diet as Tolerated: Regular Diet Adult      Pain Control: Pain medication given, as pain gets better wean to tylenol as needed.  Pain medications also sent to transitional care   Activity: 25% partial weightbearing left lower extremity x6 weeks  No Hip Precautions.   Icing: Ice on 15 minutes then off 15 minutes as needed for pain and swelling    Follow-up: Return to clinic in 2 weeks at with Dr. Franco for x-rays and wound check   When to Call the Office: Temperature greater than 101.5 degrees Fahrenheit.  Increasing pain not relieved by medicine or icing.  Excessive drainage from the incision that includes bright red blood.  Drainage from the incision that appears yellow, pus-like, or foul smelling.  Persistent nausea or vomiting.    Call 911 if you experience any chest pain and/or shortness or breath.   Additional instructions: Anticoagulation: Asprin 325mg BID x 6 weeks

## 2023-11-05 NOTE — PROGRESS NOTES
[facility]    Medicine Progress Note - Hospitalist Service    Date of Admission:  11/3/2023    Assessment & Plan   Kylah Britt is a 73 year old female with osteopenia, chronic gait abnormality for which she uses a walker during ambulation, and mild dementia living in assisted living facility ([facility]) who presented on 11/3/2023 with left hip pain after a fall while she was ambulating using her walker.  Clinical presentation and radiographic findings are suspicious for osteoporotic fracture of the left hip for which operative repair has been advised by orthopedic surgery.  Due to high risk for an adverse outcome, hospitalization is considered medically necessary.  Based on the presently available information, hospitalization for at least 2 midnights is anticipated.  Patient underwent surgery, internal fixation of the intertrochanteric fracture, November 4.      Principal Problem:    Osteoporotic fracture of left hip, initial encounter (H)  Active Problems:    Fall    Mild vascular dementia with mood disturbance (H)    Platelet dysfunction due to aspirin (H)    Abnormal gait    Osteopenia of necks of both femurs-per DEXA March 2022    Drug-induced constipation    Osteoporotic fracture of left hip is post internal fixation with gamma rods  Known osteopenia based on previous DEXA scan in March 2022 and now presenting with left hip fracture after a fall.  This is suspicious for osteoporotic fracture of left hip.  Orthopedics has advised operative repair which was completed 11/4/23.  Chronic osteopenia is treated with vitamin D and calcium supplementation.  Due to dementia patient has difficulty maintaining 25% weightbearing on her left lower extremity, Ortho is aware.  Nursing staff noted some hallucinations after taking oxycodone 5 mg.  Plan:   -Orthopedic surgery following, managing postoperative course including pain, VTE prophylaxis  -Tylenol scheduled with oral oxycodone  as needed, nursing staff noted patient had some hallucinations after oxycodone 5 mg.  Will change range of oxycodone to 2.5 to 5 mg as needed.  -she is to be 25% weightbearing on her left lower extremity  -Continue chronic vitamin D and calcium supplementation      Aspirin induced platelet function defect  Takes aspirin daily which causes platelet function defect that increases her bleeding risk.  She is not anemic at admission.  Aspirin was held preoperatively  Plan:  -Aspirin for VTE prophylaxis as ordered by Ortho  -Ordered recheck of hemoglobin in a.m.    Dementia  Chronic mild dementia residing at [facility].  Not taking any medication specific for dementia.  She will be at risk for superimposed delirium during hospitalization.  Plan:   -Continue chronic medication for insomnia including trazodone  -Monitor closely for evolving delirium and treat accordingly if she develops delirium    Neurodegenerative disorder with spasticity  Per family patient has had chronic spasticity of both lower and upper extremities for which she usually takes baclofen for.  She has been seen by neurology in the past but has been unable to get a clear diagnosis.  Currently nurse practitioner provider at the [facility] has been managing.  Home dose of baclofen is 5 mg in the morning and 10 mg in the afternoon and at at bedtime.  This has not been reordered on admission.  Plan:  We will continue to monitor plan on restarting baclofen at some point during hospitalization.  Will not restart today as patient having some confusion with pain medication.  She is also denying any other pain than her left hip.    Chronic gait abnormality with fall  Has chronic gait abnormality for which she normally uses a walker.  She fell today which is the first time she is fallen in over a year although she does have previous history of frequent falls.  Plan  -Anticipate PT evaluation and treatment postoperatively to assist with  disposition planning    Constipation  Chronically taking senna for treatment of constipation and will be at risk for worsening constipation due to combination of decreased activity level, decreased oral intake, and use of opiates.  Plan:  -Continue chronic dose of senna  -Monitor bowels and adjust dose of patient's as needed    Postoperative anemia, expected  Today hemoglobin 8.8 down from a baseline of 12.3 on admission.  Ortho is aware.  Patient continues to be hemodynamically stable.  Plan:  Repeat hemoglobin tomorrow          Diet: Advance Diet as Tolerated: Regular Diet Adult    DVT Prophylaxis: Per ortho  Kee Catheter: Not present  Lines: None     Cardiac Monitoring: None  Code Status: No CPR- Do NOT Intubate      Clinically Significant Risk Factors                      # Dementia: noted on problem list        # Financial/Environmental Concerns: none         Disposition Plan     Expected Discharge Date: 11/06/2023      Destination: inpatient rehabilitation facility            The patient's care was discussed with the Patient, Patient's Family, and [facility]PHENSON orthopedic surgery .    Lilia Harper [facility]  Hospitalist Service  [facility]  Securely message with [email or web address] (more info)  Text page via [employer] Paging/Directory   ______________________________________________________________________    Interval History   No acute events overnight.  Nursing staff did mention some confusion/hallucinations after getting oxycodone 5 mg.    Physical Exam   Vital Signs: Temp: 98.5  F (36.9  C) Temp src: Oral BP: 117/62 Pulse: 85   Resp: 18 SpO2: 96 % O2 Device: None (Room air) Oxygen Delivery: 1/2 LPM  Weight: 134 lbs .63 oz    General Appearance:  Lying in bed, alert and oriented to self.  No acute distress  Respiratory: Respiratory effort easy at rest.  No cough.  Lung sounds clear  Cardiovascular: Regular rate and rhythm, no murmur  GI: Abdomen nondistended, soft.  Bowel sounds active  Skin: Skin  grossly intact, did not visualize left hip incision dressing on  Other:  -     Medical Decision Making       35 MINUTES SPENT BY ME on the date of service doing chart review, history, exam, documentation & further activities per the note.      Data     I have personally reviewed the following data over the past 24 hrs:    N/A  \   8.8 (L)   / N/A     N/A N/A N/A /  135 (H)   N/A N/A N/A \       Imaging results reviewed over the past 24 hrs:   No results found for this or any previous visit (from the past 24 hour(s)).

## 2023-11-05 NOTE — PROGRESS NOTES
S-(situation): status update    B-(background): left hip ORIF 11/4/2023    A-(assessment): helped patient to chair this morning from bed. PT was present as was an aide before this writer came in to assist. PT stated she had her standing at the bedside and she was able to maintain left leg weight bearing status of 25% with many verbal and physical cues. When this writer came into the room, patient was sitting on the bedside and they were preparing to move her to the chair for breakfast. This writer did observe patient was able to maintain lifting her leg slightly to not put pressure on it with sit to stand, but once we were trying to pivot to the chair, she could not comprehend needing to continue minimal weight bearing on the left leg and instead transferred most or all of her weight on the left instead of the right.  Did sit up in chair for breakfast and for a short time after, then was assisted back to bed, this writer did not see the transfer back to bed.  Subsequently, now patient is again up in her chair on preparation for lunch and family is in the room, stating patient wants to go back to bed but family is encouraging patient to stay up through lunch time.     Did give patient tylenol and 5 mg Oxycodone for pain control this morning, but staff noted sometime after, the patient was hallucinating and talking about varnishing something.     R-(recommendations): Family was made aware of this and we made a plan together that with the next pain medication administration, maybe we can get half a dose (2.5 mg) Oxycodone instead. Awaiting provider order.

## 2023-11-05 NOTE — PLAN OF CARE
Goal Outcome Evaluation:      Plan of Care Reviewed With: patient    Overall Patient Progress: no changeOverall Patient Progress: no change    Outcome Evaluation: Pt alert, disoriented to place and time. Prn oxy for left hip pain. Dressing CDI. Sadaf steady utilized for transfers. Pt educated & reminded to maintain 25% WB to LLE, unclear of Pts comprehension related to weight bearing status. Pt repositioned as tolerated. IS use attempted, Pt able to reach 1000, however Pt requires step-by-step instructions with each breath and continuous reinforcement. Pt restless, pulling at IV tubing and fidgeting with hair, prn Ativan given with minimal effectiveness. Frequent rounding and reoriented  performed.

## 2023-11-05 NOTE — PLAN OF CARE
"Goal Outcome Evaluation:      Plan of Care Reviewed With: patient    Overall Patient Progress: improvingOverall Patient Progress: improving     Patient is overall improving, appears to have more clarity over yesterday after her procedure ahmet is to be expected. Did note this morning after administering oxycodone, she was talking about things that didn't make sense \"something about varnishing\".  Daughter was aware. Does continue to pull at her hair, attempted to put her hair in a pony tail pickering, and did give 1/2 tablet of Ativan this afternoon, as well as decreased dose of oxycodone. Not sure either one has changed her behaviors.  Frequently asks to use the commode, sometimes as frequent as just having her on the commode 10 minutes prior. Is able to maintain weight on non-surgical leg with many verbal cues. Will bladder scan to see if she is retaining.  Tolerating food and fluids.      Problem: Adult Inpatient Plan of Care  Goal: Optimal Comfort and Wellbeing  Outcome: Progressing     Problem: Risk for Delirium  Goal: Improved Attention and Thought Clarity  Outcome: Progressing  Intervention: Maximize Cognitive Function  Recent Flowsheet Documentation  Taken 11/5/2023 1509 by Maryellen Norman, RN  Reorientation Measures:   calendar in view   clock in view     "

## 2023-11-05 NOTE — PROGRESS NOTES
"[facility]    Progress note    73 year old female POD#1 s/p Left intertrochanteric femur fracture open-reduction, internal fixation with long Gamma adrienne by Dr. Franco  S: Patient seen at bedside in no apparent distress, alert and pleasant.  I discussed surgery and x-rays with the patient.  X-rays printed off and shown to the patient and explained today.  She denies numbness, tingling or major pain issues.  Patient has some pain when she puts weight on the leg but when she is in bed she is comfortable.  Discussed discharge to [facility] and how we hope she will be able to do that tomorrow.  Patient denies any weakness but does notice some lightheadedness at times.  O: CMS intact LLE, DF/PF intact, 5/5 PF and EHL       All 3 aquacel Dressings on, clean and dry with a good seal and no shadowing       Left hip with minimal swelling and no erythema some ecchymosis noted posterior to the proximal dressings       Bilateral calves soft and non tender    Vital signs:  Temp: 98.5  F (36.9  C) Temp src: Oral BP: 117/62 Pulse: 85   Resp: 18 SpO2: 96 % O2 Device: None (Room air) Oxygen Delivery: 1/2 LPM Height: 157.5 cm (5' 2\") Weight: 60.8 kg (134 lb 0.6 oz)  Estimated body mass index is 24.52 kg/m  as calculated from the following:    Height as of this encounter: 1.575 m (5' 2\").    Weight as of this encounter: 60.8 kg (134 lb 0.6 oz).      Hemoglobin   Date Value Ref Range Status   11/05/2023 8.8 (L) 11.7 - 15.7 g/dL Final   06/08/2017 12.6 11.7 - 15.7 g/dL Final     INR   Date Value Ref Range Status   11/03/2023 1.02 0.85 - 1.15 Final         A/P:  1. Pain-controlled with Tylenol, oxycodone 5 mg, Ativan given x1.  I did hold baclofen with the other medications I felt it could cause confusion.  Try did not use IV Dilaudid today.  2. DVT Prophylaxis-Asprin 325mg BID x 6 weeks   3. Weight Bearing Status-25% partial weightbearing of left lower extremity x6 weeks per Dr. Franco  4. Hip " Precautions-Anterior Lateral Hip Precautions x 6 weeks.  5. Physical Therapy-recommended TCU.  Assist of 2 with transfers  6. Occupational Therapy-to see patient have possible  7. Case Management-discussed with social work team possible [facility] Monday if they have access and availability.  Teams message sent to them today.  8. Hospitalist-WHITNEY Harper primary. Thank you.  Anticipates discharge Monday, 11/6/2023.  Secure message sent  9. Incision-no signs or symptoms of infection  10.  Acute blood loss anemia-12.2 prior to surgery 8.8 now.  Patient is asymptomatic and not tachycardic.  Continue to watch new hemoglobin will be drawn tomorrow.  10. Plan-anticipate discharge to [facility] tomorrow as long as hospitalist clears the patient.  I did put in orthopedic discharge orders for the patient for TCU.  I held on Ortho prescriptions as [facility] needs them specifically [facility].  Discussed plan with charge RN as well as RN.  Message sent to Dr. Franco.  Follow-up with Dr. Franco in 2 weeks for x-ray.    Bishnu Gordon PA-C  11/5/2023

## 2023-11-05 NOTE — CONSULTS
Care Management Initial Consult    General Information  Assessment completed with: Gunnar Nicholas  Type of CM/SW Visit: Initial Assessment    Primary Care Provider verified and updated as needed: Yes   Readmission within the last 30 days: no previous admission in last 30 days      Reason for Consult: discharge planning, facility placement  Advance Care Planning: Advance Care Planning Reviewed: present on chart          Communication Assessment  Patient's communication style: spoken language (English or Bilingual)    Hearing Difficulty or Deaf: no   Wear Glasses or Blind: yes    Cognitive  Cognitive/Neuro/Behavioral: .WDL except, orientation  Level of Consciousness: intermittent confusion  Arousal Level: opens eyes spontaneously  Orientation: disoriented to, time, place  Mood/Behavior: cooperative, anxious  Best Language: 0 - No aphasia  Speech: logical    Living Environment:   People in home: facility resident ([facility])     Current living Arrangements: assisted living  Name of Facility: [facility]   Able to return to prior arrangements: no       Family/Social Support:  Care provided by: other (see comments) (facility staff)  Provides care for: no one  Marital Status:   Facility resident(s)/Staff, Children          Description of Support System: Supportive, Involved    Support Assessment: Lacks adequate physical care    Current Resources:   Patient receiving home care services: No     Community Resources: None  Equipment currently used at home: walker, rolling  Supplies currently used at home:      Employment/Financial:  Employment Status: retired        Financial Concerns: none   Referral to Financial Worker: No       Does the patient's insurance plan have a 3 day qualifying hospital stay waiver?  No    Lifestyle & Psychosocial Needs:  Social Determinants of Health     Food Insecurity: Not on file   Depression: Not at risk (3/7/2022)    PHQ-2     PHQ-2 Score: 0   Housing Stability: Not on file    Tobacco Use: Low Risk  (11/4/2023)    Patient History     Smoking Tobacco Use: Never     Smokeless Tobacco Use: Never     Passive Exposure: Not on file   Financial Resource Strain: Not on file   Alcohol Use: Not on file   Transportation Needs: Not on file   Physical Activity: Not on file   Interpersonal Safety: Not on file   Stress: Not on file   Social Connections: Not on file       Functional Status:  Prior to admission patient needed assistance:   Dependent ADLs:: Ambulation-walker, Bathing, Eating  Dependent IADLs:: Cooking, Medication Management, Money Management, Transportation, Cleaning  Assesssment of Functional Status: Not at baseline with mobility    Mental Health Status:          Chemical Dependency Status:                Values/Beliefs:  Spiritual, Cultural Beliefs, Alevism Practices, Values that affect care: no               Additional Information:  Writer spoken with patient's daughter Nicholas [phone] on the phone introduced self and role patient currently lives at [facility] [phone]. Patient receives medication management 3x a day, bathing 1x a wk, cooks some of her meals and takes some in the dining room, wears a first alert. Nicholas and son Ehsan manage her finances. Writer informed Nicholas about 25% weigh bearing on surgical leg and with patient's dementia will need 24/7 care and patient will have a qualifying 3 night stay so patient would have a certain amount of days in a TCU covered by Medicare.    Nicholas asked that writer try [facility] (Main Phone: [phone] Admissions Phone: [phone] Fax: [phone]) then [facility]  (Phone: [phone] Fax: [phone])  and then [facility]. Writer informed Nicholas that referrals would be sent and that [facility] would probably not hear anything until Monday and will reach out as soon as [facility] knows.  Family would like van transportation to the facility writer explained there would be a out of pocket cost for that and Nicholas is  fine with that.    Service Provider Request Status Selected Services Address Phone Fax Patient Preferred   [facility] ([facility])  Pending - Request Sent N/A [address] [address] [phone] --   Current Capacity last updated by Gio Pulido RN on 9/1/2023  3:01 PM    Has secure memory unit            [facility] ([facility])  Pending - Request Sent N/A [address]  [address] [id number] [phone] --   [facility] ([facility])  Pending - Request Sent N/A [address] [id number] [phone]        Deb Starks RN   Inpatient Care Coordinator  [facility] [phone]  [facility] [phone]     Deb Starks, RN

## 2023-11-05 NOTE — PROGRESS NOTES
11/05/23 0900   Appointment Info   Signing Clinician's Name / Credentials (PT) itz thomas PT   Living Environment   Current Living Arrangements assisted living   General Information   Onset of Illness/Injury or Date of Surgery 11/04/23   Referring Physician Dr ordonez   Patient/Family Therapy Goals Statement (PT) return home   Pertinent History of Current Problem (include personal factors and/or comorbidities that impact the POC) patient lives in assisted living and tripped and fell , Fx left hip , had ORIF 11/4/23 and is 25% WB PMH osteopenia , normally walks with walker , mild dementia \   Weight-Bearing Status - LLE partial weight-bearing (% in comments)  (25% WB)   Cognition   Follows Commands (Cognition) 50-74% accuracy;delayed response/completion;increased processing time needed   Pain Assessment   Patient Currently in Pain   (5/10)   Integumentary/Edema   Integumentary/Edema Comments dressing on left hip   Range of Motion (ROM)   ROM Comment WFL in B UE and right LE   Strength (Manual Muscle Testing)   Strength Comments unable to MMT   Bed Mobility   Comment, (Bed Mobility) able to move supine to sitting EOB with mod assist of 1 , sitting to supine max assist of 2   Transfers   Comment, (Transfers) able to move sit to stand with max assist of 1 and able to maintain 25% WB standing but when taking 3 small steps to chair at bedside unable to evaluate how well she maintained her WB 25%   Gait/Stairs (Locomotion)   Comment, (Gait/Stairs) unable to at this time and maintain WB25%   Balance   Balance Comments poor standing balance   Clinical Impression   Criteria for Skilled Therapeutic Intervention Yes, treatment indicated   PT Diagnosis (PT) left hip ORIF 11/4/23 weakness   Functional limitations due to impairments patient is mod to max assist with bed mobility , and transfers   Clinical Presentation (PT Evaluation Complexity) stable   Clinical Decision Making (Complexity) moderate complexity   Planned  Therapy Interventions (PT) balance training;bed mobility training;gait training;home exercise program;patient/family education;ROM (range of motion);strengthening;transfer training   Risk & Benefits of therapy have been explained evaluation/treatment results reviewed;care plan/treatment goals reviewed;risks/benefits reviewed;current/potential barriers reviewed;participants voiced agreement with care plan;participants included;patient   PT Total Evaluation Time   PT Eval, Low Complexity Minutes (62540) 15   Physical Therapy Goals   PT Frequency Daily   PT Predicted Duration/Target Date for Goal Attainment 11/11/23   PT Goals Bed Mobility;Transfers;Gait   PT: Bed Mobility Minimal assist   PT: Transfers Minimal assist   PT: Gait Minimal assist;10 feet   Interventions   Interventions Quick Adds Therapeutic Activity   Therapeutic Activity   Therapeutic Activities: dynamic activities to improve functional performance Minutes (56690) 15   Symptoms Noted During/After Treatment None   Treatment Detail/Skilled Intervention patient seen BS assist of 1 to move supine to sitting and sitting to stand and reverse , mod assist of 2 to move sitting to supine and to transfer EOB to stand to chair beside bed   PT Discharge Planning   PT Plan daily to mobilize as patient is able   PT Discharge Recommendation (DC Rec) Transitional Care Facility   PT Rationale for DC Rec patient is not at baseline and requires assist for mobililty   PT Brief overview of current status patient min to mod x 2 assist for bed mobility and assist x 2 to transfer to maintain 25% WB   Total Session Time   Timed Code Treatment Minutes 15   Total Session Time (sum of timed and untimed services) 30

## 2023-11-06 VITALS
BODY MASS INDEX: 24.67 KG/M2 | WEIGHT: 134.04 LBS | HEART RATE: 82 BPM | RESPIRATION RATE: 15 BRPM | SYSTOLIC BLOOD PRESSURE: 114 MMHG | TEMPERATURE: 97.7 F | OXYGEN SATURATION: 94 % | HEIGHT: 62 IN | DIASTOLIC BLOOD PRESSURE: 52 MMHG

## 2023-11-06 LAB
GLUCOSE SERPL-MCNC: 107 MG/DL (ref 70–99)
HGB BLD-MCNC: 8.2 G/DL (ref 11.7–15.7)

## 2023-11-06 PROCEDURE — 85018 HEMOGLOBIN: CPT | Performed by: PHYSICIAN ASSISTANT

## 2023-11-06 PROCEDURE — 999N000111 HC STATISTIC OT IP EVAL DEFER: Performed by: SPECIALIST/TECHNOLOGIST

## 2023-11-06 PROCEDURE — 36415 COLL VENOUS BLD VENIPUNCTURE: CPT | Performed by: NURSE PRACTITIONER

## 2023-11-06 PROCEDURE — 250N000013 HC RX MED GY IP 250 OP 250 PS 637: Performed by: NURSE PRACTITIONER

## 2023-11-06 PROCEDURE — 250N000013 HC RX MED GY IP 250 OP 250 PS 637: Performed by: PHYSICIAN ASSISTANT

## 2023-11-06 PROCEDURE — 82947 ASSAY GLUCOSE BLOOD QUANT: CPT | Performed by: NURSE PRACTITIONER

## 2023-11-06 PROCEDURE — 99239 HOSP IP/OBS DSCHRG MGMT >30: CPT | Performed by: NURSE PRACTITIONER

## 2023-11-06 RX ORDER — ACETAMINOPHEN 500 MG
1000 TABLET ORAL 3 TIMES DAILY
COMMUNITY
Start: 2023-11-06

## 2023-11-06 RX ORDER — OXYCODONE HYDROCHLORIDE 5 MG/1
2.5 TABLET ORAL EVERY 4 HOURS PRN
Qty: 26 TABLET | Refills: 0 | Status: SHIPPED | OUTPATIENT
Start: 2023-11-06 | End: 2023-11-20

## 2023-11-06 RX ORDER — ACETAMINOPHEN 325 MG/1
650 TABLET ORAL EVERY 4 HOURS PRN
Qty: 100 TABLET | Refills: 0 | Status: SHIPPED | OUTPATIENT
Start: 2023-11-07 | End: 2023-11-09

## 2023-11-06 RX ORDER — AMOXICILLIN 250 MG
1 CAPSULE ORAL 2 TIMES DAILY
Qty: 60 TABLET | Refills: 1 | Status: SHIPPED | OUTPATIENT
Start: 2023-11-06 | End: 2024-02-24

## 2023-11-06 RX ORDER — AMOXICILLIN 250 MG
1 CAPSULE ORAL DAILY PRN
COMMUNITY
Start: 2023-11-06 | End: 2023-11-28

## 2023-11-06 RX ORDER — ASPIRIN 325 MG
325 TABLET, DELAYED RELEASE (ENTERIC COATED) ORAL 2 TIMES DAILY
Qty: 82 TABLET | Refills: 0 | Status: SHIPPED | OUTPATIENT
Start: 2023-11-06 | End: 2023-12-17

## 2023-11-06 RX ORDER — AMOXICILLIN 250 MG
1 CAPSULE ORAL DAILY
COMMUNITY
Start: 2023-11-06 | End: 2023-11-28

## 2023-11-06 RX ADMIN — OXYCODONE HYDROCHLORIDE 2.5 MG: 5 TABLET ORAL at 10:20

## 2023-11-06 RX ADMIN — OXYCODONE HYDROCHLORIDE 2.5 MG: 5 TABLET ORAL at 13:00

## 2023-11-06 RX ADMIN — FAMOTIDINE 20 MG: 20 TABLET ORAL at 08:17

## 2023-11-06 RX ADMIN — CALCIUM CARBONATE (ANTACID) CHEW TAB 500 MG 500 MG: 500 CHEW TAB at 08:17

## 2023-11-06 RX ADMIN — Medication: at 13:04

## 2023-11-06 RX ADMIN — Medication 50 MCG: at 08:17

## 2023-11-06 RX ADMIN — Medication: at 08:17

## 2023-11-06 RX ADMIN — OXYCODONE HYDROCHLORIDE 2.5 MG: 5 TABLET ORAL at 06:20

## 2023-11-06 RX ADMIN — ACETAMINOPHEN 975 MG: 325 TABLET, FILM COATED ORAL at 06:13

## 2023-11-06 RX ADMIN — ASPIRIN 325 MG: 325 TABLET, COATED ORAL at 08:17

## 2023-11-06 ASSESSMENT — ACTIVITIES OF DAILY LIVING (ADL)
ADLS_ACUITY_SCORE: 38

## 2023-11-06 NOTE — PROGRESS NOTES
"[facility]    Progress note    73 year old female POD#2 s/p Left intertrochanteric femur fracture open-reduction, internal fixation with long Gamma adrienne by Dr. Franco   S: Patient seen at bedside in no apparent distress, alert and pleasant.  I discussed discharge and pain with the patient.  She denies numbness, tingling or major pain issues.  Patient states the pain is getting better.  Patient denies any lightheadedness or any weakness.  Discussed possible discharge today.  O: CMS intact LLE, DF/PF intact, 5/5 PF and EHL and DF       All 3 aquacel Dressings on, clean and dry with a good seal and no shadowing       Left hip with minimal swelling and no erythema some ecchymosis noted posterior to the proximal dressings       Bilateral calves soft and non tender     Vital signs:  Temp: 98.4  F (36.9  C) Temp src: Oral BP: 123/55 Pulse: 84   Resp: 16 SpO2: 96 % O2 Device: None (Room air) Oxygen Delivery: 1/2 LPM Height: 157.5 cm (5' 2\") Weight: 60.8 kg (134 lb 0.6 oz)  Estimated body mass index is 24.52 kg/m  as calculated from the following:    Height as of this encounter: 1.575 m (5' 2\").    Weight as of this encounter: 60.8 kg (134 lb 0.6 oz).      Hemoglobin   Date Value Ref Range Status   11/05/2023 8.8 (L) 11.7 - 15.7 g/dL Final   06/08/2017 12.6 11.7 - 15.7 g/dL Final     INR   Date Value Ref Range Status   11/03/2023 1.02 0.85 - 1.15 Final         A/P:  1. Pain-controlled with Tylenol, oxycodone 2.5 to 5 mg, Ativan given x1.  I did hold baclofen with the other medications I felt it could cause confusion.  Avoid Dilaudid IV.  Oxycodone changed to 2.5-5 instead of 10-5 yesterday.  2. DVT Prophylaxis-Asprin 325mg BID x 6 weeks   3. Weight Bearing Status-25% partial weightbearing of left lower extremity x6 weeks per Dr. Franco  4. Hip Precautions-None  5. Physical Therapy-recommended TCU.  Assist of 2 with transfers  6. Occupational Therapy-to see patient have possible  7. Case " Management-discussed with social work team possible [facility] Monday if they have access and availability.  Social work team sent request to [facility] as well as [facility] and [facility]CU which are pending.  8. Hospitalist-WHITNEY Harper primary. Thank you.  Anticipates discharge today 11/6/2023.  Secure message sent.  9. Incision-no signs or symptoms of infection  10.  Acute blood loss anemia-12.2 prior to surgery 8.8 yesterday.  Patient is asymptomatic and not tachycardic.  Awaiting hemoglobin this morning.  10. Plan-anticipate discharge to [facility] today as long as hospitalist clears the patient.  I did put in orthopedic discharge orders for the patient for TCU yesterday.  I held on Ortho prescriptions as [facility] needs them specifically sent [facility].  Discussed plan with RN.  Teams message sent to social work team.  Follow-up with Dr. Franco in 2 weeks for x-ray.  Also discussed with daughter Nicholas today.    Bishnu Gordon PA-C  11/6/2023

## 2023-11-06 NOTE — ANESTHESIA POSTPROCEDURE EVALUATION
Patient: Kylah Britt    Procedure: Procedure(s):  INTERNAL FIXATION, FRACTURE, INTERTROCHANTERIC, HIP, USING GAMMA RODS       Anesthesia Type:  General    Note:  Disposition: Inpatient   Postop Pain Control: Uneventful            Sign Out: Well controlled pain   PONV: No   Neuro/Psych: Uneventful            Sign Out: Acceptable/Baseline neuro status   Airway/Respiratory: Uneventful            Sign Out: Acceptable/Baseline resp. status   CV/Hemodynamics: Uneventful            Sign Out: Acceptable CV status   Other NRE: NONE   DID A NON-ROUTINE EVENT OCCUR? No    Event details/Postop Comments:  Pt was happy with anesthesia care.  No complications.  I will follow up with the pt if needed.         Last vitals:  Vitals Value Taken Time   /60 11/04/23 1230   Temp 98.5  F (36.9  C) 11/04/23 1230   Pulse 81 11/04/23 1230   Resp 15 11/04/23 1230   SpO2 92 % 11/04/23 1230   Vitals shown include unfiled device data.    Electronically Signed By: ESPERANZA Carrero CRNA  November 5, 2023  7:15 PM

## 2023-11-06 NOTE — PROGRESS NOTES
S-(situation): Patient discharged to [facility] TCU via wheelchair with transport from [facility].    B-(background): Pt admitted to hospital post fall with left leg fracture. Surgery 11/4/23.    A-(assessment): Pt is A&Ox2. hx of dementia. VSS on RA. Pt is taking PRN oxycodone 2.5 mg and scheduled tylenol for pain managment. Along with reposition and ice. CMS intact. Pt is able to make needs known and is using call light. Pt is up with 2A, gb and jesús stedy. Pt is 25% weight bearing on left leg and does need reminders to put weight mostly on right leg. Pt has been up to chair for all meals. Pt is eating a regular diet and is tolerating eating 100% of lunch. Pt denies nausea. Last bowel movement was overnight. Dressing to left leg is CDI. CMS is intact. Nurse to nurse report given to Nicolasa at [facility]. Pt states feeling comfortable and ready to go to TCU today.   Last bowel movement: 11/4/23     R-(recommendations):Report called to Nicolasa at [facility]. Listed belongings gathered and sent with patient.     Discharge Nursing Criteria:     Care Plan and Patient education resolved: Yes    Vaccines  Influenza status verified at discharge:  Yes pt had vaccine 9/20/23      [facility]  Home medications returned to patient: NA  Medication Bin checked and emptied on discharge Yes  All paperwork sent with patient/Copy of AVS given to patient or family Yes.

## 2023-11-06 NOTE — PROGRESS NOTES
Patient complained of needing to have a bowel movement but has been unsuccessful on commode anytime we have put her on the commode. Has had prune juice and milk of magnesia.     Also voiding frequency, did a pre void bladder scan for approximately 450 ml, voided 300 ml then post void residual bladder scan was around 237 ml. No new orders obtained.    Gave oxycodone 5 mg earlier this morning and was felt to be having hallucinations so obtained next pain medication dose amount of 2.5 mg, which we did give her, but then patient was in even more pain the next time it could be given, so did go back to the 5 mg amount the third time she was medicated for pain. Is receiving tylenol as well.     Provider may want to consider trying Ultram or an alternative pain medication.

## 2023-11-06 NOTE — PLAN OF CARE
Goal Outcome Evaluation:      Plan of Care Reviewed With: patient, caregiver, family    Overall Patient Progress: improvingOverall Patient Progress: improving    Outcome Evaluation: Pt is A&Ox2. hx of dementia. VSS on RA. Pt is taking PRN oxycodone 2.5 mg and scheduled tylenol for pain managment. Along with reposition and ice. CMS intact. Pt is able to make needs known and is using call light. Pt is up with 2A, gb and jesús mcclellan. Pt is 25% weight bearing on left leg and does need reminders to put weight mostly on right leg. Pt has been up to chair for all meals. Pt is eating a regular diet and is tolerating eating 100% of lunch. Pt denies nausea. Last bowel movement was overnight. Dressing to left leg is CDI. CMS is intact. Nurse to nurse report given to Nicolasa at [facility]. Pt states feeling comfortable and ready to go to TCU today.

## 2023-11-06 NOTE — PROGRESS NOTES
Care Management Discharge Note    Discharge Date: 11/06/2023     Discharge Disposition: Skilled Nursing Facility - [facility] (Main Phone: [phone] Admissions Phone: [phone] Fax: [phone])     Discharge Services: Transportation Services - [facility] @ 1300     Discharge DME:  None    Discharge Transportation: health plan transportation    Private pay costs discussed: transportation costs    Does the patient's insurance plan have a 3 day qualifying hospital stay waiver?  No    PAS Confirmation Code: [id number]   Patient/family educated on Medicare website which has current facility and service quality ratings: yes    Education Provided on the Discharge Plan:  yes  Persons Notified of Discharge Plans: Nicholas chavez  Patient/Family in Agreement with the Plan: yes    Handoff Referral Completed: Yes    Additional Information:  Patient medically ready for discharge today. Patient is accepted at [facility] (Main Phone: [phone] Admissions Phone: [phone] Fax: [phone]) TCU.    Updated patient and daughter, Nicholas, and they are in agreement.  Discussed transportation as patient is currently needing a jesús steady for transfers. Nicholas in agreement with van transport. Discussed private costs.    [facility] arranged for transport @ 1300. Notified daughter, Nicholas, and left a voicemail for Maria E at [facility].    BELINDA Rodrigues  [facility] [phone]/ Jose Luis [phone]  Care Management

## 2023-11-06 NOTE — DISCHARGE SUMMARY
[facility], located within [facility]  Hospitalist Discharge Summary      Date of Admission:  11/3/2023  Date of Discharge:  11/6/2023  Discharging Provider: Lilia Harper CNP  Discharge Service: Hospitalist Service    Discharge Diagnoses   Osteoporotic fracture of left hip, post internal fixation with gamma adrienne  Aspirin induced platelet function defect  Dementia  Neurodegenerative disorder with spasticity  Chronic gait abnormality with fall  Constipation  Postoperative anemia    Clinically Significant Risk Factors          Follow-ups Needed After Discharge    Follow-up with Dr. Franco team in 2 weeks and at that time we will do   x-rays AP and lateral of left entire femur and do wound check.      Follow up with retirement physician.  The following labs/tests are   recommended: Hemoglobin in 5 to 7 days.          Unresulted Labs Ordered in the Past 30 Days of this Admission       No orders found from 10/4/2023 to 11/4/2023.        These results will be followed up by NA    Discharge Disposition   Discharged to rehabilitation facility  Condition at discharge: Stable    Hospital Course   Kylah Britt is a 73 year old female with osteopenia, chronic gait abnormality for which she uses a walker during ambulation, and mild dementia living in assisted living facility ([facility]) who presented on 11/3/2023 with left hip pain after a fall while she was ambulating using her walker.  Clinical presentation and radiographic findings are suspicious for osteoporotic fracture of the left hip for which operative repair has been advised by orthopedic surgery.  Patient underwent surgery, internal fixation of the intertrochanteric fracture, November 4.      Osteoporotic fracture of left hip is post internal fixation with gamma rods  Known osteopenia based on previous DEXA scan in March 2022 and now presenting with left hip fracture after a fall.  This is suspicious for osteoporotic fracture of left hip.  Orthopedics  has advised operative repair which was completed 11/4/23.  Chronic osteopenia is treated with vitamin D and calcium supplementation.  Due to dementia patient has difficulty maintaining 25% weightbearing on her left lower extremity, Ortho is aware.  Nursing staff noted some hallucinations after taking oxycodone 5 mg, so dose decreased to 2.5mg  Plan:    Pain-controlled with Tylenol, oxycodone 2.5   2.  . DVT Prophylaxis-Asprin 325mg BID x 6 weeks   3. Weight Bearing Status-25% partial weightbearing of left lower extremity x6 weeks per Dr. Franco  4. Hip Precautions-Anterior Lateral Hip Precautions x 6 weeks.  5.  Continue chronic vitamin D and calcium supplementation      Aspirin induced platelet function defect  Takes aspirin daily which causes platelet function defect that increases her bleeding risk.  She is not anemic at admission.  Aspirin was held preoperatively  Plan:  -Aspirin for VTE prophylaxis as ordered by Ortho    Dementia  Chronic mild dementia residing at [facility].  Not taking any medication specific for dementia.  She will be at risk for superimposed delirium during hospitalization.  Plan:   -Continue chronic medication for insomnia including trazodone  -Monitor closely for evolving delirium and treat accordingly if she develops delirium    Neurodegenerative disorder with spasticity  Per family patient has had chronic spasticity of both lower and upper extremities for which she usually takes baclofen for.  She has been seen by neurology in the past but has been unable to get a clear diagnosis.  Currently nurse practitioner provider at the [facility] has been managing.  Home dose of baclofen is 5 mg in the morning and 10 mg in the afternoon and at at bedtime.  This was not reordered during hospital stay  Plan:  Will resume home dose of baclofen on discharge.  Could consider decreasing dose or holding should patient have increased confusion    Chronic gait abnormality with  fall  Has chronic gait abnormality for which she normally uses a walker.  She fell today which is the first time she is fallen in over a year although she does have previous history of frequent falls.  Plan  -PT and OT to eval and treat    Constipation  Chronically taking senna for treatment of constipation and will be at risk for worsening constipation due to combination of decreased activity level, decreased oral intake, and use of opiates.  Plan:  -Senna scheduled twice daily and available as needed  -Monitor bowels and adjust dose of patient's as needed    Postoperative anemia, expected  Hemoglobin today 8.2, this is down from a baseline of 12.3 on admission.   Patient continues to be hemodynamically stable.  Plan:  Recommend follow-up hemoglobin in 5 to 7 days    Consultations This Hospital Stay   ORTHOPEDIC SURGERY IP CONSULT  SOCIAL WORK IP CONSULT  PHYSICAL THERAPY ADULT IP CONSULT  OCCUPATIONAL THERAPY ADULT IP CONSULT  CARE MANAGEMENT / SOCIAL WORK IP CONSULT  PHYSICAL THERAPY ADULT IP CONSULT  OCCUPATIONAL THERAPY ADULT IP CONSULT    Code Status   No CPR- Do NOT Intubate    Time Spent on this Encounter   I, Lilia Harper CNP, personally saw the patient today and spent greater than 30 minutes discharging this patient.       Lilia Harper CNP  [address] MEDICAL SURGICAL  [address] [place] [address]  Phone: [phone]  ______________________________________________________________________    Physical Exam   Vital Signs: Temp: 97.7  F (36.5  C) Temp src: Oral BP: 114/52 Pulse: 82   Resp: 15 SpO2: 94 % O2 Device: None (Room air)    Weight: 134 lbs .63 oz    General Appearance:  Lying in bed, alert and oriented to self.  No acute distress  Respiratory: Respiratory effort easy at rest.  No cough.  Lung sounds clear  Cardiovascular: Regular rate and rhythm, no murmur  GI: Abdomen nondistended, soft.  Bowel sounds active  Skin: Skin grossly intact, did not visualize left hip incision  "dressing on       Primary Care Physician   Gina Mejia    Discharge Orders      Shower with wound/dressing NOT covered    You do not need to cover your dressing or incision in the shower, you may allow water and soap to run over top of the surgical dressing or incision. You may shower 0 days after surgery.  You are strictly prohibited from soaking or submerging the surgical wound underwater.     Reason for your hospital stay    Left hip intertrochanteric fracture s/p left hip open reduction internal fixation with long gamma nail by Dr. Franco     When to call - Contact Surgeon Team    You may experience symptoms that require follow-up before your scheduled appointment. Refer to the \"Stoplight Tool\" for instructions on when to contact your Surgeon Team if you are concerned about pain control, blood clots, constipation, or if you are unable to urinate.     When to call - Reach out to Urgent Care    If you are not able to reach your Surgeon Team and you need immediate care, go to the Orthopedic Walk-in Clinic or Urgent Care at your Surgeon's office.  Do NOT go to the Emergency Room unless you have shortness of breath, chest pain, or other signs of a medical emergency.     When to call - Reasons to Call 911    Call 911 immediately if you experience sudden-onset chest pain, arm weakness/numbness, slurred speech, or shortness of breath     Symptoms - Fever Management    A low grade fever can be expected after surgery.  Use acetaminophen (TYLENOL) as needed for fever management.  Contact your Surgeon Team if you have a fever greater than 101.5 F, chills, and/or night sweats.     Symptoms - Constipation management    Constipation (hard, dry bowel movements) is expected after surgery due to the combination of being less active, the anesthetic, and the opioid pain medication.  You can do the following to help reduce constipation:  ~  FLUIDS:  Drink clear liquids (water or Gatorade), or juice (apple/prune).  ~  " DIET:  Eat a fiber rich diet.    ~  ACTIVITY:  Get up and move around several times a day.  Increase your activity as you are able.  MEDICATIONS:  Reduce the risk of constipation by starting medications before you are constipated.  You can take Miralax   (1 packet as directed) and/or a stool softener (Senokot 1-2 tablets 1-2 times a day).  If you already have constipation and these medications are not working, you can get magnesium citrate and use as directed.  If you continue to have constipation you can try an over the counter suppository or enema.  Call your Surgeon Team if it has been greater than 3 days since your last bowel movement.     Symptoms - Reduced Urine Output    Changes in the amount of fluids you drank before and after surgery may result in problems urinating.  It is important to stay well-hydrated after surgery and drink plenty of water. If it has been greater than 8 hours since you have urinated despite drinking plenty of water, call your Surgeon Team.     Medication instructions -  Anticoagulation - aspirin    Take the aspirin as prescribed twice per day for a total of 6 weeks after surgery.  This is given to help minimize your risk of blood clot.     Activity - Exercises to prevent blood clots    Unless otherwise directed by your Surgeon team, perform the following exercises at least three times per day for the first four weeks after surgery to prevent blood clots in your legs: 1) Point and flex your feet (Ankle Pumps), 2) Move your ankle around in big circles, 3) Wiggle your toes, 4) Walk, even for short distances, several times a day, will help decrease the risk of blood clots.     Comfort and Pain Management - Pain after Surgery    Pain after surgery is normal and expected.  You will have some amount of pain for several weeks after surgery.  Your pain will improve with time.  There are several things you can do to help reduce your pain including: rest, ice, elevation, and using pain  "medications as needed. Contact your Surgeon Team if you have pain that persists or worsens after surgery despite rest, ice, elevation, and taking your medication(s) as prescribed. Contact your Surgeon Team if you have new numbness, tingling, or weakness in your operative extremity.     Comfort and Pain Management - Swelling after Surgery    Swelling and/or bruising of the surgical extremity is common and may persist for several months after surgery. In addition to frequent icing and elevation, gentle compressive support with an ACE wrap or tubigrip may help with swelling. Apply compression regularly, removing at least twice daily to perform skin checks. Contact your Surgeon Team if your swelling increases and is NOT associated with an increase in your activity level, or if your swelling increases and is associated with redness and pain.     Comfort and Pain Management - LOWER Extremity Elevation    Swelling is expected for several months after surgery. This type of swelling is usually associated with gravity and activity, and can be improved with elevation.   The best way to do this is to get your \"toes above your nose\" by laying down and placing several pillows lengthwise under your calf and heel. When elevating your leg keep your knee completely straight. Perform this elevation as often as possible especially for the first two weeks after surgery.     Comfort and Pain Management - Cold therapy    Ice can be used to control swelling and discomfort after surgery. Place a thin towel over your operative site and apply the ice pack overtop. Leave ice pack in place for 20 minutes, then remove for 20 minutes. Repeat this 20 minutes on/20 minutes off routine as often as tolerated.     Medication Instructions - Acetaminophen (TYLENOL) Instructions    You were discharged with acetaminophen (TYLENOL) for pain management after surgery. Acetaminophen most effectively manages pain symptoms when it is taken on a schedule without " missing doses (every four, six, or eight hours). Your Provider will prescribe a safe daily dose between 3000 - 4000 mg.  Do NOT exceed this daily dose. Most patients use acetaminophen for pain control for the first four weeks after surgery.  You can wean from this medication as your pain decreases.     Medication Instructions - NSAID Instructions    You were discharged with an anti-inflammatory medication for pain management to use in combination with acetaminophen (TYLENOL) and the narcotic pain medication.  Take this medication exactly as directed.  You should only take one anti-inflammatory at a time.  Some common anti-inflammatories include: ibuprofen (ADVIL, MOTRIN), naproxen (ALEVE, NAPROSYN), celecoxib (CELEBREX), meloxicam (MOBIC), ketorolac (TORADOL).  Take this medication with food and water.     Medication Instructions - Opioid Instructions (Greater than or equal to 65 years)    You were discharged with an opioid medication (hydromorphone, oxycodone, hydrocodone, or tramadol). This medication should only be taken for breakthrough pain that is not controlled with acetaminophen (TYLENOL). If you rate your pain less than 3 you do not need this medication.  Pain rating 0-3:  You do not need this medication  Pain rating 4-6:  Take 1/2 tablet every 4-6 hours as needed  Pain rating 7-10:  Take 1 tablet every 4-6 hours as needed  Do not exceed 4 tablets per day     Medication Instructions - Opioids - Tapering Instructions    In the first three days following surgery, your symptoms may warrant use of the narcotic pain medication every four to six hours as prescribed. This is normal. As your pain symptoms improve, focus your efforts on decreasing (tapering) use of narcotic medications. The most successful tapering strategy is to first, decrease the number of tablets you take every 4-6 hours to the minimum prescribed. Then, increase the amount of time between doses.  For example:  First, taper to   or 1 tablet every  4-6 hours.  Then, taper to   or 1 tablet every 6-8 hours.  Then, taper to   or 1 tablet every 8-10 hours.  Then, taper to   or 1 tablet every 10-12 hours.  Then, taper to   or 1 tablet at bedtime.  The bedtime dose can help with comfort during sleep and is typically the last dose to be discontinued after surgery.     Follow Up Care    Follow-up with Dr. Franco team in 2 weeks and at that time we will do x-rays AP and lateral of left entire femur and do wound check.     General info for SNF    Length of Stay Estimate: Short Term Care: Estimated # of Days <30  Condition at Discharge: Improving  Level of care:skilled   Rehabilitation Potential: Good  Admission H&P remains valid and up-to-date: Yes  Recent Chemotherapy: N/A  Use Nursing Home Standing Orders: Yes     Mantoux instructions    Give two-step Mantoux (PPD) Per Facility Policy Yes     Follow Up and recommended labs and tests    Follow up with group home physician.  The following labs/tests are recommended: Hemoglobin in 5 to 7 days.     Reason for your hospital stay    You were admitted to the hospital after a hip fracture.  This was surgically repaired.     Physical Therapy Adult Consult    Evaluate and treat as clinically indicated.    Reason: Status Post Hip Surgery     Occupational Therapy Adult Consult    Evaluate and treat as clinically indicated.    Reason: Status Post Hip Surgery     Diet    Follow this diet upon discharge: Regular       Significant Results and Procedures   Most Recent 3 CBC's:  Recent Labs   Lab Test 11/06/23 0625 11/05/23 0617 11/04/23  1149 11/04/23  0541 11/03/23  1158 07/10/23  0640 07/18/22  0730   WBC  --   --   --   --  8.2 4.5 6.3   HGB 8.2* 8.8* 9.9*   < > 12.2 13.0 13.0   MCV  --   --   --   --  89 88 92   PLT  --   --   --   --  362 326 410    < > = values in this interval not displayed.     Most Recent 3 BMP's:  Recent Labs   Lab Test 11/06/23 0625 11/05/23 0617 11/03/23  1158 07/10/23  0640 07/10/23  0640  07/18/22  0730   NA  --   --  139  --  139 139   POTASSIUM  --   --  3.9  --  4.4 4.8   CHLORIDE  --   --  104  --  105 107   CO2  --   --  24  --  25 28   BUN  --   --  19.6  --  15.0 19   CR  --   --  0.72  --  0.76 0.70   ANIONGAP  --   --  11  --  9 4   NARA  --   --  9.6  --  9.5 9.2   * 135* 138*   < > 106* 95    < > = values in this interval not displayed.   ,   Results for orders placed or performed during the hospital encounter of 11/03/23   XR Pelvis w Hip Left 1 View    Narrative    EXAM: XR PELVIS AND HIP LEFT 1 VIEW  DATE/TIME: 11/3/2023 12:22 PM    INDICATION: fall; pain  COMPARISON: None available.       Impression    IMPRESSION: Acute, comminuted, displaced fracture of the left femoral  neck and subtrochanteric extension.    Mild degenerative arthritis both hips. Mild degenerative changes both  SI joints. Lower lumbar facet arthropathy.    NOTE: ABNORMAL REPORT    THE DICTATION ABOVE DESCRIBES AN ABNORMAL REPORT FOR WHICH FOLLOW-UP  IS NEEDED.    NICOLE BURROWS DO         SYSTEM ID:  [id number]   XR Surgery JOSHUA L/T 5 Min Fluoro w Stills    Narrative    EXAM: XR SURGERY JOSHUA FLUORO LESS THAN 5 MIN W STILLS  LOCATION: [facility]  DATE: 11/4/2023    INDICATION: gamma rods  COMPARISON: Same date hip radiograph    TECHNIQUE: Intraoperative fluoroscopy performed during the patient's procedure.    FLUOROSCOPIC TIME: 2.2  NUMBER OF IMAGES: 4    FINDINGS: Interval placement of intramedullary adrienne and dynamic screw fixation hardware across known comminuted left femoral neck fracture. Significant improvement in alignment. No evidence of immediate complication.       Discharge Medications   Current Discharge Medication List        START taking these medications    Details   oxyCODONE (ROXICODONE) 5 MG tablet Take 0.5 tablets (2.5 mg) by mouth every 4 hours as needed for moderate pain  Qty: 26 tablet, Refills: 0    Associated Diagnoses: Closed fracture of left hip, initial  encounter (H)           CONTINUE these medications which have CHANGED    Details   !! acetaminophen (TYLENOL) 325 MG tablet Take 2 tablets (650 mg) by mouth every 4 hours as needed for other (For optimal non-opioid multimodal pain management to improve pain control.)  Qty: 100 tablet, Refills: 0    Associated Diagnoses: Closed fracture of left hip, initial encounter (H)      !! acetaminophen (TYLENOL) 500 MG tablet Take 2 tablets (1,000 mg) by mouth 3 times daily as needed for mild pain    Comments: Restart after 650 mg Tylenol every 4 hours is done.  Associated Diagnoses: Pain of right thigh      aspirin (ASA) 325 MG EC tablet Take 1 tablet (325 mg) by mouth 2 times daily for 41 days  Qty: 82 tablet, Refills: 0    Associated Diagnoses: Closed fracture of left hip, initial encounter (H)      !! senna-docusate (SENOKOT-S/PERICOLACE) 8.6-50 MG tablet Take 1 tablet by mouth 2 times daily  Qty: 60 tablet, Refills: 1    Associated Diagnoses: Closed fracture of left hip, initial encounter (H)      !! senna-docusate (SENOKOT-S/PERICOLACE) 8.6-50 MG tablet Take 1 tablet by mouth daily as needed for constipation Also takes 1 daily scheduled    Comments: Hold until Senokot twice a day is done and she is done taking narcotics regularly.      !! senna-docusate (SENOKOT-S/PERICOLACE) 8.6-50 MG tablet Take 1 tablet by mouth daily And daily PRN    Comments: Hold until Senokot twice a day is done and she is done taking narcotics regularly.       !! - Potential duplicate medications found. Please discuss with provider.        CONTINUE these medications which have NOT CHANGED    Details   !! baclofen (LIORESAL) 10 MG tablet Take 10 mg by mouth 2 times daily 3pm and 9pm, takes 5mg every morning      !! Baclofen (LIORESAL) 5 MG tablet Take 5 mg by mouth every morning And 10mg twice daily      Calcium Carbonate (CALCIUM 600 PO) Take 1 tablet by mouth every other day      gabapentin (NEURONTIN) 100 MG capsule Take 2 capsules (200 mg) by  mouth At Bedtime  Qty: 42 capsule, Refills: 1    Associated Diagnoses: Neurodegenerative disorder (H24)      melatonin 3 MG tablet Take 3 tablets (9 mg) by mouth At Bedtime    Associated Diagnoses: Adjustment insomnia      traZODone (DESYREL) 50 MG tablet Take 1 tablet (50 mg) by mouth At Bedtime  Qty: 31 tablet, Refills: 11    Associated Diagnoses: Adjustment insomnia      trolamine salicylate (ASPERCREME) 10 % external cream Apply topically 3 times daily APPLY TOPICALLY BEHIND RIGHT KNEE TO HIP THREE TIMES DAILY. OK TO LEAVE AT BEDSIDE. DX PAIN.  Qty: 141 g, Refills: 11    Associated Diagnoses: Pain      vitamin D3 (CHOLECALCIFEROL) 50 mcg (2000 units) tablet Take 50 mcg by mouth every other day       !! - Potential duplicate medications found. Please discuss with provider.        Allergies   Allergies   Allergen Reactions    No Known Drug Allergy

## 2023-11-06 NOTE — PLAN OF CARE
Physical Therapy Discharge Summary    Reason for therapy discharge:    Discharged to transitional care facility.    Progress towards therapy goal(s). See goals on Care Plan in [facility] electronic health record for goal details.  Goals partially met.  Barriers to achieving goals:   discharge from facility.    Therapy recommendation(s):    Continued therapy is recommended.  Rationale/Recommendations:  Patient would benefit from continued PT intervention at the TCU to address post op mobility limitations and activity progression.      Thank you for your referral.  Anu Chua, PT, DPT, ATC, LAT    [facility]  O: [phone]  E: [email or web address]

## 2023-11-06 NOTE — PLAN OF CARE
Occupational Therapy: Orders received. Chart reviewed and discussed with care team.? Occupational Therapy not indicated due to inpatient OT consult will not change/alter discharge disposition as patient is set for discharge to TCU today.? Defer discharge recommendations to care team.? Will complete orders.      Thank you for your referral.  Alissa Farias OTR/MIRNA     [facility]  O: [phone]  E: [email or web address]

## 2023-11-06 NOTE — PLAN OF CARE
Goal Outcome Evaluation:      Plan of Care Reviewed With: patient    Overall Patient Progress: improvingOverall Patient Progress: improving    Outcome Evaluation: Pt alert, confused. Pt restless at times, pulling hair and moving around in bed. Ativan given for anxiety. Pt has been able to sleep intermittently, approximately 5-6 hours total. LLE dressings CDI, ice applied. Pt had BM's x3. Purewick utilized to promote rest. Pt transferring to bedside commode with assist x2 and jesús steady. Scheduled tylenol and oxy x1 for pain. Good urine output, bladder scan post void with 277cc residual.

## 2023-11-07 ENCOUNTER — DOCUMENTATION ONLY (OUTPATIENT)
Dept: OTHER | Facility: CLINIC | Age: 73
End: 2023-11-07

## 2023-11-07 ENCOUNTER — TELEPHONE (OUTPATIENT)
Dept: ORTHOPEDICS | Facility: CLINIC | Age: 73
End: 2023-11-07

## 2023-11-07 ENCOUNTER — TRANSITIONAL CARE UNIT VISIT (OUTPATIENT)
Dept: GERIATRICS | Facility: CLINIC | Age: 73
End: 2023-11-07
Payer: COMMERCIAL

## 2023-11-07 DIAGNOSIS — G47.9 SLEEP DISORDER: ICD-10-CM

## 2023-11-07 DIAGNOSIS — F01.A3 MILD VASCULAR DEMENTIA WITH MOOD DISTURBANCE (H): Primary | ICD-10-CM

## 2023-11-07 DIAGNOSIS — S72.002D CLOSED FRACTURE OF LEFT HIP WITH ROUTINE HEALING, SUBSEQUENT ENCOUNTER: ICD-10-CM

## 2023-11-07 DIAGNOSIS — R26.9 ABNORMAL GAIT: ICD-10-CM

## 2023-11-07 PROCEDURE — 99310 SBSQ NF CARE HIGH MDM 45: CPT | Performed by: FAMILY MEDICINE

## 2023-11-07 NOTE — TELEPHONE ENCOUNTER
[facility] Call Center    Phone Message    May a detailed message be left on voicemail: yes     Reason for Call: Appointment Intake    Patient had surgery on 11/04 and needs to schedule a post op for 2 weeks after.  Please contact Tyrell the care coordinator to schedule. Thank you.     Action Taken: Message routed to:  Other: [facility] Patient Care Specialty Pool     Travel Screening: Not Applicable

## 2023-11-07 NOTE — PROGRESS NOTES
[facility] SERVICES    Facility:   [place], [facility]  Code Status: DNR/DNI      HPI:   Kylah is a 73 year old female who was hospitalized November 3, 2023 through November 6, 2023 secondary to living in assisted living facility and did have a fall and was ambulating using her walker and was found to have a osteoporotic fracture of the left hip status post internal fixation with gamma adrienne on November 4.  She did have a DEXA scan in March 2022 and did show osteopenia did present with now a left hip fracture after a fall.  She is on both calcium and vitamin D.  For pain she was given oxycodone and at 5 mg oxycodone she did have some hallucinations so was decreased to 2.5 mg she also has Tylenol as needed.  For anticoagulation is on aspirin 325 mg twice daily and currently is 25% partial weightbearing for 6 weeks.  She does have a history of neurodegenerative disorder with spasticity currently on baclofen at 5 mg in the morning and 10 mg at 3 PM and 2100.  She did have postoperative anemia in the last hemoglobin now rechecked on November 3 was 8.2 prior to that 8.8.  She is now currently in the transitional care unit which we did talk about the roles and the goals of the transitional care unit.  She now lives in the [facility] in an assisted living facility.  She does recall growing up in [facility].  Her systolic blood pressures ranging 120-130 and no weight performed.  She does have an Aquacel to her left thigh.  Her appetite is good.  She states she has had a BM.  For pain we will go ahead and schedule the Tylenol 1000 mg 3 times daily.    Past Medical History:  Past Medical History:   Diagnosis Date    Abnormal Papanicolaou smear of cervix and cervical HPV 9/1/92    vaginitis with abnormal Pap    Depressive disorder, not elsewhere classified     depression    Female stress incontinence     urinary incontinence    Lyme disease     Osteoarthrosis, unspecified whether generalized or localized, unspecified  site      monoarticular arthritis in the right first MTP joint    Other psychological or physical stress, not elsewhere classified(V62.89)     delayed stress syndrome           Surgical History:  Past Surgical History:   Procedure Laterality Date    COLONOSCOPY N/A 4/6/2022    Procedure: COLONOSCOPY, WITH POLYPECTOMY;  Surgeon: Bishnu Tsai MD;  Location: [facility]    HC KNEE SCOPE, DIAGNOSTIC  1/31/89    Examination of right knee under anesthesia. Arthroscopy of right knee with debridement of torn anterior curciate ligament (medial meniscus not resected).    HC REMOVAL OF OVARY/TUBE(S)  2/20/90    Salpingo-Oophorectomy, bilateral    OPEN REDUCTION INTERNAL FIXATION CALCANEOUS Right 6/8/2017    Procedure: OPEN REDUCTION INTERNAL FIXATION CALCANEOUS;  Open Reduction Internal Fixation Right Calcaneous;  Surgeon: Darnell Kincaid DPM;  Location: [facility]    OPEN REDUCTION INTERNAL FIXATION HIP NAILING Left 11/4/2023    Procedure: INTERNAL FIXATION, FRACTURE, INTERTROCHANTERIC, LEFT HIP, USING GAMMA RODS;  Surgeon: Yoni Franco MD;  Location: [facility] OR    ZZC APPENDECTOMY      ZZC LIGATE FALLOPIAN TUBE,POSTPARTUM  04/12/77    Bilateral tubal cauterization    ZZC TOTAL ABDOM HYSTERECTOMY  2/20/90    Hysterectomy, Total Abdominal    ZZHC COLONOSCOPY THRU STOMA, DIAGNOSTIC  06/24/98       Family History:   Family History   Problem Relation Age of Onset    Hypertension Mother     Heart Disease Mother     Cancer Father     Cancer Son         Hodgkin's    Cancer Paternal Aunt         two with cervical cancer       Social History:    Social History     Socioeconomic History    Marital status:    Tobacco Use    Smoking status: Never    Smokeless tobacco: Never   Vaping Use    Vaping Use: Never used   Substance and Sexual Activity    Alcohol use: Yes     Comment: very rare    Drug use: No        REVIEW OF SYSTEM:  She currently denies any new symptoms of fever cough or cold sore throat postnasal drip wheezing chest  pain dizziness vertigo shortness of breath cough cold symptoms nausea vomiting diarrhea dysuria frequency urgency.  Does have a history of vascular dementia, gait instability and osteoporosis    PHYSICAL EXAM:   Pleasant female in no acute distress.  Head is normocephalic.  Conjunctiva is pink and sclera is clear.  Neck is supple without adenopathy.  Lung sounds are clear throughout.  Cardiovascular S1-S2 regular rate and rhythm and no lower extremity edema.  Gastrointestinal soft and nontender with positive bowel sounds.  Musculoskeletal does have Aquacel dressing to her left lateral thigh good CMS to her left foot.  Psychiatric: Pleasant affect.    There were no vitals filed for this visit.  November 7 blood pressure 130/70 with a pulse of 83    Medication List:  Current Outpatient Medications   Medication Sig    acetaminophen (TYLENOL) 325 MG tablet Take 2 tablets (650 mg) by mouth every 4 hours as needed for other (For optimal non-opioid multimodal pain management to improve pain control.)    acetaminophen (TYLENOL) 500 MG tablet Take 1,000 mg by mouth 3 times daily    aspirin (ASA) 325 MG EC tablet Take 1 tablet (325 mg) by mouth 2 times daily for 41 days    baclofen (LIORESAL) 10 MG tablet Take 10 mg by mouth 2 times daily 3pm and 9pm, takes 5mg every morning    Baclofen (LIORESAL) 5 MG tablet Take 5 mg by mouth every morning And 10mg twice daily    Calcium Carbonate (CALCIUM 600 PO) Take 1 tablet by mouth every other day    gabapentin (NEURONTIN) 100 MG capsule Take 2 capsules (200 mg) by mouth At Bedtime    melatonin 3 MG tablet Take 3 tablets (9 mg) by mouth At Bedtime    oxyCODONE (ROXICODONE) 5 MG tablet Take 0.5 tablets (2.5 mg) by mouth every 4 hours as needed for moderate pain    senna-docusate (SENOKOT-S/PERICOLACE) 8.6-50 MG tablet Take 1 tablet by mouth 2 times daily    senna-docusate (SENOKOT-S/PERICOLACE) 8.6-50 MG tablet Take 1 tablet by mouth daily as needed for constipation Also takes 1 daily  scheduled    senna-docusate (SENOKOT-S/PERICOLACE) 8.6-50 MG tablet Take 1 tablet by mouth daily And daily PRN    traZODone (DESYREL) 50 MG tablet Take 1 tablet (50 mg) by mouth At Bedtime    trolamine salicylate (ASPERCREME) 10 % external cream Apply topically 3 times daily APPLY TOPICALLY BEHIND RIGHT KNEE TO HIP THREE TIMES DAILY. OK TO LEAVE AT BEDSIDE. DX PAIN.    vitamin D3 (CHOLECALCIFEROL) 50 mcg (2000 units) tablet Take 50 mcg by mouth every other day     No current facility-administered medications for this visit.        MED REC REQUIRED  Post Medication Reconciliation Status: discharge medications reconciled and changed, per note/orders       Labs:  CBC RESULTS:   Recent Labs   Lab Test 11/06/23  0625 11/04/23  0541 11/03/23  1158   WBC  --   --  8.2   RBC  --   --  4.29   HGB 8.2*   < > 12.2   HCT  --   --  38.0   MCV  --   --  89   MCH  --   --  28.4   MCHC  --   --  32.1   RDW  --   --  12.9   PLT  --   --  362    < > = values in this interval not displayed.     Last Comprehensive Metabolic Panel:  Lab Results   Component Value Date     11/03/2023    POTASSIUM 3.9 11/03/2023    CHLORIDE 104 11/03/2023    CO2 24 11/03/2023    ANIONGAP 11 11/03/2023     (H) 11/06/2023    BUN 19.6 11/03/2023    CR 0.72 11/03/2023    GFRESTIMATED 87 11/03/2023    NARA 9.6 11/03/2023           Assessment:   (F01.A3) Mild vascular dementia with mood disturbance (H)  (primary encounter diagnosis)  Comment: No current issues  Plan: Continue to manage    (R26.9) Abnormal gait  Comment: We will work with therapy  Plan: She is using a walker at home    (S72.002D) Closed fracture of left hip with routine healing, subsequent encounter  Comment: Status post internal fixation with gamma adrienne on November 4  Plan: Partial weightbearing for 6 weeks    (G47.9) Sleep disorder  Comment: Currently on melatonin 9 mg and trazodone 50 mg  Plan: Continue with current medications      PLAN:    As a chance to talk about her  hospitalization as well as her work-up in the hospital and the ideas from the hospital as well as her laboratory studies, blood work, anemia and pain management.  Also discussed nutrition and she is on a regular diet does not want any extra nutrient supplements.  We will go ahead and schedule Tylenol 1000 mg 3 times daily recheck the hemoglobin next week on November 13.  She otherwise was delightful.  Staff will keep me updated for any new changes or concerns.    For documentation purposes total visit 45 minutes to which over 50% initially spent with the patient going over her hospitalization, establishing a relationship, going over her hospital records, and where her medications and laboratory studies as well as discussion of nutrition pain management and therapy in the remainder the time spent talking with staff as well as reviewing hospital records.      Electronically signed by: Bishnu Cadena NP

## 2023-11-07 NOTE — PROGRESS NOTES
Per post op note on 11/04/23: Due to the comminuted four-part fracture, we will keep her to 25% weightbearing for 6 weeks.

## 2023-11-07 NOTE — TELEPHONE ENCOUNTER
I have her an appointment for her on 11/17/2023 at 1:00pm with Bishnu Gordon PA-C.  Giuliana is notified via phone    Tre/WILLIAM

## 2023-11-08 VITALS
TEMPERATURE: 97.7 F | BODY MASS INDEX: 23.34 KG/M2 | RESPIRATION RATE: 16 BRPM | OXYGEN SATURATION: 95 % | HEIGHT: 63 IN | DIASTOLIC BLOOD PRESSURE: 91 MMHG | SYSTOLIC BLOOD PRESSURE: 114 MMHG | HEART RATE: 79 BPM | WEIGHT: 131.7 LBS

## 2023-11-09 ENCOUNTER — TRANSITIONAL CARE UNIT VISIT (OUTPATIENT)
Dept: GERIATRICS | Facility: CLINIC | Age: 73
End: 2023-11-09
Payer: COMMERCIAL

## 2023-11-09 DIAGNOSIS — R26.9 ABNORMAL GAIT: ICD-10-CM

## 2023-11-09 DIAGNOSIS — G47.9 SLEEP DISORDER: ICD-10-CM

## 2023-11-09 DIAGNOSIS — F01.A3 MILD VASCULAR DEMENTIA WITH MOOD DISTURBANCE (H): ICD-10-CM

## 2023-11-09 DIAGNOSIS — S72.002D CLOSED FRACTURE OF LEFT HIP WITH ROUTINE HEALING, SUBSEQUENT ENCOUNTER: Primary | ICD-10-CM

## 2023-11-09 PROCEDURE — 99309 SBSQ NF CARE MODERATE MDM 30: CPT | Performed by: FAMILY MEDICINE

## 2023-11-09 NOTE — PROGRESS NOTES
[facility] GERIATRIC SERVICES    Facility:   [facility] AND REHAB [facility] ([facility]) [[id number]]   Code Status: DNR/DNI      CHIEF COMPLAINT/REASON FOR VISIT:  Chief Complaint   Patient presents with    RECHECK       HISTORY:      HPI: Kylah is a 73 year old female who I had the pleasure to revisit with once again today not only secondary to her hospitalization November 3, 2023 through November 6, 2023 secondary to a fall while ambulating we did sustain osteoporotic fracture of the left hip status post internal fixation with gamma adrienne as well as today's discussion of her rehabilitation, medications, blood pressure and pain management.  Her systolic blood pressures less than 130 she has been afebrile and also on room air with a weight of 131 pounds.  She is 25% weightbearing.  For pain is on Aspercreme 3 times a day Tylenol 1000 mg 3 times daily also oxycodone as needed to which she takes about 3 doses per day.  Her follow-up visit with orthopedics is on November 17.  Recheck her hemoglobin next again on November 13 last hemoglobin was 8.2 on November 6.  For sleep is on trazodone 50 mg and for anticoagulation aspirin 325 mg twice daily until December 9.    Past Medical History:   Diagnosis Date    Abnormal Papanicolaou smear of cervix and cervical HPV 9/1/92    vaginitis with abnormal Pap    Depressive disorder, not elsewhere classified     depression    Female stress incontinence     urinary incontinence    Lyme disease     Osteoarthrosis, unspecified whether generalized or localized, unspecified site      monoarticular arthritis in the right first MTP joint    Other psychological or physical stress, not elsewhere classified(V62.89)     delayed stress syndrome            Family History   Problem Relation Age of Onset    Hypertension Mother     Heart Disease Mother     Cancer Father     Cancer Son         Hodgkin's    Cancer Paternal Aunt         two with cervical cancer      Social History     Socioeconomic History     Marital status:    Tobacco Use    Smoking status: Never    Smokeless tobacco: Never   Vaping Use    Vaping Use: Never used   Substance and Sexual Activity    Alcohol use: Yes     Comment: very rare    Drug use: No      No new changes to the review of systems or the physical exam since her last visit on November 7  REVIEW OF SYSTEM:  She currently denies any new symptoms of fever cough or cold sore throat postnasal drip wheezing chest pain dizziness vertigo shortness of breath cough cold symptoms nausea vomiting diarrhea dysuria frequency urgency.  Does have a history of vascular dementia, gait instability and osteoporosis     PHYSICAL EXAM:   Pleasant female in no acute distress.  Head is normocephalic.    Neck is supple without adenopathy.  Lung sounds are clear throughout.  Cardiovascular S1-S2 regular rate and rhythm and no lower extremity edema.  Gastrointestinal soft and nontender with positive bowel sounds.  Musculoskeletal does have Aquacel dressing to her left lateral thigh good CMS to her left foot.  Psychiatric: Pleasant affect.        Current Outpatient Medications:     acetaminophen (TYLENOL) 500 MG tablet, Take 1,000 mg by mouth 3 times daily, Disp: , Rfl:     aspirin (ASA) 325 MG EC tablet, Take 1 tablet (325 mg) by mouth 2 times daily for 41 days, Disp: 82 tablet, Rfl: 0    baclofen (LIORESAL) 10 MG tablet, Take 10 mg by mouth 2 times daily 3pm and 9pm, takes 5mg every morning, Disp: , Rfl:     Baclofen (LIORESAL) 5 MG tablet, Take 5 mg by mouth every morning And 10mg twice daily, Disp: , Rfl:     Calcium Carbonate (CALCIUM 600 PO), Take 1 tablet by mouth every other day, Disp: , Rfl:     gabapentin (NEURONTIN) 100 MG capsule, Take 2 capsules (200 mg) by mouth At Bedtime, Disp: 42 capsule, Rfl: 1    melatonin 3 MG tablet, Take 3 tablets (9 mg) by mouth At Bedtime, Disp: , Rfl:     oxyCODONE (ROXICODONE) 5 MG tablet, Take 0.5 tablets (2.5 mg) by mouth every 4 hours as needed for moderate  "pain, Disp: 26 tablet, Rfl: 0    senna-docusate (SENOKOT-S/PERICOLACE) 8.6-50 MG tablet, Take 1 tablet by mouth 2 times daily, Disp: 60 tablet, Rfl: 1    senna-docusate (SENOKOT-S/PERICOLACE) 8.6-50 MG tablet, Take 1 tablet by mouth daily as needed for constipation Also takes 1 daily scheduled, Disp: , Rfl:     senna-docusate (SENOKOT-S/PERICOLACE) 8.6-50 MG tablet, Take 1 tablet by mouth daily And daily PRN, Disp: , Rfl:     traZODone (DESYREL) 50 MG tablet, Take 1 tablet (50 mg) by mouth At Bedtime, Disp: 31 tablet, Rfl: 11    trolamine salicylate (ASPERCREME) 10 % external cream, Apply topically 3 times daily APPLY TOPICALLY BEHIND RIGHT KNEE TO HIP THREE TIMES DAILY. OK TO LEAVE AT BEDSIDE. DX PAIN., Disp: 141 g, Rfl: 11    vitamin D3 (CHOLECALCIFEROL) 50 mcg (2000 units) tablet, Take 50 mcg by mouth every other day, Disp: , Rfl:     BP (!) 114/91   Pulse 79   Temp 97.7  F (36.5  C)   Resp 16   Ht 1.6 m (5' 3\")   Wt 59.7 kg (131 lb 11.2 oz)   SpO2 95%   BMI 23.33 kg/m      LABS:   Last Comprehensive Metabolic Panel:  Lab Results   Component Value Date     11/03/2023    POTASSIUM 3.9 11/03/2023    CHLORIDE 104 11/03/2023    CO2 24 11/03/2023    ANIONGAP 11 11/03/2023     (H) 11/06/2023    BUN 19.6 11/03/2023    CR 0.72 11/03/2023    GFRESTIMATED 87 11/03/2023    NARA 9.6 11/03/2023       CBC RESULTS:   Recent Labs   Lab Test 11/06/23  0625 11/04/23  0541 11/03/23  1158   WBC  --   --  8.2   RBC  --   --  4.29   HGB 8.2*   < > 12.2   HCT  --   --  38.0   MCV  --   --  89   MCH  --   --  28.4   MCHC  --   --  32.1   RDW  --   --  12.9   PLT  --   --  362    < > = values in this interval not displayed.           ASSESSMENT:    Encounter Diagnoses   Name Primary?    Closed fracture of left hip with routine healing, subsequent encounter Yes    Abnormal gait     Sleep disorder     Mild vascular dementia with mood disturbance (H)        PLAN:    Continue with current pain management with aspirin " Tylenol and oxycodone as needed.  She seems to be very stable also rechecking hemoglobin next again on November 13.  Otherwise she seems to be in good spirits overall.  We did talk about her current concerns as well as her medications.  She did not have any other questions.        Electronically signed by: Bishnu Cadena NP

## 2023-11-09 NOTE — LETTER
11/9/2023        RE: Kylah Britt  [id number]  [facility] [id number] [facility]    Facility:   [facility] AND [facility] ([facility]) [[id number]]   Code Status: DNR/DNI      CHIEF COMPLAINT/REASON FOR VISIT:  Chief Complaint   Patient presents with     RECHECK       HISTORY:      HPI: Kylah is a 73 year old female who I had the pleasure to revisit with once again today not only secondary to her hospitalization November 3, 2023 through November 6, 2023 secondary to a fall while ambulating we did sustain osteoporotic fracture of the left hip status post internal fixation with gamma adrienne as well as today's discussion of her rehabilitation, medications, blood pressure and pain management.  Her systolic blood pressures less than 130 she has been afebrile and also on room air with a weight of 131 pounds.  She is 25% weightbearing.  For pain is on Aspercreme 3 times a day Tylenol 1000 mg 3 times daily also oxycodone as needed to which she takes about 3 doses per day.  Her follow-up visit with orthopedics is on November 17.  Recheck her hemoglobin next again on November 13 last hemoglobin was 8.2 on November 6.  For sleep is on trazodone 50 mg and for anticoagulation aspirin 325 mg twice daily until December 9.    Past Medical History:   Diagnosis Date     Abnormal Papanicolaou smear of cervix and cervical HPV 9/1/92    vaginitis with abnormal Pap     Depressive disorder, not elsewhere classified     depression     Female stress incontinence     urinary incontinence     Lyme disease      Osteoarthrosis, unspecified whether generalized or localized, unspecified site      monoarticular arthritis in the right first MTP joint     Other psychological or physical stress, not elsewhere classified(V62.89)     delayed stress syndrome            Family History   Problem Relation Age of Onset     Hypertension Mother      Heart Disease Mother      Cancer Father      Cancer Son         Hodgkin's      Cancer Paternal Aunt         two with cervical cancer      Social History     Socioeconomic History     Marital status:    Tobacco Use     Smoking status: Never     Smokeless tobacco: Never   Vaping Use     Vaping Use: Never used   Substance and Sexual Activity     Alcohol use: Yes     Comment: very rare     Drug use: No      No new changes to the review of systems or the physical exam since her last visit on November 7  REVIEW OF SYSTEM:  She currently denies any new symptoms of fever cough or cold sore throat postnasal drip wheezing chest pain dizziness vertigo shortness of breath cough cold symptoms nausea vomiting diarrhea dysuria frequency urgency.  Does have a history of vascular dementia, gait instability and osteoporosis     PHYSICAL EXAM:   Pleasant female in no acute distress.  Head is normocephalic.    Neck is supple without adenopathy.  Lung sounds are clear throughout.  Cardiovascular S1-S2 regular rate and rhythm and no lower extremity edema.  Gastrointestinal soft and nontender with positive bowel sounds.  Musculoskeletal does have Aquacel dressing to her left lateral thigh good CMS to her left foot.  Psychiatric: Pleasant affect.        Current Outpatient Medications:      acetaminophen (TYLENOL) 500 MG tablet, Take 1,000 mg by mouth 3 times daily, Disp: , Rfl:      aspirin (ASA) 325 MG EC tablet, Take 1 tablet (325 mg) by mouth 2 times daily for 41 days, Disp: 82 tablet, Rfl: 0     baclofen (LIORESAL) 10 MG tablet, Take 10 mg by mouth 2 times daily 3pm and 9pm, takes 5mg every morning, Disp: , Rfl:      Baclofen (LIORESAL) 5 MG tablet, Take 5 mg by mouth every morning And 10mg twice daily, Disp: , Rfl:      Calcium Carbonate (CALCIUM 600 PO), Take 1 tablet by mouth every other day, Disp: , Rfl:      gabapentin (NEURONTIN) 100 MG capsule, Take 2 capsules (200 mg) by mouth At Bedtime, Disp: 42 capsule, Rfl: 1     melatonin 3 MG tablet, Take 3 tablets (9 mg) by mouth At Bedtime, Disp: , Rfl:      " oxyCODONE (ROXICODONE) 5 MG tablet, Take 0.5 tablets (2.5 mg) by mouth every 4 hours as needed for moderate pain, Disp: 26 tablet, Rfl: 0     senna-docusate (SENOKOT-S/PERICOLACE) 8.6-50 MG tablet, Take 1 tablet by mouth 2 times daily, Disp: 60 tablet, Rfl: 1     senna-docusate (SENOKOT-S/PERICOLACE) 8.6-50 MG tablet, Take 1 tablet by mouth daily as needed for constipation Also takes 1 daily scheduled, Disp: , Rfl:      senna-docusate (SENOKOT-S/PERICOLACE) 8.6-50 MG tablet, Take 1 tablet by mouth daily And daily PRN, Disp: , Rfl:      traZODone (DESYREL) 50 MG tablet, Take 1 tablet (50 mg) by mouth At Bedtime, Disp: 31 tablet, Rfl: 11     trolamine salicylate (ASPERCREME) 10 % external cream, Apply topically 3 times daily APPLY TOPICALLY BEHIND RIGHT KNEE TO HIP THREE TIMES DAILY. OK TO LEAVE AT BEDSIDE. DX PAIN., Disp: 141 g, Rfl: 11     vitamin D3 (CHOLECALCIFEROL) 50 mcg (2000 units) tablet, Take 50 mcg by mouth every other day, Disp: , Rfl:     BP (!) 114/91   Pulse 79   Temp 97.7  F (36.5  C)   Resp 16   Ht 1.6 m (5' 3\")   Wt 59.7 kg (131 lb 11.2 oz)   SpO2 95%   BMI 23.33 kg/m      LABS:   Last Comprehensive Metabolic Panel:  Lab Results   Component Value Date     11/03/2023    POTASSIUM 3.9 11/03/2023    CHLORIDE 104 11/03/2023    CO2 24 11/03/2023    ANIONGAP 11 11/03/2023     (H) 11/06/2023    BUN 19.6 11/03/2023    CR 0.72 11/03/2023    GFRESTIMATED 87 11/03/2023    NARA 9.6 11/03/2023       CBC RESULTS:   Recent Labs   Lab Test 11/06/23  0625 11/04/23  0541 11/03/23  1158   WBC  --   --  8.2   RBC  --   --  4.29   HGB 8.2*   < > 12.2   HCT  --   --  38.0   MCV  --   --  89   MCH  --   --  28.4   MCHC  --   --  32.1   RDW  --   --  12.9   PLT  --   --  362    < > = values in this interval not displayed.           ASSESSMENT:    Encounter Diagnoses   Name Primary?     Closed fracture of left hip with routine healing, subsequent encounter Yes     Abnormal gait      Sleep disorder      " Mild vascular dementia with mood disturbance (H)        PLAN:    Continue with current pain management with aspirin Tylenol and oxycodone as needed.  She seems to be very stable also rechecking hemoglobin next again on November 13.  Otherwise she seems to be in good spirits overall.  We did talk about her current concerns as well as her medications.  She did not have any other questions.        Electronically signed by: Bishnu Cadena NP      Sincerely,        Bishnu Cadena NP

## 2023-11-10 ENCOUNTER — LAB REQUISITION (OUTPATIENT)
Dept: LAB | Facility: CLINIC | Age: 73
End: 2023-11-10
Payer: MEDICARE

## 2023-11-10 DIAGNOSIS — D64.9 ANEMIA, UNSPECIFIED: ICD-10-CM

## 2023-11-10 DIAGNOSIS — Z11.1 ENCOUNTER FOR SCREENING FOR RESPIRATORY TUBERCULOSIS: ICD-10-CM

## 2023-11-10 LAB — HGB BLD-MCNC: 8 G/DL (ref 11.7–15.7)

## 2023-11-10 PROCEDURE — 36415 COLL VENOUS BLD VENIPUNCTURE: CPT | Performed by: FAMILY MEDICINE

## 2023-11-10 PROCEDURE — 85018 HEMOGLOBIN: CPT | Performed by: FAMILY MEDICINE

## 2023-11-10 PROCEDURE — P9604 ONE-WAY ALLOW PRORATED TRIP: HCPCS | Performed by: FAMILY MEDICINE

## 2023-11-10 PROCEDURE — 86481 TB AG RESPONSE T-CELL SUSP: CPT | Performed by: FAMILY MEDICINE

## 2023-11-12 LAB
GAMMA INTERFERON BACKGROUND BLD IA-ACNC: 0.02 IU/ML
M TB IFN-G BLD-IMP: NEGATIVE
M TB IFN-G CD4+ BCKGRND COR BLD-ACNC: 0.63 IU/ML
MITOGEN IGNF BCKGRD COR BLD-ACNC: 0 IU/ML
MITOGEN IGNF BCKGRD COR BLD-ACNC: 0.02 IU/ML
QUANTIFERON MITOGEN: 0.65 IU/ML
QUANTIFERON NIL TUBE: 0.02 IU/ML
QUANTIFERON TB1 TUBE: 0.02 IU/ML
QUANTIFERON TB2 TUBE: 0.04

## 2023-11-13 VITALS
BODY MASS INDEX: 22.8 KG/M2 | TEMPERATURE: 97.5 F | SYSTOLIC BLOOD PRESSURE: 129 MMHG | HEART RATE: 90 BPM | WEIGHT: 128.7 LBS | DIASTOLIC BLOOD PRESSURE: 59 MMHG | RESPIRATION RATE: 18 BRPM | OXYGEN SATURATION: 97 % | HEIGHT: 63 IN

## 2023-11-14 ENCOUNTER — TRANSITIONAL CARE UNIT VISIT (OUTPATIENT)
Dept: GERIATRICS | Facility: CLINIC | Age: 73
End: 2023-11-14
Payer: COMMERCIAL

## 2023-11-14 DIAGNOSIS — S72.002D CLOSED FRACTURE OF LEFT HIP WITH ROUTINE HEALING, SUBSEQUENT ENCOUNTER: Primary | ICD-10-CM

## 2023-11-14 DIAGNOSIS — R26.9 ABNORMAL GAIT: ICD-10-CM

## 2023-11-14 DIAGNOSIS — G47.9 SLEEP DISORDER: ICD-10-CM

## 2023-11-14 DIAGNOSIS — K21.00 GASTROESOPHAGEAL REFLUX DISEASE WITH ESOPHAGITIS WITHOUT HEMORRHAGE: ICD-10-CM

## 2023-11-14 PROCEDURE — 99309 SBSQ NF CARE MODERATE MDM 30: CPT | Performed by: FAMILY MEDICINE

## 2023-11-14 NOTE — PROGRESS NOTES
[facility] GERIATRIC SERVICES    Facility:   [facility] AND REHAB [facility] ([facility]) [[id number]]   Code Status: DNR/DNI      CHIEF COMPLAINT/REASON FOR VISIT:  Chief Complaint   Patient presents with    RECHECK       HISTORY:      HPI: Kylah is a 73 year old female who does remain in the transitional care unit room 102 and who I had the opportunity to revisit with once again today not only secondary to her hospitalization November 3 through November 6, 2023 secondary to a fall while ambulating sustaining osteoporotic fracture of left hip status post internal fixation with gamma adrienne as well as today's evaluation of her chronic medical conditions.  Her systolic blood pressures ranging 120-140 she has been afebrile and also on room air.  Her weight 128 pounds in comparison to November 7 at 130 pounds.  According to nursing notes she is an assist of 2 and has been participating in therapy.  Recent hemoglobin did come back at 8.0 prior that 8.2.  A visit with orthopedic follow-up is November 17.  For pain she does have Tylenol as needed.  For sleep is on trazodone.  She otherwise states she is doing well.    Past Medical History:   Diagnosis Date    Abnormal Papanicolaou smear of cervix and cervical HPV 9/1/92    vaginitis with abnormal Pap    Depressive disorder, not elsewhere classified     depression    Female stress incontinence     urinary incontinence    Lyme disease     Osteoarthrosis, unspecified whether generalized or localized, unspecified site      monoarticular arthritis in the right first MTP joint    Other psychological or physical stress, not elsewhere classified(V62.89)     delayed stress syndrome            Family History   Problem Relation Age of Onset    Hypertension Mother     Heart Disease Mother     Cancer Father     Cancer Son         Hodgkin's    Cancer Paternal Aunt         two with cervical cancer      Social History     Socioeconomic History    Marital status:    Tobacco Use    Smoking  status: Never    Smokeless tobacco: Never   Vaping Use    Vaping Use: Never used   Substance and Sexual Activity    Alcohol use: Yes     Comment: very rare    Drug use: No      No new changes to the review of systems or the physical exam since her last visit on November 9  REVIEW OF SYSTEM:  She currently denies any new symptoms of fever cough or cold sore throat postnasal drip wheezing chest pain dizziness vertigo shortness of breath cough cold symptoms nausea vomiting diarrhea dysuria frequency urgency.  Does have a history of vascular dementia, gait instability and osteoporosis        PHYSICAL EXAM:   Pleasant female in no acute distress.  Head is normocephalic.    Neck is supple without adenopathy.  Lung sounds are clear throughout.  Cardiovascular S1-S2 regular rate and rhythm and no lower extremity edema.  Gastrointestinal soft and nontender..  Musculoskeletal -good CMS to her left leg.  Pain is managed.  Psychiatric: Pleasant affect.         Current Outpatient Medications:     acetaminophen (TYLENOL) 500 MG tablet, Take 1,000 mg by mouth 3 times daily, Disp: , Rfl:     aspirin (ASA) 325 MG EC tablet, Take 1 tablet (325 mg) by mouth 2 times daily for 41 days, Disp: 82 tablet, Rfl: 0    baclofen (LIORESAL) 10 MG tablet, Take 10 mg by mouth 2 times daily 3pm and 9pm, takes 5mg every morning, Disp: , Rfl:     Baclofen (LIORESAL) 5 MG tablet, Take 5 mg by mouth every morning And 10mg twice daily, Disp: , Rfl:     Calcium Carbonate (CALCIUM 600 PO), Take 1 tablet by mouth every other day, Disp: , Rfl:     gabapentin (NEURONTIN) 100 MG capsule, Take 2 capsules (200 mg) by mouth At Bedtime, Disp: 42 capsule, Rfl: 1    melatonin 3 MG tablet, Take 3 tablets (9 mg) by mouth At Bedtime, Disp: , Rfl:     oxyCODONE (ROXICODONE) 5 MG tablet, Take 0.5 tablets (2.5 mg) by mouth every 4 hours as needed for moderate pain, Disp: 26 tablet, Rfl: 0    senna-docusate (SENOKOT-S/PERICOLACE) 8.6-50 MG tablet, Take 1 tablet by mouth 2  "times daily, Disp: 60 tablet, Rfl: 1    senna-docusate (SENOKOT-S/PERICOLACE) 8.6-50 MG tablet, Take 1 tablet by mouth daily as needed for constipation Also takes 1 daily scheduled, Disp: , Rfl:     senna-docusate (SENOKOT-S/PERICOLACE) 8.6-50 MG tablet, Take 1 tablet by mouth daily And daily PRN, Disp: , Rfl:     traZODone (DESYREL) 50 MG tablet, Take 1 tablet (50 mg) by mouth At Bedtime, Disp: 31 tablet, Rfl: 11    trolamine salicylate (ASPERCREME) 10 % external cream, Apply topically 3 times daily APPLY TOPICALLY BEHIND RIGHT KNEE TO HIP THREE TIMES DAILY. OK TO LEAVE AT BEDSIDE. DX PAIN., Disp: 141 g, Rfl: 11    vitamin D3 (CHOLECALCIFEROL) 50 mcg (2000 units) tablet, Take 50 mcg by mouth every other day, Disp: , Rfl:     /59   Pulse 90   Temp 97.5  F (36.4  C)   Resp 18   Ht 1.6 m (5' 3\")   Wt 58.4 kg (128 lb 11.2 oz)   SpO2 97%   BMI 22.80 kg/m      LABS:   CBC RESULTS:   Recent Labs   Lab Test 11/10/23  0737 11/04/23  0541 11/03/23  1158   WBC  --   --  8.2   RBC  --   --  4.29   HGB 8.0*   < > 12.2   HCT  --   --  38.0   MCV  --   --  89   MCH  --   --  28.4   MCHC  --   --  32.1   RDW  --   --  12.9   PLT  --   --  362    < > = values in this interval not displayed.     Last Comprehensive Metabolic Panel:  Lab Results   Component Value Date     11/03/2023    POTASSIUM 3.9 11/03/2023    CHLORIDE 104 11/03/2023    CO2 24 11/03/2023    ANIONGAP 11 11/03/2023     (H) 11/06/2023    BUN 19.6 11/03/2023    CR 0.72 11/03/2023    GFRESTIMATED 87 11/03/2023    NARA 9.6 11/03/2023             ASSESSMENT:    Encounter Diagnoses   Name Primary?    Closed fracture of left hip with routine healing, subsequent encounter Yes    Abnormal gait     Sleep disorder     Gastroesophageal reflux disease with esophagitis without hemorrhage        PLAN:    No medication changes with this visit.  She is participating in therapy.  Does have a visit with orthopedics next on November 17.  Continue to manage and " follow.  Recheck hemoglobin again on November 17      Electronically signed by: Bishnu Cadena NP

## 2023-11-14 NOTE — LETTER
11/14/2023        RE: Kylah Britt  [phone]  [facility] [id number] [facility]    Facility:   [facility] AND REHAB [facility] ([facility]) [[id number]]   Code Status: DNR/DNI      CHIEF COMPLAINT/REASON FOR VISIT:  Chief Complaint   Patient presents with     RECHECK       HISTORY:      HPI: Kylah is a 73 year old female who does remain in the transitional care unit room 102 and who I had the opportunity to revisit with once again today not only secondary to her hospitalization November 3 through November 6, 2023 secondary to a fall while ambulating sustaining osteoporotic fracture of left hip status post internal fixation with gamma adrienne as well as today's evaluation of her chronic medical conditions.  Her systolic blood pressures ranging 120-140 she has been afebrile and also on room air.  Her weight 128 pounds in comparison to November 7 at 130 pounds.  According to nursing notes she is an assist of 2 and has been participating in therapy.  Recent hemoglobin did come back at 8.0 prior that 8.2.  A visit with orthopedic follow-up is November 17.  For pain she does have Tylenol as needed.  For sleep is on trazodone.  She otherwise states she is doing well.    Past Medical History:   Diagnosis Date     Abnormal Papanicolaou smear of cervix and cervical HPV 9/1/92    vaginitis with abnormal Pap     Depressive disorder, not elsewhere classified     depression     Female stress incontinence     urinary incontinence     Lyme disease      Osteoarthrosis, unspecified whether generalized or localized, unspecified site      monoarticular arthritis in the right first MTP joint     Other psychological or physical stress, not elsewhere classified(V62.89)     delayed stress syndrome            Family History   Problem Relation Age of Onset     Hypertension Mother      Heart Disease Mother      Cancer Father      Cancer Son         Hodgkin's     Cancer Paternal Aunt         two with cervical cancer       Social History     Socioeconomic History     Marital status:    Tobacco Use     Smoking status: Never     Smokeless tobacco: Never   Vaping Use     Vaping Use: Never used   Substance and Sexual Activity     Alcohol use: Yes     Comment: very rare     Drug use: No      No new changes to the review of systems or the physical exam since her last visit on November 9  REVIEW OF SYSTEM:  She currently denies any new symptoms of fever cough or cold sore throat postnasal drip wheezing chest pain dizziness vertigo shortness of breath cough cold symptoms nausea vomiting diarrhea dysuria frequency urgency.  Does have a history of vascular dementia, gait instability and osteoporosis        PHYSICAL EXAM:   Pleasant female in no acute distress.  Head is normocephalic.    Neck is supple without adenopathy.  Lung sounds are clear throughout.  Cardiovascular S1-S2 regular rate and rhythm and no lower extremity edema.  Gastrointestinal soft and nontender..  Musculoskeletal -good CMS to her left leg.  Pain is managed.  Psychiatric: Pleasant affect.         Current Outpatient Medications:      acetaminophen (TYLENOL) 500 MG tablet, Take 1,000 mg by mouth 3 times daily, Disp: , Rfl:      aspirin (ASA) 325 MG EC tablet, Take 1 tablet (325 mg) by mouth 2 times daily for 41 days, Disp: 82 tablet, Rfl: 0     baclofen (LIORESAL) 10 MG tablet, Take 10 mg by mouth 2 times daily 3pm and 9pm, takes 5mg every morning, Disp: , Rfl:      Baclofen (LIORESAL) 5 MG tablet, Take 5 mg by mouth every morning And 10mg twice daily, Disp: , Rfl:      Calcium Carbonate (CALCIUM 600 PO), Take 1 tablet by mouth every other day, Disp: , Rfl:      gabapentin (NEURONTIN) 100 MG capsule, Take 2 capsules (200 mg) by mouth At Bedtime, Disp: 42 capsule, Rfl: 1     melatonin 3 MG tablet, Take 3 tablets (9 mg) by mouth At Bedtime, Disp: , Rfl:      oxyCODONE (ROXICODONE) 5 MG tablet, Take 0.5 tablets (2.5 mg) by mouth every 4 hours as needed for moderate  "pain, Disp: 26 tablet, Rfl: 0     senna-docusate (SENOKOT-S/PERICOLACE) 8.6-50 MG tablet, Take 1 tablet by mouth 2 times daily, Disp: 60 tablet, Rfl: 1     senna-docusate (SENOKOT-S/PERICOLACE) 8.6-50 MG tablet, Take 1 tablet by mouth daily as needed for constipation Also takes 1 daily scheduled, Disp: , Rfl:      senna-docusate (SENOKOT-S/PERICOLACE) 8.6-50 MG tablet, Take 1 tablet by mouth daily And daily PRN, Disp: , Rfl:      traZODone (DESYREL) 50 MG tablet, Take 1 tablet (50 mg) by mouth At Bedtime, Disp: 31 tablet, Rfl: 11     trolamine salicylate (ASPERCREME) 10 % external cream, Apply topically 3 times daily APPLY TOPICALLY BEHIND RIGHT KNEE TO HIP THREE TIMES DAILY. OK TO LEAVE AT BEDSIDE. DX PAIN., Disp: 141 g, Rfl: 11     vitamin D3 (CHOLECALCIFEROL) 50 mcg (2000 units) tablet, Take 50 mcg by mouth every other day, Disp: , Rfl:     /59   Pulse 90   Temp 97.5  F (36.4  C)   Resp 18   Ht 1.6 m (5' 3\")   Wt 58.4 kg (128 lb 11.2 oz)   SpO2 97%   BMI 22.80 kg/m      LABS:   CBC RESULTS:   Recent Labs   Lab Test 11/10/23  0737 11/04/23  0541 11/03/23  1158   WBC  --   --  8.2   RBC  --   --  4.29   HGB 8.0*   < > 12.2   HCT  --   --  38.0   MCV  --   --  89   MCH  --   --  28.4   MCHC  --   --  32.1   RDW  --   --  12.9   PLT  --   --  362    < > = values in this interval not displayed.     Last Comprehensive Metabolic Panel:  Lab Results   Component Value Date     11/03/2023    POTASSIUM 3.9 11/03/2023    CHLORIDE 104 11/03/2023    CO2 24 11/03/2023    ANIONGAP 11 11/03/2023     (H) 11/06/2023    BUN 19.6 11/03/2023    CR 0.72 11/03/2023    GFRESTIMATED 87 11/03/2023    NARA 9.6 11/03/2023             ASSESSMENT:    Encounter Diagnoses   Name Primary?     Closed fracture of left hip with routine healing, subsequent encounter Yes     Abnormal gait      Sleep disorder      Gastroesophageal reflux disease with esophagitis without hemorrhage        PLAN:    No medication changes with this " visit.  She is participating in therapy.  Does have a visit with orthopedics next on November 17.  Continue to manage and follow.  Recheck hemoglobin again on November 17      Electronically signed by: Bishnu Cadena NP      Sincerely,        Bishnu Cadena NP

## 2023-11-15 VITALS
RESPIRATION RATE: 16 BRPM | DIASTOLIC BLOOD PRESSURE: 60 MMHG | WEIGHT: 128.7 LBS | BODY MASS INDEX: 22.8 KG/M2 | HEART RATE: 78 BPM | HEIGHT: 63 IN | OXYGEN SATURATION: 93 % | SYSTOLIC BLOOD PRESSURE: 120 MMHG | TEMPERATURE: 96.8 F

## 2023-11-16 ENCOUNTER — LAB REQUISITION (OUTPATIENT)
Dept: LAB | Facility: CLINIC | Age: 73
End: 2023-11-16

## 2023-11-16 ENCOUNTER — TRANSITIONAL CARE UNIT VISIT (OUTPATIENT)
Dept: GERIATRICS | Facility: CLINIC | Age: 73
End: 2023-11-16
Payer: COMMERCIAL

## 2023-11-16 DIAGNOSIS — G47.9 SLEEP DISORDER: ICD-10-CM

## 2023-11-16 DIAGNOSIS — D64.9 ANEMIA, UNSPECIFIED: ICD-10-CM

## 2023-11-16 DIAGNOSIS — S72.002D CLOSED FRACTURE OF LEFT HIP WITH ROUTINE HEALING, SUBSEQUENT ENCOUNTER: Primary | ICD-10-CM

## 2023-11-16 DIAGNOSIS — K21.00 GASTROESOPHAGEAL REFLUX DISEASE WITH ESOPHAGITIS WITHOUT HEMORRHAGE: ICD-10-CM

## 2023-11-16 DIAGNOSIS — R26.9 ABNORMAL GAIT: ICD-10-CM

## 2023-11-16 PROCEDURE — 99309 SBSQ NF CARE MODERATE MDM 30: CPT | Performed by: FAMILY MEDICINE

## 2023-11-16 NOTE — PROGRESS NOTES
[facility] GERIATRIC SERVICES    Facility:   [facility] AND REHAB [facility] ([facility]) [[id number]]   Code Status: DNR/DNI      CHIEF COMPLAINT/REASON FOR VISIT:  Chief Complaint   Patient presents with    RECHECK       HISTORY:      HPI: Kylah is a 73 year old female who I had the pleasure to revisit with once again today not only secondary to her hospitalization November 3, 2023 through November 6, 2023 secondary to a fall while ambulating sustaining an osteoporotic fracture of the left hip status post internal fixation with gamma adrienne as well as today's evaluation and discussion of her other chronic medical conditions.  She currently has been normotensive and afebrile and also on room air with a sustained weight of 128 pounds.  She does feel her appetite to be good she is on a regular diet.  For pain she is on scheduled Tylenol 1000 mg 3 times daily usually takes about 3 as needed oxycodone per day also was on scheduled baclofen.  For sleep is on both melatonin and trazodone.  Her visit follow-up to orthopedics is tomorrow November 17.  According nursing notes things have been stable.  She states that she is doing pretty well overall as we talked about and listed her medications and her current goals of care.  Her leg looks good with good CMS.    Past Medical History:   Diagnosis Date    Abnormal Papanicolaou smear of cervix and cervical HPV 9/1/92    vaginitis with abnormal Pap    Depressive disorder, not elsewhere classified     depression    Female stress incontinence     urinary incontinence    Lyme disease     Osteoarthrosis, unspecified whether generalized or localized, unspecified site      monoarticular arthritis in the right first MTP joint    Other psychological or physical stress, not elsewhere classified(V62.89)     delayed stress syndrome            Family History   Problem Relation Age of Onset    Hypertension Mother     Heart Disease Mother     Cancer Father     Cancer Son         Hodgkin's    Cancer  Paternal Aunt         two with cervical cancer      Social History     Socioeconomic History    Marital status:    Tobacco Use    Smoking status: Never    Smokeless tobacco: Never   Vaping Use    Vaping Use: Never used   Substance and Sexual Activity    Alcohol use: Yes     Comment: very rare    Drug use: No      No new changes to the review of systems or the physical exam since her last visit on November 14  REVIEW OF SYSTEM:  She currently denies any new symptoms of fever cough or cold sore throat postnasal drip wheezing chest pain dizziness vertigo shortness of breath cough cold symptoms nausea vomiting diarrhea dysuria frequency urgency.  Does have a history of vascular dementia, gait instability and osteoporosis      PHYSICAL EXAM:   Pleasant female in no acute distress.  Head is normocephalic.    Neck is supple without adenopathy.  Lung sounds are clear throughout.  Cardiovascular S1-S2 regular rate and rhythm and no lower extremity edema.  Gastrointestinal soft and nontender..  Musculoskeletal -good CMS to her left leg.  Pain is managed.  Psychiatric: Pleasant affect.         Current Outpatient Medications:     acetaminophen (TYLENOL) 500 MG tablet, Take 1,000 mg by mouth 3 times daily, Disp: , Rfl:     aspirin (ASA) 325 MG EC tablet, Take 1 tablet (325 mg) by mouth 2 times daily for 41 days, Disp: 82 tablet, Rfl: 0    baclofen (LIORESAL) 10 MG tablet, Take 10 mg by mouth 2 times daily 3pm and 9pm, takes 5mg every morning, Disp: , Rfl:     Baclofen (LIORESAL) 5 MG tablet, Take 5 mg by mouth every morning And 10mg twice daily, Disp: , Rfl:     Calcium Carbonate (CALCIUM 600 PO), Take 1 tablet by mouth every other day, Disp: , Rfl:     gabapentin (NEURONTIN) 100 MG capsule, Take 2 capsules (200 mg) by mouth At Bedtime, Disp: 42 capsule, Rfl: 1    melatonin 3 MG tablet, Take 3 tablets (9 mg) by mouth At Bedtime, Disp: , Rfl:     oxyCODONE (ROXICODONE) 5 MG tablet, Take 0.5 tablets (2.5 mg) by mouth every  "4 hours as needed for moderate pain, Disp: 26 tablet, Rfl: 0    senna-docusate (SENOKOT-S/PERICOLACE) 8.6-50 MG tablet, Take 1 tablet by mouth 2 times daily, Disp: 60 tablet, Rfl: 1    senna-docusate (SENOKOT-S/PERICOLACE) 8.6-50 MG tablet, Take 1 tablet by mouth daily as needed for constipation Also takes 1 daily scheduled, Disp: , Rfl:     senna-docusate (SENOKOT-S/PERICOLACE) 8.6-50 MG tablet, Take 1 tablet by mouth daily And daily PRN, Disp: , Rfl:     traZODone (DESYREL) 50 MG tablet, Take 1 tablet (50 mg) by mouth At Bedtime, Disp: 31 tablet, Rfl: 11    trolamine salicylate (ASPERCREME) 10 % external cream, Apply topically 3 times daily APPLY TOPICALLY BEHIND RIGHT KNEE TO HIP THREE TIMES DAILY. OK TO LEAVE AT BEDSIDE. DX PAIN., Disp: 141 g, Rfl: 11    vitamin D3 (CHOLECALCIFEROL) 50 mcg (2000 units) tablet, Take 50 mcg by mouth every other day, Disp: , Rfl:        /60   Pulse 78   Temp 96.8  F (36  C)   Resp 16   Ht 1.6 m (5' 3\")   Wt 58.4 kg (128 lb 11.2 oz)   SpO2 93%   BMI 22.80 kg/m      LABS:   Last Comprehensive Metabolic Panel:  Lab Results   Component Value Date     11/03/2023    POTASSIUM 3.9 11/03/2023    CHLORIDE 104 11/03/2023    CO2 24 11/03/2023    ANIONGAP 11 11/03/2023     (H) 11/06/2023    BUN 19.6 11/03/2023    CR 0.72 11/03/2023    GFRESTIMATED 87 11/03/2023    NARA 9.6 11/03/2023       CBC RESULTS:   Recent Labs   Lab Test 11/10/23  0737 11/04/23  0541 11/03/23  1158   WBC  --   --  8.2   RBC  --   --  4.29   HGB 8.0*   < > 12.2   HCT  --   --  38.0   MCV  --   --  89   MCH  --   --  28.4   MCHC  --   --  32.1   RDW  --   --  12.9   PLT  --   --  362    < > = values in this interval not displayed.   Hemoglobin November 6, 2023 was 8.2        ASSESSMENT:    Encounter Diagnoses   Name Primary?    Closed fracture of left hip with routine healing, subsequent encounter Yes    Gastroesophageal reflux disease with esophagitis without hemorrhage     Abnormal gait     Sleep " disorder        PLAN:    Rechecking the hemoglobin again next on November 17th.  Otherwise she does have a follow-up visit to orthopedics tomorrow.  Continue with current medication plan as well as the rehabilitation plan.        Electronically signed by: Bishnu Cadena NP

## 2023-11-16 NOTE — LETTER
11/16/2023        RE: Kylah Britt  [address]  [facility] [id number] [facility]    Facility:   [facility] ([facility]) [[id number]]   Code Status: DNR/DNI      CHIEF COMPLAINT/REASON FOR VISIT:  Chief Complaint   Patient presents with     RECHECK       HISTORY:      HPI: Kylah is a 73 year old female who I had the pleasure to revisit with once again today not only secondary to her hospitalization November 3, 2023 through November 6, 2023 secondary to a fall while ambulating sustaining an osteoporotic fracture of the left hip status post internal fixation with gamma adrienne as well as today's evaluation and discussion of her other chronic medical conditions.  She currently has been normotensive and afebrile and also on room air with a sustained weight of 128 pounds.  She does feel her appetite to be good she is on a regular diet.  For pain she is on scheduled Tylenol 1000 mg 3 times daily usually takes about 3 as needed oxycodone per day also was on scheduled baclofen.  For sleep is on both melatonin and trazodone.  Her visit follow-up to orthopedics is tomorrow November 17.  According nursing notes things have been stable.  She states that she is doing pretty well overall as we talked about and listed her medications and her current goals of care.  Her leg looks good with good CMS.    Past Medical History:   Diagnosis Date     Abnormal Papanicolaou smear of cervix and cervical HPV 9/1/92    vaginitis with abnormal Pap     Depressive disorder, not elsewhere classified     depression     Female stress incontinence     urinary incontinence     Lyme disease      Osteoarthrosis, unspecified whether generalized or localized, unspecified site      monoarticular arthritis in the right first MTP joint     Other psychological or physical stress, not elsewhere classified(V62.89)     delayed stress syndrome            Family History   Problem Relation Age of Onset     Hypertension  Mother      Heart Disease Mother      Cancer Father      Cancer Son         Hodgkin's     Cancer Paternal Aunt         two with cervical cancer      Social History     Socioeconomic History     Marital status:    Tobacco Use     Smoking status: Never     Smokeless tobacco: Never   Vaping Use     Vaping Use: Never used   Substance and Sexual Activity     Alcohol use: Yes     Comment: very rare     Drug use: No      No new changes to the review of systems or the physical exam since her last visit on November 14  REVIEW OF SYSTEM:  She currently denies any new symptoms of fever cough or cold sore throat postnasal drip wheezing chest pain dizziness vertigo shortness of breath cough cold symptoms nausea vomiting diarrhea dysuria frequency urgency.  Does have a history of vascular dementia, gait instability and osteoporosis      PHYSICAL EXAM:   Pleasant female in no acute distress.  Head is normocephalic.    Neck is supple without adenopathy.  Lung sounds are clear throughout.  Cardiovascular S1-S2 regular rate and rhythm and no lower extremity edema.  Gastrointestinal soft and nontender..  Musculoskeletal -good CMS to her left leg.  Pain is managed.  Psychiatric: Pleasant affect.         Current Outpatient Medications:      acetaminophen (TYLENOL) 500 MG tablet, Take 1,000 mg by mouth 3 times daily, Disp: , Rfl:      aspirin (ASA) 325 MG EC tablet, Take 1 tablet (325 mg) by mouth 2 times daily for 41 days, Disp: 82 tablet, Rfl: 0     baclofen (LIORESAL) 10 MG tablet, Take 10 mg by mouth 2 times daily 3pm and 9pm, takes 5mg every morning, Disp: , Rfl:      Baclofen (LIORESAL) 5 MG tablet, Take 5 mg by mouth every morning And 10mg twice daily, Disp: , Rfl:      Calcium Carbonate (CALCIUM 600 PO), Take 1 tablet by mouth every other day, Disp: , Rfl:      gabapentin (NEURONTIN) 100 MG capsule, Take 2 capsules (200 mg) by mouth At Bedtime, Disp: 42 capsule, Rfl: 1     melatonin 3 MG tablet, Take 3 tablets (9 mg) by  "mouth At Bedtime, Disp: , Rfl:      oxyCODONE (ROXICODONE) 5 MG tablet, Take 0.5 tablets (2.5 mg) by mouth every 4 hours as needed for moderate pain, Disp: 26 tablet, Rfl: 0     senna-docusate (SENOKOT-S/PERICOLACE) 8.6-50 MG tablet, Take 1 tablet by mouth 2 times daily, Disp: 60 tablet, Rfl: 1     senna-docusate (SENOKOT-S/PERICOLACE) 8.6-50 MG tablet, Take 1 tablet by mouth daily as needed for constipation Also takes 1 daily scheduled, Disp: , Rfl:      senna-docusate (SENOKOT-S/PERICOLACE) 8.6-50 MG tablet, Take 1 tablet by mouth daily And daily PRN, Disp: , Rfl:      traZODone (DESYREL) 50 MG tablet, Take 1 tablet (50 mg) by mouth At Bedtime, Disp: 31 tablet, Rfl: 11     trolamine salicylate (ASPERCREME) 10 % external cream, Apply topically 3 times daily APPLY TOPICALLY BEHIND RIGHT KNEE TO HIP THREE TIMES DAILY. OK TO LEAVE AT BEDSIDE. DX PAIN., Disp: 141 g, Rfl: 11     vitamin D3 (CHOLECALCIFEROL) 50 mcg (2000 units) tablet, Take 50 mcg by mouth every other day, Disp: , Rfl:        /60   Pulse 78   Temp 96.8  F (36  C)   Resp 16   Ht 1.6 m (5' 3\")   Wt 58.4 kg (128 lb 11.2 oz)   SpO2 93%   BMI 22.80 kg/m      LABS:   Last Comprehensive Metabolic Panel:  Lab Results   Component Value Date     11/03/2023    POTASSIUM 3.9 11/03/2023    CHLORIDE 104 11/03/2023    CO2 24 11/03/2023    ANIONGAP 11 11/03/2023     (H) 11/06/2023    BUN 19.6 11/03/2023    CR 0.72 11/03/2023    GFRESTIMATED 87 11/03/2023    NARA 9.6 11/03/2023       CBC RESULTS:   Recent Labs   Lab Test 11/10/23  0737 11/04/23  0541 11/03/23  1158   WBC  --   --  8.2   RBC  --   --  4.29   HGB 8.0*   < > 12.2   HCT  --   --  38.0   MCV  --   --  89   MCH  --   --  28.4   MCHC  --   --  32.1   RDW  --   --  12.9   PLT  --   --  362    < > = values in this interval not displayed.   Hemoglobin November 6, 2023 was 8.2        ASSESSMENT:    Encounter Diagnoses   Name Primary?     Closed fracture of left hip with routine healing, " subsequent encounter Yes     Gastroesophageal reflux disease with esophagitis without hemorrhage      Abnormal gait      Sleep disorder        PLAN:    Rechecking the hemoglobin again next on November 17th.  Otherwise she does have a follow-up visit to orthopedics tomorrow.  Continue with current medication plan as well as the rehabilitation plan.        Electronically signed by: Bishnu Cadena NP      Sincerely,        Bishnu Cadena NP

## 2023-11-17 ENCOUNTER — OFFICE VISIT (OUTPATIENT)
Dept: ORTHOPEDICS | Facility: CLINIC | Age: 73
End: 2023-11-17
Payer: COMMERCIAL

## 2023-11-17 ENCOUNTER — HOSPITAL ENCOUNTER (OUTPATIENT)
Dept: GENERAL RADIOLOGY | Facility: CLINIC | Age: 73
Discharge: HOME | End: 2023-11-17
Attending: PHYSICIAN ASSISTANT | Admitting: PHYSICIAN ASSISTANT
Payer: COMMERCIAL

## 2023-11-17 ENCOUNTER — TRANSFERRED RECORDS (OUTPATIENT)
Dept: HEALTH INFORMATION MANAGEMENT | Facility: CLINIC | Age: 73
End: 2023-11-17

## 2023-11-17 VITALS
WEIGHT: 134 LBS | TEMPERATURE: 97.3 F | SYSTOLIC BLOOD PRESSURE: 108 MMHG | DIASTOLIC BLOOD PRESSURE: 62 MMHG | HEIGHT: 62 IN | BODY MASS INDEX: 24.66 KG/M2

## 2023-11-17 DIAGNOSIS — Z09 POSTOP CHECK: ICD-10-CM

## 2023-11-17 DIAGNOSIS — Z98.890 HISTORY OF HIP SURGERY: Primary | ICD-10-CM

## 2023-11-17 LAB — HGB BLD-MCNC: 9.5 G/DL (ref 11.7–15.7)

## 2023-11-17 PROCEDURE — 73552 X-RAY EXAM OF FEMUR 2/>: CPT | Mod: LT

## 2023-11-17 PROCEDURE — 99024 POSTOP FOLLOW-UP VISIT: CPT | Performed by: PHYSICIAN ASSISTANT

## 2023-11-17 PROCEDURE — P9604 ONE-WAY ALLOW PRORATED TRIP: HCPCS | Performed by: FAMILY MEDICINE

## 2023-11-17 PROCEDURE — 36415 COLL VENOUS BLD VENIPUNCTURE: CPT | Performed by: FAMILY MEDICINE

## 2023-11-17 PROCEDURE — 85018 HEMOGLOBIN: CPT | Performed by: FAMILY MEDICINE

## 2023-11-17 ASSESSMENT — PAIN SCALES - GENERAL: PAINLEVEL: EXTREME PAIN (8)

## 2023-11-17 NOTE — LETTER
"    11/17/2023         RE: Kylah Britt  [address]  [facility] [id number]        Dear Colleague,    Thank you for referring your patient, Kylah Britt, to the [facility]. Please see a copy of my visit note below.    Per post op note on 11/04/23: Due to the comminuted four-part fracture, we will keep her to 25% weightbearing for 6 weeks.    Orthopedic Clinic Post-Operative Note    CHIEF COMPLAINT:   Chief Complaint   Patient presents with     Surgical Followup     DOS: 11/4/2023~ Left intertrochanteric femur fracture open-reduction, internal fixation with long Gamma adrienne. ~13 days postop       HISTORY OF PRESENT ILLNESS  Location: Left hip  Rating of Pain: 8 out of 10  Pain is better with: Rest  Pain improving overall: Yes  Associated Features: Denies numbness tingling shooting burning electric pain.  Denies any problems with the incision  Patient concerns: None.  Additional History: Patient is here with her daughter Nicholas.    Patient's past medical, surgical, social and family histories reviewed.     REVIEW OF SYSTEMS  Review of systems negative other than positive findings in HPI.    Physical Exam:  Vitals: /62 (BP Location: Right arm, Patient Position: Sitting, Cuff Size: Adult Regular)   Temp 97.3  F (36.3  C) (Temporal)   Ht 1.575 m (5' 2\")   Wt 60.8 kg (134 lb)   BMI 24.51 kg/m    BMI= Body mass index is 24.51 kg/m .  Constitutional: healthy, alert and no acute distress   Psychiatric: mentation appears normal and affect normal/bright  NEURO: no focal deficits, CMS intact left lower extremity  RESP: Normal with easy respirations and no use of accessory muscles to breathe, no audible wheezing or retractions  CV: Calf soft and nontender to palpation, leg warm   SKIN: Both proximal and distal incisions healed well with no erythema swelling or drainage and does not open with gentle force.  The rest of the left hip with no erythema, rashes, excoriation, or breakdown. No " evidence of infection.   MUSCULOSKELETAL:  INSPECTION of left hip: No gross deformities, erythema, edema, ecchymosis, atrophy or fasciculations.   PALPATION: no tenderness over the lateral hip and greater trochanteric region, thigh or lower leg.   ROM: Passive: Flexion to 115 degrees, internal rotation to 25 degrees, external rotation to 60 degrees.  All range of motion without catching locking or pain.     STRENGTH: 5 out of 5 hip flexion, quad and hamstring without pain.   SPECIAL TEST: None  GAIT: Not observed.  Patient in wheelchair  Lymph: no palpable lymph nodes    Diagnostic Modalities:  Recent Results (from the past 744 hour(s))   XR Pelvis w Hip Left 1 View    Narrative    EXAM: XR PELVIS AND HIP LEFT 1 VIEW  DATE/TIME: 11/3/2023 12:22 PM    INDICATION: fall; pain  COMPARISON: None available.       Impression    IMPRESSION: Acute, comminuted, displaced fracture of the left femoral  neck and subtrochanteric extension.    Mild degenerative arthritis both hips. Mild degenerative changes both  SI joints. Lower lumbar facet arthropathy.    NOTE: ABNORMAL REPORT    THE DICTATION ABOVE DESCRIBES AN ABNORMAL REPORT FOR WHICH FOLLOW-UP  IS NEEDED.    NICOLE BURROWS DO         SYSTEM ID:  [id number]   XR Surgery JOSHUA L/T 5 Min Fluoro w Stills    Narrative    EXAM: XR SURGERY JOSHUA FLUORO LESS THAN 5 MIN W STILLS  LOCATION: [facility]  DATE: 11/4/2023    INDICATION: gamma rods  COMPARISON: Same date hip radiograph    TECHNIQUE: Intraoperative fluoroscopy performed during the patient's procedure.    FLUOROSCOPIC TIME: 2.2  NUMBER OF IMAGES: 4    FINDINGS: Interval placement of intramedullary adrienne and dynamic screw fixation hardware across known comminuted left femoral neck fracture. Significant improvement in alignment. No evidence of immediate complication.     I agree with the above reading.    X-rays done today showing good hardware placement no prosthesis failure no backing out of  prosthesis.  These x-rays are compared to the previous x-rays that were done intraoperatively on 11/4/2023.  Alignment maintained.  No mineralization seen over the fracture site yet.    Independent visualization of the images was performed.      Impression: 1.  13 days status post left hip inotrope fracture open reduction internal fixation with long gamma nail by Dr. Franco    FOCUSED PLAN:   Patient doing well without any complaints.  [facility] transitional care where she is and is working on therapy and strengthening the contralateral leg in the upper extremities to get the patient ready for ambulation when she increases her weightbearing status.  X-rays showing good placement of hardware and no hardware failure, no mineralization seen yet.  Incisions look excellent, no dressing needed now.  Can start vitamin E lotion massage now.  After visit summary written for the transitional care and they are to continue therapy both occupational and physical.  Continue 25% partial weightbearing until Dr. Franco see the patient back in 4 weeks.  Follow-up with Dr. Franco team in 4 weeks with x-ray.    Re-x-ray on return: Yes.  AP and lateral of entire left femur.      This note was dictated with [employer].    Bishnu Gordon PA-C              Again, thank you for allowing me to participate in the care of your patient.        Sincerely,        Bishnu Gordon PA-C

## 2023-11-17 NOTE — PROGRESS NOTES
"Orthopedic Clinic Post-Operative Note    CHIEF COMPLAINT:   Chief Complaint   Patient presents with    Surgical Followup     DOS: 11/4/2023~ Left intertrochanteric femur fracture open-reduction, internal fixation with long Gamma adrienne. ~13 days postop       HISTORY OF PRESENT ILLNESS  Location: Left hip  Rating of Pain: 8 out of 10  Pain is better with: Rest  Pain improving overall: Yes  Associated Features: Denies numbness tingling shooting burning electric pain.  Denies any problems with the incision  Patient concerns: None.  Additional History: Patient is here with her daughter Nicholas.    Patient's past medical, surgical, social and family histories reviewed.     REVIEW OF SYSTEMS  Review of systems negative other than positive findings in HPI.    Physical Exam:  Vitals: /62 (BP Location: Right arm, Patient Position: Sitting, Cuff Size: Adult Regular)   Temp 97.3  F (36.3  C) (Temporal)   Ht 1.575 m (5' 2\")   Wt 60.8 kg (134 lb)   BMI 24.51 kg/m    BMI= Body mass index is 24.51 kg/m .  Constitutional: healthy, alert and no acute distress   Psychiatric: mentation appears normal and affect normal/bright  NEURO: no focal deficits, CMS intact left lower extremity  RESP: Normal with easy respirations and no use of accessory muscles to breathe, no audible wheezing or retractions  CV: Calf soft and nontender to palpation, leg warm   SKIN: Both proximal and distal incisions healed well with no erythema swelling or drainage and does not open with gentle force.  The rest of the left hip with no erythema, rashes, excoriation, or breakdown. No evidence of infection.   MUSCULOSKELETAL:  INSPECTION of left hip: No gross deformities, erythema, edema, ecchymosis, atrophy or fasciculations.   PALPATION: no tenderness over the lateral hip and greater trochanteric region, thigh or lower leg.   ROM: Passive: Flexion to 115 degrees, internal rotation to 25 degrees, external rotation to 60 degrees.  All range of motion without " catching locking or pain.     STRENGTH: 5 out of 5 hip flexion, quad and hamstring without pain.   SPECIAL TEST: None  GAIT: Not observed.  Patient in wheelchair  Lymph: no palpable lymph nodes    Diagnostic Modalities:  Recent Results (from the past 744 hour(s))   XR Pelvis w Hip Left 1 View    Narrative    EXAM: XR PELVIS AND HIP LEFT 1 VIEW  DATE/TIME: 11/3/2023 12:22 PM    INDICATION: fall; pain  COMPARISON: None available.       Impression    IMPRESSION: Acute, comminuted, displaced fracture of the left femoral  neck and subtrochanteric extension.    Mild degenerative arthritis both hips. Mild degenerative changes both  SI joints. Lower lumbar facet arthropathy.    NOTE: ABNORMAL REPORT    THE DICTATION ABOVE DESCRIBES AN ABNORMAL REPORT FOR WHICH FOLLOW-UP  IS NEEDED.    NICOLE BURROWS DO         SYSTEM ID:  [id number]   XR Surgery JOSHUA L/T 5 Min Fluoro w Stills    Narrative    EXAM: XR SURGERY JOSHUA FLUORO LESS THAN 5 MIN W STILLS  LOCATION: [facility]  DATE: 11/4/2023    INDICATION: gamma rods  COMPARISON: Same date hip radiograph    TECHNIQUE: Intraoperative fluoroscopy performed during the patient's procedure.    FLUOROSCOPIC TIME: 2.2  NUMBER OF IMAGES: 4    FINDINGS: Interval placement of intramedullary adrienne and dynamic screw fixation hardware across known comminuted left femoral neck fracture. Significant improvement in alignment. No evidence of immediate complication.     I agree with the above reading.    X-rays done today showing good hardware placement no prosthesis failure no backing out of prosthesis.  These x-rays are compared to the previous x-rays that were done intraoperatively on 11/4/2023.  Alignment maintained.  No mineralization seen over the fracture site yet.    Independent visualization of the images was performed.      Impression: 1.  13 days status post left hip inotrope fracture open reduction internal fixation with long gamma nail by   Joan    FOCUSED PLAN:   Patient doing well without any complaints.  [facility] transitional care where she is and is working on therapy and strengthening the contralateral leg in the upper extremities to get the patient ready for ambulation when she increases her weightbearing status.  X-rays showing good placement of hardware and no hardware failure, no mineralization seen yet.  Incisions look excellent, no dressing needed now.  Can start vitamin E lotion massage now.  After visit summary written for the transitional care and they are to continue therapy both occupational and physical.  Continue 25% partial weightbearing until Dr. Franco see the patient back in 4 weeks.  Follow-up with Dr. Franco team in 4 weeks with x-ray.    Re-x-ray on return: Yes.  AP and lateral of entire left femur.      This note was dictated with [employer].    Bishnu Gordon PA-C

## 2023-11-17 NOTE — PATIENT INSTRUCTIONS
Encounter Diagnoses   Name Primary?    Postop check     Status post left hip ORIF with long gamma nail by Dr. Franco on 11/4/2023 Yes     Rest, ice and elevate above heart level as needed for pain control  Exam:  1.  Physical exam: Acceptable range of motion and strength today.  2.  X-ray: X-rays showing no change in position and no failure of hardware.  No mineralization yet.        Recommendations medication/treatment orders:  1. Incision/Dressing: Incisions healed.  No dressing needed at this point.  Okay to start massaging with vitamin E lotion.  Okay to submerge in 1 more week.  2. Anticoagulation: Continue aspirin 325 twice a day for another 4 weeks  3. Pain Medication: Continue oxycodone 2.5 mg as needed.  Continue Tylenol as needed.  4. Weight Bearing: Continue 25% partial weightbearing left lower extremity for another 4 weeks until reassessed and then we will decide if we continue this or change it.  5. Activity/Therapy: Continue physical therapy and Occupational Therapy working on strengthening upper extremities and contralateral leg as well as range of motion and gentle strengthening of surgical leg.  6.  Discussed plan with patient and also patient's daughter today.    Diagnosis: same as above    Follow-up:  Follow-up with Dr. Franco in 4 weeks with x-ray.    [employer] and [employer] may offer reliable information regarding your diagnosis and treatment plan.    THANK YOU for coming in today. If you receive a survey via [facility] or mail please let us know if there was anything you especially appreciated today or if there is any way we can improve our clinic. We appreciate your input.    GENERAL INFORMATION:  My hours are:  Monday: Clinic 720am-440pm  Tuesday: Clinic 8am-440pm  Wednesday: Surgery  Thursday: Admin  Friday: Surgery      [facility] for any issues or concerns: [phone]    I am not in the office Thursdays. Therefore non- urgent calls and medical messages received  on Thursday will be addressed when we are back in the office on Wednesday. Urgent matters will be reviewed and addressed by one of our partners in the office as needed.    If lab work was done today as part of your evaluation you will generally be contacted via [employer], mail, or phone with the results within 1-5 days. If there is an alarming result we will contact you by phone. Lab results come back at varying times, I generally wait until all labs are resulted before making comments on results. Please note labs are automatically released to [employer] (if you have signed up for it) once available-at times you may see these prior to my having a chance to review them as well.    If you need refills please contact your pharmacist. They will send a refill request to me to review. Please allow 3 business days for us to process all refill requests. All narcotic refills should be handled in the clinic at the time of your visit.

## 2023-11-17 NOTE — PROGRESS NOTES
Appropriate assistive devices provided during their visit. yes (Yes, No, N/A) wheelchair (list device)    Exam table and/or cart  placed in the lowest position. yes (Yes, No, N/A)    Brakes on tables/carts/wheelchairs used at all times. yes (Yes, No, N/A)    Non slip footwear applied. No  (Yes, No, NA)    Patient was accompanied by staff throughout visit. yes (Yes, No, N/A)    Equipment safety straps used. yes (Yes, No, N/A)    Assist with toileting. na (Yes, No, N/A)

## 2023-11-20 DIAGNOSIS — S72.002A CLOSED FRACTURE OF LEFT HIP, INITIAL ENCOUNTER (H): ICD-10-CM

## 2023-11-20 RX ORDER — OXYCODONE HYDROCHLORIDE 5 MG/1
2.5 TABLET ORAL EVERY 4 HOURS PRN
Qty: 60 TABLET | Refills: 0 | Status: SHIPPED | OUTPATIENT
Start: 2023-11-20 | End: 2024-01-02

## 2023-11-20 NOTE — PROGRESS NOTES
[facility] GERIATRICS    PRIMARY CARE PROVIDER AND CLINIC:  ESPERANZA Fuentes CNP, [address] [facility] [address]  Chief Complaint   Patient presents with    Hospital F/U      [place] Medical Record Number:  [id number]  Place of Service where encounter took place:  [facility] [place] ([facility]) [[id number]]    Kylah Britt  is a 73 year old  (1950), admitted to the above facility from  [facility]. Hospital stay 11/3/23 through 11/6/23..   HPI:    - Pt  with pmh notable for osteopenia, neurodegenerative d/o with spasticity (on baclofen)  - had a fall at [facility] resulted in left femur fx secondary to osteoporosis s/p internal fixation with gamma adrienne (11/4)  -oxycodone caused metabolic encephalopathy,hence decreased to 2.5 mg  - ABLA  - DVT ppx with asa 325 mg  bid    TODAY:  - left femur: reports pain is 7-810, better with medicine. Aggravated with activities.  Still NWB  - rehab: going on. Gets tired with therapy  - anemia: appetite is sleep.     CODE STATUS/ADVANCE DIRECTIVES DISCUSSION:  Prior  DNR / DNI  ALLERGIES:   Allergies   Allergen Reactions    No Known Drug Allergy       PAST MEDICAL HISTORY:   Past Medical History:   Diagnosis Date    Abnormal Papanicolaou smear of cervix and cervical HPV 9/1/92    vaginitis with abnormal Pap    Depressive disorder, not elsewhere classified     depression    Female stress incontinence     urinary incontinence    Lyme disease     Osteoarthrosis, unspecified whether generalized or localized, unspecified site      monoarticular arthritis in the right first MTP joint    Other psychological or physical stress, not elsewhere classified(V62.89)     delayed stress syndrome      PAST SURGICAL HISTORY:   has a past surgical history that includes REMOVAL OF OVARY/TUBE(S) (2/20/90); APPENDECTOMY; TOTAL ABDOM HYSTERECTOMY (2/20/90); LIGATE FALLOPIAN TUBE,POSTPARTUM (04/12/77); COLONOSCOPY THRU STOMA,  DIAGNOSTIC (06/24/98); KNEE SCOPE,DIAGNOSTIC (1/31/89); Open reduction internal fixation calcaneous (Right, 6/8/2017); Colonoscopy (N/A, 4/6/2022); and Open reduction internal fixation hip nailing (Left, 11/4/2023).  FAMILY HISTORY: family history includes Cancer in her father, paternal aunt, and son; Heart Disease in her mother; Hypertension in her mother.  SOCIAL HISTORY:   reports that she has never smoked. She has never used smokeless tobacco. She reports current alcohol use. She reports that she does not use drugs.  Patient's living condition: lives in an assisted living facility    Post Discharge Medication Reconciliation Status:   MED REC REQUIRED  Post Medication Reconciliation Status: discharge medications reconciled and changed, per note/orders       Current Outpatient Medications   Medication Sig    acetaminophen (TYLENOL) 500 MG tablet Take 1,000 mg by mouth 3 times daily    aspirin (ASA) 325 MG EC tablet Take 1 tablet (325 mg) by mouth 2 times daily for 41 days    baclofen (LIORESAL) 10 MG tablet Take 10 mg by mouth 2 times daily 3pm and 9pm, takes 5mg every morning    Baclofen (LIORESAL) 5 MG tablet Take 5 mg by mouth every morning And 10mg twice daily    Calcium Carbonate (CALCIUM 600 PO) Take 1 tablet by mouth every other day    gabapentin (NEURONTIN) 100 MG capsule Take 2 capsules (200 mg) by mouth At Bedtime    melatonin 3 MG tablet Take 3 tablets (9 mg) by mouth At Bedtime    oxyCODONE (ROXICODONE) 5 MG tablet Take 0.5 tablets (2.5 mg) by mouth every 4 hours as needed for moderate pain    senna-docusate (SENOKOT-S/PERICOLACE) 8.6-50 MG tablet Take 1 tablet by mouth 2 times daily    senna-docusate (SENOKOT-S/PERICOLACE) 8.6-50 MG tablet Take 1 tablet by mouth daily as needed for constipation Also takes 1 daily scheduled    senna-docusate (SENOKOT-S/PERICOLACE) 8.6-50 MG tablet Take 1 tablet by mouth daily And daily PRN    traZODone (DESYREL) 50 MG tablet Take 1 tablet (50 mg) by mouth At Bedtime     "trolamine salicylate (ASPERCREME) 10 % external cream Apply topically 3 times daily APPLY TOPICALLY BEHIND RIGHT KNEE TO HIP THREE TIMES DAILY. OK TO LEAVE AT BEDSIDE. DX PAIN.    vitamin D3 (CHOLECALCIFEROL) 50 mcg (2000 units) tablet Take 50 mcg by mouth every other day     No current facility-administered medications for this visit.       ROS:  Limited secondary to cognitive deficit. but today pt reports- see HPI    Vitals:  BP (!) 180/49   Pulse 77   Temp 97.5  F (36.4  C)   Resp 18   Ht 1.575 m (5' 2\")   Wt 57.3 kg (126 lb 4.8 oz)   SpO2 98%   BMI 23.10 kg/m    Exam:  GENERAL APPEARANCE:  in no distress, tired looking, just came back from therapy.  RESP:  Unlabored breathing. CTA b/l.   CV:  S1S2 audible, regular HR, no murmur appreciated.   ABDOMEN:  soft, NT/ND, BS audible.   M/S:   no joint deformity noted on observation.   SKIN:  No rash noted on observation  NEURO:   No NFD appreciated on observation.   PSYCH:  affect and mood normal    Lab/Diagnostic data: Reviewed in the chart and EHR.        ASSESSMENT/PLAN:  ----------------------------  - Pt  with pmh notable for osteopenia (DEXA 2022), neurodegenerative d/o with spasticity (on baclofen)  - had a fall at [facility] resulted in left femur fx secondary to osteoporosis s/p internal fixation with gamma adrienne (11/4)  -oxycodone caused metabolic encephalopathy,hence decreased to 2.5 mg  - ABLA  - DVT ppx with asa 325 mg  bid unitl 12/9      (S72.22XD) Closed displaced subtrochanteric fracture of left femur with routine healing, subsequent encounter  (primary encounter diagnosis)  (G31.9) Neurodegenerative disorder (H24)  (R29.6) Recurrent falls  (R25.2) Spasticity  (M85.851,  M85.852) Osteopenia of necks of both femurs-per DEXA March 2022  -- Analgesia optimal with the current regimen. Continue present plan and medications. Counseled on asking for medicine for pain.   - Followed by Orthopedic Team. Follow on the recommendations / instructions.   - requires EZ " stand with transfer.   - DVT prophylaxis according to Orthopedic team recommendations  - Started rehab program, making a progress, continue until desired goal is achieved.   - continue Vit D and calcium      (F51.02) Adjustment insomnia  - on trazodone and melatonin    (D50.0) blood loss anemia:  - Hb trending up. Clinically stable      Orders:  NNO         Electronically signed by:  Brandon Aviles MD

## 2023-11-21 ENCOUNTER — TRANSITIONAL CARE UNIT VISIT (OUTPATIENT)
Dept: GERIATRICS | Facility: CLINIC | Age: 73
End: 2023-11-21
Payer: COMMERCIAL

## 2023-11-21 VITALS
SYSTOLIC BLOOD PRESSURE: 180 MMHG | OXYGEN SATURATION: 98 % | TEMPERATURE: 97.5 F | BODY MASS INDEX: 23.24 KG/M2 | WEIGHT: 126.3 LBS | HEIGHT: 62 IN | HEART RATE: 77 BPM | RESPIRATION RATE: 18 BRPM | DIASTOLIC BLOOD PRESSURE: 49 MMHG

## 2023-11-21 DIAGNOSIS — R29.6 RECURRENT FALLS: ICD-10-CM

## 2023-11-21 DIAGNOSIS — M85.851 OSTEOPENIA OF NECKS OF BOTH FEMURS: ICD-10-CM

## 2023-11-21 DIAGNOSIS — G31.9 NEURODEGENERATIVE DISORDER (H): ICD-10-CM

## 2023-11-21 DIAGNOSIS — F51.02 ADJUSTMENT INSOMNIA: ICD-10-CM

## 2023-11-21 DIAGNOSIS — R25.2 SPASTICITY: ICD-10-CM

## 2023-11-21 DIAGNOSIS — M85.852 OSTEOPENIA OF NECKS OF BOTH FEMURS: ICD-10-CM

## 2023-11-21 DIAGNOSIS — S72.22XD CLOSED DISPLACED SUBTROCHANTERIC FRACTURE OF LEFT FEMUR WITH ROUTINE HEALING, SUBSEQUENT ENCOUNTER: Primary | ICD-10-CM

## 2023-11-21 DIAGNOSIS — D50.0 BLOOD LOSS ANEMIA: ICD-10-CM

## 2023-11-21 PROCEDURE — 99305 1ST NF CARE MODERATE MDM 35: CPT | Performed by: FAMILY MEDICINE

## 2023-11-21 NOTE — LETTER
11/21/2023        RE: Kylah Britt  [address]  [facility] [address]        [facility] GERIATRICS    PRIMARY CARE PROVIDER AND CLINIC:  ESPERANZA Fuentes CNP, [address] [facility] [address]  Chief Complaint   Patient presents with     Hospital F/U      [place] Medical Record Number:  [id number]  Place of Service where encounter took place:  [facility] [place] ([facility]) [[id number]]    Kylah Britt  is a 73 year old  (1950), admitted to the above facility from  [facility]. Hospital stay 11/3/23 through 11/6/23..   HPI:    - Pt  with pmh notable for osteopenia, neurodegenerative d/o with spasticity (on baclofen)  - had a fall at [facility] resulted in left femur fx secondary to osteoporosis s/p internal fixation with gamma adrienne (11/4)  -oxycodone caused metabolic encephalopathy,hence decreased to 2.5 mg  - ABLA  - DVT ppx with asa 325 mg  bid    TODAY:  - left femur: reports pain is 7-810, better with medicine. Aggravated with activities.  Still NWB  - rehab: going on. Gets tired with therapy  - anemia: appetite is sleep.     CODE STATUS/ADVANCE DIRECTIVES DISCUSSION:  Prior  DNR / DNI  ALLERGIES:   Allergies   Allergen Reactions     No Known Drug Allergy       PAST MEDICAL HISTORY:   Past Medical History:   Diagnosis Date     Abnormal Papanicolaou smear of cervix and cervical HPV 9/1/92    vaginitis with abnormal Pap     Depressive disorder, not elsewhere classified     depression     Female stress incontinence     urinary incontinence     Lyme disease      Osteoarthrosis, unspecified whether generalized or localized, unspecified site      monoarticular arthritis in the right first MTP joint     Other psychological or physical stress, not elsewhere classified(V62.89)     delayed stress syndrome      PAST SURGICAL HISTORY:   has a past surgical history that includes REMOVAL OF OVARY/TUBE(S) (2/20/90); APPENDECTOMY; TOTAL ABDOM  HYSTERECTOMY (2/20/90); LIGATE FALLOPIAN TUBE,POSTPARTUM (04/12/77); COLONOSCOPY THRU STOMA, DIAGNOSTIC (06/24/98); KNEE SCOPE,DIAGNOSTIC (1/31/89); Open reduction internal fixation calcaneous (Right, 6/8/2017); Colonoscopy (N/A, 4/6/2022); and Open reduction internal fixation hip nailing (Left, 11/4/2023).  FAMILY HISTORY: family history includes Cancer in her father, paternal aunt, and son; Heart Disease in her mother; Hypertension in her mother.  SOCIAL HISTORY:   reports that she has never smoked. She has never used smokeless tobacco. She reports current alcohol use. She reports that she does not use drugs.  Patient's living condition: lives in an assisted living facility    Post Discharge Medication Reconciliation Status:   MED REC REQUIRED  Post Medication Reconciliation Status: discharge medications reconciled and changed, per note/orders       Current Outpatient Medications   Medication Sig     acetaminophen (TYLENOL) 500 MG tablet Take 1,000 mg by mouth 3 times daily     aspirin (ASA) 325 MG EC tablet Take 1 tablet (325 mg) by mouth 2 times daily for 41 days     baclofen (LIORESAL) 10 MG tablet Take 10 mg by mouth 2 times daily 3pm and 9pm, takes 5mg every morning     Baclofen (LIORESAL) 5 MG tablet Take 5 mg by mouth every morning And 10mg twice daily     Calcium Carbonate (CALCIUM 600 PO) Take 1 tablet by mouth every other day     gabapentin (NEURONTIN) 100 MG capsule Take 2 capsules (200 mg) by mouth At Bedtime     melatonin 3 MG tablet Take 3 tablets (9 mg) by mouth At Bedtime     oxyCODONE (ROXICODONE) 5 MG tablet Take 0.5 tablets (2.5 mg) by mouth every 4 hours as needed for moderate pain     senna-docusate (SENOKOT-S/PERICOLACE) 8.6-50 MG tablet Take 1 tablet by mouth 2 times daily     senna-docusate (SENOKOT-S/PERICOLACE) 8.6-50 MG tablet Take 1 tablet by mouth daily as needed for constipation Also takes 1 daily scheduled     senna-docusate (SENOKOT-S/PERICOLACE) 8.6-50 MG tablet Take 1 tablet by  "mouth daily And daily PRN     traZODone (DESYREL) 50 MG tablet Take 1 tablet (50 mg) by mouth At Bedtime     trolamine salicylate (ASPERCREME) 10 % external cream Apply topically 3 times daily APPLY TOPICALLY BEHIND RIGHT KNEE TO HIP THREE TIMES DAILY. OK TO LEAVE AT BEDSIDE. DX PAIN.     vitamin D3 (CHOLECALCIFEROL) 50 mcg (2000 units) tablet Take 50 mcg by mouth every other day     No current facility-administered medications for this visit.       ROS:  Limited secondary to cognitive deficit. but today pt reports- see HPI    Vitals:  BP (!) 180/49   Pulse 77   Temp 97.5  F (36.4  C)   Resp 18   Ht 1.575 m (5' 2\")   Wt 57.3 kg (126 lb 4.8 oz)   SpO2 98%   BMI 23.10 kg/m    Exam:  GENERAL APPEARANCE:  in no distress, tired looking, just came back from therapy.  RESP:  Unlabored breathing. CTA b/l.   CV:  S1S2 audible, regular HR, no murmur appreciated.   ABDOMEN:  soft, NT/ND, BS audible.   M/S:   no joint deformity noted on observation.   SKIN:  No rash noted on observation  NEURO:   No NFD appreciated on observation.   PSYCH:  affect and mood normal    Lab/Diagnostic data: Reviewed in the chart and EHR.        ASSESSMENT/PLAN:  ----------------------------  - Pt  with pmh notable for osteopenia (DEXA 2022), neurodegenerative d/o with spasticity (on baclofen)  - had a fall at [facility] resulted in left femur fx secondary to osteoporosis s/p internal fixation with gamma adrienne (11/4)  -oxycodone caused metabolic encephalopathy,hence decreased to 2.5 mg  - ABLA  - DVT ppx with asa 325 mg  bid unitl 12/9      (S72.22XD) Closed displaced subtrochanteric fracture of left femur with routine healing, subsequent encounter  (primary encounter diagnosis)  (G31.9) Neurodegenerative disorder (H24)  (R29.6) Recurrent falls  (R25.2) Spasticity  (M85.851,  M85.852) Osteopenia of necks of both femurs-per DEXA March 2022  -- Analgesia optimal with the current regimen. Continue present plan and medications. Counseled on asking for " medicine for pain.   - Followed by Orthopedic Team. Follow on the recommendations / instructions.   - requires EZ stand with transfer.   - DVT prophylaxis according to Orthopedic team recommendations  - Started rehab program, making a progress, continue until desired goal is achieved.   - continue Vit D and calcium      (F51.02) Adjustment insomnia  - on trazodone and melatonin    (D50.0) blood loss anemia:  - Hb trending up. Clinically stable      Orders:  NNO         Electronically signed by:  Brandon Aviles MD                     Sincerely,        Brandon Aviles MD

## 2023-11-27 VITALS
RESPIRATION RATE: 17 BRPM | SYSTOLIC BLOOD PRESSURE: 140 MMHG | WEIGHT: 122.2 LBS | TEMPERATURE: 97.3 F | OXYGEN SATURATION: 97 % | HEART RATE: 89 BPM | DIASTOLIC BLOOD PRESSURE: 65 MMHG | HEIGHT: 63 IN | BODY MASS INDEX: 21.65 KG/M2

## 2023-11-28 ENCOUNTER — LAB REQUISITION (OUTPATIENT)
Dept: LAB | Facility: CLINIC | Age: 73
End: 2023-11-28

## 2023-11-28 ENCOUNTER — TRANSITIONAL CARE UNIT VISIT (OUTPATIENT)
Dept: GERIATRICS | Facility: CLINIC | Age: 73
End: 2023-11-28
Payer: COMMERCIAL

## 2023-11-28 DIAGNOSIS — D62 ABLA (ACUTE BLOOD LOSS ANEMIA): ICD-10-CM

## 2023-11-28 DIAGNOSIS — S72.002D CLOSED FRACTURE OF LEFT HIP WITH ROUTINE HEALING, SUBSEQUENT ENCOUNTER: ICD-10-CM

## 2023-11-28 DIAGNOSIS — R52 PAIN: Primary | ICD-10-CM

## 2023-11-28 DIAGNOSIS — K59.03 DRUG-INDUCED CONSTIPATION: ICD-10-CM

## 2023-11-28 DIAGNOSIS — Z79.01 ANTICOAGULATED: ICD-10-CM

## 2023-11-28 DIAGNOSIS — F51.02 ADJUSTMENT INSOMNIA: ICD-10-CM

## 2023-11-28 DIAGNOSIS — I10 ESSENTIAL (PRIMARY) HYPERTENSION: ICD-10-CM

## 2023-11-28 PROCEDURE — 99309 SBSQ NF CARE MODERATE MDM 30: CPT | Performed by: NURSE PRACTITIONER

## 2023-11-28 RX ORDER — LIDOCAINE 4 G/G
1 PATCH TOPICAL EVERY 24 HOURS
COMMUNITY

## 2023-11-28 RX ORDER — HYDROXYZINE PAMOATE 25 MG/1
25 CAPSULE ORAL 3 TIMES DAILY
COMMUNITY
End: 2024-01-30

## 2023-11-28 NOTE — LETTER
"    11/28/2023        RE: Kylah Britt  [address]  [facility] [address]        [facility] GERIATRICS    Chief Complaint   Patient presents with     RECHECK     HPI:  Kylah Britt is a 73 year old  (1950), who is being seen today for an episodic care visit at: [facility] ([facility]) [[id number]].     Admitted to the above facility from  [facility]. Hospital stay 11/3/23 through 11/6/23.     This is a 74 yo female with PMHx for osteopenia, neurodegenerative d/o with spasticity (on baclofen) who presented s/p mechanical fall at [facility] resulted in left femur fx secondary to osteoporosis s/p internal fixation with gamma adrienne on 11/4/23.    Today's concern is:   Pain control    Allergies, and PMH/PSH reviewed in EPIC today.  REVIEW OF SYSTEMS:  4 point ROS including Respiratory, CV, GI and , other than that noted in the HPI,  is negative    Objective:   BP (!) 140/65   Pulse 89   Temp 97.3  F (36.3  C)   Resp 17   Ht 1.6 m (5' 3\")   Wt 55.4 kg (122 lb 3.2 oz)   SpO2 97%   BMI 21.65 kg/m    GENERAL APPEARANCE:  Alert, in no distress, oriented, cooperative, periodica pain  RESP:  respiratory effort and palpation of chest normal, lungs clear to auscultation   CV:  regular rate and rhythm, no murmur, rub, or gallop, no edema  PSYCH:  oriented X 3    Most Recent 3 CBC's:  Recent Labs   Lab Test 11/17/23  0705 11/10/23  0737 11/06/23  0625 11/04/23  0541 11/03/23  1158 07/10/23  0640 07/18/22  0730   WBC  --   --   --   --  8.2 4.5 6.3   HGB 9.5* 8.0* 8.2*   < > 12.2 13.0 13.0   MCV  --   --   --   --  89 88 92   PLT  --   --   --   --  362 326 410    < > = values in this interval not displayed.     Most Recent 3 BMP's:  Recent Labs   Lab Test 11/06/23  0625 11/05/23  0617 11/03/23  1158 07/10/23  0640 07/10/23  0640 07/18/22  0730   NA  --   --  139  --  139 139   POTASSIUM  --   --  3.9  --  4.4 4.8   CHLORIDE  --   --  104  --  105 107   CO2  --   " --  24  --  25 28   BUN  --   --  19.6  --  15.0 19   CR  --   --  0.72  --  0.76 0.70   ANIONGAP  --   --  11  --  9 4   NARA  --   --  9.6  --  9.5 9.2   * 135* 138*   < > 106* 95    < > = values in this interval not displayed.       Assessment/Plan:    (S72.002D) Closed fracture of left hip with routine healing, subsequent encounter  Continue NWB on LLE with follow-up (TBD), continue vit/ca supplementation    (Z79.01) Anticoagulated  Continue ASA 325mg BID x41d    (R52) Pain    Continue tylenol 1000mg TID and baclofen 5mg in AM/10mg BID, today start vistaril 25mg TID/lidocaine patch and decrease oxycodone to 2.5mg q6h PRN.  Also gabapentin 200mg at HS    Renal fx  La 200 mg at bedtimest Cr 0.73 with GFR >60, recheck BMP on 11/29    (D62) ABLA (acute blood loss anemia)  Last Hgb 9.5, recheck CBC on 11/29    (K59.03) Drug-induced constipation  Continue senna S 1 tab BID    (F51.02) Adjustment insomnia  Continue melatonin 9mg and trazodone 50mg at HS    Orders:  Decrease oxycodone, lidocaine patch, vistaril, BMP/CBC    Electronically signed by: Bishnu Polo NP         Sincerely,        Bishnu Polo NP

## 2023-11-28 NOTE — PROGRESS NOTES
"[facility] GERIATRICS    Chief Complaint   Patient presents with    RECHECK     HPI:  Kylah Britt is a 73 year old  (1950), who is being seen today for an episodic care visit at: [facility] REHAB [facility] ([facility]) [[id number]].     Admitted to the above facility from  [facility]. Hospital stay 11/3/23 through 11/6/23.     This is a 74 yo female with PMHx for osteopenia, neurodegenerative d/o with spasticity (on baclofen) who presented s/p mechanical fall at [facility] resulted in left femur fx secondary to osteoporosis s/p internal fixation with gamma adrienne on 11/4/23.    Today's concern is:   Pain control    Allergies, and PMH/PSH reviewed in EPIC today.  REVIEW OF SYSTEMS:  4 point ROS including Respiratory, CV, GI and , other than that noted in the HPI,  is negative    Objective:   BP (!) 140/65   Pulse 89   Temp 97.3  F (36.3  C)   Resp 17   Ht 1.6 m (5' 3\")   Wt 55.4 kg (122 lb 3.2 oz)   SpO2 97%   BMI 21.65 kg/m    GENERAL APPEARANCE:  Alert, in no distress, oriented, cooperative, periodica pain  RESP:  respiratory effort and palpation of chest normal, lungs clear to auscultation   CV:  regular rate and rhythm, no murmur, rub, or gallop, no edema  PSYCH:  oriented X 3    Most Recent 3 CBC's:  Recent Labs   Lab Test 11/17/23  0705 11/10/23  0737 11/06/23  0625 11/04/23  0541 11/03/23  1158 07/10/23  0640 07/18/22  0730   WBC  --   --   --   --  8.2 4.5 6.3   HGB 9.5* 8.0* 8.2*   < > 12.2 13.0 13.0   MCV  --   --   --   --  89 88 92   PLT  --   --   --   --  362 326 410    < > = values in this interval not displayed.     Most Recent 3 BMP's:  Recent Labs   Lab Test 11/06/23  0625 11/05/23  0617 11/03/23  1158 07/10/23  0640 07/10/23  0640 07/18/22  0730   NA  --   --  139  --  139 139   POTASSIUM  --   --  3.9  --  4.4 4.8   CHLORIDE  --   --  104  --  105 107   CO2  --   --  24  --  25 28   BUN  --   --  19.6  --  15.0 19   CR  --   --  0.72  --  0.76 0.70 "   ANIONGAP  --   --  11  --  9 4   NARA  --   --  9.6  --  9.5 9.2   * 135* 138*   < > 106* 95    < > = values in this interval not displayed.       Assessment/Plan:    (S72.002D) Closed fracture of left hip with routine healing, subsequent encounter  Continue NWB on LLE with follow-up (TBD), continue vit/ca supplementation    (Z79.01) Anticoagulated  Continue ASA 325mg BID x41d    (R52) Pain    Continue tylenol 1000mg TID and baclofen 5mg in AM/10mg BID, today start vistaril 25mg TID/lidocaine patch and decrease oxycodone to 2.5mg q6h PRN.  Also gabapentin 200mg at HS    Renal fx  La 200 mg at bedtimest Cr 0.73 with GFR >60, recheck BMP on 11/29    (D62) ABLA (acute blood loss anemia)  Last Hgb 9.5, recheck CBC on 11/29    (K59.03) Drug-induced constipation  Continue senna S 1 tab BID    (F51.02) Adjustment insomnia  Continue melatonin 9mg and trazodone 50mg at HS    Orders:  Decrease oxycodone, lidocaine patch, vistaril, BMP/CBC    Electronically signed by: Bishnu Polo NP

## 2023-11-29 ENCOUNTER — TELEPHONE (OUTPATIENT)
Dept: GERIATRICS | Facility: CLINIC | Age: 73
End: 2023-11-29
Payer: COMMERCIAL

## 2023-11-29 VITALS
DIASTOLIC BLOOD PRESSURE: 69 MMHG | WEIGHT: 122.2 LBS | OXYGEN SATURATION: 94 % | TEMPERATURE: 97.5 F | BODY MASS INDEX: 21.65 KG/M2 | HEART RATE: 81 BPM | RESPIRATION RATE: 16 BRPM | SYSTOLIC BLOOD PRESSURE: 117 MMHG

## 2023-11-29 LAB
ANION GAP SERPL CALCULATED.3IONS-SCNC: 15 MMOL/L (ref 7–15)
BUN SERPL-MCNC: 11.3 MG/DL (ref 8–23)
CALCIUM SERPL-MCNC: 9.9 MG/DL (ref 8.8–10.2)
CHLORIDE SERPL-SCNC: 103 MMOL/L (ref 98–107)
CREAT SERPL-MCNC: 0.59 MG/DL (ref 0.51–0.95)
DEPRECATED HCO3 PLAS-SCNC: 22 MMOL/L (ref 22–29)
EGFRCR SERPLBLD CKD-EPI 2021: >90 ML/MIN/1.73M2
ERYTHROCYTE [DISTWIDTH] IN BLOOD BY AUTOMATED COUNT: 13.9 % (ref 10–15)
GLUCOSE SERPL-MCNC: 91 MG/DL (ref 70–99)
HCT VFR BLD AUTO: 34.2 % (ref 35–47)
HGB BLD-MCNC: 11.2 G/DL (ref 11.7–15.7)
MCH RBC QN AUTO: 30.3 PG (ref 26.5–33)
MCHC RBC AUTO-ENTMCNC: 32.7 G/DL (ref 31.5–36.5)
MCV RBC AUTO: 92 FL (ref 78–100)
PLATELET # BLD AUTO: 522 10E3/UL (ref 150–450)
POTASSIUM SERPL-SCNC: 3.8 MMOL/L (ref 3.4–5.3)
RBC # BLD AUTO: 3.7 10E6/UL (ref 3.8–5.2)
SODIUM SERPL-SCNC: 140 MMOL/L (ref 135–145)
WBC # BLD AUTO: 3.9 10E3/UL (ref 4–11)

## 2023-11-29 PROCEDURE — 36415 COLL VENOUS BLD VENIPUNCTURE: CPT | Performed by: NURSE PRACTITIONER

## 2023-11-29 PROCEDURE — 80048 BASIC METABOLIC PNL TOTAL CA: CPT | Performed by: NURSE PRACTITIONER

## 2023-11-29 PROCEDURE — P9604 ONE-WAY ALLOW PRORATED TRIP: HCPCS | Performed by: NURSE PRACTITIONER

## 2023-11-29 PROCEDURE — 85027 COMPLETE CBC AUTOMATED: CPT | Performed by: NURSE PRACTITIONER

## 2023-11-29 NOTE — TELEPHONE ENCOUNTER
[facility] Lab Note     Provider: Bishnu Polo NP   Facility: [facility] Facility Type:  TCU    Allergies   Allergen Reactions    No Known Drug Allergy        Labs Reviewed by provider: BMP and CBC     Verbal Order/Direction given by Provider: JEFFO    Provider giving Order:  Bishnu Polo NP     Verbal Order given to: Nicolasa Bourgeois RN

## 2023-11-30 ENCOUNTER — TRANSITIONAL CARE UNIT VISIT (OUTPATIENT)
Dept: GERIATRICS | Facility: CLINIC | Age: 73
End: 2023-11-30
Payer: COMMERCIAL

## 2023-11-30 DIAGNOSIS — R52 PAIN: Primary | ICD-10-CM

## 2023-11-30 DIAGNOSIS — D62 ABLA (ACUTE BLOOD LOSS ANEMIA): ICD-10-CM

## 2023-11-30 DIAGNOSIS — Z79.01 ANTICOAGULATED: ICD-10-CM

## 2023-11-30 DIAGNOSIS — K59.03 DRUG-INDUCED CONSTIPATION: ICD-10-CM

## 2023-11-30 DIAGNOSIS — S72.002D CLOSED FRACTURE OF LEFT HIP WITH ROUTINE HEALING, SUBSEQUENT ENCOUNTER: ICD-10-CM

## 2023-11-30 DIAGNOSIS — F51.02 ADJUSTMENT INSOMNIA: ICD-10-CM

## 2023-11-30 PROCEDURE — 99309 SBSQ NF CARE MODERATE MDM 30: CPT | Performed by: NURSE PRACTITIONER

## 2023-11-30 NOTE — LETTER
11/30/2023        RE: Kylah Britt  [address]  [facility] [place] [address]        [facility] GERIATRICS    Chief Complaint   Patient presents with     RECHECK     HPI:  Kylah Britt is a 73 year old  (1950), who is being seen today for an episodic care visit at: [facility] REHAB [facility] ([facility]) [[id number]].     This is a 74 yo female with PMHx for osteopenia, neurodegenerative d/o with spasticity (on baclofen) who presented s/p mechanical fall at [facility] resulted in left femur fx secondary to osteoporosis s/p internal fixation with gamma adrienne on 11/4/23.    Today's concern is:   Pain, insomnia    Allergies, and PMH/PSH reviewed in EPIC today.  REVIEW OF SYSTEMS:  4 point ROS including Respiratory, CV, GI and , other than that noted in the HPI,  is negative     Objective:   /69   Pulse 81   Temp 97.5  F (36.4  C)   Resp 16   Wt 55.4 kg (122 lb 3.2 oz)   SpO2 94%   BMI 21.65 kg/m    GENERAL APPEARANCE:  Alert, in no distress, oriented, cooperative, pain in knees  RESP:  respiratory effort normal, lungs clear to auscultation, RA  CV:  regular rate and rhythm, no murmur, rub, or gallop, no edema  PSYCH:  oriented X 3    Most Recent 3 CBC's:  Recent Labs   Lab Test 11/29/23  0729 11/17/23  0705 11/10/23  0737 11/04/23  0541 11/03/23  1158 07/10/23  0640   WBC 3.9*  --   --   --  8.2 4.5   HGB 11.2* 9.5* 8.0*   < > 12.2 13.0   MCV 92  --   --   --  89 88   *  --   --   --  362 326    < > = values in this interval not displayed.     Most Recent 3 BMP's:  Recent Labs   Lab Test 11/29/23  0729 11/06/23  0625 11/05/23  0617 11/03/23  1158 07/10/23  0640     --   --  139 139   POTASSIUM 3.8  --   --  3.9 4.4   CHLORIDE 103  --   --  104 105   CO2 22  --   --  24 25   BUN 11.3  --   --  19.6 15.0   CR 0.59  --   --  0.72 0.76   ANIONGAP 15  --   --  11 9   NARA 9.9  --   --  9.6 9.5   GLC 91 107* 135* 138* 106*       Assessment/Plan:      (S72.002D) Closed fracture of left hip  with routine healing, subsequent encounter  Continue NWB on LLE with follow-up (TBD), continue vit/ca supplementation     (Z79.01) Anticoagulated  Continue ASA 325mg BID x41d, complete on 12/16     (R52) Pain    Continue tylenol 1000mg TID and baclofen 5mg in AM/10mg BID, vistaril 25mg TID/lidocaine patch and oxycodone 2.5mg q6h PRN.  Also gabapentin 200mg at HS. Today discontinue aspercreme and start voltaren gel 1% 4gm QID to knees     Renal fx  Last Cr 0.59 with GFR >60 on 11/29     (D62) ABLA (acute blood loss anemia)  Last Hgb 11.2 on 11/29     (K59.03) Drug-induced constipation  Continue senna S 1 tab BID, adequate     (F51.02) Adjustment insomnia  Continue melatonin 9mg and trazodone 50mg at HS    Orders:  Voltaren gel    Electronically signed by: Bishnu Polo NP         Sincerely,        Bishnu Polo NP

## 2023-11-30 NOTE — PROGRESS NOTES
[facility] GERIATRICS    Chief Complaint   Patient presents with    RECHECK     HPI:  Kylah Britt is a 73 year old  (1950), who is being seen today for an episodic care visit at: [facility] ([facility]) [[id number]].     This is a 74 yo female with PMHx for osteopenia, neurodegenerative d/o with spasticity (on baclofen) who presented s/p mechanical fall at [facility] resulted in left femur fx secondary to osteoporosis s/p internal fixation with gamma adrienne on 11/4/23.    Today's concern is:   Pain, insomnia    Allergies, and PMH/PSH reviewed in EPIC today.  REVIEW OF SYSTEMS:  4 point ROS including Respiratory, CV, GI and , other than that noted in the HPI,  is negative     Objective:   /69   Pulse 81   Temp 97.5  F (36.4  C)   Resp 16   Wt 55.4 kg (122 lb 3.2 oz)   SpO2 94%   BMI 21.65 kg/m    GENERAL APPEARANCE:  Alert, in no distress, oriented, cooperative, pain in knees  RESP:  respiratory effort normal, lungs clear to auscultation, RA  CV:  regular rate and rhythm, no murmur, rub, or gallop, no edema  PSYCH:  oriented X 3    Most Recent 3 CBC's:  Recent Labs   Lab Test 11/29/23  0729 11/17/23  0705 11/10/23  0737 11/04/23  0541 11/03/23  1158 07/10/23  0640   WBC 3.9*  --   --   --  8.2 4.5   HGB 11.2* 9.5* 8.0*   < > 12.2 13.0   MCV 92  --   --   --  89 88   *  --   --   --  362 326    < > = values in this interval not displayed.     Most Recent 3 BMP's:  Recent Labs   Lab Test 11/29/23  0729 11/06/23  0625 11/05/23  0617 11/03/23  1158 07/10/23  0640     --   --  139 139   POTASSIUM 3.8  --   --  3.9 4.4   CHLORIDE 103  --   --  104 105   CO2 22  --   --  24 25   BUN 11.3  --   --  19.6 15.0   CR 0.59  --   --  0.72 0.76   ANIONGAP 15  --   --  11 9   NARA 9.9  --   --  9.6 9.5   GLC 91 107* 135* 138* 106*       Assessment/Plan:      (S72.002D) Closed fracture of left hip with routine healing, subsequent encounter  Continue NWB on LLE with follow-up (TBD),  continue vit/ca supplementation     (Z79.01) Anticoagulated  Continue ASA 325mg BID x41d, complete on 12/16     (R52) Pain    Continue tylenol 1000mg TID and baclofen 5mg in AM/10mg BID, vistaril 25mg TID/lidocaine patch and oxycodone 2.5mg q6h PRN.  Also gabapentin 200mg at HS. Today discontinue aspercreme and start voltaren gel 1% 4gm QID to knees     Renal fx  Last Cr 0.59 with GFR >60 on 11/29     (D62) ABLA (acute blood loss anemia)  Last Hgb 11.2 on 11/29     (K59.03) Drug-induced constipation  Continue senna S 1 tab BID, adequate     (F51.02) Adjustment insomnia  Continue melatonin 9mg and trazodone 50mg at HS    Orders:  Voltaren gel    Electronically signed by: Bishnu Polo NP

## 2023-12-06 VITALS
WEIGHT: 120 LBS | RESPIRATION RATE: 20 BRPM | BODY MASS INDEX: 21.26 KG/M2 | SYSTOLIC BLOOD PRESSURE: 140 MMHG | HEART RATE: 76 BPM | DIASTOLIC BLOOD PRESSURE: 65 MMHG | HEIGHT: 63 IN | OXYGEN SATURATION: 94 % | TEMPERATURE: 96.9 F

## 2023-12-07 ENCOUNTER — TRANSITIONAL CARE UNIT VISIT (OUTPATIENT)
Dept: GERIATRICS | Facility: CLINIC | Age: 73
End: 2023-12-07
Payer: COMMERCIAL

## 2023-12-07 DIAGNOSIS — F51.02 ADJUSTMENT INSOMNIA: ICD-10-CM

## 2023-12-07 DIAGNOSIS — S72.002A CLOSED FRACTURE OF LEFT HIP, INITIAL ENCOUNTER (H): ICD-10-CM

## 2023-12-07 DIAGNOSIS — S72.002D CLOSED FRACTURE OF LEFT HIP WITH ROUTINE HEALING, SUBSEQUENT ENCOUNTER: ICD-10-CM

## 2023-12-07 DIAGNOSIS — K59.03 DRUG-INDUCED CONSTIPATION: ICD-10-CM

## 2023-12-07 DIAGNOSIS — R52 PAIN: Primary | ICD-10-CM

## 2023-12-07 PROCEDURE — 99309 SBSQ NF CARE MODERATE MDM 30: CPT | Performed by: NURSE PRACTITIONER

## 2023-12-07 NOTE — LETTER
"    12/7/2023        RE: Kylah Britt  [address]  [facility] [address]        M [facility] GERIATRICS    Chief Complaint   Patient presents with     RECHECK     HPI:  Kylah Britt is a 73 year old  (1950), who is being seen today for an episodic care visit at: [facility] AND REHAB [facility] ([facility]) [[id number]].     This is a 72 yo female with PMHx for osteopenia, neurodegenerative d/o with spasticity (on baclofen) who presented s/p mechanical fall at [facility] resulted in left femur fx secondary to osteoporosis s/p internal fixation with gamma adrienne on 11/4/23.     Today's concern is:   Insomnia, pain    Allergies, and PMH/PSH reviewed in [facility] today.  REVIEW OF SYSTEMS:  4 point ROS including Respiratory, CV, GI and , other than that noted in the HPI,  is negative    Objective:   BP (!) 140/65   Pulse 76   Temp 96.9  F (36.1  C)   Resp 20   Ht 1.6 m (5' 3\")   Wt 54.4 kg (120 lb)   SpO2 94%   BMI 21.26 kg/m    GENERAL APPEARANCE:  Alert, in no distress, oriented, cooperative, pain in knees improved  RESP:  respiratory effort normal, lungs clear to auscultation, RA  CV:  regular rate and rhythm, no murmur, rub, or gallop, no edema  PSYCH:  oriented X 3    Most Recent 3 CBC's:  Recent Labs   Lab Test 11/29/23  0729 11/17/23  0705 11/10/23  0737 11/04/23  0541 11/03/23  1158 07/10/23  0640   WBC 3.9*  --   --   --  8.2 4.5   HGB 11.2* 9.5* 8.0*   < > 12.2 13.0   MCV 92  --   --   --  89 88   *  --   --   --  362 326    < > = values in this interval not displayed.     Most Recent 3 BMP's:  Recent Labs   Lab Test 11/29/23  0729 11/06/23  0625 11/05/23  0617 11/03/23  1158 07/10/23  0640     --   --  139 139   POTASSIUM 3.8  --   --  3.9 4.4   CHLORIDE 103  --   --  104 105   CO2 22  --   --  24 25   BUN 11.3  --   --  19.6 15.0   CR 0.59  --   --  0.72 0.76   ANIONGAP 15  --   --  11 9   NARA 9.9  --   --  9.6 9.5   GLC 91 107* 135* 138* 106*       Assessment/Plan:    (S72.002D) Closed " fracture of left hip with routine healing, subsequent encounter  Continue NWB on LLE with follow-up on 12/14, continue vit/ca supplementation     (Z79.01) Anticoagulated  Continue ASA 325mg BID x41d, complete on 12/16. No change     (R52) Pain    Continue tylenol 1000mg TID and baclofen 5mg in AM/10mg BID, vistaril 25mg TID/lidocaine patch and oxycodone 2.5mg q6h PRN.  Also gabapentin 200mg at HS with voltaren gel 1% 4gm QID to knees, improved     Renal fx  Last Cr 0.59 with GFR >60 on 11/29     (D62) ABLA (acute blood loss anemia)  Last Hgb 11.2 on 11/29      (K59.03) Drug-induced constipation  Continue senna S 1 tab BID, no issues     (F51.02) Adjustment insomnia  Continue melatonin 9mg and trazodone 50mg at HS, stable    Orders:  NA    Electronically signed by: Bishnu Polo NP         Sincerely,        Bishnu Polo NP

## 2023-12-07 NOTE — PROGRESS NOTES
"[facility] GERIATRICS    Chief Complaint   Patient presents with    RECHECK     HPI:  Kylah Britt is a 73 year old  (1950), who is being seen today for an episodic care visit at: [facility] AND REHAB [facility] ([facility]) [[id number]].     This is a 74 yo female with PMHx for osteopenia, neurodegenerative d/o with spasticity (on baclofen) who presented s/p mechanical fall at [facility] resulted in left femur fx secondary to osteoporosis s/p internal fixation with gamma adrienne on 11/4/23.     Today's concern is:   Insomnia, pain    Allergies, and PMH/PSH reviewed in EPIC today.  REVIEW OF SYSTEMS:  4 point ROS including Respiratory, CV, GI and , other than that noted in the HPI,  is negative    Objective:   BP (!) 140/65   Pulse 76   Temp 96.9  F (36.1  C)   Resp 20   Ht 1.6 m (5' 3\")   Wt 54.4 kg (120 lb)   SpO2 94%   BMI 21.26 kg/m    GENERAL APPEARANCE:  Alert, in no distress, oriented, cooperative, pain in knees improved  RESP:  respiratory effort normal, lungs clear to auscultation, RA  CV:  regular rate and rhythm, no murmur, rub, or gallop, no edema  PSYCH:  oriented X 3    Most Recent 3 CBC's:  Recent Labs   Lab Test 11/29/23  0729 11/17/23  0705 11/10/23  0737 11/04/23  0541 11/03/23  1158 07/10/23  0640   WBC 3.9*  --   --   --  8.2 4.5   HGB 11.2* 9.5* 8.0*   < > 12.2 13.0   MCV 92  --   --   --  89 88   *  --   --   --  362 326    < > = values in this interval not displayed.     Most Recent 3 BMP's:  Recent Labs   Lab Test 11/29/23  0729 11/06/23  0625 11/05/23  0617 11/03/23  1158 07/10/23  0640     --   --  139 139   POTASSIUM 3.8  --   --  3.9 4.4   CHLORIDE 103  --   --  104 105   CO2 22  --   --  24 25   BUN 11.3  --   --  19.6 15.0   CR 0.59  --   --  0.72 0.76   ANIONGAP 15  --   --  11 9   NARA 9.9  --   --  9.6 9.5   GLC 91 107* 135* 138* 106*       Assessment/Plan:    (S72.002D) Closed fracture of left hip with routine healing, subsequent encounter  Continue NWB on LLE " with follow-up on 12/14, continue vit/ca supplementation     (Z79.01) Anticoagulated  Continue ASA 325mg BID x41d, complete on 12/16. No change     (R52) Pain    Continue tylenol 1000mg TID and baclofen 5mg in AM/10mg BID, vistaril 25mg TID/lidocaine patch and oxycodone 2.5mg q6h PRN.  Also gabapentin 200mg at HS with voltaren gel 1% 4gm QID to knees, improved     Renal fx  Last Cr 0.59 with GFR >60 on 11/29     (D62) ABLA (acute blood loss anemia)  Last Hgb 11.2 on 11/29      (K59.03) Drug-induced constipation  Continue senna S 1 tab BID, no issues     (F51.02) Adjustment insomnia  Continue melatonin 9mg and trazodone 50mg at HS, stable    Orders:  NA    Electronically signed by: Bishnu Polo NP

## 2023-12-08 ENCOUNTER — TELEPHONE (OUTPATIENT)
Dept: GERIATRICS | Facility: CLINIC | Age: 73
End: 2023-12-08
Payer: COMMERCIAL

## 2023-12-08 NOTE — TELEPHONE ENCOUNTER
[facility] Geriatrics Triage Nurse Telephone Encounter    Provider: Bishnu Polo NP   Facility: [facility] Facility Type:  TCU    Caller: Nicolasa   Call Back Number: [phone]    Allergies:    Allergies   Allergen Reactions    No Known Drug Allergy         Reason for call: Today the patient tested positive for Covid-19, the patient states that she has no symptoms, nursing is noticing an occasional cough. Vitals: BP:  116/68  P:: 77  R:: 16  SPO2: 94% R/A Temp.:  98.4.      The patient is not wanting any anti-virals at this time.    Verbal Order/Direction given by Provider: Continue to monitor for any new symptoms, call if any worsening, call if the patient changes their mind about antivirals.     Provider giving Order:  Bishnu Polo NP     Verbal Order given to: Nicolasa Fallon RN

## 2023-12-11 VITALS
HEART RATE: 71 BPM | RESPIRATION RATE: 20 BRPM | BODY MASS INDEX: 21.26 KG/M2 | SYSTOLIC BLOOD PRESSURE: 118 MMHG | OXYGEN SATURATION: 94 % | HEIGHT: 63 IN | WEIGHT: 120 LBS | TEMPERATURE: 98.4 F | DIASTOLIC BLOOD PRESSURE: 82 MMHG

## 2023-12-12 ENCOUNTER — LAB REQUISITION (OUTPATIENT)
Dept: LAB | Facility: CLINIC | Age: 73
End: 2023-12-12

## 2023-12-12 ENCOUNTER — TRANSITIONAL CARE UNIT VISIT (OUTPATIENT)
Dept: GERIATRICS | Facility: CLINIC | Age: 73
End: 2023-12-12
Payer: COMMERCIAL

## 2023-12-12 DIAGNOSIS — K59.03 DRUG-INDUCED CONSTIPATION: ICD-10-CM

## 2023-12-12 DIAGNOSIS — I10 ESSENTIAL (PRIMARY) HYPERTENSION: ICD-10-CM

## 2023-12-12 DIAGNOSIS — R52 PAIN: Primary | ICD-10-CM

## 2023-12-12 DIAGNOSIS — F51.02 ADJUSTMENT INSOMNIA: ICD-10-CM

## 2023-12-12 DIAGNOSIS — D62 ABLA (ACUTE BLOOD LOSS ANEMIA): ICD-10-CM

## 2023-12-12 DIAGNOSIS — S72.002D CLOSED FRACTURE OF LEFT HIP WITH ROUTINE HEALING, SUBSEQUENT ENCOUNTER: ICD-10-CM

## 2023-12-12 PROCEDURE — 99309 SBSQ NF CARE MODERATE MDM 30: CPT | Performed by: NURSE PRACTITIONER

## 2023-12-12 NOTE — LETTER
"    12/12/2023        RE: Kylah Britt  [address]  [facility] [address]        M [facility] GERIATRICS    Chief Complaint   Patient presents with     RECHECK     HPI:  Kylah Britt is a 73 year old  (1950), who is being seen today for an episodic care visit at: [facility] AND REHAB [facility] ([facility]) [[id number]].     This is a 74 yo female with PMHx for osteopenia, neurodegenerative d/o with spasticity (on baclofen) who presented s/p mechanical fall at [facility] resulted in left femur fx secondary to osteoporosis s/p internal fixation with gamma adrienne on 11/4/23.      Today's concern is:   Covid 19    Allergies, and PMH/PSH reviewed in [facility] today.  REVIEW OF SYSTEMS:  4 point ROS including Respiratory, CV, GI and , other than that noted in the HPI,  is negative     Objective:   /82   Pulse 71   Temp 98.4  F (36.9  C)   Resp 20   Ht 1.6 m (5' 3\")   Wt 54.4 kg (120 lb)   SpO2 94%   BMI 21.26 kg/m    GENERAL APPEARANCE:  Alert, in no distress, oriented, cooperative, pain in knees improved  RESP:  respiratory effort normal, lungs clear to auscultation, RA  CV:  regular rate and rhythm, no murmur, rub, or gallop, no edema  PSYCH:  oriented X 2/3    Most Recent 3 CBC's:  Recent Labs   Lab Test 11/29/23  0729 11/17/23  0705 11/10/23  0737 11/04/23  0541 11/03/23  1158 07/10/23  0640   WBC 3.9*  --   --   --  8.2 4.5   HGB 11.2* 9.5* 8.0*   < > 12.2 13.0   MCV 92  --   --   --  89 88   *  --   --   --  362 326    < > = values in this interval not displayed.     Most Recent 3 BMP's:  Recent Labs   Lab Test 11/29/23  0729 11/06/23  0625 11/05/23  0617 11/03/23  1158 07/10/23  0640     --   --  139 139   POTASSIUM 3.8  --   --  3.9 4.4   CHLORIDE 103  --   --  104 105   CO2 22  --   --  24 25   BUN 11.3  --   --  19.6 15.0   CR 0.59  --   --  0.72 0.76   ANIONGAP 15  --   --  11 9   NARA 9.9  --   --  9.6 9.5   GLC 91 107* 135* 138* 106*       Assessment/Plan:    (S72.002D) Closed " fracture of left hip with routine healing, subsequent encounter  Continue NWB on LLE with follow-up on 12/14, continue vit/ca supplementation, continue 25% WB on LLE     (Z79.01) Anticoagulated  Continue ASA 325mg BID x41d, complete on 12/16     (R52) Pain    Continue tylenol 1000mg TID and baclofen 5mg in AM/10mg BID, vistaril 25mg TID/lidocaine patch and oxycodone 2.5mg q6h PRN.  Also gabapentin 200mg at HS with voltaren gel 1% 4gm QID to knees, stable    Covid 19  Positive on 12/8, asymptomatic      Renal fx  Last Cr 0.59 with GFR >60 on 11/29, recheck BMP on 12/13     (D62) ABLA (acute blood loss anemia)  Last Hgb 11.2 on 11/29, recheck CBC on 12/13     (K59.03) Drug-induced constipation  Continue senna S 1 tab BID, no issues     (F51.02) Adjustment insomnia  Continue melatonin 9mg and trazodone 50mg at HS, stable    Orders:  BMP/CBC    Electronically signed by: Bishnu Polo NP         Sincerely,        Bishnu Polo NP

## 2023-12-12 NOTE — PROGRESS NOTES
"[facility] GERIATRICS    Chief Complaint   Patient presents with    RECHECK     HPI:  Kylah Britt is a 73 year old  (1950), who is being seen today for an episodic care visit at: [facility] AND REHAB [facility] ([facility]) [[id number]].     This is a 72 yo female with PMHx for osteopenia, neurodegenerative d/o with spasticity (on baclofen) who presented s/p mechanical fall at [facility] resulted in left femur fx secondary to osteoporosis s/p internal fixation with gamma adrienne on 11/4/23.      Today's concern is:   Covid 19    Allergies, and PMH/PSH reviewed in EPIC today.  REVIEW OF SYSTEMS:  4 point ROS including Respiratory, CV, GI and , other than that noted in the HPI,  is negative     Objective:   /82   Pulse 71   Temp 98.4  F (36.9  C)   Resp 20   Ht 1.6 m (5' 3\")   Wt 54.4 kg (120 lb)   SpO2 94%   BMI 21.26 kg/m    GENERAL APPEARANCE:  Alert, in no distress, oriented, cooperative, pain in knees improved  RESP:  respiratory effort normal, lungs clear to auscultation, RA  CV:  regular rate and rhythm, no murmur, rub, or gallop, no edema  PSYCH:  oriented X 2/3    Most Recent 3 CBC's:  Recent Labs   Lab Test 11/29/23  0729 11/17/23  0705 11/10/23  0737 11/04/23  0541 11/03/23  1158 07/10/23  0640   WBC 3.9*  --   --   --  8.2 4.5   HGB 11.2* 9.5* 8.0*   < > 12.2 13.0   MCV 92  --   --   --  89 88   *  --   --   --  362 326    < > = values in this interval not displayed.     Most Recent 3 BMP's:  Recent Labs   Lab Test 11/29/23  0729 11/06/23  0625 11/05/23  0617 11/03/23  1158 07/10/23  0640     --   --  139 139   POTASSIUM 3.8  --   --  3.9 4.4   CHLORIDE 103  --   --  104 105   CO2 22  --   --  24 25   BUN 11.3  --   --  19.6 15.0   CR 0.59  --   --  0.72 0.76   ANIONGAP 15  --   --  11 9   NARA 9.9  --   --  9.6 9.5   GLC 91 107* 135* 138* 106*       Assessment/Plan:    (S72.002D) Closed fracture of left hip with routine healing, subsequent encounter  Continue NWB on LLE with " follow-up on 12/14, continue vit/ca supplementation, continue 25% WB on LLE     (Z79.01) Anticoagulated  Continue ASA 325mg BID x41d, complete on 12/16     (R52) Pain    Continue tylenol 1000mg TID and baclofen 5mg in AM/10mg BID, vistaril 25mg TID/lidocaine patch and oxycodone 2.5mg q6h PRN.  Also gabapentin 200mg at HS with voltaren gel 1% 4gm QID to knees, stable    Covid 19  Positive on 12/8, asymptomatic      Renal fx  Last Cr 0.59 with GFR >60 on 11/29, recheck BMP on 12/13     (D62) ABLA (acute blood loss anemia)  Last Hgb 11.2 on 11/29, recheck CBC on 12/13     (K59.03) Drug-induced constipation  Continue senna S 1 tab BID, no issues     (F51.02) Adjustment insomnia  Continue melatonin 9mg and trazodone 50mg at HS, stable    Orders:  BMP/CBC    Electronically signed by: Bishnu Polo NP

## 2023-12-13 ENCOUNTER — TELEPHONE (OUTPATIENT)
Dept: GERIATRICS | Facility: CLINIC | Age: 73
End: 2023-12-13
Payer: COMMERCIAL

## 2023-12-13 LAB
ANION GAP SERPL CALCULATED.3IONS-SCNC: 14 MMOL/L (ref 7–15)
BASOPHILS # BLD AUTO: 0.1 10E3/UL (ref 0–0.2)
BASOPHILS NFR BLD AUTO: 1 %
BUN SERPL-MCNC: 12.4 MG/DL (ref 8–23)
CALCIUM SERPL-MCNC: 9.6 MG/DL (ref 8.8–10.2)
CHLORIDE SERPL-SCNC: 104 MMOL/L (ref 98–107)
CREAT SERPL-MCNC: 0.64 MG/DL (ref 0.51–0.95)
DEPRECATED HCO3 PLAS-SCNC: 22 MMOL/L (ref 22–29)
EGFRCR SERPLBLD CKD-EPI 2021: >90 ML/MIN/1.73M2
EOSINOPHIL # BLD AUTO: 0.2 10E3/UL (ref 0–0.7)
EOSINOPHIL NFR BLD AUTO: 3 %
ERYTHROCYTE [DISTWIDTH] IN BLOOD BY AUTOMATED COUNT: 13.5 % (ref 10–15)
GLUCOSE SERPL-MCNC: 105 MG/DL (ref 70–99)
HCT VFR BLD AUTO: 37.8 % (ref 35–47)
HGB BLD-MCNC: 11.9 G/DL (ref 11.7–15.7)
IMM GRANULOCYTES # BLD: 0 10E3/UL
IMM GRANULOCYTES NFR BLD: 0 %
LYMPHOCYTES # BLD AUTO: 1.3 10E3/UL (ref 0.8–5.3)
LYMPHOCYTES NFR BLD AUTO: 19 %
MCH RBC QN AUTO: 27.9 PG (ref 26.5–33)
MCHC RBC AUTO-ENTMCNC: 31.5 G/DL (ref 31.5–36.5)
MCV RBC AUTO: 89 FL (ref 78–100)
MONOCYTES # BLD AUTO: 0.4 10E3/UL (ref 0–1.3)
MONOCYTES NFR BLD AUTO: 6 %
NEUTROPHILS # BLD AUTO: 4.9 10E3/UL (ref 1.6–8.3)
NEUTROPHILS NFR BLD AUTO: 71 %
NRBC # BLD AUTO: 0 10E3/UL
NRBC BLD AUTO-RTO: 0 /100
PLATELET # BLD AUTO: 446 10E3/UL (ref 150–450)
POTASSIUM SERPL-SCNC: 4.2 MMOL/L (ref 3.4–5.3)
RBC # BLD AUTO: 4.26 10E6/UL (ref 3.8–5.2)
SODIUM SERPL-SCNC: 140 MMOL/L (ref 135–145)
WBC # BLD AUTO: 6.8 10E3/UL (ref 4–11)

## 2023-12-13 PROCEDURE — P9604 ONE-WAY ALLOW PRORATED TRIP: HCPCS | Performed by: NURSE PRACTITIONER

## 2023-12-13 PROCEDURE — 36415 COLL VENOUS BLD VENIPUNCTURE: CPT | Performed by: NURSE PRACTITIONER

## 2023-12-13 PROCEDURE — 80048 BASIC METABOLIC PNL TOTAL CA: CPT | Performed by: NURSE PRACTITIONER

## 2023-12-13 PROCEDURE — 85025 COMPLETE CBC W/AUTO DIFF WBC: CPT | Performed by: NURSE PRACTITIONER

## 2023-12-13 NOTE — TELEPHONE ENCOUNTER
[facility]    Provider: Bishnu Polo NP   Facility: [facility] Facility Type:  TCU    Caller: Nora   Call Back Number: [phone]    Allergies:    Allergies   Allergen Reactions    No Known Drug Allergy         Reason for call: This am when the patient was asked what her name was, she gave the nurse the roommates name. The patient was then given her roommates medications which included-  Metoprolol 25mg   Metolazone 5mg  Bumex 1mg  Colace 100mg  Vit B12 500mg  Cyclobenzaprine 5mg   MVI  Plavix 75mg  Potassium citrate 20meq  Senna 8.6  Venlafaxine 112.5mg       Verbal Order/Direction given by Provider: Push fluids, VS q2h x 12 hrs. Contact Pharmacy about any medication interaction and recommendations about giving her own medications.     Provider giving Order:  Bishnu Polo NP     Verbal Order given to: Nora Fallon RN

## 2023-12-18 VITALS
OXYGEN SATURATION: 96 % | BODY MASS INDEX: 20.75 KG/M2 | RESPIRATION RATE: 18 BRPM | HEART RATE: 68 BPM | DIASTOLIC BLOOD PRESSURE: 65 MMHG | TEMPERATURE: 96.8 F | SYSTOLIC BLOOD PRESSURE: 125 MMHG | WEIGHT: 117.1 LBS | HEIGHT: 63 IN

## 2023-12-19 ENCOUNTER — TRANSITIONAL CARE UNIT VISIT (OUTPATIENT)
Dept: GERIATRICS | Facility: CLINIC | Age: 73
End: 2023-12-19
Payer: COMMERCIAL

## 2023-12-19 DIAGNOSIS — D62 ABLA (ACUTE BLOOD LOSS ANEMIA): ICD-10-CM

## 2023-12-19 DIAGNOSIS — F51.02 ADJUSTMENT INSOMNIA: ICD-10-CM

## 2023-12-19 DIAGNOSIS — S72.002D CLOSED FRACTURE OF LEFT HIP WITH ROUTINE HEALING, SUBSEQUENT ENCOUNTER: ICD-10-CM

## 2023-12-19 DIAGNOSIS — R52 PAIN: Primary | ICD-10-CM

## 2023-12-19 DIAGNOSIS — Z79.01 ANTICOAGULATED: ICD-10-CM

## 2023-12-19 DIAGNOSIS — K59.03 DRUG-INDUCED CONSTIPATION: ICD-10-CM

## 2023-12-19 PROCEDURE — 99309 SBSQ NF CARE MODERATE MDM 30: CPT | Performed by: NURSE PRACTITIONER

## 2023-12-19 NOTE — PROGRESS NOTES
"[facility] GERIATRICS    Chief Complaint   Patient presents with    RECHECK     HPI:  Kylah Britt is a 73 year old  (1950), who is being seen today for an episodic care visit at: [facility] AND REHAB [facility] ([facility]) [[id number]].     This is a 74 yo female with PMHx for osteopenia, neurodegenerative d/o with spasticity (on baclofen) who presented s/p mechanical fall at [facility] resulted in left femur fx secondary to osteoporosis s/p internal fixation with gamma adrienne on 11/4/23.       Today's concern is:   pain    Allergies, and PMH/PSH reviewed in EPIC today.  REVIEW OF SYSTEMS:  4 point ROS including Respiratory, CV, GI and , other than that noted in the HPI,  is negative    Objective:   /65   Pulse 68   Temp 96.8  F (36  C)   Resp 18   Ht 1.6 m (5' 3\")   Wt 53.1 kg (117 lb 1.6 oz)   SpO2 96%   BMI 20.74 kg/m    GENERAL APPEARANCE:  Alert, in no distress, cooperative, pain stable  RESP:  respiratory effort normal, lungs clear to auscultation, RA  CV:  regular rate and rhythm, no murmur, rub, or gallop, no edema  PSYCH:  oriented X 2/3    Most Recent 3 CBC's:  Recent Labs   Lab Test 12/13/23  0830 11/29/23  0729 11/17/23  0705 11/04/23  0541 11/03/23  1158   WBC 6.8 3.9*  --   --  8.2   HGB 11.9 11.2* 9.5*   < > 12.2   MCV 89 92  --   --  89    522*  --   --  362    < > = values in this interval not displayed.     Most Recent 3 BMP's:  Recent Labs   Lab Test 12/13/23  0830 11/29/23  0729 11/06/23  0625 11/05/23  0617 11/03/23  1158    140  --   --  139   POTASSIUM 4.2 3.8  --   --  3.9   CHLORIDE 104 103  --   --  104   CO2 22 22  --   --  24   BUN 12.4 11.3  --   --  19.6   CR 0.64 0.59  --   --  0.72   ANIONGAP 14 15  --   --  11   NARA 9.6 9.9  --   --  9.6   * 91 107*   < > 138*    < > = values in this interval not displayed.       Assessment/Plan:    (S72.002D) Closed fracture of left hip with routine healing, subsequent encounter  Continue NWB on LLE with " follow-up on 12/26, continue vit/ca supplementation, continue 25% WB on LLE     (Z79.01) Anticoagulated  Completed course of ASA on 12/16     (R52) Pain    Continue tylenol 1000mg TID and baclofen 5mg in AM/10mg BID, vistaril 25mg TID/lidocaine patch and oxycodone 2.5mg q6h PRN.  Also gabapentin 200mg at HS with voltaren gel 1% 4gm QID to knees. Pain seems controlled     Covid 19  Positive on 12/8, asymptomatic      Renal fx  Last Cr 0.64 with GFR >90 on 12/13     (D62) ABLA (acute blood loss anemia)  Last Hgb 11.9 on 12/13     (K59.03) Drug-induced constipation  Continue senna S 1 tab BID, stable     (F51.02) Adjustment insomnia  Continue melatonin 9mg and trazodone 50mg at HS, stable    Orders:  NA    Electronically signed by: Bishnu Polo NP

## 2023-12-19 NOTE — LETTER
"    12/19/2023        RE: Kylah Britt  [address]  [facility] [address]        M [facility] GERIATRICS    Chief Complaint   Patient presents with     RECHECK     HPI:  Kylah Britt is a 73 year old  (1950), who is being seen today for an episodic care visit at: [facility] AND REHAB [facility] ([facility]) [[id number]].     This is a 74 yo female with PMHx for osteopenia, neurodegenerative d/o with spasticity (on baclofen) who presented s/p mechanical fall at [facility] resulted in left femur fx secondary to osteoporosis s/p internal fixation with gamma adrienne on 11/4/23.       Today's concern is:   pain    Allergies, and PMH/PSH reviewed in EPIC today.  REVIEW OF SYSTEMS:  4 point ROS including Respiratory, CV, GI and , other than that noted in the HPI,  is negative    Objective:   /65   Pulse 68   Temp 96.8  F (36  C)   Resp 18   Ht 1.6 m (5' 3\")   Wt 53.1 kg (117 lb 1.6 oz)   SpO2 96%   BMI 20.74 kg/m    GENERAL APPEARANCE:  Alert, in no distress, cooperative, pain stable  RESP:  respiratory effort normal, lungs clear to auscultation, RA  CV:  regular rate and rhythm, no murmur, rub, or gallop, no edema  PSYCH:  oriented X 2/3    Most Recent 3 CBC's:  Recent Labs   Lab Test 12/13/23  0830 11/29/23  0729 11/17/23  0705 11/04/23  0541 11/03/23  1158   WBC 6.8 3.9*  --   --  8.2   HGB 11.9 11.2* 9.5*   < > 12.2   MCV 89 92  --   --  89    522*  --   --  362    < > = values in this interval not displayed.     Most Recent 3 BMP's:  Recent Labs   Lab Test 12/13/23  0830 11/29/23  0729 11/06/23  0625 11/05/23  0617 11/03/23  1158    140  --   --  139   POTASSIUM 4.2 3.8  --   --  3.9   CHLORIDE 104 103  --   --  104   CO2 22 22  --   --  24   BUN 12.4 11.3  --   --  19.6   CR 0.64 0.59  --   --  0.72   ANIONGAP 14 15  --   --  11   NARA 9.6 9.9  --   --  9.6   * 91 107*   < > 138*    < > = values in this interval not displayed.       Assessment/Plan:    (S72.002D) Closed fracture " of left hip with routine healing, subsequent encounter  Continue NWB on LLE with follow-up on 12/26, continue vit/ca supplementation, continue 25% WB on LLE     (Z79.01) Anticoagulated  Completed course of ASA on 12/16     (R52) Pain    Continue tylenol 1000mg TID and baclofen 5mg in AM/10mg BID, vistaril 25mg TID/lidocaine patch and oxycodone 2.5mg q6h PRN.  Also gabapentin 200mg at HS with voltaren gel 1% 4gm QID to knees. Pain seems controlled     Covid 19  Positive on 12/8, asymptomatic      Renal fx  Last Cr 0.64 with GFR >90 on 12/13     (D62) ABLA (acute blood loss anemia)  Last Hgb 11.9 on 12/13     (K59.03) Drug-induced constipation  Continue senna S 1 tab BID, stable     (F51.02) Adjustment insomnia  Continue melatonin 9mg and trazodone 50mg at HS, stable    Orders:  NA    Electronically signed by: Bishnu Polo NP         Sincerely,        Bishnu Polo NP

## 2023-12-25 VITALS
BODY MASS INDEX: 21.85 KG/M2 | HEART RATE: 73 BPM | DIASTOLIC BLOOD PRESSURE: 64 MMHG | SYSTOLIC BLOOD PRESSURE: 119 MMHG | OXYGEN SATURATION: 96 % | TEMPERATURE: 96.8 F | RESPIRATION RATE: 18 BRPM | HEIGHT: 63 IN | WEIGHT: 123.3 LBS

## 2023-12-25 NOTE — PROGRESS NOTES
"[facility] GERIATRICS    Chief Complaint   Patient presents with    RECHECK     HPI:  Kylah Britt is a 73 year old  (1950), who is being seen today for an episodic care visit at: [facility] AND REHAB [facility] ([facility]) [[id number]].     This is a 72 yo female with PMHx for osteopenia, neurodegenerative d/o with spasticity (on baclofen) who presented s/p mechanical fall at [facility] resulted in left femur fx secondary to osteoporosis s/p internal fixation with gamma adrienne on 11/4/23.    Today's concern is:   pain    Allergies, and PMH/PSH reviewed in EPIC today.  REVIEW OF SYSTEMS:  4 point ROS including Respiratory, CV, GI and , other than that noted in the HPI,  is negative    Objective:   /64   Pulse 73   Temp 96.8  F (36  C)   Resp 18   Ht 1.6 m (5' 3\")   Wt 55.9 kg (123 lb 4.8 oz)   SpO2 96%   BMI 21.84 kg/m    GENERAL APPEARANCE:  Alert, cooperative, distress, pain stable  RESP:  respiratory effort normal, lungs clear to auscultation, RA  CV:  regular rate and rhythm, no murmur, rub, or gallop, no edema  PSYCH:  oriented X 2/3    Most Recent 3 CBC's:  Recent Labs   Lab Test 12/13/23  0830 11/29/23  0729 11/17/23  0705 11/04/23  0541 11/03/23  1158   WBC 6.8 3.9*  --   --  8.2   HGB 11.9 11.2* 9.5*   < > 12.2   MCV 89 92  --   --  89    522*  --   --  362    < > = values in this interval not displayed.     Most Recent 3 BMP's:  Recent Labs   Lab Test 12/13/23  0830 11/29/23  0729 11/06/23  0625 11/05/23  0617 11/03/23  1158    140  --   --  139   POTASSIUM 4.2 3.8  --   --  3.9   CHLORIDE 104 103  --   --  104   CO2 22 22  --   --  24   BUN 12.4 11.3  --   --  19.6   CR 0.64 0.59  --   --  0.72   ANIONGAP 14 15  --   --  11   NARA 9.6 9.9  --   --  9.6   * 91 107*   < > 138*    < > = values in this interval not displayed.       Assessment/Plan:    (S72.002D) Closed fracture of left hip with routine healing, subsequent encounter  Continue NWB on LLE with follow-up on " 12/26 (today), continue vit/ca supplementation, continue 25% WB on LLE     (Z79.01) Anticoagulated  Completed course of ASA on 12/16     (R52) Pain    Continue tylenol 1000mg TID and baclofen 5mg in AM/10mg BID, vistaril 25mg TID/lidocaine patch and oxycodone 2.5mg q6h PRN.  Also gabapentin 200mg at HS with voltaren gel 1% 4gm QID to knees. Pain stable     Covid 19  Positive on 12/8, asymptomatic      Renal fx  Last Cr 0.64 with GFR >90 on 12/13     (D62) ABLA (acute blood loss anemia)  Last Hgb 11.9 on 12/13     (K59.03) Drug-induced constipation  Continue senna S 1 tab BID, no change needed     (F51.02) Adjustment insomnia  Continue melatonin 9mg and trazodone 50mg at HS, effective    Orders:  NA    Electronically signed by: Bishnu Polo NP

## 2023-12-26 ENCOUNTER — TRANSITIONAL CARE UNIT VISIT (OUTPATIENT)
Dept: GERIATRICS | Facility: CLINIC | Age: 73
End: 2023-12-26
Payer: COMMERCIAL

## 2023-12-26 ENCOUNTER — ANCILLARY PROCEDURE (OUTPATIENT)
Dept: GENERAL RADIOLOGY | Facility: CLINIC | Age: 73
End: 2023-12-26
Attending: ORTHOPAEDIC SURGERY
Payer: COMMERCIAL

## 2023-12-26 ENCOUNTER — OFFICE VISIT (OUTPATIENT)
Dept: ORTHOPEDICS | Facility: CLINIC | Age: 73
End: 2023-12-26
Payer: COMMERCIAL

## 2023-12-26 VITALS
BODY MASS INDEX: 24.66 KG/M2 | DIASTOLIC BLOOD PRESSURE: 93 MMHG | SYSTOLIC BLOOD PRESSURE: 156 MMHG | HEIGHT: 62 IN | WEIGHT: 134 LBS | HEART RATE: 77 BPM | RESPIRATION RATE: 18 BRPM

## 2023-12-26 DIAGNOSIS — Z98.890 HISTORY OF HIP SURGERY: ICD-10-CM

## 2023-12-26 DIAGNOSIS — R52 PAIN: Primary | ICD-10-CM

## 2023-12-26 DIAGNOSIS — K59.01 SLOW TRANSIT CONSTIPATION: ICD-10-CM

## 2023-12-26 DIAGNOSIS — S72.142D CLOSED DISPLACED INTERTROCHANTERIC FRACTURE OF LEFT FEMUR WITH ROUTINE HEALING: ICD-10-CM

## 2023-12-26 DIAGNOSIS — S72.002D CLOSED FRACTURE OF LEFT HIP WITH ROUTINE HEALING, SUBSEQUENT ENCOUNTER: ICD-10-CM

## 2023-12-26 DIAGNOSIS — F51.02 ADJUSTMENT INSOMNIA: ICD-10-CM

## 2023-12-26 DIAGNOSIS — Z98.890 HISTORY OF HIP SURGERY: Primary | ICD-10-CM

## 2023-12-26 PROCEDURE — 99024 POSTOP FOLLOW-UP VISIT: CPT | Performed by: ORTHOPAEDIC SURGERY

## 2023-12-26 PROCEDURE — 99309 SBSQ NF CARE MODERATE MDM 30: CPT | Performed by: NURSE PRACTITIONER

## 2023-12-26 PROCEDURE — 73552 X-RAY EXAM OF FEMUR 2/>: CPT | Mod: TC | Performed by: RADIOLOGY

## 2023-12-26 NOTE — LETTER
"    12/26/2023        RE: Kylah Britt  [address]  [place] [place] [place] [address]        M [facility] GERIATRICS    Chief Complaint   Patient presents with     RECHECK     HPI:  Kylah Britt is a 73 year old  (1950), who is being seen today for an episodic care visit at: [facility] AND REHAB [facility] ([facility]) [[id number]].     This is a 74 yo female with PMHx for osteopenia, neurodegenerative d/o with spasticity (on baclofen) who presented s/p mechanical fall at [facility] resulted in left femur fx secondary to osteoporosis s/p internal fixation with gamma adrienne on 11/4/23.    Today's concern is:   pain    Allergies, and PMH/PSH reviewed in EPIC today.  REVIEW OF SYSTEMS:  4 point ROS including Respiratory, CV, GI and , other than that noted in the HPI,  is negative    Objective:   /64   Pulse 73   Temp 96.8  F (36  C)   Resp 18   Ht 1.6 m (5' 3\")   Wt 55.9 kg (123 lb 4.8 oz)   SpO2 96%   BMI 21.84 kg/m    GENERAL APPEARANCE:  Alert, cooperative, distress, pain stable  RESP:  respiratory effort normal, lungs clear to auscultation, RA  CV:  regular rate and rhythm, no murmur, rub, or gallop, no edema  PSYCH:  oriented X 2/3    Most Recent 3 CBC's:  Recent Labs   Lab Test 12/13/23  0830 11/29/23  0729 11/17/23  0705 11/04/23  0541 11/03/23  1158   WBC 6.8 3.9*  --   --  8.2   HGB 11.9 11.2* 9.5*   < > 12.2   MCV 89 92  --   --  89    522*  --   --  362    < > = values in this interval not displayed.     Most Recent 3 BMP's:  Recent Labs   Lab Test 12/13/23  0830 11/29/23  0729 11/06/23  0625 11/05/23  0617 11/03/23  1158    140  --   --  139   POTASSIUM 4.2 3.8  --   --  3.9   CHLORIDE 104 103  --   --  104   CO2 22 22  --   --  24   BUN 12.4 11.3  --   --  19.6   CR 0.64 0.59  --   --  0.72   ANIONGAP 14 15  --   --  11   NARA 9.6 9.9  --   --  9.6   * 91 107*   < > 138*    < > = values in this interval not displayed.       Assessment/Plan:    (S72.002D) Closed fracture of left " hip with routine healing, subsequent encounter  Continue NWB on LLE with follow-up on 12/26 (today), continue vit/ca supplementation, continue 25% WB on LLE     (Z79.01) Anticoagulated  Completed course of ASA on 12/16     (R52) Pain    Continue tylenol 1000mg TID and baclofen 5mg in AM/10mg BID, vistaril 25mg TID/lidocaine patch and oxycodone 2.5mg q6h PRN.  Also gabapentin 200mg at HS with voltaren gel 1% 4gm QID to knees. Pain stable     Covid 19  Positive on 12/8, asymptomatic      Renal fx  Last Cr 0.64 with GFR >90 on 12/13     (D62) ABLA (acute blood loss anemia)  Last Hgb 11.9 on 12/13     (K59.03) Drug-induced constipation  Continue senna S 1 tab BID, no change needed     (F51.02) Adjustment insomnia  Continue melatonin 9mg and trazodone 50mg at HS, effective    Orders:  NA    Electronically signed by: Bishnu Polo NP         Sincerely,        Bishnu Polo NP

## 2023-12-26 NOTE — PROGRESS NOTES
Follow up open-reduction, internal fixation left Intertrochanteric fracture with long Gamma adrienne on 11/4/23.    She has been PWB 25%, but patient and family report no ambulation or standing.      Ambulation aids:  wheelchair.    Xray images were independently visualized with the patient.  Xray shows good position of fracture and fixation.     Exam shows mild pain with rotation of left hip, no pain on right.  External rotation 40 degrees on left.  Internal rotation 15-20 degrees on left.    Assessment:  intertrochanteric femur fracture is stable, but not being used.  Plan:  advance to 50% partial weight bearing.  Ambulation with Physical Therapy.  Return to clinic 4 weeks with left hip x-ray.

## 2023-12-26 NOTE — LETTER
12/26/2023         RE: Kylah Britt  [address]  [place] [address]        Dear Colleague,    Thank you for referring your patient, Kylah Britt, to the [facility]. Please see a copy of my visit note below.    Follow up open-reduction, internal fixation left Intertrochanteric fracture with long Gamma adrienne on 11/4/23.    She has been PWB 25%, but patient and family report no ambulation or standing.      Ambulation aids:  wheelchair.    Xray images were independently visualized with the patient.  Xray shows good position of fracture and fixation.     Exam shows mild pain with rotation of left hip, no pain on right.  External rotation 40 degrees on left.  Internal rotation 15-20 degrees on left.    Assessment:  intertrochanteric femur fracture is stable, but not being used.  Plan:  advance to 50% partial weight bearing.  Ambulation with Physical Therapy.  Return to clinic 4 weeks with left hip x-ray.      Again, thank you for allowing me to participate in the care of your patient.        Sincerely,        Yoni Franco MD

## 2023-12-28 NOTE — PROGRESS NOTES
[facility] GERIATRICS  Chief Complaint   Patient presents with    long term Regulatory     [place] Medical Record Number:  [id number]  Place of Service where encounter took place:  [facility] AND REHAB [facility] ([facility]) [[id number]]    HPI:    Kylah Britt  is 73 year old (1950), who is being seen today for a federally mandated E/M visit.     Update:  -12/8: asymptomatic COVID-19       Today's concerns are:  -IT fx s/p IM: seen by Ortho team 12/26, permitted 50% PWB. Reports pain in left shin, better with medicine.   - Rehab: repots going on .   - Insomnia:  reports sleep is ok  - reports eats well.   - denies any constipation  ========================    MEDICATIONS:  MED REC REQUIRED  Post Medication Reconciliation Status: discharge medications reconciled and changed, per note/orders       Review of your medicines            Accurate as of January 2, 2024  8:40 AM. If you have any questions, ask your nurse or doctor.                CONTINUE these medicines which may have CHANGED, or have new prescriptions. If we are uncertain of the size of tablets/capsules you have at home, strength may be listed as something that might have changed.        Dose / Directions   oxyCODONE 5 MG tablet  Commonly known as: ROXICODONE  This may have changed: when to take this  Used for: Closed fracture of left hip, initial encounter (H)      Dose: 2.5 mg  Take 0.5 tablets (2.5 mg) by mouth every 4 hours as needed for moderate pain  Quantity: 60 tablet  Refills: 0            CONTINUE these medicines which have NOT CHANGED        Dose / Directions   acetaminophen 500 MG tablet  Commonly known as: TYLENOL  Used for: Pain of right thigh      Dose: 1,000 mg  Take 1,000 mg by mouth 3 times daily  Refills: 0     baclofen 10 MG tablet  Commonly known as: LIORESAL      Dose: 10 mg  Take 10 mg by mouth 2 times daily 3pm and 9pm, takes 5mg every morning  Refills: 0     CALCIUM 600 PO  Indication: Nutritional Support      Dose:  "1 tablet  Take 1 tablet by mouth every other day  Refills: 0     diclofenac 1 % topical gel  Commonly known as: VOLTAREN      Dose: 4 g  Apply 4 g topically 4 times daily Apply to knees  Refills: 0     gabapentin 100 MG capsule  Commonly known as: NEURONTIN  Used for: Neurodegenerative disorder (H24)      Dose: 200 mg  Take 2 capsules (200 mg) by mouth At Bedtime  Quantity: 42 capsule  Refills: 1     hydrOXYzine robert 25 MG capsule  Commonly known as: VISTARIL      Dose: 25 mg  Take 25 mg by mouth 3 times daily  Refills: 0     Lidocaine 4 % Patch  Commonly known as: LIDOCARE      Dose: 1 patch  Place 1 patch onto the skin every 24 hours To prevent lidocaine toxicity, patient should be patch free for 12 hrs daily. Apply to L hip  Refills: 0     melatonin 3 MG tablet  Used for: Adjustment insomnia      Dose: 9 mg  Take 3 tablets (9 mg) by mouth At Bedtime  Refills: 0     senna-docusate 8.6-50 MG tablet  Commonly known as: SENOKOT-S/PERICOLACE  Used for: Closed fracture of left hip, initial encounter (H)      Dose: 1 tablet  Take 1 tablet by mouth 2 times daily  Quantity: 60 tablet  Refills: 1     traZODone 50 MG tablet  Commonly known as: DESYREL  Used for: Adjustment insomnia      Dose: 50 mg  Take 1 tablet (50 mg) by mouth At Bedtime  Quantity: 31 tablet  Refills: 11     vitamin D3 50 mcg (2000 units) tablet  Commonly known as: CHOLECALCIFEROL      Dose: 50 mcg  Take 50 mcg by mouth every other day  Refills: 0            Case Management:  I have reviewed the care plan and MDS and do agree with the plan. Patient's desire to return to the community is present, but is not able due to care needs . Information reviewed:  Medications, vital signs, orders, and nursing notes.    ROS: 4 point ROS including Respiratory, CV, GI and , other than that noted in the HPI,  is negative    Vitals:  /72   Pulse 72   Temp 98.9  F (37.2  C)   Resp 18   Ht 1.575 m (5' 2\")   Wt 54.3 kg (119 lb 12.8 oz)   SpO2 96%   BMI 21.91 " kg/m    Body mass index is 21.91 kg/m .  Exam:  GENERAL APPEARANCE:  in no distress,   RESP:  Unlabored breathing. CTA b/l.   CV:  S1S2 audible, regular HR, no murmur appreciated.   ABDOMEN:  soft, NT/ND, BS audible.   M/S:   no joint deformity noted on observation.   SKIN:  No rash noted on observation  NEURO:   No NFD appreciated on observation.   PSYCH:  affect and mood normal    Lab/Diagnostic data:  Reviewed in the chart and EHR.        ASSESSMENT/PLAN  ================   Pt  with pmh notable for osteopenia (DEXA 2022), neurodegenerative d/o with spasticity (on baclofen)  - had a fall at [facility] resulted in left femur fx secondary to osteoporosis s/p internal fixation with gamma adrienne (11/4)  -oxycodone caused metabolic encephalopathy,hence decreased to 2.5 mg  - ABLA  - DVT ppx with asa 325 mg  bid unitl 12/9        (S72.22XD) Closed displaced subtrochanteric fracture of left femur with routine healing, subsequent encounter    (G31.9) Neurodegenerative disorder (H24)  (R29.6) Recurrent falls  (R25.2) Spasticity  (M85.851,  M85.852) Osteopenia of necks of both femurs-per DEXA March 2022  -- Analgesia optimal with the current regimen. Continue present plan and medications. Counseled on asking for medicine for pain.   - Followed by Orthopedic Team. Was seen on 12/26, and permitted to have partial weight bearing. Follow up in 4 weeks.   - on vitamin D supplement.      (F51.02) Adjustment insomnia  Mild vascular dementia with mood disturbance (H)  - adjusting well. No behaviors concern. Sleeping is fine.   - continue current regimen.      (D50.0) blood loss anemia:  - Hb trending up. Clinically stable. No concern.     Slow transit constipation:  - no concern, continue med.     Electronically signed by:  Brandon Aviles MD

## 2024-01-02 ENCOUNTER — TRANSITIONAL CARE UNIT VISIT (OUTPATIENT)
Dept: GERIATRICS | Facility: CLINIC | Age: 74
End: 2024-01-02
Payer: COMMERCIAL

## 2024-01-02 VITALS
TEMPERATURE: 98.9 F | HEIGHT: 62 IN | DIASTOLIC BLOOD PRESSURE: 72 MMHG | WEIGHT: 119.8 LBS | SYSTOLIC BLOOD PRESSURE: 132 MMHG | OXYGEN SATURATION: 96 % | BODY MASS INDEX: 22.05 KG/M2 | RESPIRATION RATE: 18 BRPM | HEART RATE: 72 BPM

## 2024-01-02 DIAGNOSIS — S72.002A CLOSED FRACTURE OF LEFT HIP, INITIAL ENCOUNTER (H): ICD-10-CM

## 2024-01-02 DIAGNOSIS — F51.02 ADJUSTMENT INSOMNIA: ICD-10-CM

## 2024-01-02 DIAGNOSIS — G31.9 NEURODEGENERATIVE DISORDER (H): ICD-10-CM

## 2024-01-02 DIAGNOSIS — K59.01 SLOW TRANSIT CONSTIPATION: ICD-10-CM

## 2024-01-02 DIAGNOSIS — R25.2 SPASTICITY: ICD-10-CM

## 2024-01-02 DIAGNOSIS — S72.002D CLOSED FRACTURE OF LEFT HIP WITH ROUTINE HEALING, SUBSEQUENT ENCOUNTER: Primary | ICD-10-CM

## 2024-01-02 DIAGNOSIS — R29.6 RECURRENT FALLS: ICD-10-CM

## 2024-01-02 DIAGNOSIS — M85.851 OSTEOPENIA OF NECKS OF BOTH FEMURS: ICD-10-CM

## 2024-01-02 DIAGNOSIS — M85.852 OSTEOPENIA OF NECKS OF BOTH FEMURS: ICD-10-CM

## 2024-01-02 DIAGNOSIS — D50.0 BLOOD LOSS ANEMIA: ICD-10-CM

## 2024-01-02 DIAGNOSIS — F01.A3 MILD VASCULAR DEMENTIA WITH MOOD DISTURBANCE (H): ICD-10-CM

## 2024-01-02 PROCEDURE — 99309 SBSQ NF CARE MODERATE MDM 30: CPT | Performed by: FAMILY MEDICINE

## 2024-01-02 RX ORDER — OXYCODONE HYDROCHLORIDE 5 MG/1
2.5 TABLET ORAL EVERY 6 HOURS PRN
Qty: 40 TABLET | Refills: 0 | Status: SHIPPED | OUTPATIENT
Start: 2024-01-02 | End: 2024-01-09

## 2024-01-02 NOTE — LETTER
1/2/2024        RE: Kylah Britt  [address]  [place] [address]        M [facility] GERIATRICS  Chief Complaint   Patient presents with     [facility] [place] Medical Record Number:  [id number]  Place of Service where encounter took place:  [facility] ([facility]) [[id number]]    HPI:    Kylah Britt  is 73 year old (1950), who is being seen today for a federally mandated E/M visit.     Update:  -12/8: asymptomatic COVID-19       Today's concerns are:  -IT fx s/p IM: seen by Ortho team 12/26, permitted 50% PWB. Reports pain in left shin, better with medicine.   - Rehab: repots going on .   - Insomnia:  reports sleep is ok  - reports eats well.   - denies any constipation  ========================    MEDICATIONS:  MED REC REQUIRED  Post Medication Reconciliation Status: discharge medications reconciled and changed, per note/orders       Review of your medicines            Accurate as of January 2, 2024  8:40 AM. If you have any questions, ask your nurse or doctor.                CONTINUE these medicines which may have CHANGED, or have new prescriptions. If we are uncertain of the size of tablets/capsules you have at home, strength may be listed as something that might have changed.        Dose / Directions   oxyCODONE 5 MG tablet  Commonly known as: ROXICODONE  This may have changed: when to take this  Used for: Closed fracture of left hip, initial encounter (H)      Dose: 2.5 mg  Take 0.5 tablets (2.5 mg) by mouth every 4 hours as needed for moderate pain  Quantity: 60 tablet  Refills: 0            CONTINUE these medicines which have NOT CHANGED        Dose / Directions   acetaminophen 500 MG tablet  Commonly known as: TYLENOL  Used for: Pain of right thigh      Dose: 1,000 mg  Take 1,000 mg by mouth 3 times daily  Refills: 0     baclofen 10 MG tablet  Commonly known as: LIORESAL      Dose: 10 mg  Take 10 mg by mouth 2 times daily 3pm and 9pm, takes 5mg  every morning  Refills: 0     CALCIUM 600 PO  Indication: Nutritional Support      Dose: 1 tablet  Take 1 tablet by mouth every other day  Refills: 0     diclofenac 1 % topical gel  Commonly known as: VOLTAREN      Dose: 4 g  Apply 4 g topically 4 times daily Apply to knees  Refills: 0     gabapentin 100 MG capsule  Commonly known as: NEURONTIN  Used for: Neurodegenerative disorder (H24)      Dose: 200 mg  Take 2 capsules (200 mg) by mouth At Bedtime  Quantity: 42 capsule  Refills: 1     hydrOXYzine robert 25 MG capsule  Commonly known as: VISTARIL      Dose: 25 mg  Take 25 mg by mouth 3 times daily  Refills: 0     Lidocaine 4 % Patch  Commonly known as: LIDOCARE      Dose: 1 patch  Place 1 patch onto the skin every 24 hours To prevent lidocaine toxicity, patient should be patch free for 12 hrs daily. Apply to L hip  Refills: 0     melatonin 3 MG tablet  Used for: Adjustment insomnia      Dose: 9 mg  Take 3 tablets (9 mg) by mouth At Bedtime  Refills: 0     senna-docusate 8.6-50 MG tablet  Commonly known as: SENOKOT-S/PERICOLACE  Used for: Closed fracture of left hip, initial encounter (H)      Dose: 1 tablet  Take 1 tablet by mouth 2 times daily  Quantity: 60 tablet  Refills: 1     traZODone 50 MG tablet  Commonly known as: DESYREL  Used for: Adjustment insomnia      Dose: 50 mg  Take 1 tablet (50 mg) by mouth At Bedtime  Quantity: 31 tablet  Refills: 11     vitamin D3 50 mcg (2000 units) tablet  Commonly known as: CHOLECALCIFEROL      Dose: 50 mcg  Take 50 mcg by mouth every other day  Refills: 0            Case Management:  I have reviewed the care plan and MDS and do agree with the plan. Patient's desire to return to the community is present, but is not able due to care needs . Information reviewed:  Medications, vital signs, orders, and nursing notes.    ROS: 4 point ROS including Respiratory, CV, GI and , other than that noted in the HPI,  is negative    Vitals:  /72   Pulse 72   Temp 98.9  F (37.2  C)   " Resp 18   Ht 1.575 m (5' 2\")   Wt 54.3 kg (119 lb 12.8 oz)   SpO2 96%   BMI 21.91 kg/m    Body mass index is 21.91 kg/m .  Exam:  GENERAL APPEARANCE:  in no distress,   RESP:  Unlabored breathing. CTA b/l.   CV:  S1S2 audible, regular HR, no murmur appreciated.   ABDOMEN:  soft, NT/ND, BS audible.   M/S:   no joint deformity noted on observation.   SKIN:  No rash noted on observation  NEURO:   No NFD appreciated on observation.   PSYCH:  affect and mood normal    Lab/Diagnostic data:  Reviewed in the chart and EHR.        ASSESSMENT/PLAN  ================   Pt  with pmh notable for osteopenia (DEXA 2022), neurodegenerative d/o with spasticity (on baclofen)  - had a fall at [facility] resulted in left femur fx secondary to osteoporosis s/p internal fixation with gamma adrienne (11/4)  -oxycodone caused metabolic encephalopathy,hence decreased to 2.5 mg  - ABLA  - DVT ppx with asa 325 mg  bid unitl 12/9        (S72.22XD) Closed displaced subtrochanteric fracture of left femur with routine healing, subsequent encounter    (G31.9) Neurodegenerative disorder (H24)  (R29.6) Recurrent falls  (R25.2) Spasticity  (M85.851,  M85.852) Osteopenia of necks of both femurs-per DEXA March 2022  -- Analgesia optimal with the current regimen. Continue present plan and medications. Counseled on asking for medicine for pain.   - Followed by Orthopedic Team. Was seen on 12/26, and permitted to have partial weight bearing. Follow up in 4 weeks.   - on vitamin D supplement.      (F51.02) Adjustment insomnia  Mild vascular dementia with mood disturbance (H)  - adjusting well. No behaviors concern. Sleeping is fine.   - continue current regimen.      (D50.0) blood loss anemia:  - Hb trending up. Clinically stable. No concern.     Slow transit constipation:  - no concern, continue med.     Electronically signed by:  Brandon Aviles MD            Sincerely,        Brandon Aviles MD      "

## 2024-01-03 ENCOUNTER — DOCUMENTATION ONLY (OUTPATIENT)
Dept: GERIATRICS | Facility: CLINIC | Age: 74
End: 2024-01-03
Payer: COMMERCIAL

## 2024-01-03 VITALS
HEART RATE: 73 BPM | OXYGEN SATURATION: 97 % | RESPIRATION RATE: 18 BRPM | WEIGHT: 119.8 LBS | BODY MASS INDEX: 21.23 KG/M2 | DIASTOLIC BLOOD PRESSURE: 71 MMHG | SYSTOLIC BLOOD PRESSURE: 125 MMHG | HEIGHT: 63 IN | TEMPERATURE: 97.2 F

## 2024-01-04 ENCOUNTER — TRANSITIONAL CARE UNIT VISIT (OUTPATIENT)
Dept: GERIATRICS | Facility: CLINIC | Age: 74
End: 2024-01-04
Payer: COMMERCIAL

## 2024-01-04 DIAGNOSIS — D62 ABLA (ACUTE BLOOD LOSS ANEMIA): ICD-10-CM

## 2024-01-04 DIAGNOSIS — S72.142D CLOSED DISPLACED INTERTROCHANTERIC FRACTURE OF LEFT FEMUR WITH ROUTINE HEALING: ICD-10-CM

## 2024-01-04 DIAGNOSIS — F51.02 ADJUSTMENT INSOMNIA: ICD-10-CM

## 2024-01-04 DIAGNOSIS — R52 PAIN: Primary | ICD-10-CM

## 2024-01-04 DIAGNOSIS — K59.03 DRUG-INDUCED CONSTIPATION: ICD-10-CM

## 2024-01-04 PROCEDURE — 99309 SBSQ NF CARE MODERATE MDM 30: CPT | Performed by: NURSE PRACTITIONER

## 2024-01-04 NOTE — LETTER
"    1/4/2024        RE: Kylah Britt  [address]  [place] [address]        M [facility] GERIATRICS    Chief Complaint   Patient presents with     RECHECK     HPI:  Kylah Britt is a 73 year old  (1950), who is being seen today for an episodic care visit at: [facility] AND REHAB [facility] [[id number]].     This is a 72 yo female with PMHx for osteopenia, neurodegenerative d/o with spasticity (on baclofen) who presented s/p mechanical fall at [facility] resulted in left femur fx secondary to osteoporosis s/p internal fixation with gamma adrienne on 11/4/23.     Today's concern is:   pain    Allergies, and PMH/PSH reviewed in EPIC today.  REVIEW OF SYSTEMS:  4 point ROS including Respiratory, CV, GI and , other than that noted in the HPI,  is negative    Objective:   /71   Pulse 73   Temp 97.2  F (36.2  C)   Resp 18   Ht 1.6 m (5' 3\")   Wt 54.3 kg (119 lb 12.8 oz)   SpO2 97%   BMI 21.22 kg/m    GENERAL APPEARANCE:  Alert, cooperative, distress, pain controlled  RESP:  respiratory effort normal, lungs clear to auscultation, RA  CV:  regular rate and rhythm, no murmur, rub, or gallop, no edema  PSYCH:  oriented X 2/3    Most Recent 3 CBC's:  Recent Labs   Lab Test 12/13/23  0830 11/29/23  0729 11/17/23  0705 11/04/23  0541 11/03/23  1158   WBC 6.8 3.9*  --   --  8.2   HGB 11.9 11.2* 9.5*   < > 12.2   MCV 89 92  --   --  89    522*  --   --  362    < > = values in this interval not displayed.     Most Recent 3 BMP's:  Recent Labs   Lab Test 12/13/23  0830 11/29/23  0729 11/06/23  0625 11/05/23  0617 11/03/23  1158    140  --   --  139   POTASSIUM 4.2 3.8  --   --  3.9   CHLORIDE 104 103  --   --  104   CO2 22 22  --   --  24   BUN 12.4 11.3  --   --  19.6   CR 0.64 0.59  --   --  0.72   ANIONGAP 14 15  --   --  11   NARA 9.6 9.9  --   --  9.6   * 91 107*   < > 138*    < > = values in this interval not displayed.       Assessment/Plan:    (S72.002D) Closed fracture of " left hip with routine healing, subsequent encounter  Continue NWB on LLE with follow-up on 1/23, continue vit/ca supplementation, continue 50% WB on LLE     (Z79.01) Anticoagulated  Completed course of ASA on 12/16, resolved     (R52) Pain    Continue tylenol 1000mg TID and baclofen 5mg in AM/10mg BID, vistaril 25mg TID/lidocaine patch and today change oxycodone 2.5mg to q8h PRN.  Also gabapentin 200mg at HS with voltaren gel 1% 4gm QID to knees. Pain controlled     Covid 19  Positive on 12/8, asymptomatic      Renal fx  Last Cr 0.64 with GFR >90 on 12/13, recheck BMP on 1/5     (D62) ABLA (acute blood loss anemia)  Last Hgb 11.9 on 12/13, recheck CBC on 1/5     (K59.03) Drug-induced constipation  Continue senna S 1 tab BID, no change needed     (F51.02) Adjustment insomnia  Continue melatonin 9mg and trazodone 50mg at HS, no issue    Orders:  Change oxycodone, BMP/CBC    Electronically signed by: Bishnu Polo NP         Sincerely,        Bishnu Polo NP

## 2024-01-04 NOTE — PROGRESS NOTES
"[facility] GERIATRICS    Chief Complaint   Patient presents with    RECHECK     HPI:  Kylah Britt is a 73 year old  (1950), who is being seen today for an episodic care visit at: [facility] AND REHAB [facility] ([facility]) [[id number]].     This is a 74 yo female with PMHx for osteopenia, neurodegenerative d/o with spasticity (on baclofen) who presented s/p mechanical fall at [facility] resulted in left femur fx secondary to osteoporosis s/p internal fixation with gamma adrienne on 11/4/23.     Today's concern is:   pain    Allergies, and PMH/PSH reviewed in EPIC today.  REVIEW OF SYSTEMS:  4 point ROS including Respiratory, CV, GI and , other than that noted in the HPI,  is negative    Objective:   /71   Pulse 73   Temp 97.2  F (36.2  C)   Resp 18   Ht 1.6 m (5' 3\")   Wt 54.3 kg (119 lb 12.8 oz)   SpO2 97%   BMI 21.22 kg/m    GENERAL APPEARANCE:  Alert, cooperative, distress, pain controlled  RESP:  respiratory effort normal, lungs clear to auscultation, RA  CV:  regular rate and rhythm, no murmur, rub, or gallop, no edema  PSYCH:  oriented X 2/3    Most Recent 3 CBC's:  Recent Labs   Lab Test 12/13/23  0830 11/29/23  0729 11/17/23  0705 11/04/23  0541 11/03/23  1158   WBC 6.8 3.9*  --   --  8.2   HGB 11.9 11.2* 9.5*   < > 12.2   MCV 89 92  --   --  89    522*  --   --  362    < > = values in this interval not displayed.     Most Recent 3 BMP's:  Recent Labs   Lab Test 12/13/23  0830 11/29/23  0729 11/06/23  0625 11/05/23  0617 11/03/23  1158    140  --   --  139   POTASSIUM 4.2 3.8  --   --  3.9   CHLORIDE 104 103  --   --  104   CO2 22 22  --   --  24   BUN 12.4 11.3  --   --  19.6   CR 0.64 0.59  --   --  0.72   ANIONGAP 14 15  --   --  11   NARA 9.6 9.9  --   --  9.6   * 91 107*   < > 138*    < > = values in this interval not displayed.       Assessment/Plan:    (S72.002D) Closed fracture of left hip with routine healing, subsequent encounter  Continue NWB on LLE with follow-up " on 1/23, continue vit/ca supplementation, continue 50% WB on LLE     (Z79.01) Anticoagulated  Completed course of ASA on 12/16, resolved     (R52) Pain    Continue tylenol 1000mg TID and baclofen 5mg in AM/10mg BID, vistaril 25mg TID/lidocaine patch and today change oxycodone 2.5mg to q8h PRN.  Also gabapentin 200mg at HS with voltaren gel 1% 4gm QID to knees. Pain controlled     Covid 19  Positive on 12/8, asymptomatic      Renal fx  Last Cr 0.64 with GFR >90 on 12/13, recheck BMP on 1/5     (D62) ABLA (acute blood loss anemia)  Last Hgb 11.9 on 12/13, recheck CBC on 1/5     (K59.03) Drug-induced constipation  Continue senna S 1 tab BID, no change needed     (F51.02) Adjustment insomnia  Continue melatonin 9mg and trazodone 50mg at HS, no issue    Orders:  Change oxycodone, BMP/CBC    Electronically signed by: Bishnu Polo NP

## 2024-01-05 ENCOUNTER — LAB REQUISITION (OUTPATIENT)
Dept: LAB | Facility: CLINIC | Age: 74
End: 2024-01-05

## 2024-01-05 DIAGNOSIS — I10 ESSENTIAL (PRIMARY) HYPERTENSION: ICD-10-CM

## 2024-01-05 LAB
ANION GAP SERPL CALCULATED.3IONS-SCNC: 7 MMOL/L (ref 7–15)
BUN SERPL-MCNC: 14.4 MG/DL (ref 8–23)
CALCIUM SERPL-MCNC: 9.5 MG/DL (ref 8.8–10.2)
CHLORIDE SERPL-SCNC: 104 MMOL/L (ref 98–107)
CREAT SERPL-MCNC: 0.65 MG/DL (ref 0.51–0.95)
DEPRECATED HCO3 PLAS-SCNC: 29 MMOL/L (ref 22–29)
EGFRCR SERPLBLD CKD-EPI 2021: >90 ML/MIN/1.73M2
ERYTHROCYTE [DISTWIDTH] IN BLOOD BY AUTOMATED COUNT: 13.9 % (ref 10–15)
GLUCOSE SERPL-MCNC: 88 MG/DL (ref 70–99)
HCT VFR BLD AUTO: 37.9 % (ref 35–47)
HGB BLD-MCNC: 11.7 G/DL (ref 11.7–15.7)
MCH RBC QN AUTO: 28.1 PG (ref 26.5–33)
MCHC RBC AUTO-ENTMCNC: 30.9 G/DL (ref 31.5–36.5)
MCV RBC AUTO: 91 FL (ref 78–100)
PLATELET # BLD AUTO: 358 10E3/UL (ref 150–450)
POTASSIUM SERPL-SCNC: 4.3 MMOL/L (ref 3.4–5.3)
RBC # BLD AUTO: 4.16 10E6/UL (ref 3.8–5.2)
SODIUM SERPL-SCNC: 140 MMOL/L (ref 135–145)
WBC # BLD AUTO: 5.1 10E3/UL (ref 4–11)

## 2024-01-05 PROCEDURE — 85027 COMPLETE CBC AUTOMATED: CPT | Performed by: NURSE PRACTITIONER

## 2024-01-05 PROCEDURE — P9604 ONE-WAY ALLOW PRORATED TRIP: HCPCS | Performed by: NURSE PRACTITIONER

## 2024-01-05 PROCEDURE — 82374 ASSAY BLOOD CARBON DIOXIDE: CPT | Performed by: NURSE PRACTITIONER

## 2024-01-05 PROCEDURE — 36415 COLL VENOUS BLD VENIPUNCTURE: CPT | Performed by: NURSE PRACTITIONER

## 2024-01-07 PROBLEM — D69.1 PLATELET DYSFUNCTION DUE TO ASPIRIN (H): Status: RESOLVED | Noted: 2023-11-03 | Resolved: 2024-01-07

## 2024-01-07 PROBLEM — T39.015A PLATELET DYSFUNCTION DUE TO ASPIRIN (H): Status: RESOLVED | Noted: 2023-11-03 | Resolved: 2024-01-07

## 2024-01-07 PROBLEM — F32.5 MAJOR DEPRESSIVE DISORDER IN REMISSION (H): Status: RESOLVED | Noted: 2022-07-15 | Resolved: 2024-01-07

## 2024-01-09 ENCOUNTER — TRANSITIONAL CARE UNIT VISIT (OUTPATIENT)
Dept: GERIATRICS | Facility: CLINIC | Age: 74
End: 2024-01-09
Payer: COMMERCIAL

## 2024-01-09 ENCOUNTER — TRANSFERRED RECORDS (OUTPATIENT)
Dept: HEALTH INFORMATION MANAGEMENT | Facility: CLINIC | Age: 74
End: 2024-01-09

## 2024-01-09 DIAGNOSIS — S72.142D CLOSED DISPLACED INTERTROCHANTERIC FRACTURE OF LEFT FEMUR WITH ROUTINE HEALING: ICD-10-CM

## 2024-01-09 DIAGNOSIS — K59.03 DRUG-INDUCED CONSTIPATION: ICD-10-CM

## 2024-01-09 DIAGNOSIS — R52 PAIN: Primary | ICD-10-CM

## 2024-01-09 DIAGNOSIS — F51.02 ADJUSTMENT INSOMNIA: ICD-10-CM

## 2024-01-09 PROBLEM — Z79.01 ANTICOAGULATED: Status: RESOLVED | Noted: 2023-11-28 | Resolved: 2024-01-09

## 2024-01-09 PROCEDURE — 99309 SBSQ NF CARE MODERATE MDM 30: CPT | Performed by: NURSE PRACTITIONER

## 2024-01-09 NOTE — PROGRESS NOTES
[facility] GERIATRICS        Chief Complaint   Patient presents with    RECHECK       HPI:  Kylah Britt is a 73 year old  (1950), who is being seen today for an episodic care visit at: [facility] [[id number]].     This is a 72 yo female with PMHx for osteopenia, neurodegenerative d/o with spasticity (on baclofen) who presented s/p mechanical fall at [facility] resulted in left femur fx secondary to osteoporosis s/p internal fixation with gamma adrienne on 11/4/23.    Today's concern is:   Pain control    Allergies, and PMH/PSH reviewed in EPIC today.  REVIEW OF SYSTEMS:  4 point ROS including Respiratory, CV, GI and , other than that noted in the HPI,  is negative    Objective:   GENERAL APPEARANCE:  Alert, cooperative, no distress, pain controlled  RESP:  respiratory effort normal, lungs clear to auscultation, RA  CV:  regular rate and rhythm, no murmur, rub, or gallop, no edema  PSYCH:  oriented X 2/3    Most Recent 3 CBC's:  Recent Labs   Lab Test 01/05/24  0719 12/13/23  0830 11/29/23  0729   WBC 5.1 6.8 3.9*   HGB 11.7 11.9 11.2*   MCV 91 89 92    446 522*     Most Recent 3 BMP's:  Recent Labs   Lab Test 01/05/24  0719 12/13/23  0830 11/29/23  0729    140 140   POTASSIUM 4.3 4.2 3.8   CHLORIDE 104 104 103   CO2 29 22 22   BUN 14.4 12.4 11.3   CR 0.65 0.64 0.59   ANIONGAP 7 14 15   NARA 9.5 9.6 9.9   GLC 88 105* 91       Assessment/Plan:    (S72.002D) Closed fracture of left hip with routine healing  Continue NWB on LLE with follow-up on 1/23, continue vit/ca supplementation, continue 50% WB on LLE     (Z79.01) Anticoagulated  Completed course of ASA on 12/16, resolved     (R52) Pain    Today increase tylenol 1000mg TID with 1000mg daily PRN and baclofen 5mg in AM/10mg BID, vistaril 25mg TID/lidocaine patch.  Also gabapentin 200mg at HS with voltaren gel 1% 4gm QID to knees. Pain controlled. Today discontinue oxycodone     Covid 19  Positive on 12/8, asymptomatic       Renal fx  Last Cr 0.65 with GFR >90 on 1/5     (D62) ABLA (acute blood loss anemia)  Last Hgb 11.7 on 1/5     (K59.03) Drug-induced constipation  Continue senna S 1 tab BID, stable     (F51.02) Adjustment insomnia  Continue melatonin 9mg and trazodone 50mg at HS, stable    Orders:  Increase tylenol, discontinue oxycodone    Electronically signed by: Bishnu Polo NP

## 2024-01-10 ENCOUNTER — DOCUMENTATION ONLY (OUTPATIENT)
Dept: GERIATRICS | Facility: CLINIC | Age: 74
End: 2024-01-10
Payer: COMMERCIAL

## 2024-01-15 VITALS
HEART RATE: 66 BPM | OXYGEN SATURATION: 97 % | HEIGHT: 63 IN | WEIGHT: 121.3 LBS | RESPIRATION RATE: 16 BRPM | TEMPERATURE: 97.2 F | SYSTOLIC BLOOD PRESSURE: 129 MMHG | BODY MASS INDEX: 21.49 KG/M2 | DIASTOLIC BLOOD PRESSURE: 74 MMHG

## 2024-01-16 ENCOUNTER — TRANSITIONAL CARE UNIT VISIT (OUTPATIENT)
Dept: GERIATRICS | Facility: CLINIC | Age: 74
End: 2024-01-16
Payer: COMMERCIAL

## 2024-01-16 DIAGNOSIS — K59.03 DRUG-INDUCED CONSTIPATION: ICD-10-CM

## 2024-01-16 DIAGNOSIS — S72.142D CLOSED DISPLACED INTERTROCHANTERIC FRACTURE OF LEFT FEMUR WITH ROUTINE HEALING: ICD-10-CM

## 2024-01-16 DIAGNOSIS — F51.02 ADJUSTMENT INSOMNIA: ICD-10-CM

## 2024-01-16 DIAGNOSIS — F41.9 ANXIETY: ICD-10-CM

## 2024-01-16 DIAGNOSIS — G47.9 SLEEP DISORDER: ICD-10-CM

## 2024-01-16 DIAGNOSIS — R52 PAIN: Primary | ICD-10-CM

## 2024-01-16 PROBLEM — K59.01 SLOW TRANSIT CONSTIPATION: Status: RESOLVED | Noted: 2023-12-26 | Resolved: 2024-01-16

## 2024-01-16 PROCEDURE — 99309 SBSQ NF CARE MODERATE MDM 30: CPT | Performed by: NURSE PRACTITIONER

## 2024-01-16 RX ORDER — BUSPIRONE HYDROCHLORIDE 5 MG/1
10 TABLET ORAL 2 TIMES DAILY
COMMUNITY
End: 2024-05-23

## 2024-01-16 NOTE — PROGRESS NOTES
"[facility] GERIATRICS    Chief Complaint   Patient presents with    RECHECK     HPI:  Kylah Britt is a 73 year old  (1950), who is being seen today for an episodic care visit at: [facility] AND REHAB [facility] ([facility]) [[id number]].     This is a 74 yo female with PMHx for osteopenia, neurodegenerative d/o with spasticity (on baclofen) who presented s/p mechanical fall at [facility] resulted in left femur fx secondary to osteoporosis s/p internal fixation with gamma adrienne on 11/4/23.    Today's concern is:   pain    Allergies, and PMH/PSH reviewed in EPIC today.  REVIEW OF SYSTEMS:  4 point ROS including Respiratory, CV, GI and , other than that noted in the HPI,  is negative    Objective:   /74   Pulse 66   Temp 97.2  F (36.2  C)   Resp 16   Ht 1.6 m (5' 3\")   Wt 55 kg (121 lb 4.8 oz)   SpO2 97%   BMI 21.49 kg/m    GENERAL APPEARANCE:  Alert, cooperative, no distress, pain controlled  RESP:  respiratory effort normal, lungs clear to auscultation, RA  CV:  regular rate and rhythm, no murmur, rub, or gallop, no edema  PSYCH:  oriented X 2/3    Assessment/Plan:    (S72.002D) Closed fracture of left hip with routine healing  Continue NWB on LLE with follow-up on 1/23, continue vit/ca supplementation, continue 50% WB on LLE. No change     (Z79.01) Anticoagulated  Completed course of ASA on 12/16, resolved     (R52) Pain    Today increase tylenol 1000mg TID with 1000mg daily PRN and baclofen 5mg in AM/10mg BID, vistaril 25mg TID/lidocaine patch.  Also gabapentin 200mg at HS with voltaren gel 1% 4gm QID to knees. Pain controlled    Anxiety  Start buspar 5mg BID     Covid 19  Positive on 12/8, asymptomatic      Renal fx  Last Cr 0.65 with GFR >90 on 1/5     (D62) ABLA (acute blood loss anemia)  Last Hgb 11.7 on 1/5     (K59.03) Drug-induced constipation  Continue senna S 1 tab BID, adequate     (F51.02) Adjustment insomnia  Continue melatonin 9mg and trazodone 50mg at HS, no " change    Orders:  buspar    Electronically signed by: Bishnu Polo NP

## 2024-01-16 NOTE — LETTER
"    1/16/2024        RE: Kylah Britt  [address]  [place] [address]        M [facility] GERIATRICS    Chief Complaint   Patient presents with     RECHECK     HPI:  Kylah Britt is a 73 year old  (1950), who is being seen today for an episodic care visit at: [facility] AND REHAB [facility] ([facility]) [[id number]].     This is a 74 yo female with PMHx for osteopenia, neurodegenerative d/o with spasticity (on baclofen) who presented s/p mechanical fall at [facility] resulted in left femur fx secondary to osteoporosis s/p internal fixation with gamma adrienne on 11/4/23.    Today's concern is:   pain    Allergies, and PMH/PSH reviewed in [facility] today.  REVIEW OF SYSTEMS:  4 point ROS including Respiratory, CV, GI and , other than that noted in the HPI,  is negative    Objective:   /74   Pulse 66   Temp 97.2  F (36.2  C)   Resp 16   Ht 1.6 m (5' 3\")   Wt 55 kg (121 lb 4.8 oz)   SpO2 97%   BMI 21.49 kg/m    GENERAL APPEARANCE:  Alert, cooperative, no distress, pain controlled  RESP:  respiratory effort normal, lungs clear to auscultation, RA  CV:  regular rate and rhythm, no murmur, rub, or gallop, no edema  PSYCH:  oriented X 2/3    Assessment/Plan:    (S72.002D) Closed fracture of left hip with routine healing  Continue NWB on LLE with follow-up on 1/23, continue vit/ca supplementation, continue 50% WB on LLE. No change     (Z79.01) Anticoagulated  Completed course of ASA on 12/16, resolved     (R52) Pain    Today increase tylenol 1000mg TID with 1000mg daily PRN and baclofen 5mg in AM/10mg BID, vistaril 25mg TID/lidocaine patch.  Also gabapentin 200mg at HS with voltaren gel 1% 4gm QID to knees. Pain controlled    Anxiety  Start buspar 5mg BID     Covid 19  Positive on 12/8, asymptomatic      Renal fx  Last Cr 0.65 with GFR >90 on 1/5     (D62) ABLA (acute blood loss anemia)  Last Hgb 11.7 on 1/5     (K59.03) Drug-induced constipation  Continue senna S 1 tab BID, adequate     (F51.02) Adjustment " insomnia  Continue melatonin 9mg and trazodone 50mg at HS, no change    Orders:  buspar    Electronically signed by: Bishnu Polo NP         Sincerely,        Bishnu Polo NP

## 2024-01-22 VITALS
OXYGEN SATURATION: 94 % | SYSTOLIC BLOOD PRESSURE: 113 MMHG | HEIGHT: 63 IN | WEIGHT: 123.8 LBS | BODY MASS INDEX: 21.93 KG/M2 | RESPIRATION RATE: 20 BRPM | DIASTOLIC BLOOD PRESSURE: 62 MMHG | TEMPERATURE: 97.2 F | HEART RATE: 70 BPM

## 2024-01-23 ENCOUNTER — OFFICE VISIT (OUTPATIENT)
Dept: ORTHOPEDICS | Facility: CLINIC | Age: 74
End: 2024-01-23
Payer: COMMERCIAL

## 2024-01-23 ENCOUNTER — ANCILLARY PROCEDURE (OUTPATIENT)
Dept: GENERAL RADIOLOGY | Facility: CLINIC | Age: 74
End: 2024-01-23
Attending: ORTHOPAEDIC SURGERY
Payer: COMMERCIAL

## 2024-01-23 ENCOUNTER — TRANSITIONAL CARE UNIT VISIT (OUTPATIENT)
Dept: GERIATRICS | Facility: CLINIC | Age: 74
End: 2024-01-23
Payer: COMMERCIAL

## 2024-01-23 VITALS — WEIGHT: 134 LBS | BODY MASS INDEX: 24.66 KG/M2 | HEIGHT: 62 IN | RESPIRATION RATE: 20 BRPM

## 2024-01-23 DIAGNOSIS — R52 PAIN: Primary | ICD-10-CM

## 2024-01-23 DIAGNOSIS — F41.9 ANXIETY: ICD-10-CM

## 2024-01-23 DIAGNOSIS — S72.142D CLOSED DISPLACED INTERTROCHANTERIC FRACTURE OF LEFT FEMUR WITH ROUTINE HEALING: ICD-10-CM

## 2024-01-23 DIAGNOSIS — Z98.890 HISTORY OF HIP SURGERY: Primary | ICD-10-CM

## 2024-01-23 DIAGNOSIS — F51.02 ADJUSTMENT INSOMNIA: ICD-10-CM

## 2024-01-23 DIAGNOSIS — K59.03 DRUG-INDUCED CONSTIPATION: ICD-10-CM

## 2024-01-23 DIAGNOSIS — Z98.890 HISTORY OF HIP SURGERY: ICD-10-CM

## 2024-01-23 PROCEDURE — 99309 SBSQ NF CARE MODERATE MDM 30: CPT | Performed by: NURSE PRACTITIONER

## 2024-01-23 PROCEDURE — 73502 X-RAY EXAM HIP UNI 2-3 VIEWS: CPT | Mod: TC | Performed by: RADIOLOGY

## 2024-01-23 PROCEDURE — 99024 POSTOP FOLLOW-UP VISIT: CPT | Performed by: ORTHOPAEDIC SURGERY

## 2024-01-23 NOTE — PROGRESS NOTES
"[facility] GERIATRICS    Chief Complaint   Patient presents with    RECHECK     HPI:  Kylah Britt is a 73 year old  (1950), who is being seen today for an episodic care visit at: [facility] AND REHAB [facility] ([facility]) [[id number]].     This is a 74 yo female with PMHx for osteopenia, neurodegenerative d/o with spasticity (on baclofen) who presented s/p mechanical fall at [facility] resulted in left femur fx secondary to osteoporosis s/p internal fixation with gamma adrienne on 11/4/23.    Today's concern is:   pain    Allergies, and PMH/PSH reviewed in EPIC today.  REVIEW OF SYSTEMS:  4 point ROS including Respiratory, CV, GI and , other than that noted in the HPI,  is negative    Objective:   /62   Pulse 70   Temp 97.2  F (36.2  C)   Resp 20   Ht 1.6 m (5' 3\")   Wt 56.2 kg (123 lb 12.8 oz)   SpO2 94%   BMI 21.93 kg/m    GENERAL APPEARANCE:  Alert, cooperative, no distress, pain controlled  RESP:  respiratory effort normal, lungs clear to auscultation, RA  CV:  regular rate and rhythm, no murmur, rub, or gallop, no edema  PSYCH:  oriented X 2/3    Most Recent 3 CBC's:  Recent Labs   Lab Test 01/05/24  0719 12/13/23  0830 11/29/23  0729   WBC 5.1 6.8 3.9*   HGB 11.7 11.9 11.2*   MCV 91 89 92    446 522*     Most Recent 3 BMP's:  Recent Labs   Lab Test 01/05/24  0719 12/13/23  0830 11/29/23  0729    140 140   POTASSIUM 4.3 4.2 3.8   CHLORIDE 104 104 103   CO2 29 22 22   BUN 14.4 12.4 11.3   CR 0.65 0.64 0.59   ANIONGAP 7 14 15   NARA 9.5 9.6 9.9   GLC 88 105* 91       Assessment/Plan:    (S72.002D) Closed fracture of left hip with routine healing  Continue NWB on LLE with follow-up on 1/23 (today), continue vit/ca supplementation, continue 50% WB on LLE. Pending change     (Z79.01) Anticoagulated  Completed course of ASA on 12/16, resolved     (R52) Pain    Continue 1000mg TID with 1000mg daily PRN and baclofen 5mg in AM/10mg BID, vistaril 25mg TID/lidocaine patch.  Also gabapentin " 200mg at HS with voltaren gel 1% 4gm QID to knees. Pain controlled     Anxiety  Continue buspar 5mg BID, improvement?     Covid 19 (H)  Positive on 12/8, asymptomatic . Resolved     Renal fx  Last Cr 0.65 with GFR >90 on 1/5     (D62) ABLA (acute blood loss anemia)  Last Hgb 11.7 on 1/5     (K59.03) Drug-induced constipation  Continue senna S 1 tab BID, effective     (F51.02) Adjustment insomnia  Continue melatonin 9mg and trazodone 50mg at HS, no change    Orders:  NA    Electronically signed by: Bishnu Polo NP

## 2024-01-23 NOTE — LETTER
1/23/2024         RE: Kylah Britt  [address]  [place] [address]        Dear Colleague,    Thank you for referring your patient, Kylah Britt, to the [facility]. Please see a copy of my visit note below.    Follow up open-reduction, internal fixation left Intertrochanteric fracture with long Gamma adrienne on 11/4/23.    We allowed 50% partial weight bearing.  Family reports she uses a walker with Physical Therapy.    Xray images were independently visualized with the patient.  Xray shows good position of fracture and fixation. There is mild compression of the fracture.   The compression screw is holding well in the femoral head.    Exam shows patient confusion.  She resists rotation of left hip. She reports left knee pain with this attempted rotation.  No pain on right.  External rotation 20 degrees on left.  Internal rotation 10 degrees on left.  Patient resists this motion.    Assessment:  intertrochanteric femur fracture is stable, but difficult to tell progress with confusion.  Plan:  advance to weight bearing as tolerated with walker.  Ambulation with Physical Therapy.  Return to clinic 4-6 weeks with left hip x-ray.      Again, thank you for allowing me to participate in the care of your patient.        Sincerely,        Yoni Franco MD

## 2024-01-23 NOTE — LETTER
"    1/23/2024        RE: Kylah Britt  [address]  [place] [address]        M [facility] GERIATRICS    Chief Complaint   Patient presents with     RECHECK     HPI:  Kylah Britt is a 73 year old  (1950), who is being seen today for an episodic care visit at: [facility] AND REHAB [facility] ([facility]) [[id number]].     This is a 74 yo female with PMHx for osteopenia, neurodegenerative d/o with spasticity (on baclofen) who presented s/p mechanical fall at [facility] resulted in left femur fx secondary to osteoporosis s/p internal fixation with gamma adrienne on 11/4/23.    Today's concern is:   pain    Allergies, and PMH/PSH reviewed in EPIC today.  REVIEW OF SYSTEMS:  4 point ROS including Respiratory, CV, GI and , other than that noted in the HPI,  is negative    Objective:   /62   Pulse 70   Temp 97.2  F (36.2  C)   Resp 20   Ht 1.6 m (5' 3\")   Wt 56.2 kg (123 lb 12.8 oz)   SpO2 94%   BMI 21.93 kg/m    GENERAL APPEARANCE:  Alert, cooperative, no distress, pain controlled  RESP:  respiratory effort normal, lungs clear to auscultation, RA  CV:  regular rate and rhythm, no murmur, rub, or gallop, no edema  PSYCH:  oriented X 2/3    Most Recent 3 CBC's:  Recent Labs   Lab Test 01/05/24  0719 12/13/23  0830 11/29/23  0729   WBC 5.1 6.8 3.9*   HGB 11.7 11.9 11.2*   MCV 91 89 92    446 522*     Most Recent 3 BMP's:  Recent Labs   Lab Test 01/05/24  0719 12/13/23  0830 11/29/23  0729    140 140   POTASSIUM 4.3 4.2 3.8   CHLORIDE 104 104 103   CO2 29 22 22   BUN 14.4 12.4 11.3   CR 0.65 0.64 0.59   ANIONGAP 7 14 15   NARA 9.5 9.6 9.9   GLC 88 105* 91       Assessment/Plan:    (S72.002D) Closed fracture of left hip with routine healing  Continue NWB on LLE with follow-up on 1/23 (today), continue vit/ca supplementation, continue 50% WB on LLE. Pending change     (Z79.01) Anticoagulated  Completed course of ASA on 12/16, resolved     (R52) Pain    Continue 1000mg TID with 1000mg daily PRN " and baclofen 5mg in AM/10mg BID, vistaril 25mg TID/lidocaine patch.  Also gabapentin 200mg at HS with voltaren gel 1% 4gm QID to knees. Pain controlled     Anxiety  Continue buspar 5mg BID, improvement?     Covid 19 (H)  Positive on 12/8, asymptomatic . Resolved     Renal fx  Last Cr 0.65 with GFR >90 on 1/5     (D62) ABLA (acute blood loss anemia)  Last Hgb 11.7 on 1/5     (K59.03) Drug-induced constipation  Continue senna S 1 tab BID, effective     (F51.02) Adjustment insomnia  Continue melatonin 9mg and trazodone 50mg at HS, no change    Orders:  NA    Electronically signed by: Bishnu Polo NP         Sincerely,        Bishnu Polo NP

## 2024-01-23 NOTE — PROGRESS NOTES
Follow up open-reduction, internal fixation left Intertrochanteric fracture with long Gamma adrienne on 11/4/23.    We allowed 50% partial weight bearing.  Family reports she uses a walker with Physical Therapy.    Xray images were independently visualized with the patient.  Xray shows good position of fracture and fixation. There is mild compression of the fracture.   The compression screw is holding well in the femoral head.    Exam shows patient confusion.  She resists rotation of left hip. She reports left knee pain with this attempted rotation.  No pain on right.  External rotation 20 degrees on left.  Internal rotation 10 degrees on left.  Patient resists this motion.    Assessment:  intertrochanteric femur fracture is stable, but difficult to tell progress with confusion.  Plan:  advance to weight bearing as tolerated with walker.  Ambulation with Physical Therapy.  Return to clinic 4-6 weeks with left hip x-ray.

## 2024-01-29 VITALS
BODY MASS INDEX: 22.33 KG/M2 | OXYGEN SATURATION: 96 % | TEMPERATURE: 96.8 F | HEART RATE: 62 BPM | DIASTOLIC BLOOD PRESSURE: 75 MMHG | RESPIRATION RATE: 18 BRPM | WEIGHT: 122.1 LBS | SYSTOLIC BLOOD PRESSURE: 144 MMHG

## 2024-01-30 ENCOUNTER — TRANSITIONAL CARE UNIT VISIT (OUTPATIENT)
Dept: GERIATRICS | Facility: CLINIC | Age: 74
End: 2024-01-30
Payer: COMMERCIAL

## 2024-01-30 DIAGNOSIS — F51.02 ADJUSTMENT INSOMNIA: ICD-10-CM

## 2024-01-30 DIAGNOSIS — R52 PAIN: Primary | ICD-10-CM

## 2024-01-30 DIAGNOSIS — K59.03 DRUG-INDUCED CONSTIPATION: ICD-10-CM

## 2024-01-30 DIAGNOSIS — S72.142D CLOSED DISPLACED INTERTROCHANTERIC FRACTURE OF LEFT FEMUR WITH ROUTINE HEALING: ICD-10-CM

## 2024-01-30 DIAGNOSIS — F41.9 ANXIETY: ICD-10-CM

## 2024-01-30 PROCEDURE — 99309 SBSQ NF CARE MODERATE MDM 30: CPT | Performed by: NURSE PRACTITIONER

## 2024-01-30 NOTE — PROGRESS NOTES
[facility] GERIATRICS    Chief Complaint   Patient presents with    RECHECK     HPI:  Kylah Britt is a 73 year old  (1950), who is being seen today for an episodic care visit at: [facility] [[id number]].     This is a 74 yo female with PMHx for osteopenia, neurodegenerative d/o with spasticity (on baclofen) who presented s/p mechanical fall at [facility] resulted in left femur fx secondary to osteoporosis s/p internal fixation with gamma adrienne on 11/4/23.    Today's concern is:   Follow-up wit ortho, anxiety, pain    Allergies, and PMH/PSH reviewed in EPIC today.  REVIEW OF SYSTEMS:  4 point ROS including Respiratory, CV, GI and , other than that noted in the HPI,  is negative    Objective:   BP (!) 144/75   Pulse 62   Temp 96.8  F (36  C)   Resp 18   Wt 55.4 kg (122 lb 1.6 oz)   SpO2 96%   BMI 22.33 kg/m    GENERAL APPEARANCE:  Alert, cooperative, no distress, pain mostly controlled during day, ambulating well  RESP:  respiratory effort normal, lungs clear to auscultation, RA  CV:  regular rate and rhythm, no murmur, rub, or gallop, no edema  PSYCH:  oriented X 2    Most Recent 3 CBC's:  Recent Labs   Lab Test 01/05/24  0719 12/13/23  0830 11/29/23  0729   WBC 5.1 6.8 3.9*   HGB 11.7 11.9 11.2*   MCV 91 89 92    446 522*     Most Recent 3 BMP's:  Recent Labs   Lab Test 01/05/24  0719 12/13/23  0830 11/29/23  0729    140 140   POTASSIUM 4.3 4.2 3.8   CHLORIDE 104 104 103   CO2 29 22 22   BUN 14.4 12.4 11.3   CR 0.65 0.64 0.59   ANIONGAP 7 14 15   NARA 9.5 9.6 9.9   GLC 88 105* 91       Assessment/Plan:    (S72.002D) Closed fracture of left hip with routine healing  Recently given WBAT, continue vit/ca supplementation, follow-up with ortho as directed     (Z79.01) Anticoagulated (H)  Completed course of ASA on 12/16, resolved     (R52) Pain    Continue 1000mg TID with 1000mg daily PRN and baclofen 5mg in AM/10mg BID, lidocaine patch.  Also gabapentin 200mg at HS  with voltaren gel 1% 4gm QID to knees. Today discontinue vistaril. Pain mostly controlled     Anxiety  Today increase buspar to 10mg BID     Covid 19 (H)  Positive on 12/8, asymptomatic . Resolved     Renal fx  Last Cr 0.65 with GFR >90 on 1/5     (D62) ABLA (acute blood loss anemia)  Last Hgb 11.7 on 1/5     (K59.03) Drug-induced constipation  Continue senna S 1 tab BID, effective     (F51.02) Adjustment insomnia  Continue melatonin 9mg and trazodone 50mg at HS, adequate    Orders:  Discontinue vistaril, increase buspar    Electronically signed by: Bishnu Polo NP

## 2024-01-30 NOTE — LETTER
1/30/2024        RE: Kylah Britt  [address]  [place] [address]        M [facility] GERIATRICS    Chief Complaint   Patient presents with     RECHECK     HPI:  Kylah Britt is a 73 year old  (1950), who is being seen today for an episodic care visit at: [facility] AND REHAB [facility] ([facility]) [[id number]].     This is a 74 yo female with PMHx for osteopenia, neurodegenerative d/o with spasticity (on baclofen) who presented s/p mechanical fall at [facility] resulted in left femur fx secondary to osteoporosis s/p internal fixation with gamma adrienne on 11/4/23.    Today's concern is:   Follow-up wit ortho, anxiety, pain    Allergies, and PMH/PSH reviewed in EPIC today.  REVIEW OF SYSTEMS:  4 point ROS including Respiratory, CV, GI and , other than that noted in the HPI,  is negative    Objective:   BP (!) 144/75   Pulse 62   Temp 96.8  F (36  C)   Resp 18   Wt 55.4 kg (122 lb 1.6 oz)   SpO2 96%   BMI 22.33 kg/m    GENERAL APPEARANCE:  Alert, cooperative, no distress, pain mostly controlled during day, ambulating well  RESP:  respiratory effort normal, lungs clear to auscultation, RA  CV:  regular rate and rhythm, no murmur, rub, or gallop, no edema  PSYCH:  oriented X 2    Most Recent 3 CBC's:  Recent Labs   Lab Test 01/05/24  0719 12/13/23  0830 11/29/23  0729   WBC 5.1 6.8 3.9*   HGB 11.7 11.9 11.2*   MCV 91 89 92    446 522*     Most Recent 3 BMP's:  Recent Labs   Lab Test 01/05/24  0719 12/13/23  0830 11/29/23  0729    140 140   POTASSIUM 4.3 4.2 3.8   CHLORIDE 104 104 103   CO2 29 22 22   BUN 14.4 12.4 11.3   CR 0.65 0.64 0.59   ANIONGAP 7 14 15   NARA 9.5 9.6 9.9   GLC 88 105* 91       Assessment/Plan:    (S72.002D) Closed fracture of left hip with routine healing  Recently given WBAT, continue vit/ca supplementation, follow-up with ortho as directed     (Z79.01) Anticoagulated (H)  Completed course of ASA on 12/16, resolved     (R52) Pain    Continue 1000mg TID with 1000mg  daily PRN and baclofen 5mg in AM/10mg BID, lidocaine patch.  Also gabapentin 200mg at HS with voltaren gel 1% 4gm QID to knees. Today discontinue vistaril. Pain mostly controlled     Anxiety  Today increase buspar to 10mg BID     Covid 19 (H)  Positive on 12/8, asymptomatic . Resolved     Renal fx  Last Cr 0.65 with GFR >90 on 1/5     (D62) ABLA (acute blood loss anemia)  Last Hgb 11.7 on 1/5     (K59.03) Drug-induced constipation  Continue senna S 1 tab BID, effective     (F51.02) Adjustment insomnia  Continue melatonin 9mg and trazodone 50mg at HS, adequate    Orders:  Discontinue vistaril, increase buspar    Electronically signed by: Bishnu Polo NP         Sincerely,        Bishnu Polo NP

## 2024-02-05 VITALS
WEIGHT: 122.1 LBS | OXYGEN SATURATION: 98 % | DIASTOLIC BLOOD PRESSURE: 63 MMHG | RESPIRATION RATE: 18 BRPM | HEIGHT: 63 IN | SYSTOLIC BLOOD PRESSURE: 115 MMHG | HEART RATE: 62 BPM | TEMPERATURE: 97 F | BODY MASS INDEX: 21.63 KG/M2

## 2024-02-06 ENCOUNTER — TRANSITIONAL CARE UNIT VISIT (OUTPATIENT)
Dept: GERIATRICS | Facility: CLINIC | Age: 74
End: 2024-02-06
Payer: COMMERCIAL

## 2024-02-06 ENCOUNTER — TRANSFERRED RECORDS (OUTPATIENT)
Dept: HEALTH INFORMATION MANAGEMENT | Facility: CLINIC | Age: 74
End: 2024-02-06

## 2024-02-06 DIAGNOSIS — F51.02 ADJUSTMENT INSOMNIA: ICD-10-CM

## 2024-02-06 DIAGNOSIS — R52 PAIN: ICD-10-CM

## 2024-02-06 DIAGNOSIS — K59.03 DRUG-INDUCED CONSTIPATION: ICD-10-CM

## 2024-02-06 DIAGNOSIS — S72.142D CLOSED DISPLACED INTERTROCHANTERIC FRACTURE OF LEFT FEMUR WITH ROUTINE HEALING: Primary | ICD-10-CM

## 2024-02-06 PROCEDURE — 99309 SBSQ NF CARE MODERATE MDM 30: CPT | Performed by: NURSE PRACTITIONER

## 2024-02-06 NOTE — PROGRESS NOTES
"[facility] GERIATRICS    Chief Complaint   Patient presents with    RECHECK     HPI:  Kylah Britt is a 73 year old  (1950), who is being seen today for an episodic care visit at: [facility] ([facility]) [[id number]].     This is a 72 yo female with PMHx for osteopenia, neurodegenerative d/o with spasticity (on baclofen) who presented s/p mechanical fall at [facility] resulted in left femur fx secondary to osteoporosis s/p internal fixation with gamma adrienne on 11/4/23.    Today's concern is:   Anxiety, pain    Allergies, and PMH/PSH reviewed in EPIC today.  REVIEW OF SYSTEMS:  4 point ROS including Respiratory, CV, GI and , other than that noted in the HPI,  is negative    Objective:   /63   Pulse 62   Temp 97  F (36.1  C)   Resp 18   Ht 1.6 m (5' 3\")   Wt 55.4 kg (122 lb 1.6 oz)   SpO2 98%   BMI 21.63 kg/m    GENERAL APPEARANCE:  Alert, cooperative, no distress, pain mostly controlled during day, ambulating well  RESP:  respiratory effort normal, lungs clear to auscultation, RA  CV:  regular rate and rhythm, no murmur, rub, or gallop, no edema  PSYCH:  oriented X 2    Most Recent 3 CBC's:  Recent Labs   Lab Test 01/05/24  0719 12/13/23  0830 11/29/23  0729   WBC 5.1 6.8 3.9*   HGB 11.7 11.9 11.2*   MCV 91 89 92    446 522*       Assessment/Plan:    (S72.002D) Closed fracture of left hip with routine healing  Recently given WBAT, continue vit/ca supplementation, follow-up with ortho as directed     (Z79.01) Anticoagulated (H)  Completed course of ASA on 12/16, resolved     (R52) Pain    Continue 1000mg TID with 1000mg daily PRN and baclofen 5mg in AM/10mg BID, lidocaine patch.  Also gabapentin 200mg at HS with voltaren gel 1% 4gm QID to knees. Pain mostly controlled     Anxiety  Continue buspar 10mg BID, improved     Covid 19 (H)  Positive on 12/8, asymptomatic. Resolved     Renal fx  Last Cr 0.65 with GFR >90 on 1/5     (D62) ABLA (acute blood loss anemia)  Last Hgb 11.7 " on 1/5     (K59.03) Drug-induced constipation  Continue senna S 1 tab BID, effective     (F51.02) Adjustment insomnia  Continue melatonin 9mg and trazodone 50mg at HS, effective    Orders:  NA    Electronically signed by: Bishnu Polo NP

## 2024-02-06 NOTE — LETTER
"    2/6/2024        RE: Kylah Britt  [address]  [facility] [address]        [facility] GERIATRICS    Chief Complaint   Patient presents with     RECHECK     HPI:  Kylah Britt is a 73 year old  (1950), who is being seen today for an episodic care visit at: [facility] ([facility]) [[id number]].     This is a 72 yo female with PMHx for osteopenia, neurodegenerative d/o with spasticity (on baclofen) who presented s/p mechanical fall at [facility] resulted in left femur fx secondary to osteoporosis s/p internal fixation with gamma adrienne on 11/4/23.    Today's concern is:   Anxiety, pain    Allergies, and PMH/PSH reviewed in [facility] today.  REVIEW OF SYSTEMS:  4 point ROS including Respiratory, CV, GI and , other than that noted in the HPI,  is negative    Objective:   /63   Pulse 62   Temp 97  F (36.1  C)   Resp 18   Ht 1.6 m (5' 3\")   Wt 55.4 kg (122 lb 1.6 oz)   SpO2 98%   BMI 21.63 kg/m    GENERAL APPEARANCE:  Alert, cooperative, no distress, pain mostly controlled during day, ambulating well  RESP:  respiratory effort normal, lungs clear to auscultation, RA  CV:  regular rate and rhythm, no murmur, rub, or gallop, no edema  PSYCH:  oriented X 2    Most Recent 3 CBC's:  Recent Labs   Lab Test 01/05/24  0719 12/13/23  0830 11/29/23  0729   WBC 5.1 6.8 3.9*   HGB 11.7 11.9 11.2*   MCV 91 89 92    446 522*       Assessment/Plan:    (S72.002D) Closed fracture of left hip with routine healing  Recently given WBAT, continue vit/ca supplementation, follow-up with ortho as directed     (Z79.01) Anticoagulated (H)  Completed course of ASA on 12/16, resolved     (R52) Pain    Continue 1000mg TID with 1000mg daily PRN and baclofen 5mg in AM/10mg BID, lidocaine patch.  Also gabapentin 200mg at HS with voltaren gel 1% 4gm QID to knees. Pain mostly controlled     Anxiety  Continue buspar 10mg BID, improved     Covid 19 (H)  Positive on 12/8, asymptomatic. Resolved     Renal " fx  Last Cr 0.65 with GFR >90 on 1/5     (D62) ABLA (acute blood loss anemia)  Last Hgb 11.7 on 1/5     (K59.03) Drug-induced constipation  Continue senna S 1 tab BID, effective     (F51.02) Adjustment insomnia  Continue melatonin 9mg and trazodone 50mg at HS, effective    Orders:  NA    Electronically signed by: Bishnu Polo NP          Sincerely,        Bishnu Polo NP

## 2024-02-12 VITALS
HEART RATE: 75 BPM | DIASTOLIC BLOOD PRESSURE: 74 MMHG | RESPIRATION RATE: 20 BRPM | WEIGHT: 122.4 LBS | BODY MASS INDEX: 21.68 KG/M2 | SYSTOLIC BLOOD PRESSURE: 128 MMHG | OXYGEN SATURATION: 97 % | TEMPERATURE: 97.3 F

## 2024-02-12 NOTE — PROGRESS NOTES
[facility] GERIATRICS DISCHARGE SUMMARY  PATIENT'S NAME: Kylah Britt  YOB: 1950  MEDICAL RECORD NUMBER:  [id number]  Place of Service where encounter took place:  [facility] ([facility]) [[id number]]    PRIMARY CARE PROVIDER AND CLINIC RESPONSIBLE AFTER TRANSFER:   ESPERANZA Fuentes CNP, [address] [facility] [address]    Non-FMG Provider     Transferring providers: Bishnu Polo NP, MD Maribel  Recent Hospitalization/ED:  [facility] stay 11/3/23 to 11/6/23.  Date of SNF Admission: November 06, 2023  Date of SNF (anticipated) Discharge: February 14, 2024  Discharged to: [facility]  Cognitive Scores: SLUMS: 18/30  Physical Function: Ambulating >300 ft with FWW  DME: No new DME needed    CODE STATUS/ADVANCE DIRECTIVES DISCUSSION:  Prior   ALLERGIES: No known drug allergy    HPI  This is a 74 yo female with PMHx for osteopenia, neurodegenerative d/o with spasticity (on baclofen) who presented s/p mechanical fall at [facility] resulted in left femur fx secondary to osteoporosis s/p internal fixation with gamma adrienne on 11/4/23.    Summary of nursing facility stay:     (S72.002D) Closed fracture of left hip with routine healing  Recently given WBAT, continue vit/ca supplementation, follow-up with ortho as directed     (Z79.01) Anticoagulated (H)  Completed course of ASA on 12/16     (R52) Pain    Continue 1000mg TID with 1000mg daily PRN and baclofen 5mg in AM/10mg BID, lidocaine patch.  Also gabapentin 200mg at HS with voltaren gel 1% 4gm QID to knees     Anxiety  Continue buspar 10mg BID, improved     Covid 19 (H)  Positive on 12/8, asymptomatic. Resolved     Renal fx  Last Cr 0.65 with GFR >90 on 1/5     (D62) ABLA (acute blood loss anemia)  Last Hgb 11.7 on 1/5     (K59.03) Drug-induced constipation  Continue senna S 1 tab BID     (F51.02) Adjustment insomnia  Continue melatonin 9mg and trazodone 50mg at  HS    Discharge Medications:    Current Outpatient Medications   Medication Sig Dispense Refill    acetaminophen (TYLENOL) 500 MG tablet Take 1,000 mg by mouth 3 times daily And 1000mg daily PRN      baclofen (LIORESAL) 10 MG tablet Take 10 mg by mouth 2 times daily 3pm and 9pm, takes 5mg every morning      busPIRone (BUSPAR) 5 MG tablet Take 10 mg by mouth 2 times daily      Calcium Carbonate (CALCIUM 600 PO) Take 1 tablet by mouth every other day      diclofenac (VOLTAREN) 1 % topical gel Apply 4 g topically 4 times daily Apply to knees      gabapentin (NEURONTIN) 100 MG capsule Take 2 capsules (200 mg) by mouth At Bedtime 42 capsule 1    Lidocaine (LIDOCARE) 4 % Patch Place 1 patch onto the skin every 24 hours To prevent lidocaine toxicity, patient should be patch free for 12 hrs daily. Apply to L hip      melatonin 3 MG tablet Take 3 tablets (9 mg) by mouth At Bedtime      senna-docusate (SENOKOT-S/PERICOLACE) 8.6-50 MG tablet Take 1 tablet by mouth 2 times daily 60 tablet 1    traZODone (DESYREL) 50 MG tablet Take 1 tablet (50 mg) by mouth At Bedtime 31 tablet 11    vitamin D3 (CHOLECALCIFEROL) 50 mcg (2000 units) tablet Take 50 mcg by mouth every other day          Controlled medications:   not applicable/none     Past Medical History:   Past Medical History:   Diagnosis Date    Abnormal Papanicolaou smear of cervix and cervical HPV 9/1/92    vaginitis with abnormal Pap    Depressive disorder, not elsewhere classified     depression    Female stress incontinence     urinary incontinence    Lyme disease     Osteoarthrosis, unspecified whether generalized or localized, unspecified site      monoarticular arthritis in the right first MTP joint    Other psychological or physical stress, not elsewhere classified(V62.89)     delayed stress syndrome     Physical Exam:   Vitals: /74   Pulse 75   Temp 97.3  F (36.3  C)   Resp 20   Wt 55.5 kg (122 lb 6.4 oz)   SpO2 97%   BMI 21.68 kg/m    BMI: Body mass  index is 21.68 kg/m .  GENERAL APPEARANCE:  Alert, cooperative, no distress, pain mostly controlled during day, ambulating well  RESP:  respiratory effort normal, lungs clear to auscultation, RA  CV:  regular rate and rhythm, no murmur, rub, or gallop, no edema  PSYCH:  oriented X 2    SNF labs: Most Recent 3 CBC's:  Recent Labs   Lab Test 01/05/24  0719 12/13/23  0830 11/29/23  0729   WBC 5.1 6.8 3.9*   HGB 11.7 11.9 11.2*   MCV 91 89 92    446 522*     Most Recent 3 BMP's:  Recent Labs   Lab Test 01/05/24  0719 12/13/23  0830 11/29/23  0729    140 140   POTASSIUM 4.3 4.2 3.8   CHLORIDE 104 104 103   CO2 29 22 22   BUN 14.4 12.4 11.3   CR 0.65 0.64 0.59   ANIONGAP 7 14 15   NARA 9.5 9.6 9.9   GLC 88 105* 91       DISCHARGE PLAN:  Follow up labs: No labs orders/due  Medical Follow Up:      Follow up with primary care provider in 1-2 weeks  [facility] scheduled appointments:  Appointments in Next Year      Feb 13, 2024  7:00 AM  Transitional Care Unit Visit with Bishnu Polo NP  [facility] ([facility] ) [phone] Feb 20, 2024  3:00 PM  (Arrive by 2:45 PM)  RETURN VISIT with Yoni Franco MD  [facility] ([facility] ) [phone]           Discharge Services: Home Care:  Occupational Therapy, Physical Therapy, and Home Health Aide  Discharge Instructions Verbalized to Patient at Discharge:   None    TOTAL DISCHARGE TIME:   Less than or equal to 30 minutes  Electronically signed by:  Bishnu Polo NP     Documentation of Face to Face and Certification for Home Health Services    I certify that patient: Kylah Britt is under my care and that I, or a nurse practitioner or physician's assistant working with me, had a face-to-face encounter that meets the physician face-to-face encounter requirements with this patient on: 2/13/2024.    This encounter with the patient was in whole,  or in part, for the following medical condition, which is the primary reason for home health care: therapy.    I certify that, based on my findings, the following services are medically necessary home health services: Occupational Therapy and Physical Therapy.    My clinical findings support the need for the above services because: Occupational Therapy Services are needed to assess and treat cognitive ability and address ADL safety due to impairment in cognition. and Physical Therapy Services are needed to assess and treat the following functional impairments: Falls risk.    Further, I certify that my clinical findings support that this patient is homebound (i.e. absences from home require considerable and taxing effort and are for medical reasons or Druze services or infrequently or of short duration when for other reasons) because:  returning to [facility] .    Based on the above findings. I certify that this patient is confined to the home and needs intermittent skilled nursing care, physical therapy and/or speech therapy.  The patient is under my care, and I have initiated the establishment of the plan of care.  This patient will be followed by a physician who will periodically review the plan of care.  Physician/Provider to provide follow up care: Gina Mejia    Attending [facility] physician (the Medicare certified PECOS provider): Bishnu Polo NP  Physician Signature: See electronic signature associated with these discharge orders.  Date: 2/13/2024

## 2024-02-13 ENCOUNTER — TRANSITIONAL CARE UNIT VISIT (OUTPATIENT)
Dept: GERIATRICS | Facility: CLINIC | Age: 74
End: 2024-02-13
Payer: COMMERCIAL

## 2024-02-13 DIAGNOSIS — F41.9 ANXIETY: ICD-10-CM

## 2024-02-13 DIAGNOSIS — F51.02 ADJUSTMENT INSOMNIA: ICD-10-CM

## 2024-02-13 DIAGNOSIS — K59.03 DRUG-INDUCED CONSTIPATION: ICD-10-CM

## 2024-02-13 DIAGNOSIS — S72.142D CLOSED DISPLACED INTERTROCHANTERIC FRACTURE OF LEFT FEMUR WITH ROUTINE HEALING: ICD-10-CM

## 2024-02-13 DIAGNOSIS — R52 PAIN: Primary | ICD-10-CM

## 2024-02-13 DIAGNOSIS — D62 ABLA (ACUTE BLOOD LOSS ANEMIA): ICD-10-CM

## 2024-02-13 PROCEDURE — 99315 NF DSCHRG MGMT 30 MIN/LESS: CPT | Performed by: NURSE PRACTITIONER

## 2024-02-13 NOTE — LETTER
2/13/2024        RE: Kylah Britt  [address]  [facility] [address]        [facility] GERIATRICS DISCHARGE SUMMARY  PATIENT'S NAME: Kylah Britt  YOB: 1950  MEDICAL RECORD NUMBER:  [id number]  Place of Service where encounter took place:  [facility] [place] ([facility]) [[id number]]    PRIMARY CARE PROVIDER AND CLINIC RESPONSIBLE AFTER TRANSFER:   ESPERANZA Fuentes CNP, [address] [facility] [address]    Non-G Provider     Transferring providers: Bishnu Polo NP, MD Maribel  Recent Hospitalization/ED:  [facility] stay 11/3/23 to 11/6/23.  Date of SNF Admission: November 06, 2023  Date of SNF (anticipated) Discharge: February 14, 2024  Discharged to: [facility]  Cognitive Scores: SLUMS: 18/30  Physical Function: Ambulating >300 ft with FWW  DME: No new DME needed    CODE STATUS/ADVANCE DIRECTIVES DISCUSSION:  Prior   ALLERGIES: No known drug allergy    HPI  This is a 74 yo female with PMHx for osteopenia, neurodegenerative d/o with spasticity (on baclofen) who presented s/p mechanical fall at [facility] resulted in left femur fx secondary to osteoporosis s/p internal fixation with gamma adrienne on 11/4/23.    Summary of nursing facility stay:     (S72.002D) Closed fracture of left hip with routine healing  Recently given WBAT, continue vit/ca supplementation, follow-up with ortho as directed     (Z79.01) Anticoagulated (H)  Completed course of ASA on 12/16     (R52) Pain    Continue 1000mg TID with 1000mg daily PRN and baclofen 5mg in AM/10mg BID, lidocaine patch.  Also gabapentin 200mg at HS with voltaren gel 1% 4gm QID to knees     Anxiety  Continue buspar 10mg BID, improved     Covid 19 (H)  Positive on 12/8, asymptomatic. Resolved     Renal fx  Last Cr 0.65 with GFR >90 on 1/5     (D62) ABLA (acute blood loss anemia)  Last Hgb 11.7 on 1/5     (K59.03) Drug-induced constipation  Continue senna S 1  tab BID     (F51.02) Adjustment insomnia  Continue melatonin 9mg and trazodone 50mg at HS    Discharge Medications:    Current Outpatient Medications   Medication Sig Dispense Refill     acetaminophen (TYLENOL) 500 MG tablet Take 1,000 mg by mouth 3 times daily And 1000mg daily PRN       baclofen (LIORESAL) 10 MG tablet Take 10 mg by mouth 2 times daily 3pm and 9pm, takes 5mg every morning       busPIRone (BUSPAR) 5 MG tablet Take 10 mg by mouth 2 times daily       Calcium Carbonate (CALCIUM 600 PO) Take 1 tablet by mouth every other day       diclofenac (VOLTAREN) 1 % topical gel Apply 4 g topically 4 times daily Apply to knees       gabapentin (NEURONTIN) 100 MG capsule Take 2 capsules (200 mg) by mouth At Bedtime 42 capsule 1     Lidocaine (LIDOCARE) 4 % Patch Place 1 patch onto the skin every 24 hours To prevent lidocaine toxicity, patient should be patch free for 12 hrs daily. Apply to L hip       melatonin 3 MG tablet Take 3 tablets (9 mg) by mouth At Bedtime       senna-docusate (SENOKOT-S/PERICOLACE) 8.6-50 MG tablet Take 1 tablet by mouth 2 times daily 60 tablet 1     traZODone (DESYREL) 50 MG tablet Take 1 tablet (50 mg) by mouth At Bedtime 31 tablet 11     vitamin D3 (CHOLECALCIFEROL) 50 mcg (2000 units) tablet Take 50 mcg by mouth every other day          Controlled medications:   not applicable/none     Past Medical History:   Past Medical History:   Diagnosis Date     Abnormal Papanicolaou smear of cervix and cervical HPV 9/1/92    vaginitis with abnormal Pap     Depressive disorder, not elsewhere classified     depression     Female stress incontinence     urinary incontinence     Lyme disease      Osteoarthrosis, unspecified whether generalized or localized, unspecified site      monoarticular arthritis in the right first MTP joint     Other psychological or physical stress, not elsewhere classified(V62.89)     delayed stress syndrome     Physical Exam:   Vitals: /74   Pulse 75   Temp 97.3  F  (36.3  C)   Resp 20   Wt 55.5 kg (122 lb 6.4 oz)   SpO2 97%   BMI 21.68 kg/m    BMI: Body mass index is 21.68 kg/m .  GENERAL APPEARANCE:  Alert, cooperative, no distress, pain mostly controlled during day, ambulating well  RESP:  respiratory effort normal, lungs clear to auscultation, RA  CV:  regular rate and rhythm, no murmur, rub, or gallop, no edema  PSYCH:  oriented X 2    SNF labs: Most Recent 3 CBC's:  Recent Labs   Lab Test 01/05/24 0719 12/13/23 0830 11/29/23  0729   WBC 5.1 6.8 3.9*   HGB 11.7 11.9 11.2*   MCV 91 89 92    446 522*     Most Recent 3 BMP's:  Recent Labs   Lab Test 01/05/24 0719 12/13/23 0830 11/29/23  0729    140 140   POTASSIUM 4.3 4.2 3.8   CHLORIDE 104 104 103   CO2 29 22 22   BUN 14.4 12.4 11.3   CR 0.65 0.64 0.59   ANIONGAP 7 14 15   NARA 9.5 9.6 9.9   GLC 88 105* 91       DISCHARGE PLAN:  Follow up labs: No labs orders/due  Medical Follow Up:      Follow up with primary care provider in 1-2 weeks  [facility] scheduled appointments:  Appointments in Next Year      Feb 13, 2024  7:00 AM  Transitional Care Unit Visit with Bishnu Polo NP  [facility] ([facility] ) [phone] Feb 20, 2024  3:00 PM  (Arrive by 2:45 PM)  RETURN VISIT with oYni Franco MD  [facility] ([facility] ) [phone]           Discharge Services: Home Care:  Occupational Therapy, Physical Therapy, and Home Health Aide  Discharge Instructions Verbalized to Patient at Discharge:   None    TOTAL DISCHARGE TIME:   Less than or equal to 30 minutes  Electronically signed by:  Bishnu Polo NP     Documentation of Face to Face and Certification for Home Health Services    I certify that patient: Kylah Britt is under my care and that I, or a nurse practitioner or physician's assistant working with me, had a face-to-face encounter that meets the physician face-to-face  encounter requirements with this patient on: 2/13/2024.    This encounter with the patient was in whole, or in part, for the following medical condition, which is the primary reason for home health care: therapy.    I certify that, based on my findings, the following services are medically necessary home health services: Occupational Therapy and Physical Therapy.    My clinical findings support the need for the above services because: Occupational Therapy Services are needed to assess and treat cognitive ability and address ADL safety due to impairment in cognition. and Physical Therapy Services are needed to assess and treat the following functional impairments: Falls risk.    Further, I certify that my clinical findings support that this patient is homebound (i.e. absences from home require considerable and taxing effort and are for medical reasons or Mu-ism services or infrequently or of short duration when for other reasons) because:  returning to [facility] .    Based on the above findings. I certify that this patient is confined to the home and needs intermittent skilled nursing care, physical therapy and/or speech therapy.  The patient is under my care, and I have initiated the establishment of the plan of care.  This patient will be followed by a physician who will periodically review the plan of care.  Physician/Provider to provide follow up care: Gina Mejia    Attending hospital physician (the Medicare certified PECOS provider): Bishnu Polo NP  Physician Signature: See electronic signature associated with these discharge orders.  Date: 2/13/2024              Sincerely,        Bishnu Polo NP

## 2024-02-18 DIAGNOSIS — G31.9 NEURODEGENERATIVE DISORDER (H): ICD-10-CM

## 2024-02-18 RX ORDER — GABAPENTIN 100 MG/1
CAPSULE ORAL
Qty: 90 CAPSULE | Refills: 4 | Status: SHIPPED | OUTPATIENT
Start: 2024-02-18 | End: 2024-02-24

## 2024-02-18 NOTE — PROGRESS NOTES
Nursing updated this NP that Anamaria is very upset about the pain in her legs.  Wants to control the voltaren gel 1% order but by the looks of it and confirming with nursing she is at her max of the gel  4GM 4x a day to her knees.    She does not want to take any more pills which is just like her to say.  She is asking for either a heating pad or rice pack that can be heated up in the microwave.    Orders:   Gabapentin 100mg - give 2 caps (200mg) at HS and now adding 100mg po every 4 hours PRN for pain  May use rice pack heated in microwave every 2 hours PRN - family to provide  Ask DHS if able to use a heating pad in facility.      Gina Mejia, ESPERANZA CNP

## 2024-02-20 ENCOUNTER — OFFICE VISIT (OUTPATIENT)
Dept: ORTHOPEDICS | Facility: CLINIC | Age: 74
End: 2024-02-20
Payer: COMMERCIAL

## 2024-02-20 ENCOUNTER — ANCILLARY PROCEDURE (OUTPATIENT)
Dept: GENERAL RADIOLOGY | Facility: CLINIC | Age: 74
End: 2024-02-20
Attending: ORTHOPAEDIC SURGERY
Payer: COMMERCIAL

## 2024-02-20 VITALS
SYSTOLIC BLOOD PRESSURE: 146 MMHG | DIASTOLIC BLOOD PRESSURE: 77 MMHG | WEIGHT: 134 LBS | BODY MASS INDEX: 24.66 KG/M2 | HEIGHT: 62 IN | HEART RATE: 81 BPM | RESPIRATION RATE: 18 BRPM

## 2024-02-20 DIAGNOSIS — S72.142D CLOSED DISPLACED INTERTROCHANTERIC FRACTURE OF LEFT FEMUR WITH ROUTINE HEALING: ICD-10-CM

## 2024-02-20 DIAGNOSIS — S72.142D CLOSED DISPLACED INTERTROCHANTERIC FRACTURE OF LEFT FEMUR WITH ROUTINE HEALING: Primary | ICD-10-CM

## 2024-02-20 PROCEDURE — 73502 X-RAY EXAM HIP UNI 2-3 VIEWS: CPT | Mod: TC | Performed by: RADIOLOGY

## 2024-02-20 PROCEDURE — 99024 POSTOP FOLLOW-UP VISIT: CPT | Performed by: ORTHOPAEDIC SURGERY

## 2024-02-20 NOTE — LETTER
2/20/2024         RE: Kylah Britt  [address]  [facility] [address]        Dear Colleague,    Thank you for referring your patient, Kylah Britt, to the [facility]. Please see a copy of my visit note below.    Follow up open-reduction, internal fixation left Intertrochanteric fracture with long Gamma adrienne on 11/4/23.    She is full weight bearing.   Family reports she uses a walker with Physical Therapy.  She is back at assisted living in [facility]    Xray images were independently visualized with the patient.  Xray shows good position of fracture and fixation. There was mild compression of the fracture, but is now stable.   The compression screw is holding well in the femoral head.    Exam shows patient confusion.  She resists rotation of left hip. She reports left knee pain with this attempted rotation.  No pain with flexion of left hip.  No pain on right.  External rotation 20 degrees on left.  Internal rotation 10 degrees on left.  Patient resists this motion.    Assessment:  intertrochanteric femur fracture is stable, but difficult to tell progress with confusion.  Plan: continue  weight bearing as tolerated with walker.  Ambulation with Physical Therapy.  Return to clinic as needed.      Again, thank you for allowing me to participate in the care of your patient.        Sincerely,        Yoni Franco MD

## 2024-02-21 NOTE — PROGRESS NOTES
Follow up open-reduction, internal fixation left Intertrochanteric fracture with long Gamma adrienne on 11/4/23.    She is full weight bearing.   Family reports she uses a walker with Physical Therapy.  She is back at assisted living in [facility]    Xray images were independently visualized with the patient.  Xray shows good position of fracture and fixation. There was mild compression of the fracture, but is now stable.   The compression screw is holding well in the femoral head.    Exam shows patient confusion.  She resists rotation of left hip. She reports left knee pain with this attempted rotation.  No pain with flexion of left hip.  No pain on right.  External rotation 20 degrees on left.  Internal rotation 10 degrees on left.  Patient resists this motion.    Assessment:  intertrochanteric femur fracture is stable, but difficult to tell progress with confusion.  Plan: continue  weight bearing as tolerated with walker.  Ambulation with Physical Therapy.  Return to clinic as needed.

## 2024-02-22 ENCOUNTER — ASSISTED LIVING VISIT (OUTPATIENT)
Dept: GERIATRICS | Facility: CLINIC | Age: 74
End: 2024-02-22
Payer: COMMERCIAL

## 2024-02-22 DIAGNOSIS — G31.9 NEURODEGENERATIVE DISORDER (H): Primary | ICD-10-CM

## 2024-02-22 DIAGNOSIS — M79.651 PAIN OF RIGHT THIGH: ICD-10-CM

## 2024-02-22 DIAGNOSIS — K59.03 DRUG-INDUCED CONSTIPATION: ICD-10-CM

## 2024-02-22 DIAGNOSIS — F51.02 ADJUSTMENT INSOMNIA: ICD-10-CM

## 2024-02-22 DIAGNOSIS — F41.8 MIXED ANXIETY AND DEPRESSIVE DISORDER: ICD-10-CM

## 2024-02-22 DIAGNOSIS — F01.A3 MILD VASCULAR DEMENTIA WITH MOOD DISTURBANCE (H): ICD-10-CM

## 2024-02-22 DIAGNOSIS — S72.002S CLOSED FRACTURE OF LEFT HIP, SEQUELA: ICD-10-CM

## 2024-02-22 PROCEDURE — 99349 HOME/RES VST EST MOD MDM 40: CPT | Performed by: NURSE PRACTITIONER

## 2024-02-23 DIAGNOSIS — Z53.9 DIAGNOSIS NOT YET DEFINED: Primary | ICD-10-CM

## 2024-02-23 PROCEDURE — G0180 MD CERTIFICATION HHA PATIENT: HCPCS | Performed by: NURSE PRACTITIONER

## 2024-02-24 RX ORDER — FLUOXETINE 10 MG/1
10 CAPSULE ORAL DAILY
Qty: 30 CAPSULE | Refills: 3 | Status: SHIPPED | OUTPATIENT
Start: 2024-02-24 | End: 2024-06-16

## 2024-02-24 RX ORDER — GABAPENTIN 100 MG/1
CAPSULE ORAL
Qty: 90 CAPSULE | Refills: 4 | Status: SHIPPED | OUTPATIENT
Start: 2024-02-24 | End: 2024-05-23

## 2024-02-24 RX ORDER — AMOXICILLIN 250 MG
1 CAPSULE ORAL DAILY
Qty: 30 TABLET | Refills: 4 | Status: SHIPPED | OUTPATIENT
Start: 2024-02-24 | End: 2024-02-29

## 2024-02-27 ENCOUNTER — TELEPHONE (OUTPATIENT)
Dept: GERIATRICS | Facility: CLINIC | Age: 74
End: 2024-02-27
Payer: COMMERCIAL

## 2024-02-27 DIAGNOSIS — R52 GENERALIZED PAIN: Primary | ICD-10-CM

## 2024-02-27 RX ORDER — TRAMADOL HYDROCHLORIDE 50 MG/1
25 TABLET ORAL EVERY 4 HOURS PRN
Qty: 20 TABLET | Refills: 3 | Status: SHIPPED | OUTPATIENT
Start: 2024-02-27 | End: 2024-03-07

## 2024-02-27 NOTE — TELEPHONE ENCOUNTER
Nursing notified this NP that Anamaria is requesting to have her Voltaren Gel 1% spread down her left leg as well.  Nursing tried to explain to her about Voltaren is usually just for joints.  Anamaria is also interested in Tramadol now as well.    Orders:   Ok to add to voltaren gel order:  ok to use the 4GM to left knee and massage into left shin area for comfort    2.  Tramadol 50mg - give 1/2 tab (25mg) po every 4 hours PRN for generalized pain    Script sent to pharmacy    Gina Mejia, ESPERANZA CNP

## 2024-02-29 ENCOUNTER — ASSISTED LIVING VISIT (OUTPATIENT)
Dept: GERIATRICS | Facility: CLINIC | Age: 74
End: 2024-02-29
Payer: COMMERCIAL

## 2024-02-29 VITALS
BODY MASS INDEX: 22.24 KG/M2 | OXYGEN SATURATION: 95 % | DIASTOLIC BLOOD PRESSURE: 59 MMHG | WEIGHT: 121.6 LBS | RESPIRATION RATE: 14 BRPM | SYSTOLIC BLOOD PRESSURE: 114 MMHG | TEMPERATURE: 97.5 F | HEART RATE: 73 BPM

## 2024-02-29 DIAGNOSIS — F41.8 MIXED ANXIETY AND DEPRESSIVE DISORDER: ICD-10-CM

## 2024-02-29 DIAGNOSIS — R52 GENERALIZED PAIN: ICD-10-CM

## 2024-02-29 DIAGNOSIS — K59.03 DRUG-INDUCED CONSTIPATION: Primary | ICD-10-CM

## 2024-02-29 DIAGNOSIS — G31.9 NEURODEGENERATIVE DISORDER (H): ICD-10-CM

## 2024-02-29 PROCEDURE — 99349 HOME/RES VST EST MOD MDM 40: CPT | Performed by: NURSE PRACTITIONER

## 2024-02-29 RX ORDER — AMOXICILLIN 250 MG
1 CAPSULE ORAL 2 TIMES DAILY
Qty: 60 TABLET | Refills: 4 | Status: SHIPPED | OUTPATIENT
Start: 2024-02-29 | End: 2024-03-08

## 2024-02-29 NOTE — PROGRESS NOTES
"[facility] GERIATRICS  ACUTE/EPISODIC VISIT    [facility] Medical Record Number:  [id number]  Place of Service where encounter took place:  [facility] ([facility] [[id number]]    Chief Complaint   Patient presents with    RECHECK       HPI:    Kylah Britt is a 73 year old  (1950), who is being seen today for an episodic care visit.  HPI information obtained from: facility chart records, facility staff, patient report, and [facility] chart review.    Today's concern is:    Diagnoses         Codes Comments    Drug-induced constipation    -  Primary K59.03     Generalized pain     R52     Mixed anxiety and depressive disorder     F41.8     Neurodegenerative disorder (H24)     G31.9           Came to see Anamaria today as received a note that she was requesting to have her Voltaren gel spread over the front of her left shin.  Have given a few orders since this note was put in the NP file for next visit information.    Started on Prozac 10mg daily for anxiety as requested from daughter - 2/25  Started on Senna-S 1 tab daily for constipation  Gabapentin 100mg at 12N for leg pain  On 2/27/24 gave orders for Tramadol PRN as on Tylenol already.    Found Anamaria laying in her bed.  Seemed calmer and not moving extremities in repetitive movements except for pushing her hair back on one side.  \"Constipation is my problem today\".  Reviewed her medication list and offered to double the dose to see if that would be helpful.  Anamaria agreed with that.      ALLERGIES:    Allergies   Allergen Reactions    No Known Drug Allergy         MEDICATIONS:  Post Discharge Medication Reconciliation Status: patient was not discharged from an inpatient facility or TCU. Medications reconciled.    Current Outpatient Medications   Medication Sig Dispense Refill    acetaminophen (TYLENOL) 500 MG tablet Take 1,000 mg by mouth 3 times daily And 1000mg daily PRN      baclofen (LIORESAL) 10 MG tablet Take 10 mg " by mouth 2 times daily 3pm and 9pm, takes 5mg every morning      busPIRone (BUSPAR) 5 MG tablet Take 10 mg by mouth 2 times daily      Calcium Carbonate (CALCIUM 600 PO) Take 1 tablet by mouth every other day      diclofenac (VOLTAREN) 1 % topical gel Apply 4 g topically 4 times daily Apply to knees      FLUoxetine (PROZAC) 10 MG capsule Take 1 capsule (10 mg) by mouth daily 30 capsule 3    gabapentin (NEURONTIN) 100 MG capsule 100mg po at 12N, 200mg po at bedtime and 100mg po every 4 hours as needed for RLS or pain 90 capsule 4    Lidocaine (LIDOCARE) 4 % Patch Place 1 patch onto the skin every 24 hours To prevent lidocaine toxicity, patient should be patch free for 12 hrs daily. Apply to L hip      melatonin 3 MG tablet Take 3 tablets (9 mg) by mouth At Bedtime      senna-docusate (SENOKOT-S/PERICOLACE) 8.6-50 MG tablet Take 1 tablet by mouth daily 30 tablet 4    traMADol (ULTRAM) 50 MG tablet Take 0.5 tablets (25 mg) by mouth every 4 hours as needed for severe pain 20 tablet 3    traZODone (DESYREL) 50 MG tablet Take 1 tablet (50 mg) by mouth At Bedtime 31 tablet 11    vitamin D3 (CHOLECALCIFEROL) 50 mcg (2000 units) tablet Take 50 mcg by mouth every other day           REVIEW OF SYSTEMS:  4 point ROS neg other than the symptoms noted above in the HPI.        PHYSICAL EXAM:  /59   Pulse 73   Temp 97.5  F (36.4  C)   Resp 14   Wt 55.2 kg (121 lb 9.6 oz)   SpO2 95%   BMI 22.24 kg/m    Laying in bed.  No acute distress.  Stated what she wanted and did not ask questions which is somewhat unusual for her.  Expected her to tell this NP she did not like the medication changes.    Skin is pale/pink, warm to touch.  No respiratory distress. No use of oxygen.  Anterior lungs clear.  Heart rate regular and strong.    Has repetitive movements with hands to pull back her hair away from her face - do not think she knows she really does this.  Was not rubbing her legs today.       No recent labs.    Blood pressures  110-150's/70-90's.      ASSESSMENT / PLAN:  (K59.03) Drug-induced constipation  (primary encounter diagnosis)  Comment: will proceed to increase the Senna-S from 1 tab daily to 1 tab po BID.  If she needs more or less than can figure that out as well.  Plan: senna-docusate (SENOKOT-S/PERICOLACE) 8.6-50 MG        tablet    (R52) Generalized pain  Comment: now has Tramadol 25mg po every 4 hours PRN.  Small dose as want her to be able to tolerate it and not fall.  Has the Voltaren gel 1% 4GM to knees QID and can rub part of the 4GM down the shins.  Has Tylenol ES 1000mg TID and daily prn.    Continue to monitor use of PRN and work with Anamaria  Daughter worried of her use of stronger pain medication as might have had issues while in the TCU..  Tramadol not so worried about.  Everyone's pain tolerance is different.      (F41.8) Mixed anxiety and depressive disorder  Comment: just started the Prozac 10mg daily.  Room to move to help curb her anxiety.  She can be sensitive to medications in the past and so will move slowly with this medication.    (G31.9) Neurodegenerative disorder (H24)  Comment: risk for falls.  Family trying to find her a recliner that lifts.  Want to get the cough out of there.  Low for her to get out of if sitting down.  No changes.  Manages to ambulate with the 4WW.  Has an escort down to meals.  Therapies from home care working with Anamaria for transitioning back to home environment.       Orders:  Increase Senna-S to 1 tab twice a day for constipation.    Electronically signed by  ESPERANZA Fuentes CNP

## 2024-02-29 NOTE — LETTER
"    2/29/2024        RE: Kylah Britt  [address]  [facility] [address]        [facility] GERIATRICS  ACUTE/EPISODIC VISIT    [facility] Medical Record Number:  [id number]  Place of Service where encounter took place:  [facility] [[id number]]    Chief Complaint   Patient presents with     RECHECK       HPI:    Kylah Britt is a 73 year old  (1950), who is being seen today for an episodic care visit.  HPI information obtained from: facility chart records, facility staff, patient report, and [facility] chart review.    Today's concern is:    Diagnoses         Codes Comments    Drug-induced constipation    -  Primary K59.03     Generalized pain     R52     Mixed anxiety and depressive disorder     F41.8     Neurodegenerative disorder (H24)     G31.9           Came to see Anamaria today as received a note that she was requesting to have her Voltaren gel spread over the front of her left shin.  Have given a few orders since this note was put in the NP file for next visit information.    Started on Prozac 10mg daily for anxiety as requested from daughter - 2/25  Started on Senna-S 1 tab daily for constipation  Gabapentin 100mg at 12N for leg pain  On 2/27/24 gave orders for Tramadol PRN as on Tylenol already.    Found Anamaria laying in her bed.  Seemed calmer and not moving extremities in repetitive movements except for pushing her hair back on one side.  \"Constipation is my problem today\".  Reviewed her medication list and offered to double the dose to see if that would be helpful.  Anamaria agreed with that.      ALLERGIES:    Allergies   Allergen Reactions     No Known Drug Allergy         MEDICATIONS:  Post Discharge Medication Reconciliation Status: patient was not discharged from an inpatient facility or TCU. Medications reconciled.    Current Outpatient Medications   Medication Sig Dispense Refill     acetaminophen (TYLENOL) 500 MG tablet Take 1,000 mg by " mouth 3 times daily And 1000mg daily PRN       baclofen (LIORESAL) 10 MG tablet Take 10 mg by mouth 2 times daily 3pm and 9pm, takes 5mg every morning       busPIRone (BUSPAR) 5 MG tablet Take 10 mg by mouth 2 times daily       Calcium Carbonate (CALCIUM 600 PO) Take 1 tablet by mouth every other day       diclofenac (VOLTAREN) 1 % topical gel Apply 4 g topically 4 times daily Apply to knees       FLUoxetine (PROZAC) 10 MG capsule Take 1 capsule (10 mg) by mouth daily 30 capsule 3     gabapentin (NEURONTIN) 100 MG capsule 100mg po at 12N, 200mg po at bedtime and 100mg po every 4 hours as needed for RLS or pain 90 capsule 4     Lidocaine (LIDOCARE) 4 % Patch Place 1 patch onto the skin every 24 hours To prevent lidocaine toxicity, patient should be patch free for 12 hrs daily. Apply to L hip       melatonin 3 MG tablet Take 3 tablets (9 mg) by mouth At Bedtime       senna-docusate (SENOKOT-S/PERICOLACE) 8.6-50 MG tablet Take 1 tablet by mouth daily 30 tablet 4     traMADol (ULTRAM) 50 MG tablet Take 0.5 tablets (25 mg) by mouth every 4 hours as needed for severe pain 20 tablet 3     traZODone (DESYREL) 50 MG tablet Take 1 tablet (50 mg) by mouth At Bedtime 31 tablet 11     vitamin D3 (CHOLECALCIFEROL) 50 mcg (2000 units) tablet Take 50 mcg by mouth every other day           REVIEW OF SYSTEMS:  4 point ROS neg other than the symptoms noted above in the HPI.        PHYSICAL EXAM:  /59   Pulse 73   Temp 97.5  F (36.4  C)   Resp 14   Wt 55.2 kg (121 lb 9.6 oz)   SpO2 95%   BMI 22.24 kg/m    Laying in bed.  No acute distress.  Stated what she wanted and did not ask questions which is somewhat unusual for her.  Expected her to tell this NP she did not like the medication changes.    Skin is pale/pink, warm to touch.  No respiratory distress. No use of oxygen.  Anterior lungs clear.  Heart rate regular and strong.    Has repetitive movements with hands to pull back her hair away from her face - do not think she  knows she really does this.  Was not rubbing her legs today.       No recent labs.    Blood pressures 110-150's/70-90's.      ASSESSMENT / PLAN:  (K59.03) Drug-induced constipation  (primary encounter diagnosis)  Comment: will proceed to increase the Senna-S from 1 tab daily to 1 tab po BID.  If she needs more or less than can figure that out as well.  Plan: senna-docusate (SENOKOT-S/PERICOLACE) 8.6-50 MG        tablet    (R52) Generalized pain  Comment: now has Tramadol 25mg po every 4 hours PRN.  Small dose as want her to be able to tolerate it and not fall.  Has the Voltaren gel 1% 4GM to knees QID and can rub part of the 4GM down the shins.  Has Tylenol ES 1000mg TID and daily prn.    Continue to monitor use of PRN and work with Anamaria  Daughter worried of her use of stronger pain medication as might have had issues while in the TCU..  Tramadol not so worried about.  Everyone's pain tolerance is different.      (F41.8) Mixed anxiety and depressive disorder  Comment: just started the Prozac 10mg daily.  Room to move to help curb her anxiety.  She can be sensitive to medications in the past and so will move slowly with this medication.    (G31.9) Neurodegenerative disorder (H24)  Comment: risk for falls.  Family trying to find her a recliner that lifts.  Want to get the cough out of there.  Low for her to get out of if sitting down.  No changes.  Manages to ambulate with the 4WW.  Has an escort down to meals.  Therapies from home care working with Anamaria for transitioning back to home environment.       Orders:  Increase Senna-S to 1 tab twice a day for constipation.    Electronically signed by  ESPERANZA Fuentes CNP            Sincerely,        ESPERANZA Fuentes CNP

## 2024-03-07 ENCOUNTER — ASSISTED LIVING VISIT (OUTPATIENT)
Dept: GERIATRICS | Facility: CLINIC | Age: 74
End: 2024-03-07
Payer: COMMERCIAL

## 2024-03-07 VITALS
TEMPERATURE: 97.7 F | HEART RATE: 70 BPM | RESPIRATION RATE: 16 BRPM | WEIGHT: 123 LBS | DIASTOLIC BLOOD PRESSURE: 69 MMHG | SYSTOLIC BLOOD PRESSURE: 120 MMHG | BODY MASS INDEX: 22.5 KG/M2 | OXYGEN SATURATION: 95 %

## 2024-03-07 DIAGNOSIS — F41.8 MIXED ANXIETY AND DEPRESSIVE DISORDER: ICD-10-CM

## 2024-03-07 DIAGNOSIS — M79.605 PAIN OF LEFT LOWER EXTREMITY: Primary | ICD-10-CM

## 2024-03-07 DIAGNOSIS — K59.03 DRUG-INDUCED CONSTIPATION: ICD-10-CM

## 2024-03-07 DIAGNOSIS — G31.9 NEURODEGENERATIVE DISORDER (H): ICD-10-CM

## 2024-03-07 PROCEDURE — 99349 HOME/RES VST EST MOD MDM 40: CPT | Performed by: NURSE PRACTITIONER

## 2024-03-07 RX ORDER — TRAMADOL HYDROCHLORIDE 50 MG/1
TABLET ORAL
Qty: 30 TABLET | Refills: 5 | Status: SHIPPED | OUTPATIENT
Start: 2024-03-07 | End: 2024-07-03

## 2024-03-07 NOTE — LETTER
3/7/2024        RE: Kylah Britt  [address]  [facility] [address]        [facility] GERIATRICS  ACUTE/EPISODIC VISIT    [facility] Medical Record Number:  [id number]  Place of Service where encounter took place:  [facility] [[id number]]    Chief Complaint   Patient presents with     RECHECK       HPI:    Kylah Britt is a 73 year old  (1950), who is being seen today for an episodic care visit.  HPI information obtained from: facility chart records, facility staff, patient report, and [facility] chart review.    Today's concern is:    Diagnoses         Codes Comments    Pain of left lower extremity    -  Primary M79.605     Drug-induced constipation     K59.03     Mixed anxiety and depressive disorder     F41.8     Neurodegenerative disorder (H24)     G31.9           Came to see Anamaria today as reviewing her requests of Tramadol for left leg pain.  Wanted to speak to her about scheduling the Tramadol so that she does not have to ask for it all the time.      Found Anamaria in her apartment sitting in her new recliner with a smile on her face from ear to ear.  She was so happy to have the new recliner.      Anamaria was very aware of how much Tramadol she has been taking.  Do see 2-3x a day as needed.  She expressed she knows she takes it twice a day around a certain time.  Agreed to schedule it then around those times and can still ask for PRN if she wakes up in the night and has pain.      Spoke about her bowels.  She explained she refused a dose of the Senna-S because she had 2 BMs one day.  She mentioned changing the frequency.  In looking at the delivery history, she only refused it once.  Decided not to change her bowel medication.      ALLERGIES:    Allergies   Allergen Reactions     No Known Drug Allergy         MEDICATIONS:  Post Discharge Medication Reconciliation Status: patient was not discharged from an inpatient facility or TCU.   Medication  list reconciled.    Current Outpatient Medications   Medication Sig Dispense Refill     SENNA-docusate sodium (SENNA S) 8.6-50 MG tablet Take 1 tablet by mouth 2 times daily       traMADol (ULTRAM) 50 MG tablet Take 0.5 tablets (25 mg) by mouth 2 times daily. May also take 0.5 tablets (25 mg) every 4 hours as needed for severe pain. 30 tablet 5     acetaminophen (TYLENOL) 500 MG tablet Take 1,000 mg by mouth 3 times daily And 1000mg daily PRN       baclofen (LIORESAL) 10 MG tablet Take 10 mg by mouth 2 times daily 3pm and 9pm, takes 5mg every morning       busPIRone (BUSPAR) 5 MG tablet Take 10 mg by mouth 2 times daily       Calcium Carbonate (CALCIUM 600 PO) Take 1 tablet by mouth every other day       diclofenac (VOLTAREN) 1 % topical gel Apply 4 g topically 4 times daily Apply to knees       FLUoxetine (PROZAC) 10 MG capsule Take 1 capsule (10 mg) by mouth daily 30 capsule 3     gabapentin (NEURONTIN) 100 MG capsule 100mg po at 12N, 200mg po at bedtime and 100mg po every 4 hours as needed for RLS or pain 90 capsule 4     Lidocaine (LIDOCARE) 4 % Patch Place 1 patch onto the skin every 24 hours To prevent lidocaine toxicity, patient should be patch free for 12 hrs daily. Apply to L hip       melatonin 3 MG tablet Take 3 tablets (9 mg) by mouth At Bedtime       traZODone (DESYREL) 50 MG tablet Take 1 tablet (50 mg) by mouth At Bedtime 31 tablet 11     vitamin D3 (CHOLECALCIFEROL) 50 mcg (2000 units) tablet Take 50 mcg by mouth every other day         REVIEW OF SYSTEMS:  4 point ROS neg other than the symptoms noted above in the HPI.  Chronic pain in left leg.  Denied chest pain, SOB.      PHYSICAL EXAM:  /69   Pulse 70   Temp 97.7  F (36.5  C)   Resp 16   Wt 55.8 kg (123 lb)   SpO2 95%   BMI 22.50 kg/m    Alert and pleasant.  Feel she is tolerating the Tramadol and not over sedated.    Continues to play with her hair.  Pulls it behind her ear or to the back of her head.  Heart rate regular and strong.   No edema in ankles/feet.  Lungs are clear with good expansion.  Abdomen is soft, round, non-tender.  Bowel sounds present.      ASSESSMENT / PLAN:  (M79.605) Pain of left lower extremity  (primary encounter diagnosis)  Comment: takes Tramadol 25mg 2-3x a day PRN but seems to take it in AM and PM consistently.  Will schedule the Tramadol 25mg twice a day and then keep the 25mg po every 4 hours prn.  Still has the Voltaren gel 1% to knees.  Tylenol ES 1000mg TID and daily prn which occasionally get used.  Will see how she does now with the Tramadol scheduled.  Plan: traMADol (ULTRAM) 50 MG tablet    (K59.03) Drug-induced constipation  Comment: will leave the senna-S 1 tab twice a day as it is now that she will consistently getting Tramadol scheduled.  Plan: SENNA-docusate sodium (SENNA S) 8.6-50 MG         tablet, DISCONTINUED: senna-docusate         (SENOKOT-S/PERICOLACE) 8.6-50 MG tablet    (F41.8) Mixed anxiety and depressive disorder  Comment: is on Prozac 10mg po daily.  Has Buspar as well.  Feel with the Prozac she seems to be more reasonable with her thinking.  Will give it more time and get input from daughter about increasing the dose in the future.      (G31.9) Neurodegenerative disorder (H24)  Comment: continues to have abnormal movements but right now seems not to be so intense.  May be finally getting her pain in control as well as her anxiety.  Staff now are helping her with escorts to meals and cues/help to toilet.  Has had therapies as well or still working with her.  Continue to provide a safe environment and she is thrilled with her new lift chair.  This will be great for her to get out of a chair safely compared to the couch she was using.       Orders:   Change Tramadol to 25mg po BID and 25mg po every 4 hours prn for pain   max 300mg/24H    Electronically signed by  ESPERANZA Fuentes CNP            Sincerely,        ESPERANZA Fuentes CNP

## 2024-03-07 NOTE — PROGRESS NOTES
[facility] GERIATRICS  ACUTE/EPISODIC VISIT    [facility] Medical Record Number:  [id number]  Place of Service where encounter took place:  [facility] [[id number]]    Chief Complaint   Patient presents with    RECHECK       HPI:    Kylah Britt is a 73 year old  (1950), who is being seen today for an episodic care visit.  HPI information obtained from: facility chart records, facility staff, patient report, and [facility] chart review.    Today's concern is:    Diagnoses         Codes Comments    Pain of left lower extremity    -  Primary M79.605     Drug-induced constipation     K59.03     Mixed anxiety and depressive disorder     F41.8     Neurodegenerative disorder (H24)     G31.9           Came to see Anamaria today as reviewing her requests of Tramadol for left leg pain.  Wanted to speak to her about scheduling the Tramadol so that she does not have to ask for it all the time.      Found Anamaria in her apartment sitting in her new recliner with a smile on her face from ear to ear.  She was so happy to have the new recliner.      Anamaria was very aware of how much Tramadol she has been taking.  Do see 2-3x a day as needed.  She expressed she knows she takes it twice a day around a certain time.  Agreed to schedule it then around those times and can still ask for PRN if she wakes up in the night and has pain.      Spoke about her bowels.  She explained she refused a dose of the Senna-S because she had 2 BMs one day.  She mentioned changing the frequency.  In looking at the delivery history, she only refused it once.  Decided not to change her bowel medication.      ALLERGIES:    Allergies   Allergen Reactions    No Known Drug Allergy         MEDICATIONS:  Post Discharge Medication Reconciliation Status: patient was not discharged from an inpatient facility or TCU.   Medication list reconciled.    Current Outpatient Medications   Medication Sig Dispense Refill     SENNA-docusate sodium (SENNA S) 8.6-50 MG tablet Take 1 tablet by mouth 2 times daily      traMADol (ULTRAM) 50 MG tablet Take 0.5 tablets (25 mg) by mouth 2 times daily. May also take 0.5 tablets (25 mg) every 4 hours as needed for severe pain. 30 tablet 5    acetaminophen (TYLENOL) 500 MG tablet Take 1,000 mg by mouth 3 times daily And 1000mg daily PRN      baclofen (LIORESAL) 10 MG tablet Take 10 mg by mouth 2 times daily 3pm and 9pm, takes 5mg every morning      busPIRone (BUSPAR) 5 MG tablet Take 10 mg by mouth 2 times daily      Calcium Carbonate (CALCIUM 600 PO) Take 1 tablet by mouth every other day      diclofenac (VOLTAREN) 1 % topical gel Apply 4 g topically 4 times daily Apply to knees      FLUoxetine (PROZAC) 10 MG capsule Take 1 capsule (10 mg) by mouth daily 30 capsule 3    gabapentin (NEURONTIN) 100 MG capsule 100mg po at 12N, 200mg po at bedtime and 100mg po every 4 hours as needed for RLS or pain 90 capsule 4    Lidocaine (LIDOCARE) 4 % Patch Place 1 patch onto the skin every 24 hours To prevent lidocaine toxicity, patient should be patch free for 12 hrs daily. Apply to L hip      melatonin 3 MG tablet Take 3 tablets (9 mg) by mouth At Bedtime      traZODone (DESYREL) 50 MG tablet Take 1 tablet (50 mg) by mouth At Bedtime 31 tablet 11    vitamin D3 (CHOLECALCIFEROL) 50 mcg (2000 units) tablet Take 50 mcg by mouth every other day         REVIEW OF SYSTEMS:  4 point ROS neg other than the symptoms noted above in the HPI.  Chronic pain in left leg.  Denied chest pain, SOB.      PHYSICAL EXAM:  /69   Pulse 70   Temp 97.7  F (36.5  C)   Resp 16   Wt 55.8 kg (123 lb)   SpO2 95%   BMI 22.50 kg/m    Alert and pleasant.  Feel she is tolerating the Tramadol and not over sedated.    Continues to play with her hair.  Pulls it behind her ear or to the back of her head.  Heart rate regular and strong.  No edema in ankles/feet.  Lungs are clear with good expansion.  Abdomen is soft, round, non-tender.   Bowel sounds present.      ASSESSMENT / PLAN:  (M79.605) Pain of left lower extremity  (primary encounter diagnosis)  Comment: takes Tramadol 25mg 2-3x a day PRN but seems to take it in AM and PM consistently.  Will schedule the Tramadol 25mg twice a day and then keep the 25mg po every 4 hours prn.  Still has the Voltaren gel 1% to knees.  Tylenol ES 1000mg TID and daily prn which occasionally get used.  Will see how she does now with the Tramadol scheduled.  Plan: traMADol (ULTRAM) 50 MG tablet    (K59.03) Drug-induced constipation  Comment: will leave the senna-S 1 tab twice a day as it is now that she will consistently getting Tramadol scheduled.  Plan: SENNA-docusate sodium (SENNA S) 8.6-50 MG         tablet, DISCONTINUED: senna-docusate         (SENOKOT-S/PERICOLACE) 8.6-50 MG tablet    (F41.8) Mixed anxiety and depressive disorder  Comment: is on Prozac 10mg po daily.  Has Buspar as well.  Feel with the Prozac she seems to be more reasonable with her thinking.  Will give it more time and get input from daughter about increasing the dose in the future.      (G31.9) Neurodegenerative disorder (H24)  Comment: continues to have abnormal movements but right now seems not to be so intense.  May be finally getting her pain in control as well as her anxiety.  Staff now are helping her with escorts to meals and cues/help to toilet.  Has had therapies as well or still working with her.  Continue to provide a safe environment and she is thrilled with her new lift chair.  This will be great for her to get out of a chair safely compared to the couch she was using.       Orders:   Change Tramadol to 25mg po BID and 25mg po every 4 hours prn for pain   max 300mg/24H    Electronically signed by  ESEPRANZA Fuentes CNP

## 2024-03-08 PROBLEM — M79.662 PAIN OF LEFT LOWER LEG: Status: ACTIVE | Noted: 2024-03-08

## 2024-03-08 RX ORDER — SENNA AND DOCUSATE SODIUM 50; 8.6 MG/1; MG/1
1 TABLET, FILM COATED ORAL 2 TIMES DAILY
Status: SHIPPED
Start: 2024-02-29

## 2024-03-08 RX ORDER — AMOXICILLIN 250 MG
1 CAPSULE ORAL DAILY
Status: SHIPPED
Start: 2024-03-08 | End: 2024-03-08 | Stop reason: DRUGHIGH

## 2024-03-22 VITALS
WEIGHT: 121.6 LBS | BODY MASS INDEX: 22.24 KG/M2 | RESPIRATION RATE: 14 BRPM | SYSTOLIC BLOOD PRESSURE: 114 MMHG | HEART RATE: 73 BPM | TEMPERATURE: 97.5 F | DIASTOLIC BLOOD PRESSURE: 59 MMHG

## 2024-03-22 NOTE — PROGRESS NOTES
[facility] GERIATRICS  ACUTE/EPISODIC VISIT    [facility] Medical Record Number:  [id number]  Place of Service where encounter took place:  [facility] ([facility]) [[id number]]    Chief Complaint   Patient presents with    RECHECK       HPI:    Kylah Britt is a 73 year old  (1950), who is being seen today for an episodic care visit.  HPI information obtained from: facility chart records, facility staff, patient report, [facility] chart review, and Care Everywhere [facility] chart review.    Today's concern is:    Diagnoses         Codes Comments    Neurodegenerative disorder (H24)    -  Primary G31.9     Drug-induced constipation     K59.03     Pain of right thigh     M79.651     Closed fracture of left hip, sequela     S72.002S     Mixed anxiety and depressive disorder     F41.8     Adjustment insomnia     F51.02     Mild vascular dementia with mood disturbance (H)     F01.A3           Seeing Kylah today as she returned to [facility] from [facility] here in [place].    Anamaria was at the [facility] from 11/3/2024 through 11/6/2024 for left hip fracture after a fall.  From there she went to [address] for rehabilitation.  Anamaria came back to reside at [facility] on 2/14/2024.  Meeting Anamaria today in her apartment as she is laying on her couch.  She recognizes this nurse practitioner and spoke about family is trying to get a lift chair for her and wondering if insurance will help pay for that.  Typically it is not a coverable DME unless she was on elderly waiver.  Did leave a message for family and also referred her to [place] [facility]  for [facility].    Anamaria did sit up on the couch and rubbed her left shin almost constantly and then would take a piece of hair and pull it back on her head unconsciously/habit.  Can see where family feels that she is anxious and inquiring about medication to start.  Did talk to Anamaria about starting a  new medication for anxiety.  Have tried Celexa on her before, but right away she said it did not agree with her stomach and would not take it anymore.  Hoping that this medication that is chosen will be more effective for her.  Anamaria will be getting accurate home care seeing her to ensure smooth transition back into the assisted living facility.  She is getting more care from the staff here to ensure that she is safe with her transfers and escorts down to meals.    ALLERGIES:    Allergies   Allergen Reactions    No Known Drug Allergy         MEDICATIONS:  Post Discharge Medication Reconciliation Status: discharge medications reconciled and changed, per note/orders.     Current Outpatient Medications   Medication Sig Dispense Refill    FLUoxetine (PROZAC) 10 MG capsule Take 1 capsule (10 mg) by mouth daily 30 capsule 3    gabapentin (NEURONTIN) 100 MG capsule 100mg po at 12N, 200mg po at bedtime and 100mg po every 4 hours as needed for RLS or pain 90 capsule 4    acetaminophen (TYLENOL) 500 MG tablet Take 1,000 mg by mouth 3 times daily And 1000mg daily PRN      baclofen (LIORESAL) 10 MG tablet Take 10 mg by mouth 2 times daily 3pm and 9pm, takes 5mg every morning      busPIRone (BUSPAR) 5 MG tablet Take 10 mg by mouth 2 times daily      Calcium Carbonate (CALCIUM 600 PO) Take 1 tablet by mouth every other day      diclofenac (VOLTAREN) 1 % topical gel Apply 4 g topically 4 times daily Apply to knees      Lidocaine (LIDOCARE) 4 % Patch Place 1 patch onto the skin every 24 hours To prevent lidocaine toxicity, patient should be patch free for 12 hrs daily. Apply to L hip      melatonin 3 MG tablet Take 3 tablets (9 mg) by mouth At Bedtime      SENNA-docusate sodium (SENNA S) 8.6-50 MG tablet Take 1 tablet by mouth 2 times daily      traZODone (DESYREL) 50 MG tablet Take 1 tablet (50 mg) by mouth At Bedtime 31 tablet 11    vitamin D3 (CHOLECALCIFEROL) 50 mcg (2000 units) tablet Take 50 mcg by mouth every other day        Medications reviewed:  Medications reconciled to facility chart and changes were made to reflect current medications as identified as above med list. Below are the changes that were made:   Medications stopped since last EPIC medication reconciliation:   Medications Discontinued During This Encounter   Medication Reason    senna-docusate (SENOKOT-S/PERICOLACE) 8.6-50 MG tablet     gabapentin (NEURONTIN) 100 MG capsule        Medications started since last [facility] medication reconciliation:  Orders Placed This Encounter   Medications    gabapentin (NEURONTIN) 100 MG capsule     Simg po at 12N, 200mg po at bedtime and 100mg po every 4 hours as needed for RLS or pain     Dispense:  90 capsule     Refill:  4    FLUoxetine (PROZAC) 10 MG capsule     Sig: Take 1 capsule (10 mg) by mouth daily     Dispense:  30 capsule     Refill:  3    DISCONTD: senna-docusate (SENOKOT-S/PERICOLACE) 8.6-50 MG tablet     Sig: Take 1 tablet by mouth daily     Dispense:  30 tablet     Refill:  4         REVIEW OF SYSTEMS:  4 point ROS neg other than the symptoms noted above in the HPI.  Jaqueline denies any chest pain or dysuria.  She does admit that she does have some constipation and so did discuss different options.      PHYSICAL EXAM:  /59   Pulse 73   Temp 97.5  F (36.4  C)   Resp 14   Wt 55.2 kg (121 lb 9.6 oz)   BMI 22.24 kg/m    Physical Exam  Vitals and nursing note reviewed.   Constitutional:       Comments: Slender petite female.  Nervous during the visit with repetitive actions.     HENT:      Head: Normocephalic.      Right Ear: External ear normal.      Left Ear: External ear normal.      Nose: Nose normal.      Mouth/Throat:      Mouth: Mucous membranes are moist.      Pharynx: Oropharynx is clear.   Eyes:      Extraocular Movements: Extraocular movements intact.      Conjunctiva/sclera: Conjunctivae normal.      Comments: Make eye contact through out visit   Cardiovascular:      Rate and Rhythm: Normal rate and  regular rhythm.      Heart sounds: Normal heart sounds.   Pulmonary:      Effort: Pulmonary effort is normal.      Breath sounds: Normal breath sounds.   Abdominal:      General: Abdomen is flat.      Palpations: Abdomen is soft.      Comments: Decreased bowel sounds   Musculoskeletal:      Cervical back: Normal range of motion and neck supple.      Comments: Can move all extremities but does have abnormal movements due to her neurodegenerative disorder   Skin:     General: Skin is warm and dry.   Neurological:      Mental Status: She is alert.      Gait: Gait abnormal.      Comments: Does have minor memory issues but right now getting accustomed back to her old environment and seems happy to be here   Psychiatric:      Comments: Can be impulsive, will ask to have things repeated a few times so she understands what will occur.  Judgement sometimes is not the best       ASSESSMENT / PLAN:  (G31.9) Neurodegenerative disorder (H24)  (primary encounter diagnosis)  Comment: And this nurse practitioner's opinion, Anamaria's movements in her extremities seem to be a little bit more extreme than when last here back in November.  She is complaining of pain and so will add 100 mg of Neurontin at noon but keeping the 200 mg at bedtime.  She does have Voltaren gel that can be used for comfort and is taking Tylenol extra strength 1000 mg 3 times a day.  Will monitor how well this Neurontin is doing for her  Back he is on baclofen 5mg every AM and then 10 mg twice a day  Plan: gabapentin (NEURONTIN) 100 MG capsule    (K59.03) Drug-induced constipation  Comment: Today we will discontinue the plain senna medication and start backing on senna S 1 tablet daily.  Would prefer to go slow with this medication as if she does get diarrhea she will impulsively want to stop it and not deal with it anymore.  Reinforced to her to be drinking fluids but goal is not to be straining having to go to the bathroom.  It is okay if she skips a day of  not having a bowel movement but typically after 3 days then need to consider another regimen for her  Plan: DISCONTINUED: senna-docusate         (SENOKOT-S/PERICOLACE) 8.6-50 MG tablet    (S72.002S) Closed fracture of left hip, sequela  Comment: Will have home care services help transition her to old environment.  Family is looking into better furniture for her to have in her living room so that is easier for her to get out of a chair.  Anamaria does use a walker for ambulation.    (F41.8) Mixed anxiety and depressive disorder  Comment: Will start Anamaria on Prozac 10 mg daily for depression/anxiety.  Explained to her that it could take up to 3 weeks to see the full effect of the medication.  Hope that it will just help slow her down so she will be so impulsive but not over sedate her.  Remains on BuSpar 10 mg twice a day.  Plan: FLUoxetine (PROZAC) 10 MG capsule    (F51.02) Adjustment insomnia  (F01.A3) Mild vascular dementia with mood disturbance (H)  Comment: Currently Anamaria receives trazodone 50 mg at bedtime to help with sleep and this also can help with calming her down for anxiety.  Feel that staff would speak up if she is having rough nights where she is up a lot or they are finding her not a bed etc.  As noted through the documentation that it is not uncommon that she will forget things and have staff repeat a few times what changes are occurring.    Orders:   Gabapentin 100mg daily at 12N for chronic pain  Prozac 10mg po daily for depression/anxiety  Discontinue senna  Start Senna-S 1 tab po daily for constipation    Electronically signed by  ESPERANZA Fuentes CNP

## 2024-03-25 ENCOUNTER — APPOINTMENT (OUTPATIENT)
Dept: CT IMAGING | Facility: CLINIC | Age: 74
End: 2024-03-25
Attending: FAMILY MEDICINE
Payer: MEDICARE

## 2024-03-25 ENCOUNTER — HOSPITAL ENCOUNTER (EMERGENCY)
Facility: CLINIC | Age: 74
Discharge: HOME | End: 2024-03-25
Attending: FAMILY MEDICINE | Admitting: FAMILY MEDICINE
Payer: MEDICARE

## 2024-03-25 ENCOUNTER — APPOINTMENT (OUTPATIENT)
Dept: GENERAL RADIOLOGY | Facility: CLINIC | Age: 74
End: 2024-03-25
Attending: FAMILY MEDICINE
Payer: MEDICARE

## 2024-03-25 VITALS
DIASTOLIC BLOOD PRESSURE: 66 MMHG | WEIGHT: 125 LBS | RESPIRATION RATE: 17 BRPM | HEIGHT: 64 IN | SYSTOLIC BLOOD PRESSURE: 144 MMHG | OXYGEN SATURATION: 95 % | HEART RATE: 78 BPM | BODY MASS INDEX: 21.34 KG/M2
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
## 2024-03-25 DIAGNOSIS — S01.81XA FOREHEAD LACERATION, INITIAL ENCOUNTER: ICD-10-CM

## 2024-03-25 DIAGNOSIS — S49.91XA ARM INJURY, RIGHT, INITIAL ENCOUNTER: ICD-10-CM

## 2024-03-25 PROCEDURE — 73080 X-RAY EXAM OF ELBOW: CPT | Mod: RT

## 2024-03-25 PROCEDURE — 12051 INTMD RPR FACE/MM 2.5 CM/<: CPT | Performed by: FAMILY MEDICINE

## 2024-03-25 PROCEDURE — 99284 EMERGENCY DEPT VISIT MOD MDM: CPT | Mod: 25 | Performed by: FAMILY MEDICINE

## 2024-03-25 PROCEDURE — 99285 EMERGENCY DEPT VISIT HI MDM: CPT | Mod: 25 | Performed by: FAMILY MEDICINE

## 2024-03-25 PROCEDURE — 250N000013 HC RX MED GY IP 250 OP 250 PS 637: Performed by: FAMILY MEDICINE

## 2024-03-25 PROCEDURE — 73060 X-RAY EXAM OF HUMERUS: CPT | Mod: RT

## 2024-03-25 PROCEDURE — 72125 CT NECK SPINE W/O DYE: CPT | Mod: MA

## 2024-03-25 PROCEDURE — 70450 CT HEAD/BRAIN W/O DYE: CPT | Mod: MA

## 2024-03-25 RX ORDER — ACETAMINOPHEN 500 MG
1000 TABLET ORAL ONCE
Status: COMPLETED | OUTPATIENT
Start: 2024-03-25 | End: 2024-03-25

## 2024-03-25 RX ADMIN — ACETAMINOPHEN 1000 MG: 500 TABLET ORAL at 11:11

## 2024-03-25 ASSESSMENT — COLUMBIA-SUICIDE SEVERITY RATING SCALE - C-SSRS
6. HAVE YOU EVER DONE ANYTHING, STARTED TO DO ANYTHING, OR PREPARED TO DO ANYTHING TO END YOUR LIFE?: NO
2. HAVE YOU ACTUALLY HAD ANY THOUGHTS OF KILLING YOURSELF IN THE PAST MONTH?: NO
1. IN THE PAST MONTH, HAVE YOU WISHED YOU WERE DEAD OR WISHED YOU COULD GO TO SLEEP AND NOT WAKE UP?: NO

## 2024-03-25 ASSESSMENT — ACTIVITIES OF DAILY LIVING (ADL): ADLS_ACUITY_SCORE: 39

## 2024-03-25 NOTE — ED TRIAGE NOTES
Unwitnessed fall at [facility] this morning about 30 minutes prior to arrival.  Does have dementia and neurological issues.  Laceration to right side of forehead and complaining of right shoulder pain.  Does have scratch to top of right foot. Not on blood thinners and brought in by daughter

## 2024-03-25 NOTE — DISCHARGE INSTRUCTIONS
Patient had some internal stitches placed which do not need to be removed to the right forehead.  Laceration site was then glued.  The glue will act as its own Band-Aid.  Do not place any ointments or creams on this area as this could dissolve the glue.    X-rays of the right arm, head, and neck proved negative for any signs of fracture or more severe injury.    The patient received a dose of oral Tylenol here in the ER at approximately 10 AM.

## 2024-03-25 NOTE — ED PROVIDER NOTES
History     Chief Complaint   Patient presents with    Laceration     HPI  Kylah Britt is a 73 year old female who presented to the emergency room today in the care of her daughter from the nearby assisted living home, [facility].  It is reported that the patient fell sometime this morning and was found with a laceration to her right forehead region.  Patient has issues of dementia and does not remember the fall.  Her daughter states that she seems to be acting her normal self.  Patient does complain of pain to the right upper arm.  She has no other pain complaints.  Denies pain to the area laceration to the forehead.  Patient otherwise poor historian.  Her daughter states that she appears to be acting her normal self otherwise.  [facility] that she has not had any of her a.m. medications as yet.  Her daughter reports that she is not on any blood thinning medications that she is aware of except possibly an aspirin a day.    Allergies:  Allergies   Allergen Reactions    No Known Drug Allergy        Problem List:    Patient Active Problem List    Diagnosis Date Noted    Pain of left lower leg 03/08/2024     Priority: Medium    Anxiety 01/16/2024     Priority: Medium    ABLA (acute blood loss anemia) 11/28/2023     Priority: Medium    Status post left hip ORIF with long gamma nail by Dr. Franco on 11/4/2023 11/17/2023     Priority: Medium    Closed displaced intertrochanteric fracture of left femur with routine healing 11/07/2023     Priority: Medium    Sleep disorder 11/07/2023     Priority: Medium    Osteoporotic fracture of left hip, initial encounter (H) 11/03/2023     Priority: Medium    Abnormal gait 11/03/2023     Priority: Medium    Mild vascular dementia with mood disturbance (H) 02/07/2023     Priority: Medium    Adjustment insomnia 07/22/2022     Priority: Medium    Strain of right hamstring muscle 07/15/2022     Priority: Medium    Pain 07/15/2022      Priority: Medium    Drug-induced constipation 07/15/2022     Priority: Medium    Lyme disease 07/15/2022     Priority: Medium    Gastroesophageal reflux disease with esophagitis 07/15/2022     Priority: Medium    Pain of right thigh 07/06/2022     Priority: Medium    Unable to bear weight on right lower extremity 07/06/2022     Priority: Medium    Fall 07/06/2022     Priority: Medium    Osteopenia of necks of both femurs-per DEXA March 2022 07/06/2022     Priority: Medium    Allodynia-lateral right thigh 07/06/2022     Priority: Medium    Numbness of toes-right, chronic 07/06/2022     Priority: Medium    Closed Colles' fracture of left radius 03/12/2022     Priority: Medium    CARDIOVASCULAR SCREENING; LDL GOAL LESS THAN 160 10/31/2010     Priority: Medium    Tear meniscus knee 04/21/2010     Priority: Medium        Past Medical History:    Past Medical History:   Diagnosis Date    Abnormal Papanicolaou smear of cervix and cervical HPV 9/1/92    Depressive disorder, not elsewhere classified     Female stress incontinence     Lyme disease     Osteoarthrosis, unspecified whether generalized or localized, unspecified site     Other psychological or physical stress, not elsewhere classified(V62.89)        Past Surgical History:    Past Surgical History:   Procedure Laterality Date    COLONOSCOPY N/A 4/6/2022    Procedure: COLONOSCOPY, WITH POLYPECTOMY;  Surgeon: Bishnu Tsai MD;  Location:  GI    HC KNEE SCOPE, DIAGNOSTIC  1/31/89    Examination of right knee under anesthesia. Arthroscopy of right knee with debridement of torn anterior curciate ligament (medial meniscus not resected).     REMOVAL OF OVARY/TUBE(S)  2/20/90    Salpingo-Oophorectomy, bilateral    OPEN REDUCTION INTERNAL FIXATION CALCANEOUS Right 6/8/2017    Procedure: OPEN REDUCTION INTERNAL FIXATION CALCANEOUS;  Open Reduction Internal Fixation Right Calcaneous;  Surgeon: Darnell Kincaid DPM;  Location:  OR    OPEN REDUCTION INTERNAL  "FIXATION HIP NAILING Left 11/4/2023    Procedure: INTERNAL FIXATION, FRACTURE, INTERTROCHANTERIC, LEFT HIP, USING GAMMA RODS;  Surgeon: Yoni Franco MD;  Location: [facility] OR    ZZC APPENDECTOMY      ZZC LIGATE FALLOPIAN TUBE,POSTPARTUM  04/12/77    Bilateral tubal cauterization    ZZC TOTAL ABDOM HYSTERECTOMY  2/20/90    Hysterectomy, Total Abdominal    ZZHC COLONOSCOPY THRU STOMA, DIAGNOSTIC  06/24/98       Family History:    Family History   Problem Relation Age of Onset    Hypertension Mother     Heart Disease Mother     Cancer Father     Cancer Son         Hodgkin's    Cancer Paternal Aunt         two with cervical cancer       Social History:  Marital Status:   [5]  Social History     Tobacco Use    Smoking status: Never    Smokeless tobacco: Never   Vaping Use    Vaping Use: Never used   Substance Use Topics    Alcohol use: Yes     Comment: very rare    Drug use: No        Medications:    acetaminophen (TYLENOL) 500 MG tablet  baclofen (LIORESAL) 10 MG tablet  busPIRone (BUSPAR) 5 MG tablet  Calcium Carbonate (CALCIUM 600 PO)  diclofenac (VOLTAREN) 1 % topical gel  FLUoxetine (PROZAC) 10 MG capsule  gabapentin (NEURONTIN) 100 MG capsule  Lidocaine (LIDOCARE) 4 % Patch  melatonin 3 MG tablet  SENNA-docusate sodium (SENNA S) 8.6-50 MG tablet  traMADol (ULTRAM) 50 MG tablet  traZODone (DESYREL) 50 MG tablet  vitamin D3 (CHOLECALCIFEROL) 50 mcg (2000 units) tablet          Review of Systems   Constitutional:         Patient is a poor historian secondary to dementia.  She does complain of right mid upper arm pain.  She was no other pain complaints.   All other systems reviewed and are negative.      Physical Exam   BP: (!) 168/91  Pulse: 89  Resp: 17  Height: 162.6 cm (5' 4\")  Weight: 56.7 kg (125 lb)  SpO2: 95 %      Physical Exam  HENT:      Head:        Right Ear: Tympanic membrane, ear canal and external ear normal.      Left Ear: Tympanic membrane, ear canal and external ear normal.      Nose: Nose " normal.      Mouth/Throat:      Comments: No dental trauma noted.  Eyes:      Extraocular Movements: Extraocular movements intact.      Conjunctiva/sclera: Conjunctivae normal.      Pupils: Pupils are equal, round, and reactive to light.   Neck:      Vascular: No carotid bruit.   Cardiovascular:      Rate and Rhythm: Normal rate.      Pulses: Normal pulses.   Pulmonary:      Effort: Pulmonary effort is normal. No respiratory distress.   Abdominal:      Tenderness: There is no abdominal tenderness.   Musculoskeletal:         General: Tenderness (Patient with tenderness to palpation to the mid right humeral area.  No obvious deformity noted.) present. No deformity.   Skin:     Capillary Refill: Capillary refill takes less than 2 seconds.   Neurological:      Mental Status: She is alert.   Psychiatric:         Mood and Affect: Mood normal.         Speech: Speech normal.         Behavior: Behavior is cooperative.         Thought Content: Thought content normal.         Cognition and Memory: Cognition is impaired. Memory is impaired.         ED [facility]    -Laceration Repair    Date/Time: 3/25/2024 11:48 AM    Performed by: Yoni Lynn DO  Authorized by: Yoni Lynn DO    Risks, benefits and alternatives discussed.      ANESTHESIA (see MAR for exact dosages):     Anesthesia method:  Local infiltration    Local anesthetic:  Bupivacaine 0.25% w/o epi  LACERATION DETAILS     Location:  Face    Face location:  Forehead    Length (cm):  1.5    Depth (mm):  3    REPAIR TYPE:     Repair type:  Intermediate    EXPLORATION:     Hemostasis achieved with:  Direct pressure    Wound exploration: entire depth of wound probed and visualized      Contaminated: no      TREATMENT:     Area cleansed with:  Saline    Amount of cleaning:  Standard    Irrigation solution:  Sterile saline    Irrigation method:  Syringe    Visualized foreign bodies/material removed: no       SUBCUTANEOUS REPAIR     Suture size:  5-0    Suture material:  Vicryl    Number of sutures:  2    SKIN REPAIR     Repair method:  Tissue adhesive    APPROXIMATION     Approximation:  Close    PROCEDURE    Patient Tolerance:  Patient tolerated the procedure well with no immediate complications                Critical Care time:  none               Results for orders placed or performed during the hospital encounter of 03/25/24 (from the past 24 hour(s))   CT Head w/o Contrast    Narrative    EXAM: CT HEAD W/O CONTRAST  3/25/2024 10:47 AM     HISTORY: Fall, forehead injury, mental status change       COMPARISON: None    TECHNIQUE: Using multidetector thin collimation helical acquisition  technique, axial, coronal and sagittal CT images from the skull base  to the vertex were obtained without intravenous contrast.   (topogram) image(s) also obtained and reviewed. Dose reduction  techniques were used.    FINDINGS:  No acute intracranial hemorrhage, mass effect, or midline shift.  Subcortical, periventricular areas of white matter hypoattenuation;  mild to moderate parenchymal volume loss and compensatory ventricular  dilatation. No CT findings of acute infarct or hydrocephalus.  Preserved subarachnoid spaces.    Evaluation of skull base is limited by motion. Right small scalp  contusion. No underlying fracture. No substantial paranasal sinus  mucosal disease. Clear mastoid air cells. Nonfocal orbits.       Impression    IMPRESSION: No evidence for acute intracranial hemorrhage,  hydrocephalus or transcortical infarct. No skull fracture.    LINDSEY OWEN DO         SYSTEM ID:  [id number]   XR Elbow Right G/E 3 Views    Narrative    ELBOW RIGHT THREE OR MORE VIEWS March 25, 2024 11:01 AM     HISTORY: Fall, pain.    COMPARISON: None.      Impression    IMPRESSION: Normal bones, joint spaces and alignment. There is no  evidence of fracture, effusion or calcified intra-articular body.    LAZ GARCIA MD          SYSTEM ID:  [id number]   Humerus XR, G/E 2 views, right    Narrative    HUMERUS RIGHT TWO OR MORE VIEWS March 25, 2024 11:02 AM     HISTORY: Fall, pain.    COMPARISON: None.      Impression    IMPRESSION:  1. Mild osteoarthrosis of the AC joint with small inferiorly directed  osteophytes. There is also lateral downsloping of the acromion process  and a subacromial enthesophyte. These findings narrow the subacromial  space.  2. The humerus appears normal. There is no evidence of fracture.    LAZ GARCIA MD         SYSTEM ID:  [id number]   CT Cervical Spine w/o Contrast    Narrative    EXAM: CT CERVICAL SPINE W/O CONTRAST  3/25/2024 11:05 AM     HISTORY: Fall       COMPARISON: none    TECHNIQUE: Using multidetector thin collimation helical acquisition  technique, axial, coronal and sagittal CT images through the cervical  spine were obtained without intravenous contrast. Dose reduction  techniques were used.    FINDINGS:  No acute fracture or traumatic subluxation. No prevertebral edema.  Nonfocal extraspinal structures.    C2-T1: Relative straightening of the vertebral body alignment.  Degenerative C4-C5 grade 1 anterolisthesis. Advanced C5-C6, C6-C7  degenerative disc and opposing endplate change with bilateral  uncovertebral spurring and facet arthropathy contributing to moderate  to advanced bilateral neural foraminal stenosis at both levels. No  high-grade canal stenosis at any level.      Impression    IMPRESSION:  1. No acute fracture or posttraumatic subluxation.  2. Multilevel cervical spondylosis, as above.    LINDSEY OWEN DO         SYSTEM ID:  [id number]       Medications   acetaminophen (TYLENOL) tablet 1,000 mg (has no administration in time range)       Assessments & Plan (with Medical Decision Making)  73-year-old female to the ER brought in by her daughter from the local dementia care assisted living home secondary to a fall that occurred in the home.  Patient does not recall any details of  the fall and it was not witnessed.  She was noted to have a laceration to the right forehead and also complained of pain to her right upper arm.  Her daughter did not notice any change in her mentation over baseline.  We had contacted the care home to see when she last had her medications and she had not received any of her medications this morning.  Multiple imaging evaluations performed to include head, neck, and the right upper arm and elbow area.  Fortunately no acute injury identified with these evaluations.  The right forehead laceration was repaired as noted above.  Patient tolerated repair well.  No exam findings that would suggest a reason for the follow were identified.  The patient's condition was stable through the ER stay and patient was discharged to the assisted living home in the care of her daughter.  No medication changes were made.     I have reviewed the nursing notes.    I have reviewed the findings, diagnosis, plan and need for follow up with the patient's daughter         Final diagnoses:   Fall at nursing home, initial encounter   Forehead laceration, initial encounter   Arm injury, right, initial encounter       3/25/2024   [facility] EMERGENCY DEPT       Yoni Lynn,   03/25/24 [id number]

## 2024-04-04 ENCOUNTER — ASSISTED LIVING VISIT (OUTPATIENT)
Dept: GERIATRICS | Facility: CLINIC | Age: 74
End: 2024-04-04
Payer: COMMERCIAL

## 2024-04-04 VITALS
DIASTOLIC BLOOD PRESSURE: 66 MMHG | WEIGHT: 123 LBS | RESPIRATION RATE: 12 BRPM | BODY MASS INDEX: 21.11 KG/M2 | SYSTOLIC BLOOD PRESSURE: 131 MMHG | TEMPERATURE: 97.3 F | HEART RATE: 74 BPM

## 2024-04-04 DIAGNOSIS — F41.9 ANXIETY: ICD-10-CM

## 2024-04-04 DIAGNOSIS — M79.662 PAIN OF LEFT LOWER LEG: ICD-10-CM

## 2024-04-04 DIAGNOSIS — S01.81XD FACIAL LACERATION, SUBSEQUENT ENCOUNTER: Primary | ICD-10-CM

## 2024-04-04 DIAGNOSIS — K21.00 GASTROESOPHAGEAL REFLUX DISEASE WITH ESOPHAGITIS WITHOUT HEMORRHAGE: ICD-10-CM

## 2024-04-04 DIAGNOSIS — S72.002S CLOSED FRACTURE OF LEFT HIP, SEQUELA: ICD-10-CM
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
## 2024-04-04 PROCEDURE — 99349 HOME/RES VST EST MOD MDM 40: CPT | Performed by: NURSE PRACTITIONER

## 2024-04-04 NOTE — PROGRESS NOTES
[facility] GERIATRICS  ACUTE/EPISODIC VISIT    [facility] Medical Record Number:  [id number]  Place of Service where encounter took place:  [facility] [[id number]]    Chief Complaint   Patient presents with    RECHECK       HPI:    Kylah Britt is a 73 year old  (1950), who is being seen today for an episodic care visit.  HPI information obtained from: facility chart records, facility staff, patient report and [facility] chart review.    Today's concern is:    Diagnoses         Codes Comments    Facial laceration, subsequent encounter    -  Primary S01.81XD     Fall, subsequent encounter     W19.XXXD     Pain of left lower leg     M79.662     Closed fracture of left hip, sequela     S72.002S     Gastroesophageal reflux disease with esophagitis without hemorrhage     K21.00     Anxiety     F41.9           Came today to visit patient after she had a fall and subsequent visit to the ED at [facility] in [place] on 3/25/24. Laceration present to the top of left eye brow resulting from the fall. Area was glued together and is expected to dissolve. No redness or signs of infection noted.     Patient was sitting in her recliner during this visit. She talked about how she fell out of bed while sleeping. She reports feeling good during this visit and felt erin that she only got a laceration from the fall. She denies dizziness, HA, pain in the eyes, change in vision, chest pain or tightness, SOB, and abdominal pain. Patient continues to use a walker for ambulation.     Her systolic Bps are variable between 110s to 150s with systolic between 50s and 70s. She is currently not on any BP meds as most of her Bps tend to be closer to the systolic of 110s.     ALLERGIES:    Allergies   Allergen Reactions    No Known Drug Allergy         MEDICATIONS:  Post Discharge Medication Reconciliation Status: patient was not discharged from an inpatient facility or TCU.      Current Outpatient Medications   Medication Sig Dispense Refill    acetaminophen (TYLENOL) 500 MG tablet Take 1,000 mg by mouth 3 times daily And 1000mg daily PRN      baclofen (LIORESAL) 10 MG tablet Take 10 mg by mouth 2 times daily 3pm and 9pm, takes 5mg every morning      busPIRone (BUSPAR) 5 MG tablet Take 10 mg by mouth 2 times daily      Calcium Carbonate (CALCIUM 600 PO) Take 1 tablet by mouth every other day      diclofenac (VOLTAREN) 1 % topical gel Apply 4 g topically 4 times daily Apply to knees      FLUoxetine (PROZAC) 10 MG capsule Take 1 capsule (10 mg) by mouth daily 30 capsule 3    gabapentin (NEURONTIN) 100 MG capsule 100mg po at 12N, 200mg po at bedtime and 100mg po every 4 hours as needed for RLS or pain 90 capsule 4    Lidocaine (LIDOCARE) 4 % Patch Place 1 patch onto the skin every 24 hours To prevent lidocaine toxicity, patient should be patch free for 12 hrs daily. Apply to L hip      melatonin 3 MG tablet Take 3 tablets (9 mg) by mouth At Bedtime      SENNA-docusate sodium (SENNA S) 8.6-50 MG tablet Take 1 tablet by mouth 2 times daily      traMADol (ULTRAM) 50 MG tablet Take 0.5 tablets (25 mg) by mouth 2 times daily. May also take 0.5 tablets (25 mg) every 4 hours as needed for severe pain. 30 tablet 5    traZODone (DESYREL) 50 MG tablet Take 1 tablet (50 mg) by mouth At Bedtime 31 tablet 11    vitamin D3 (CHOLECALCIFEROL) 50 mcg (2000 units) tablet Take 50 mcg by mouth every other day         REVIEW OF SYSTEMS:  4 point ROS neg other than the symptoms noted above in the HPI.      PHYSICAL EXAM:  /66   Pulse 74   Temp 97.3  F (36.3  C)   Resp 12   Wt 55.8 kg (123 lb)   BMI 21.11 kg/m    Physical Exam  Cardiovascular:      Rate and Rhythm: Normal rate and regular rhythm.      Pulses: Normal pulses.      Heart sounds: Normal heart sounds.   Pulmonary:      Effort: Pulmonary effort is normal.      Breath sounds: Normal breath sounds.   Musculoskeletal:      Comments: Patient  constantly touching her hair with BUE.    Skin:     General: Skin is warm and dry.      Capillary Refill: Capillary refill takes less than 2 seconds.   Neurological:      Mental Status: She is alert. Mental status is at baseline.   Psychiatric:         Mood and Affect: Mood normal.         Behavior: Behavior normal.      Comments: Calm and pleasant during this visit.          ASSESSMENT / PLAN:  (S01.81XD) Facial laceration, subsequent encounter  (primary encounter diagnosis)  (W19.XXXD) Fall, subsequent encounter  Comment:  About half each laceration located at patients forehead on top of the left eye brow. She denies pain at the laceration site. No redness, drainage, swelling or signs of infection noted. Laceration open to air. Area clued close with no sutures to be removed.   Patient has scheduled tylenol 500 mg 3 x a day and PRN tramadol for pan. She states that her pain is well controled at this time/     (M79.662) left lower leg pain  (S72.002S)  closed fracture of left hip, [place]  Comment: Numbness to LE. She expresses pain and states that she had not gotten the Voltaren gel which she feels is the most helpful for this sensation of numbness.  This is to her knees and shins since she will rub her shin often and almost seems to be more of a habit.  Spoke to facility staff about administering voltaren gel up to 4 x a day as ordered. Back in mid March, increased the Voltaren gel to be used 4x a day.  Baclofen scheduled and helpful with muscle spasms.   Scheduled tylenol 500 mg 3 x a day and PRN tramadol for pain.  Remains on Gabapentin as well to work with the numbness and discomfort.  Seems to tolerate the dose.    (K21.00) Gastroesophageal reflux disease with esophagitis without hemorrhage  Comment: She denies abdominal pain at this visit. Denies changes in bowel and bladder habits. She reports good appetite and sleeps well at night with no concerns.     (F41.9) Anxiety  Comment: currently on Buspirone 10 mg  twice a day for anxiety. Condition well managed on regimen.   No adjustments to be made on medication regimen during this visit.  The more anxious she is the more her hand pulls around her hair out of her face.        Orders:  No new orders for this visit.     Electronically signed by  Lucia Lobo NP Student     I was present with the medical student/advanced practice registered nurse, Lucia Lobo NP Student, who participated in the service and in the documentation of this note and/or observation along side of this provider. I have verified the history and personally performed the physical exam and medical decision making. I agree with the assessment and plan of care as documented in the note.       ESPERANZA Fuentes CNP

## 2024-04-04 NOTE — LETTER
4/4/2024        RE: Kylah Britt  [address]  [place] [address]        [facility] GERIATRICS  ACUTE/EPISODIC VISIT    [facility] Medical Record Number:  [id number]  Place of Service where encounter took place:  [facility] [[id number]]    Chief Complaint   Patient presents with     RECHECK       HPI:    Kylah Britt is a 73 year old  (1950), who is being seen today for an episodic care visit.  HPI information obtained from: facility chart records, facility staff, patient report and [facility] chart review.    Today's concern is:    Diagnoses         Codes Comments    Facial laceration, subsequent encounter    -  Primary S01.81XD     Fall, subsequent encounter     W19.XXXD     Pain     R52     Gastroesophageal reflux disease with esophagitis without hemorrhage     K21.00     Anxiety     F41.9           Came today to visit patient after she had a fall and subsequent visit to the ED at [facility] in [place] on 3/25/24. Laceration present to the top of left eye brow resulting from the fall. Area was glued together and is expected to dissolve. No redness or signs of infection noted.     Patient was sitting in her recliner during this visit. She talked about how she fell out of bed while sleeping. She reports feeling good during this visit and felt erin that she only got a laceration from the fall. She denies dizziness, HA, pain in the eyes, change in vision, chest pain or tightness, SOB, and abdominal pain. Patient continues to use a walker for ambulation.     Her systolic Bps are variable between 110s to 150s with systolic between 50s and 70s. She is currently not on any BP meds as most of her Bps tend to be closer to the systolic of 110s.     ALLERGIES:    Allergies   Allergen Reactions     No Known Drug Allergy         MEDICATIONS:  Post Discharge Medication Reconciliation Status: patient was not discharged from an inpatient facility or  TCU.     Current Outpatient Medications   Medication Sig Dispense Refill     acetaminophen (TYLENOL) 500 MG tablet Take 1,000 mg by mouth 3 times daily And 1000mg daily PRN       baclofen (LIORESAL) 10 MG tablet Take 10 mg by mouth 2 times daily 3pm and 9pm, takes 5mg every morning       busPIRone (BUSPAR) 5 MG tablet Take 10 mg by mouth 2 times daily       Calcium Carbonate (CALCIUM 600 PO) Take 1 tablet by mouth every other day       diclofenac (VOLTAREN) 1 % topical gel Apply 4 g topically 4 times daily Apply to knees       FLUoxetine (PROZAC) 10 MG capsule Take 1 capsule (10 mg) by mouth daily 30 capsule 3     gabapentin (NEURONTIN) 100 MG capsule 100mg po at 12N, 200mg po at bedtime and 100mg po every 4 hours as needed for RLS or pain 90 capsule 4     Lidocaine (LIDOCARE) 4 % Patch Place 1 patch onto the skin every 24 hours To prevent lidocaine toxicity, patient should be patch free for 12 hrs daily. Apply to L hip       melatonin 3 MG tablet Take 3 tablets (9 mg) by mouth At Bedtime       SENNA-docusate sodium (SENNA S) 8.6-50 MG tablet Take 1 tablet by mouth 2 times daily       traMADol (ULTRAM) 50 MG tablet Take 0.5 tablets (25 mg) by mouth 2 times daily. May also take 0.5 tablets (25 mg) every 4 hours as needed for severe pain. 30 tablet 5     traZODone (DESYREL) 50 MG tablet Take 1 tablet (50 mg) by mouth At Bedtime 31 tablet 11     vitamin D3 (CHOLECALCIFEROL) 50 mcg (2000 units) tablet Take 50 mcg by mouth every other day         REVIEW OF SYSTEMS:  4 point ROS neg other than the symptoms noted above in the HPI.      PHYSICAL EXAM:  /66   Pulse 74   Temp 97.3  F (36.3  C)   Resp 12   Wt 55.8 kg (123 lb)   BMI 21.11 kg/m    Physical Exam  Cardiovascular:      Rate and Rhythm: Normal rate and regular rhythm.      Pulses: Normal pulses.      Heart sounds: Normal heart sounds.   Pulmonary:      Effort: Pulmonary effort is normal.      Breath sounds: Normal breath sounds.   Musculoskeletal:       Comments: Patient constantly touching her hair with BUE.    Skin:     General: Skin is warm and dry.      Capillary Refill: Capillary refill takes less than 2 seconds.   Neurological:      Mental Status: She is alert. Mental status is at baseline.   Psychiatric:         Mood and Affect: Mood normal.         Behavior: Behavior normal.      Comments: Calm and pleasant during this visit.          ASSESSMENT / PLAN:  (S01.81XD) Facial laceration, subsequent encounter  (primary encounter diagnosis)  (W19.XXXD) Fall, subsequent encounter  Comment:  About half each laceration located at patients forehead on top of the left eye brow. She denies pain at the laceration site. No redness, drainage, swelling or signs of infection noted. Laceration open to air. Area clued close with no sutures to be removed.   Patient has scheduled tylenol 500 mg 3 x a day and PRN tramadol for pan. She states that her pain is well controled at this time/     (R52) Pain  Comment: Numbness to LE. She expresses pain and states that she had not gotten the Voltaren gel which she feels is the most helpful for this sensation of numbness.   Spoke to facility staff about administering volterrene gel up to 4 x a day as ordered.   Baclofen scheduled and helpful with muscle spasms.   Scheduled tylenol 500 mg 3 x a day and PRN tramadol for pan.    (K21.00) Gastroesophageal reflux disease with esophagitis without hemorrhage  Comment: She denies abdominal pain at this visit. Denies changes in bowel and bladder habits. She reports good appetite and sleeps well at night with no concerns.     (F41.9) Anxiety  Comment: currently on Buspirone 10 mg twice a day for anxiety. Condition well managed on regimen.   No adjustments to be made on medication regimen during this visit.       Orders:  No new orders for this visit.     Electronically signed by  Lucia Lobo NP Student     I was present with the medical student/advanced practice registered nurse,  Lucia Lobo, WHITNEY Student, who participated in the service and in the documentation of this note and/or observation along side of this provider. I have verified the history and personally performed the physical exam and medical decision making. I agree with the assessment and plan of care as documented in the note.       ESPERANZA Fuentes CNP      Sincerely,        ESPERANZA Fuentes CNP

## 2024-04-13 PROBLEM — S01.81XA FACIAL LACERATION, INITIAL ENCOUNTER: Status: ACTIVE | Noted: 2024-04-13

## 2024-04-22 DIAGNOSIS — M21.6X1 FOOT TURNED IN, ACQUIRED, RIGHT: Primary | ICD-10-CM

## 2024-05-02 ENCOUNTER — ASSISTED LIVING VISIT (OUTPATIENT)
Dept: GERIATRICS | Facility: CLINIC | Age: 74
End: 2024-05-02
Payer: COMMERCIAL

## 2024-05-02 VITALS
WEIGHT: 106.2 LBS | HEART RATE: 76 BPM | SYSTOLIC BLOOD PRESSURE: 154 MMHG | DIASTOLIC BLOOD PRESSURE: 82 MMHG | OXYGEN SATURATION: 96 % | RESPIRATION RATE: 16 BRPM | BODY MASS INDEX: 18.23 KG/M2 | TEMPERATURE: 98.1 F

## 2024-05-02 DIAGNOSIS — F63.3 HAIR PULLING: ICD-10-CM

## 2024-05-02 DIAGNOSIS — G31.9 NEURODEGENERATIVE DISORDER (H): ICD-10-CM

## 2024-05-02 DIAGNOSIS — M79.605 PAIN OF LEFT LOWER EXTREMITY: ICD-10-CM

## 2024-05-02 DIAGNOSIS — F41.8 MIXED ANXIETY AND DEPRESSIVE DISORDER: Primary | ICD-10-CM

## 2024-05-02 PROCEDURE — 99349 HOME/RES VST EST MOD MDM 40: CPT | Performed by: NURSE PRACTITIONER

## 2024-05-02 NOTE — LETTER
5/2/2024        RE: Kylha Britt  [address]  [facility] [address]        No notes on file      Sincerely,        ESPERANZA Fuentes CNP

## 2024-05-02 NOTE — LETTER
5/2/2024      Kylah Britt  [address]  [facility] [address]        [facility] GERIATRICS  ACUTE/EPISODIC VISIT    [facility] Medical Record Number:  [id number]  Place of Service where encounter took place:  [facility] [[id number]]    Chief Complaint   Patient presents with     RECHECK       HPI:    Kylah Britt is a 73 year old  (1950), who is being seen today for an episodic care visit.  HPI information obtained from: facility chart records, facility staff, patient report, and [facility] chart review.    Today's concern is:    Diagnoses         Codes Comments    Mixed anxiety and depressive disorder    -  Primary F41.8     Hair pulling     F63.3     Neurodegenerative disorder (H24)     G31.9     Pain of left lower extremity     M79.605          Came to see Anamaria today as received a note that she wanted something to help with anxiety as she continues to pull on her hair repetitively.      Found Anamaria in her apartment.  She was sitting in her newer recliner chair.  Smiles and greats this NP.  Seems to be aware of who this NP is.  Brought up about the note receives from nursing and she agrees that she is wanting something to help control her anxiety/movements as she fixes her hair with her left arm.  It is like unconsciously she does this behavior/pattern.     Reviewed medications with her and typically attempt to move medications slowly with her as she will request to back off them and then not sure which may be causing her negative concern.    ALLERGIES:    Allergies   Allergen Reactions     No Known Drug Allergy         MEDICATIONS:  Post Discharge Medication Reconciliation Status: patient was not discharged from an inpatient facility or TCU.     Current Outpatient Medications   Medication Sig Dispense Refill     acetaminophen (TYLENOL) 500 MG tablet Take 1,000 mg by mouth 3 times daily And 1000mg daily PRN       baclofen (LIORESAL) 10 MG tablet  Take 10 mg by mouth 2 times daily 3pm and 9pm, takes 5mg every morning       busPIRone (BUSPAR) 5 MG tablet Take 10 mg by mouth 2 times daily       Calcium Carbonate (CALCIUM 600 PO) Take 1 tablet by mouth every other day       diclofenac (VOLTAREN) 1 % topical gel Apply 4 g topically 4 times daily Apply to knees       FLUoxetine (PROZAC) 10 MG capsule Take 2 capsule (20 mg) by mouth daily 30 capsule 3     gabapentin (NEURONTIN) 100 MG capsule 100mg po at 12N, 200mg po at bedtime and 100mg po every 4 hours as needed for RLS or pain 90 capsule 4     Lidocaine (LIDOCARE) 4 % Patch Place 1 patch onto the skin every 24 hours To prevent lidocaine toxicity, patient should be patch free for 12 hrs daily. Apply to L hip       melatonin 3 MG tablet Take 3 tablets (9 mg) by mouth At Bedtime       SENNA-docusate sodium (SENNA S) 8.6-50 MG tablet Take 1 tablet by mouth 2 times daily       traMADol (ULTRAM) 50 MG tablet Take 0.5 tablets (25 mg) by mouth 2 times daily. May also take 0.5 tablets (25 mg) every 4 hours as needed for severe pain. 30 tablet 5     traZODone (DESYREL) 50 MG tablet Take 1 tablet (50 mg) by mouth At Bedtime 31 tablet 11     vitamin D3 (CHOLECALCIFEROL) 50 mcg (2000 units) tablet Take 50 mcg by mouth every other day         REVIEW OF SYSTEMS:  4 point ROS neg other than the symptoms noted above in the HPI.  Denied chest pain, SOB.  States she is sleeping ok at night.      PHYSICAL EXAM:  BP (!) 154/82   Pulse 76   Temp 98.1  F (36.7  C)   Resp 16   Wt 48.2 kg (106 lb 3.2 oz)   SpO2 96%   BMI 18.23 kg/m    Petite female sitting in her apartment.  She did put on the pendant at end of visit so she can be escorted down to lunch.    Uses a 2WW for mobility.      Heart rate regular and strong with S1 and S2 heard.  No edema in lower legs.  Points to her right shin of where it hurts.  Lungs clear with fair expansion.  Stomach is flat, non-tender.  Decreased bowel sounds.  Skin is pale/pink, dry and  warm.  Pulls on her hair to fix it from being in her face.      ASSESSMENT / PLAN:  (F41.8) Mixed anxiety and depressive disorder  (primary encounter diagnosis)  Comment: currently taking Prozac 10mg po daily.  Can help with anxiety and some OCD behaviors.  Will increase her up to 20mg po daily.  Is on Buspar 10mg twice a day.  Will see how this works for her.  Plan: FLUoxetine (PROZAC) 10 MG capsule    (G31.9) Neurodegsenerative disorder (H24)  (M79.605) Pain of left lower extremity  Comment: unfortunately, Anamaria has a ill defined Neurodegenerative disorder that has left her with abnormal gait.  Anamaria remains on Tylenol ES 3x a day, Baclofen 5mg in AM and 10mg twice a day.  Has Gabapentin for pain control, Tramadol 25mg twice a day, Voltaren gel 1% 4x a day.  No mention of wanting an increase in medication.  Continue to give emotional support as this has been difficult for her to navigate and think she has to be here long term.     Orders:   Increase Prozac to 20mg po daily for mixed anxiety and depression    Electronically signed by  ESPERANZA Fuentes CNP            Sincerely,        ESPERANZA Fuentes CNP

## 2024-05-02 NOTE — PROGRESS NOTES
[facility] GERIATRICS  ACUTE/EPISODIC VISIT    [facility] Medical Record Number:  [id number]  Place of Service where encounter took place:  [facility] [[id number]]    Chief Complaint   Patient presents with    RECHECK       HPI:    Kylah Britt is a 73 year old  (1950), who is being seen today for an episodic care visit.  HPI information obtained from: facility chart records, facility staff, patient report, and [facility] chart review.    Today's concern is:    Diagnoses         Codes Comments    Mixed anxiety and depressive disorder    -  Primary F41.8     Hair pulling     F63.3     Neurodegenerative disorder (H24)     G31.9     Pain of left lower extremity     M79.605          Came to see Anamaria today as received a note that she wanted something to help with anxiety as she continues to pull on her hair repetitively.      Found Anamaria in her apartment.  She was sitting in her newer recliner chair.  Smiles and greats this NP.  Seems to be aware of who this NP is.  Brought up about the note receives from nursing and she agrees that she is wanting something to help control her anxiety/movements as she fixes her hair with her left arm.  It is like unconsciously she does this behavior/pattern.     Reviewed medications with her and typically attempt to move medications slowly with her as she will request to back off them and then not sure which may be causing her negative concern.    ALLERGIES:    Allergies   Allergen Reactions    No Known Drug Allergy         MEDICATIONS:  Post Discharge Medication Reconciliation Status: patient was not discharged from an inpatient facility or TCU.     Current Outpatient Medications   Medication Sig Dispense Refill    acetaminophen (TYLENOL) 500 MG tablet Take 1,000 mg by mouth 3 times daily And 1000mg daily PRN      baclofen (LIORESAL) 10 MG tablet Take 10 mg by mouth 2 times daily 3pm and 9pm, takes 5mg every morning       busPIRone (BUSPAR) 5 MG tablet Take 10 mg by mouth 2 times daily      Calcium Carbonate (CALCIUM 600 PO) Take 1 tablet by mouth every other day      diclofenac (VOLTAREN) 1 % topical gel Apply 4 g topically 4 times daily Apply to knees      FLUoxetine (PROZAC) 10 MG capsule Take 2 capsule (20 mg) by mouth daily 30 capsule 3    gabapentin (NEURONTIN) 100 MG capsule 100mg po at 12N, 200mg po at bedtime and 100mg po every 4 hours as needed for RLS or pain 90 capsule 4    Lidocaine (LIDOCARE) 4 % Patch Place 1 patch onto the skin every 24 hours To prevent lidocaine toxicity, patient should be patch free for 12 hrs daily. Apply to L hip      melatonin 3 MG tablet Take 3 tablets (9 mg) by mouth At Bedtime      SENNA-docusate sodium (SENNA S) 8.6-50 MG tablet Take 1 tablet by mouth 2 times daily      traMADol (ULTRAM) 50 MG tablet Take 0.5 tablets (25 mg) by mouth 2 times daily. May also take 0.5 tablets (25 mg) every 4 hours as needed for severe pain. 30 tablet 5    traZODone (DESYREL) 50 MG tablet Take 1 tablet (50 mg) by mouth At Bedtime 31 tablet 11    vitamin D3 (CHOLECALCIFEROL) 50 mcg (2000 units) tablet Take 50 mcg by mouth every other day         REVIEW OF SYSTEMS:  4 point ROS neg other than the symptoms noted above in the HPI.  Denied chest pain, SOB.  States she is sleeping ok at night.      PHYSICAL EXAM:  BP (!) 154/82   Pulse 76   Temp 98.1  F (36.7  C)   Resp 16   Wt 48.2 kg (106 lb 3.2 oz)   SpO2 96%   BMI 18.23 kg/m    Petite female sitting in her apartment.  She did put on the pendant at end of visit so she can be escorted down to lunch.    Uses a 2WW for mobility.      Heart rate regular and strong with S1 and S2 heard.  No edema in lower legs.  Points to her right shin of where it hurts.  Lungs clear with fair expansion.  Stomach is flat, non-tender.  Decreased bowel sounds.  Skin is pale/pink, dry and warm.  Pulls on her hair to fix it from being in her face.      ASSESSMENT / PLAN:  (F41.8)  Mixed anxiety and depressive disorder  (primary encounter diagnosis)  Comment: currently taking Prozac 10mg po daily.  Can help with anxiety and some OCD behaviors.  Will increase her up to 20mg po daily.  Is on Buspar 10mg twice a day.  Will see how this works for her.  Plan: FLUoxetine (PROZAC) 10 MG capsule    (G31.9) Neurodegsenerative disorder (H24)  (M79.605) Pain of left lower extremity  Comment: unfortunately, Anamaria has a ill defined Neurodegenerative disorder that has left her with abnormal gait.  Anamaria remains on Tylenol ES 3x a day, Baclofen 5mg in AM and 10mg twice a day.  Has Gabapentin for pain control, Tramadol 25mg twice a day, Voltaren gel 1% 4x a day.  No mention of wanting an increase in medication.  Continue to give emotional support as this has been difficult for her to navigate and think she has to be here long term.     Orders:   Increase Prozac to 20mg po daily for mixed anxiety and depression    Electronically signed by  ESPERANZA Fuentes CNP

## 2024-05-23 ENCOUNTER — ASSISTED LIVING VISIT (OUTPATIENT)
Dept: GERIATRICS | Facility: CLINIC | Age: 74
End: 2024-05-23
Payer: COMMERCIAL

## 2024-05-23 VITALS
TEMPERATURE: 97.5 F | HEART RATE: 86 BPM | DIASTOLIC BLOOD PRESSURE: 71 MMHG | BODY MASS INDEX: 18.23 KG/M2 | SYSTOLIC BLOOD PRESSURE: 136 MMHG | WEIGHT: 106.2 LBS | OXYGEN SATURATION: 95 % | RESPIRATION RATE: 16 BRPM

## 2024-05-23 DIAGNOSIS — M79.662 PAIN OF LEFT LOWER LEG: ICD-10-CM

## 2024-05-23 DIAGNOSIS — F41.8 MIXED ANXIETY AND DEPRESSIVE DISORDER: Primary | ICD-10-CM

## 2024-05-23 DIAGNOSIS — G31.9 NEURODEGENERATIVE DISORDER (H): ICD-10-CM

## 2024-05-23 PROCEDURE — 99349 HOME/RES VST EST MOD MDM 40: CPT | Performed by: NURSE PRACTITIONER

## 2024-05-23 RX ORDER — GABAPENTIN 100 MG/1
CAPSULE ORAL
Qty: 15 CAPSULE | Refills: 5 | Status: SHIPPED | OUTPATIENT
Start: 2024-05-23 | End: 2024-05-23

## 2024-05-23 RX ORDER — LORAZEPAM 0.5 MG/1
0.25 TABLET ORAL EVERY 4 HOURS PRN
Qty: 10 TABLET | Refills: 5 | Status: SHIPPED | OUTPATIENT
Start: 2024-05-23 | End: 2024-06-20

## 2024-05-23 RX ORDER — GABAPENTIN 100 MG/1
CAPSULE ORAL
Qty: 120 CAPSULE | Refills: 4 | Status: SHIPPED | OUTPATIENT
Start: 2024-05-23 | End: 2024-10-04

## 2024-05-23 RX ORDER — BUSPIRONE HYDROCHLORIDE 5 MG/1
10 TABLET ORAL 3 TIMES DAILY
Qty: 90 TABLET | Refills: 11 | Status: SHIPPED | OUTPATIENT
Start: 2024-05-23 | End: 2024-06-20

## 2024-05-23 NOTE — PROGRESS NOTES
[facility] GERIATRICS  ACUTE/EPISODIC VISIT    [facility] Medical Record Number:  [id number]  Place of Service where encounter took place:  [facility] [[id number]]    Chief Complaint   Patient presents with    RECHECK       HPI:    Kylah Britt is a 73 year old  (1950), who is being seen today for an episodic care visit.  HPI information obtained from: facility chart records, facility staff, patient report, and [facility] chart review.    Today's concern is:    Diagnoses         Codes Comments    Anxiety    -  Primary F41.9     Pain of left lower leg     M79.662     Neurodegenerative disorder (H24)     G31.9           Received another request from nursing to consider increasing pain and or anxiety medication.  Anamaria is having episodes where staff having trouble distinguishing what is anxiety versus increase pain.  Sometimes can not redirect her behaviors.    Found Anamaria in her apartment.  Informed that she is having a AFO coming soon for her lower extremity.  Hopefully this will give her more stability with her ambulation that she continues with a 2WW.      Anamaria is alert and her eyes are wide open.  Moving around in one place.  Went through her medications with her.    ALLERGIES:    Allergies   Allergen Reactions    No Known Drug Allergy         MEDICATIONS:  Post Discharge Medication Reconciliation Status: patient was not discharged from an inpatient facility or TCU.     Current Outpatient Medications   Medication Sig Dispense Refill    busPIRone (BUSPAR) 5 MG tablet Take 2 tablets (10 mg) by mouth 3 times daily 90 tablet 11    gabapentin (NEURONTIN) 100 MG capsule 100mg po in AM and at 12N, 200mg po at bedtime and 100mg po every 4 hours as needed for RLS or pain 120 capsule 4    LORazepam (ATIVAN) 0.5 MG tablet Take 0.5 tablets (0.25 mg) by mouth every 4 hours as needed for anxiety 10 tablet 5    acetaminophen (TYLENOL) 500 MG tablet Take 1,000 mg by  mouth 3 times daily And 1000mg daily PRN      baclofen (LIORESAL) 10 MG tablet Take 10 mg by mouth 2 times daily 3pm and 9pm, takes 5mg every morning      Calcium Carbonate (CALCIUM 600 PO) Take 1 tablet by mouth every other day      diclofenac (VOLTAREN) 1 % topical gel Apply 4 g topically 4 times daily Apply to knees      FLUoxetine (PROZAC) 10 MG capsule Take 2 capsules (20 mg) by mouth daily      Lidocaine (LIDOCARE) 4 % Patch Place 1 patch onto the skin every 24 hours To prevent lidocaine toxicity, patient should be patch free for 12 hrs daily. Apply to L hip      melatonin 3 MG tablet Take 3 tablets (9 mg) by mouth At Bedtime      SENNA-docusate sodium (SENNA S) 8.6-50 MG tablet Take 1 tablet by mouth 2 times daily      traMADol (ULTRAM) 50 MG tablet Take 0.5 tablets (25 mg) by mouth 2 times daily. May also take 0.5 tablets (25 mg) every 4 hours as needed for severe pain. 30 tablet 5    traZODone (DESYREL) 50 MG tablet Take 1 tablet (50 mg) by mouth At Bedtime 31 tablet 11    vitamin D3 (CHOLECALCIFEROL) 50 mcg (2000 units) tablet Take 50 mcg by mouth every other day         REVIEW OF SYSTEMS:  4 point ROS neg other than the symptoms noted above in the HPI.  No chest pain or SOB.      PHYSICAL EXAM:  /71   Pulse 86   Temp 97.5  F (36.4  C)   Resp 16   Wt 48.2 kg (106 lb 3.2 oz)   SpO2 95%   BMI 18.23 kg/m    Anamaria is a petite female whom appears comfortable in her reclining chair that is electric.    Pulls on her hair to pull it aside repetitively.  Skin is pale/pink, warm and dry.  Heart rate regular and strong.  Lungs are clear.    Abdomen is flat, non-tender.  No masses.  Bowel sounds heard.  Oriented to self, and surrounding.      No recent labs      ASSESSMENT / PLAN:  (F41.8) Mixed anxiety and depressive disorder (primary encounter diagnosis)  Comment: trying to work with medications she already has in place.  Today will increase the Buspar to 10mg TID.  Max would be 15mg TID.  Adding 10mg in  the middle of the daytime.  Will add Lorazepam 0.5mg - give 0.25mg every 4 hours PRN for anxiety with evidence of yelling and not being able to redirect.    Will follow up at a later date to see if PRN is used and overall mood/behavior.  Plan: LORazepam (ATIVAN) 0.5 MG tablet, busPIRone         (BUSPAR) 5 MG tablet    (M79.662) Pain of left lower leg    (G31.9) Neurodegenerative disorder (H24)  Comment: want to change her Gabapentin for pain control.  Can not do 50mg in tablet form, only liquid and facility not able to do liquid.    Will need to add 100mg in AM, keep 100mg in afternoon and then 200mg HS dosing.  Has PRN Gabepentin as well.  Remains on Tramadol 25mg twice a day, Baclofen, and Voltaren gel 1% applied 4x a day.  Plan: DISCONTINUED: gabapentin (NEURONTIN) 100 MG         capsule, DISCONTINUED: gabapentin (NEURONTIN)         100 MG capsule      Orders:  Add 100mg of Gabapentin in the AM to regimen due to pain in the left leg.  Increase Buspar to 10mg po TID for anxiety  Lorazepam 0.25mg po every 4 hours PRN for anxiety - e/b yelling at staff, anxiety, hard to redirect    Electronically signed by  ESPERANZA Fuentes CNP

## 2024-05-23 NOTE — LETTER
5/23/2024        RE: Kylah Britt  [id number]  [facility] [address]        No notes on file      Sincerely,        ESPERANZA Fuentes CNP

## 2024-05-25 ENCOUNTER — HEALTH MAINTENANCE LETTER (OUTPATIENT)
Age: 74
End: 2024-05-25

## 2024-06-14 ENCOUNTER — APPOINTMENT (OUTPATIENT)
Dept: CT IMAGING | Facility: CLINIC | Age: 74
End: 2024-06-14
Attending: STUDENT IN AN ORGANIZED HEALTH CARE EDUCATION/TRAINING PROGRAM
Payer: MEDICARE

## 2024-06-14 ENCOUNTER — HOSPITAL ENCOUNTER (EMERGENCY)
Facility: CLINIC | Age: 74
Discharge: HOME | End: 2024-06-14
Attending: STUDENT IN AN ORGANIZED HEALTH CARE EDUCATION/TRAINING PROGRAM | Admitting: STUDENT IN AN ORGANIZED HEALTH CARE EDUCATION/TRAINING PROGRAM
Payer: MEDICARE

## 2024-06-14 VITALS
SYSTOLIC BLOOD PRESSURE: 157 MMHG | HEART RATE: 89 BPM | DIASTOLIC BLOOD PRESSURE: 78 MMHG | TEMPERATURE: 98.4 F | WEIGHT: 106 LBS | OXYGEN SATURATION: 97 % | HEIGHT: 62 IN | RESPIRATION RATE: 18 BRPM | BODY MASS INDEX: 19.51 KG/M2

## 2024-06-14 DIAGNOSIS — S09.90XA INJURY OF HEAD, INITIAL ENCOUNTER: ICD-10-CM

## 2024-06-14 DIAGNOSIS — S01.511A LIP LACERATION, INITIAL ENCOUNTER: ICD-10-CM

## 2024-06-14 DIAGNOSIS — S02.2XXA CLOSED FRACTURE OF NASAL BONE, INITIAL ENCOUNTER: ICD-10-CM
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
## 2024-06-14 PROCEDURE — 70450 CT HEAD/BRAIN W/O DYE: CPT | Mod: ME

## 2024-06-14 PROCEDURE — 99284 EMERGENCY DEPT VISIT MOD MDM: CPT | Mod: 25 | Performed by: STUDENT IN AN ORGANIZED HEALTH CARE EDUCATION/TRAINING PROGRAM

## 2024-06-14 PROCEDURE — 40652 RPR LIP FTH<HALF VER HEIGHT: CPT | Mod: 54 | Performed by: STUDENT IN AN ORGANIZED HEALTH CARE EDUCATION/TRAINING PROGRAM

## 2024-06-14 PROCEDURE — 40652 RPR LIP FTH<HALF VER HEIGHT: CPT | Performed by: STUDENT IN AN ORGANIZED HEALTH CARE EDUCATION/TRAINING PROGRAM

## 2024-06-14 PROCEDURE — 99283 EMERGENCY DEPT VISIT LOW MDM: CPT | Mod: 57 | Performed by: STUDENT IN AN ORGANIZED HEALTH CARE EDUCATION/TRAINING PROGRAM

## 2024-06-14 ASSESSMENT — ACTIVITIES OF DAILY LIVING (ADL): ADLS_ACUITY_SCORE: 39

## 2024-06-14 ASSESSMENT — ENCOUNTER SYMPTOMS: ROS SKIN COMMENTS: FACIAL INJURY

## 2024-06-14 NOTE — ED PROVIDER NOTES
History     Chief Complaint   Patient presents with    Fall     HPI  Kylah Britt is a 73 year old female who from across the street after a fall resulting in her hitting her face off the sink.  She was going to brush her teeth.  She notes that she got up and she has a brace to her right ankle resulting in her to trip and fall.  She did not lose conscious.  She is not any blood thinning medications.  She denies any neck pain upper extremity or lower extremity pain.    Allergies:  Allergies   Allergen Reactions    No Known Drug Allergy        Problem List:    Patient Active Problem List    Diagnosis Date Noted    Facial laceration, initial encounter 04/13/2024     Priority: Medium    Pain of left lower leg 03/08/2024     Priority: Medium    Anxiety 01/16/2024     Priority: Medium    ABLA (acute blood loss anemia) 11/28/2023     Priority: Medium    Status post left hip ORIF with long gamma nail by Dr. Franco on 11/4/2023 11/17/2023     Priority: Medium    Closed displaced intertrochanteric fracture of left femur with routine healing 11/07/2023     Priority: Medium    Sleep disorder 11/07/2023     Priority: Medium    Osteoporotic fracture of left hip, initial encounter (H) 11/03/2023     Priority: Medium    Abnormal gait 11/03/2023     Priority: Medium    Mild vascular dementia with mood disturbance (H) 02/07/2023     Priority: Medium    Adjustment insomnia 07/22/2022     Priority: Medium    Strain of right hamstring muscle 07/15/2022     Priority: Medium    Pain 07/15/2022     Priority: Medium    Drug-induced constipation 07/15/2022     Priority: Medium    Lyme disease 07/15/2022     Priority: Medium    Gastroesophageal reflux disease with esophagitis 07/15/2022     Priority: Medium    Pain of right thigh 07/06/2022     Priority: Medium    Unable to bear weight on right lower extremity 07/06/2022     Priority: Medium    Fall 07/06/2022     Priority: Medium    Osteopenia of necks of both femurs-per DEXA March  2022 07/06/2022     Priority: Medium    Allodynia-lateral right thigh 07/06/2022     Priority: Medium    Numbness of toes-right, chronic 07/06/2022     Priority: Medium    Closed Colles' fracture of left radius 03/12/2022     Priority: Medium    CARDIOVASCULAR SCREENING; LDL GOAL LESS THAN 160 10/31/2010     Priority: Medium    Tear meniscus knee 04/21/2010     Priority: Medium        Past Medical History:    Past Medical History:   Diagnosis Date    Abnormal Papanicolaou smear of cervix and cervical HPV 9/1/92    Depressive disorder, not elsewhere classified     Female stress incontinence     Lyme disease     Osteoarthrosis, unspecified whether generalized or localized, unspecified site     Other psychological or physical stress, not elsewhere classified(V62.89)        Past Surgical History:    Past Surgical History:   Procedure Laterality Date    COLONOSCOPY N/A 4/6/2022    Procedure: COLONOSCOPY, WITH POLYPECTOMY;  Surgeon: Bishnu Tsai MD;  Location:  GI    HC KNEE SCOPE, DIAGNOSTIC  1/31/89    Examination of right knee under anesthesia. Arthroscopy of right knee with debridement of torn anterior curciate ligament (medial meniscus not resected).    HC REMOVAL OF OVARY/TUBE(S)  2/20/90    Salpingo-Oophorectomy, bilateral    OPEN REDUCTION INTERNAL FIXATION CALCANEOUS Right 6/8/2017    Procedure: OPEN REDUCTION INTERNAL FIXATION CALCANEOUS;  Open Reduction Internal Fixation Right Calcaneous;  Surgeon: Darnell Kincaid DPM;  Location: [facility] OR    OPEN REDUCTION INTERNAL FIXATION HIP NAILING Left 11/4/2023    Procedure: INTERNAL FIXATION, FRACTURE, INTERTROCHANTERIC, LEFT HIP, USING GAMMA RODS;  Surgeon: Yoni Franco MD;  Location:  OR    ZZC APPENDECTOMY      ZZ LIGATE FALLOPIAN TUBE,POSTPARTUM  04/12/77    Bilateral tubal cauterization    ZZC TOTAL ABDOM HYSTERECTOMY  2/20/90    Hysterectomy, Total Abdominal    ZZHC COLONOSCOPY THRU STOMA, DIAGNOSTIC  06/24/98       Family History:    Family  "History   Problem Relation Age of Onset    Hypertension Mother     Heart Disease Mother     Cancer Father     Cancer Son         Hodgkin's    Cancer Paternal Aunt         two with cervical cancer       Social History:  Marital Status:   [5]  Social History     Tobacco Use    Smoking status: Never    Smokeless tobacco: Never   Vaping Use    Vaping status: Never Used   Substance Use Topics    Alcohol use: Yes     Comment: very rare    Drug use: No        Medications:    acetaminophen (TYLENOL) 500 MG tablet  baclofen (LIORESAL) 10 MG tablet  busPIRone (BUSPAR) 5 MG tablet  Calcium Carbonate (CALCIUM 600 PO)  diclofenac (VOLTAREN) 1 % topical gel  FLUoxetine (PROZAC) 10 MG capsule  gabapentin (NEURONTIN) 100 MG capsule  Lidocaine (LIDOCARE) 4 % Patch  LORazepam (ATIVAN) 0.5 MG tablet  melatonin 3 MG tablet  SENNA-docusate sodium (SENNA S) 8.6-50 MG tablet  traMADol (ULTRAM) 50 MG tablet  traZODone (DESYREL) 50 MG tablet  vitamin D3 (CHOLECALCIFEROL) 50 mcg (2000 units) tablet          Review of Systems   Skin:         Facial injury   All other systems reviewed and are negative.      Physical Exam   BP: (!) 157/78  Pulse: 89  Temp: 98.4  F (36.9  C)  Resp: (!) 87  Height: 157.5 cm (5' 2\")  Weight: 48.1 kg (106 lb)  SpO2: 97 %      Physical Exam  Vitals and nursing note reviewed.   Constitutional:       General: She is not in acute distress.     Appearance: Normal appearance. She is not diaphoretic.   HENT:      Head:        Comments: Laceration to the right lip through the vermilion border.  Bruising noted to the bridge of the nose as well as around the right eye.     Right Ear: Tympanic membrane normal.      Left Ear: Tympanic membrane normal.      Mouth/Throat:      Mouth: Mucous membranes are moist.   Eyes:      General: No scleral icterus.     Conjunctiva/sclera: Conjunctivae normal.   Cardiovascular:      Rate and Rhythm: Normal rate.      Heart sounds: Normal heart sounds.   Pulmonary:      Effort: No " respiratory distress.      Breath sounds: Normal breath sounds.   Abdominal:      General: Abdomen is flat.   Musculoskeletal:         General: No deformity. Normal range of motion.      Cervical back: Neck supple.   Skin:     General: Skin is warm.      Capillary Refill: Capillary refill takes less than 2 seconds.      Findings: No rash.   Neurological:      Mental Status: She is alert.         ED [facility]    -Laceration Repair    Date/Time: 6/14/2024 6:12 PM    Performed by: Cristy Villanueva MD  Authorized by: Cristy Villanueva MD    Risks, benefits and alternatives discussed.      UNIVERSAL PROTOCOL   Site Marked: Yes  Prior Images Obtained and Reviewed:  Yes  Required items: Required blood products, implants, devices and special equipment available    Patient identity confirmed:  Verbally with patient, arm band and provided demographic data  Patient was reevaluated immediately before administering moderate or deep sedation or anesthesia  Confirmation Checklist:  Patient's identity using two indicators, relevant allergies, procedure was appropriate and matched the consent or emergent situation and correct equipment/implants were available  Time out: Immediately prior to the procedure a time out was called    Universal Protocol: the Joint Commission Universal Protocol was followed    Preparation: Patient was prepped and draped in usual sterile fashion      ANESTHESIA (see MAR for exact dosages):     Anesthesia method:  Nerve block    Block location:  Infraorbital - right    Block needle gauge:  27 G    Block anesthetic:  Bupivacaine 0.25% WITH epi    Block technique:  Infiltration    Block injection procedure:  Anatomic landmarks identified, introduced needle, incremental injection and anatomic landmarks palpated    Block outcome:  Anesthesia achieved    LACERATION DETAILS     Location:  Lip    Lip location:  Upper lip, full thickness    Vermilion border  involved: yes      Height of lip laceration:  Up to half vertical height    Length (cm):  1    Depth (mm):  5    REPAIR TYPE:     Repair type:  Simple    EXPLORATION:     Hemostasis achieved with:  Direct pressure    Wound exploration: wound explored through full range of motion and entire depth of wound probed and visualized      Contaminated: no      TREATMENT:     Area cleansed with:  Saline    Amount of cleaning:  Standard    Visualized foreign bodies/material removed: no      SKIN REPAIR     Repair method:  Sutures    Suture size:  5-0    Suture material:  Fast-absorbing gut    Suture technique:  Simple interrupted    Number of sutures:  2    APPROXIMATION     Approximation:  Close    Vermilion border well-aligned: yes      POST-PROCEDURE DETAILS     Dressing:  Open (no dressing)      PROCEDURE    Patient Tolerance:  Patient tolerated the procedure well with no immediate complications               Results for orders placed or performed during the hospital encounter of 06/14/24 (from the past 24 hour(s))   Head CT w/o contrast    Narrative    EXAM: CT HEAD W/O CONTRAST  LOCATION: [facility]  DATE: 6/14/2024    INDICATION: Fall with nose and right eye injury  COMPARISON: 3/25/2024.  TECHNIQUE: Routine CT Head without IV contrast. Multiplanar reformats. Dose reduction techniques were used.    FINDINGS:  INTRACRANIAL CONTENTS: No intracranial hemorrhage, extraaxial collection, or mass effect.  No CT evidence of acute infarct. Mild presumed chronic small vessel ischemic changes. Mild generalized volume loss. No hydrocephalus.    BONES/SOFT TISSUES: There appears to be mild soft tissue swelling along the right aspect of the nasal arch and in the right premaxillary region. There is cortical irregularity and asymmetric cortical step-off involving the right aspect of the nasal arch,   concerning for fracture given the patient's history. The visualized paranasal sinuses and mastoids  appear clear. Mild right frontal scalp soft tissue swelling. The calvarium appears intact.      Impression    IMPRESSION:  1.  No CT findings of acute intracranial process.  2.  Brain atrophy and presumed chronic small vessel ischemic changes, as described.  3.  Mild soft tissue swelling in the right vertebral, frontal scalp, and premaxillary region.  4.  Cortical irregularity involving the right aspect of the nasal arch, concerning for fracture given the patient's history. This is incompletely imaged/assessed. Please correlate for point tenderness over the right nasal arch.       Medications - No data to display    Assessments & Plan (with Medical Decision Making)     I have reviewed the nursing notes.    I have reviewed the findings, diagnosis, plan and need for follow up with the patient.      Medical Decision Making  Pleasant 73-year-old female presenting with bruising to her face after falling hitting the countertop sink.  She was walking with a mechanical brace which resulted in her to fall forward.  She has laceration to her lip as well as bruising to the nose bridge and right eye.  Neurologically intact.  No other acute findings of abnormalities on chest extremity and abdominal examination.  CT imaging of the head was performed and negative for intracranial bleeding however did show positive nasal bridge fracture..  Patient provided with infraorbital nerve block and lip was sutured with absorbable sutures.  Outpatient follow-up instructions provided and return precautions discussed and patient discharged home with son.    New Prescriptions    No medications on file       Final diagnoses:   Injury of head, initial encounter   Fall, initial encounter   Lip laceration, initial encounter   Closed fracture of nasal bone, initial encounter       6/14/2024   [facility] EMERGENCY DEPT       Cristy Villanueva MD  06/14/24 [id number]

## 2024-06-14 NOTE — DISCHARGE INSTRUCTIONS
Please monitor patient as she has a minor head injury results and possible concussion like symptoms.  Dizziness and headaches are often symptoms.  Can be treated with Tylenol aspirin as needed.  Please return if you have any other concerns.  The laceration was sutured with 2 sutures that are absorbable and will not require removal.  However there was concern for infection therefore if there are signs of infection please return for reevaluation and antibiotics.

## 2024-06-14 NOTE — ED TRIAGE NOTES
Patient fell in bathroom when brushing teeth. Hit head/face on sink. Bruising present, laceration to top lip. Denies neck pain, LOC or on anticoagulants.

## 2024-06-16 PROBLEM — G31.9 NEURODEGENERATIVE DISORDER (H): Status: ACTIVE | Noted: 2024-06-16

## 2024-06-16 PROBLEM — F41.8 MIXED ANXIETY AND DEPRESSIVE DISORDER: Status: ACTIVE | Noted: 2024-06-16

## 2024-06-16 RX ORDER — FLUOXETINE 10 MG/1
20 CAPSULE ORAL DAILY
Status: SHIPPED
Start: 2024-05-02

## 2024-06-18 ENCOUNTER — APPOINTMENT (OUTPATIENT)
Dept: CT IMAGING | Facility: CLINIC | Age: 74
End: 2024-06-18
Attending: EMERGENCY MEDICINE
Payer: MEDICARE

## 2024-06-18 ENCOUNTER — HOSPITAL ENCOUNTER (EMERGENCY)
Facility: CLINIC | Age: 74
Discharge: HOME | End: 2024-06-18
Attending: EMERGENCY MEDICINE | Admitting: EMERGENCY MEDICINE
Payer: MEDICARE

## 2024-06-18 VITALS
TEMPERATURE: 98.6 F | DIASTOLIC BLOOD PRESSURE: 94 MMHG | SYSTOLIC BLOOD PRESSURE: 151 MMHG | RESPIRATION RATE: 20 BRPM | HEART RATE: 80 BPM | OXYGEN SATURATION: 96 %

## 2024-06-18 DIAGNOSIS — S09.90XA CLOSED HEAD INJURY, INITIAL ENCOUNTER: ICD-10-CM

## 2024-06-18 DIAGNOSIS — S01.01XA SCALP LACERATION, INITIAL ENCOUNTER: ICD-10-CM

## 2024-06-18 PROCEDURE — 70450 CT HEAD/BRAIN W/O DYE: CPT | Mod: ME

## 2024-06-18 PROCEDURE — 250N000013 HC RX MED GY IP 250 OP 250 PS 637: Performed by: EMERGENCY MEDICINE

## 2024-06-18 PROCEDURE — 99284 EMERGENCY DEPT VISIT MOD MDM: CPT | Mod: 25 | Performed by: EMERGENCY MEDICINE

## 2024-06-18 PROCEDURE — 250N000011 HC RX IP 250 OP 636: Performed by: EMERGENCY MEDICINE

## 2024-06-18 PROCEDURE — 12001 RPR S/N/AX/GEN/TRNK 2.5CM/<: CPT | Performed by: EMERGENCY MEDICINE

## 2024-06-18 RX ORDER — LORAZEPAM 0.5 MG/1
0.5 TABLET ORAL ONCE
Status: DISCONTINUED | OUTPATIENT
Start: 2024-06-18 | End: 2024-06-18

## 2024-06-18 RX ORDER — ACETAMINOPHEN 500 MG
500 TABLET ORAL ONCE
Status: COMPLETED | OUTPATIENT
Start: 2024-06-18 | End: 2024-06-18

## 2024-06-18 RX ORDER — ONDANSETRON 4 MG/1
4 TABLET, ORALLY DISINTEGRATING ORAL ONCE
Status: COMPLETED | OUTPATIENT
Start: 2024-06-18 | End: 2024-06-18

## 2024-06-18 RX ADMIN — ONDANSETRON 4 MG: 4 TABLET, ORALLY DISINTEGRATING ORAL at 21:45

## 2024-06-18 RX ADMIN — ACETAMINOPHEN 500 MG: 500 TABLET ORAL at 21:45

## 2024-06-18 ASSESSMENT — COLUMBIA-SUICIDE SEVERITY RATING SCALE - C-SSRS
6. HAVE YOU EVER DONE ANYTHING, STARTED TO DO ANYTHING, OR PREPARED TO DO ANYTHING TO END YOUR LIFE?: NO
1. IN THE PAST MONTH, HAVE YOU WISHED YOU WERE DEAD OR WISHED YOU COULD GO TO SLEEP AND NOT WAKE UP?: NO
2. HAVE YOU ACTUALLY HAD ANY THOUGHTS OF KILLING YOURSELF IN THE PAST MONTH?: NO

## 2024-06-18 ASSESSMENT — ACTIVITIES OF DAILY LIVING (ADL): ADLS_ACUITY_SCORE: 39

## 2024-06-19 NOTE — ED PROVIDER NOTES
History     Chief Complaint   Patient presents with    Fall     HPI  Kylah Britt is a 73 year old female with a history of mild vascular dementia, blood loss anemia who presents to the emergency department via EMS from [facility] secondary to head injury.  Patient walks with a walker and was try to make it to the bathroom and lost her footing and fell forward striking the right side of her head on the door jam.  No neck injury or loss of consciousness.  No extremity injury.  No vomiting.  Patient does have some nausea.  No new confusion or severe headache.  Headache is 5 out of 10.  He did not have any medications prior to arrival.  she is not on any blood thinners.  She had a fall and was seen here on Friday.  She has old ecchymosis over her right face.  She had a CT scan at that time that was negative for acute findings.  She did not injure her neck today.  No new radicular symptoms down the arms or legs.  No new back pain.  No new visual disturbance.    Allergies:  Allergies   Allergen Reactions    No Known Drug Allergy        Problem List:    Patient Active Problem List    Diagnosis Date Noted    Neurodegenerative disorder (H24) 06/16/2024     Priority: Medium    Mixed anxiety and depressive disorder 06/16/2024     Priority: Medium    Facial laceration, initial encounter 04/13/2024     Priority: Medium    Pain of left lower leg 03/08/2024     Priority: Medium    ABLA (acute blood loss anemia) 11/28/2023     Priority: Medium    Status post left hip ORIF with long gamma nail by Dr. Franco on 11/4/2023 11/17/2023     Priority: Medium    Closed displaced intertrochanteric fracture of left femur with routine healing 11/07/2023     Priority: Medium    Sleep disorder 11/07/2023     Priority: Medium    Osteoporotic fracture of left hip, initial encounter (H) 11/03/2023     Priority: Medium    Abnormal gait 11/03/2023     Priority: Medium    Mild vascular dementia with mood disturbance (H) 02/07/2023      Priority: Medium    Adjustment insomnia 07/22/2022     Priority: Medium    Strain of right hamstring muscle 07/15/2022     Priority: Medium    Pain 07/15/2022     Priority: Medium    Drug-induced constipation 07/15/2022     Priority: Medium    Lyme disease 07/15/2022     Priority: Medium    Gastroesophageal reflux disease with esophagitis 07/15/2022     Priority: Medium    Pain of right thigh 07/06/2022     Priority: Medium    Unable to bear weight on right lower extremity 07/06/2022     Priority: Medium    Fall 07/06/2022     Priority: Medium    Osteopenia of necks of both femurs-per DEXA March 2022 07/06/2022     Priority: Medium    Allodynia-lateral right thigh 07/06/2022     Priority: Medium    Numbness of toes-right, chronic 07/06/2022     Priority: Medium    Closed Colles' fracture of left radius 03/12/2022     Priority: Medium    CARDIOVASCULAR SCREENING; LDL GOAL LESS THAN 160 10/31/2010     Priority: Medium    Tear meniscus knee 04/21/2010     Priority: Medium        Past Medical History:    Past Medical History:   Diagnosis Date    Abnormal Papanicolaou smear of cervix and cervical HPV 9/1/92    Depressive disorder, not elsewhere classified     Female stress incontinence     Lyme disease     Osteoarthrosis, unspecified whether generalized or localized, unspecified site     Other psychological or physical stress, not elsewhere classified(V62.89)        Past Surgical History:    Past Surgical History:   Procedure Laterality Date    COLONOSCOPY N/A 4/6/2022    Procedure: COLONOSCOPY, WITH POLYPECTOMY;  Surgeon: Bishnu Tsai MD;  Location:  GI     KNEE SCOPE, DIAGNOSTIC  1/31/89    Examination of right knee under anesthesia. Arthroscopy of right knee with debridement of torn anterior curciate ligament (medial meniscus not resected).     REMOVAL OF OVARY/TUBE(S)  2/20/90    Salpingo-Oophorectomy, bilateral    OPEN REDUCTION INTERNAL FIXATION CALCANEOUS Right 6/8/2017    Procedure: OPEN REDUCTION  INTERNAL FIXATION CALCANEOUS;  Open Reduction Internal Fixation Right Calcaneous;  Surgeon: Darnell Kincaid DPM;  Location: [facility] OR    OPEN REDUCTION INTERNAL FIXATION HIP NAILING Left 11/4/2023    Procedure: INTERNAL FIXATION, FRACTURE, INTERTROCHANTERIC, LEFT HIP, USING GAMMA RODS;  Surgeon: Yoni Franco MD;  Location: [facility] OR    ZZC APPENDECTOMY      ZZC LIGATE FALLOPIAN TUBE,POSTPARTUM  04/12/77    Bilateral tubal cauterization    ZZC TOTAL ABDOM HYSTERECTOMY  2/20/90    Hysterectomy, Total Abdominal    ZZHC COLONOSCOPY THRU STOMA, DIAGNOSTIC  06/24/98       Family History:    Family History   Problem Relation Age of Onset    Hypertension Mother     Heart Disease Mother     Cancer Father     Cancer Son         Hodgkin's    Cancer Paternal Aunt         two with cervical cancer       Social History:  Marital Status:   [5]  Social History     Tobacco Use    Smoking status: Never    Smokeless tobacco: Never   Vaping Use    Vaping status: Never Used   Substance Use Topics    Alcohol use: Yes     Comment: very rare    Drug use: No        Medications:    acetaminophen (TYLENOL) 500 MG tablet  baclofen (LIORESAL) 10 MG tablet  busPIRone (BUSPAR) 5 MG tablet  Calcium Carbonate (CALCIUM 600 PO)  diclofenac (VOLTAREN) 1 % topical gel  FLUoxetine (PROZAC) 10 MG capsule  gabapentin (NEURONTIN) 100 MG capsule  Lidocaine (LIDOCARE) 4 % Patch  LORazepam (ATIVAN) 0.5 MG tablet  melatonin 3 MG tablet  SENNA-docusate sodium (SENNA S) 8.6-50 MG tablet  traMADol (ULTRAM) 50 MG tablet  traZODone (DESYREL) 50 MG tablet  vitamin D3 (CHOLECALCIFEROL) 50 mcg (2000 units) tablet          Review of Systems   All other systems reviewed and are negative.      Physical Exam   BP: (!) 145/79  Pulse: 80  Temp: 98.6  F (37  C)  Resp: 20  SpO2: 95 %      Physical Exam  Vitals and nursing note reviewed.   Constitutional:       General: She is not in acute distress.     Appearance: She is well-developed. She is not diaphoretic.    HENT:      Head: Atraumatic.      Comments: Right side of the head has dried crusted matted hair from bleeding.  I cannot currently see a laceration without cleaning the area.  There is no cephalohematoma or step-off noted.     Right Ear: External ear normal.      Left Ear: External ear normal.      Nose: Nose normal.      Mouth/Throat:      Pharynx: No oropharyngeal exudate.   Eyes:      General: No scleral icterus.        Right eye: No discharge.         Left eye: No discharge.      Extraocular Movements: Extraocular movements intact.      Conjunctiva/sclera: Conjunctivae normal.      Pupils: Pupils are equal, round, and reactive to light.   Cardiovascular:      Rate and Rhythm: Normal rate.      Heart sounds: Normal heart sounds. No murmur heard.     No friction rub. No gallop.   Pulmonary:      Effort: Pulmonary effort is normal. No respiratory distress.      Breath sounds: Normal breath sounds. No stridor.   Abdominal:      General: There is no distension.      Palpations: Abdomen is soft.      Tenderness: There is no abdominal tenderness. There is no rebound.   Musculoskeletal:         General: Normal range of motion.      Cervical back: Normal range of motion and neck supple.   Skin:     General: Skin is warm and dry.      Coloration: Skin is not pale.      Findings: No erythema or rash.      Comments: Old appearing yellowish ecchymosis over the right face from previous fall.   Neurological:      Mental Status: She is alert.      Cranial Nerves: No cranial nerve deficit.      Coordination: Coordination normal.   Psychiatric:         Mood and Affect: Mood normal.         Behavior: Behavior normal.         ED Course        Procedures         [facility] Procedure Note        Laceration Repair:    Performed by: Yan Beavers MD  Authorized by: Yan Beavers MD  Consent given by: Patient and Family who states understanding of the procedure being performed after discussing the risks, benefits and  alternatives.    Preparation: Patient was prepped and draped in usual sterile fashion.  Irrigation solution: saline    Body area:scalp  Laceration length: 1cm  Contamination: The wound is not contaminated.  Foreign bodies:none  Tendon involvement: none  Anesthesia: none      Debridement: none  Skin closure: Closed with 2 Staples  Technique: interrupted  Approximation: close  Approximation difficulty: simple    Patient tolerance: Patient tolerated the procedure well with no immediate complications.           Results for orders placed or performed during the hospital encounter of 06/18/24 (from the past 24 hour(s))   Head CT w/o contrast    Narrative    EXAM: CT HEAD W/O CONTRAST  LOCATION: [facility]  DATE: 6/18/2024    INDICATION: Fall. Head injury.  COMPARISON: 6/14/2024.  TECHNIQUE: Routine CT Head without IV contrast. Multiplanar reformats. Dose reduction techniques were used.    FINDINGS:  INTRACRANIAL CONTENTS: No intracranial hemorrhage, extraaxial collection, or mass effect.  No CT evidence of acute infarct. Mild volume loss and presumed chronic small vessel ischemia are stable.     VISUALIZED ORBITS/SINUSES/MASTOIDS: No intraorbital abnormality. No paranasal sinus mucosal disease. No middle ear or mastoid effusion.    BONES/SOFT TISSUES: Right temporoparietal scalp swelling. No calvarial fracture. Right nasal bone deformity again noted as seen on the 6/14/2024 study.       Impression    IMPRESSION:  1.  Right temporoparietal scalp swelling. No acute intracranial hemorrhage or calvarial fracture.    2.  Stable age-related and chronic ischemic changes.       Medications   acetaminophen (TYLENOL) tablet 500 mg (500 mg Oral $Given 6/18/24 2145)   ondansetron (ZOFRAN ODT) ODT tab 4 mg (4 mg Oral $Given 6/18/24 2145)       Assessments & Plan (with Medical Decision Making)  73-year-old female not on blood thinners [place] who had a fall and hit her right head on  the door jam.  She has matted hair with blood on that side.  No cephalohematoma.  Due to her age and the head injury over the center part of the skull we will proceed with CT scan of the head.  She has some nausea and 5 out of 10 headache.  She was given Tylenol and Zofran ODT.  There is no photosensitivity or new confusion.  No loss of consciousness.  No extremity injury.  Will have ED tech cleaned the wound to see if there is a laceration that requires repair.  CT scan of the head is unremarkable for acute findings.  Patient was given acetaminophen and Zofran.  Hide laceration was relatively small, 1 cm and repaired as above.  Tetanus vaccine is up-to-date.  Patient was discharged home in improved condition.  Return to ER precautions and follow-up precautions discussed.  Antibiotic ointment placed on the wound.  Staples should be removed in about a week.  All questions answered with daughter present prior to discharge.     I have reviewed the nursing notes.    I have reviewed the findings, diagnosis, plan and need for follow up with the patient.          Discharge Medication List as of 6/18/2024 10:40 PM          Final diagnoses:   Closed head injury, initial encounter   Scalp laceration, initial encounter       6/18/2024   [facility] EMERGENCY DEPT       Yan Beavers MD  06/18/24 9180

## 2024-06-19 NOTE — DISCHARGE INSTRUCTIONS
Return to the ER if new or worsening symptoms.  Staples should come out in about a week.  Keep the wound clean and dry.  Put antibiotic ointment on it each day.  Your CAT scan looked good today.

## 2024-06-19 NOTE — ED TRIAGE NOTES
Patient presents via EMS from [facility] after falling and hitting head on the trim of a doorway. Denies LOC, or blood thinners. Bleeding controlled at triage.     Triage Assessment (Adult)       Row Name 06/18/24 5815          Triage Assessment    Airway WDL WDL        Respiratory WDL    Respiratory WDL WDL        Peripheral/Neurovascular WDL    Peripheral Neurovascular WDL WDL

## 2024-06-20 ENCOUNTER — ASSISTED LIVING VISIT (OUTPATIENT)
Dept: GERIATRICS | Facility: CLINIC | Age: 74
End: 2024-06-20
Payer: COMMERCIAL

## 2024-06-20 VITALS
SYSTOLIC BLOOD PRESSURE: 116 MMHG | WEIGHT: 101.6 LBS | DIASTOLIC BLOOD PRESSURE: 51 MMHG | OXYGEN SATURATION: 96 % | TEMPERATURE: 97 F | HEART RATE: 81 BPM | RESPIRATION RATE: 18 BRPM | BODY MASS INDEX: 18.58 KG/M2

## 2024-06-20 DIAGNOSIS — G31.9 NEURODEGENERATIVE DISORDER (H): ICD-10-CM

## 2024-06-20 DIAGNOSIS — F41.8 MIXED ANXIETY AND DEPRESSIVE DISORDER: ICD-10-CM

## 2024-06-20 DIAGNOSIS — R45.1 PSYCHOMOTOR RESTLESSNESS: Primary | ICD-10-CM

## 2024-06-20 DIAGNOSIS — L29.9 GENERALIZED PRURITUS: ICD-10-CM

## 2024-06-20 DIAGNOSIS — R29.6 FALLS FREQUENTLY: ICD-10-CM

## 2024-06-20 PROCEDURE — 99350 HOME/RES VST EST HIGH MDM 60: CPT | Performed by: NURSE PRACTITIONER

## 2024-06-20 RX ORDER — LORAZEPAM 0.5 MG/1
0.5 TABLET ORAL EVERY 6 HOURS PRN
Qty: 10 TABLET | Refills: 5 | Status: SHIPPED | OUTPATIENT
Start: 2024-06-20

## 2024-06-20 RX ORDER — QUETIAPINE FUMARATE 25 MG/1
TABLET, FILM COATED ORAL
Qty: 30 TABLET | Refills: 5 | Status: SHIPPED | OUTPATIENT
Start: 2024-06-21 | End: 2024-07-12

## 2024-06-20 RX ORDER — BUSPIRONE HYDROCHLORIDE 5 MG/1
TABLET ORAL
Qty: 54 TABLET | Refills: 0 | Status: SHIPPED | OUTPATIENT
Start: 2024-06-22 | End: 2024-08-18

## 2024-06-20 NOTE — PROGRESS NOTES
[facility] GERIATRICS  ACUTE/EPISODIC VISIT    [facility] Medical Record Number:  [id number]  Place of Service where encounter took place:  [facility] [[id number]]    Chief Complaint   Patient presents with    RECHECK       HPI:    Kylah Britt is a 73 year old  (1950), who is being seen today for an episodic care visit.  HPI information obtained from: facility chart records, facility staff, patient report, [facility] chart review, and family/first contact daughter - Nicholas report.    Today's concern is:    Diagnoses         Codes Comments    Psychomotor restlessness    -  Primary R45.1     Mixed anxiety and depressive disorder     F41.8     Neurodegenerative disorder (H24)     G31.9     Falls frequently     R29.6     Generalized pruritus     L29.9           Came to see Anamaria today due to being in the ED twice in less than a week due to falling and having three falls in this time as well.  Most recent fall did not result in a ED visit.       Service (RN) had a talk with this NP today as she has spoken to Anamaria's daughter.   This NP was asked to speak with the daughter as well.  Care Conference is set up for 7/9/24 so that both Anamaria's children can attend.      Anamaria's health status is changing now that she is falling.  She still can be impulsive and not wait for staff to come despite putting on the pendent to let staff know she wants help.    Physically she is more restless, pulling out hair - always rearranging it.  Recently started PRN Lorazepam as she has been yelling and swinging at staff.      Found Anamaria in her apartment.  The aide finished helping her in the bathroom and then ambulated with her out to the lift chair in the living room.  Anamaria was wearing socks, shoes, and has a new soft pink velcro brace for right foot/calf that she is able to take off herself.    Anamaria was trying to tell this NP the the #4 medication makes her itch  (she started to rub her legs but no evidence of scratch marks).  Reviewed medications with her and think it was the Lorazepam that she is talking about.  She was totally not sure herself.  She stated she has had it only three times which would make sense for a PRN medication but has received it more than three times.      After the visit spoke with the [employer] again to throw out ideas and let her know of the visit.  Placed a call to the daughter and eventually were able to talk.  Family feels lost of what to do with her as well.  Stressful that she falls and knows she is impulsive.   Reviewed medications and gave suggestions of what could be done.  Things can be tried and see how she reacts.  Traditionally medication gets started and within 24 hours she has some excuse why she will not take it anymore.    Also brought up and to reinforce the DHS's suggestion of having Anamaria move to the secured memory care unit.  This NP feels she would thrive down there as there are more staff in close proximity.  Can not stop a fall, but at lease she is not at the end of the [place].  Would be able to hear if something is going on.  She could be out in the [facility] area, socialize more, and be able to still ambulate but shorter distance to dining room.     ALLERGIES:    Allergies   Allergen Reactions    No Known Drug Allergy         MEDICATIONS:  Post Discharge Medication Reconciliation Status: patient was not discharged from an inpatient facility or TCU. Has been the local ED at [facility].    Current Outpatient Medications   Medication Sig Dispense Refill    [START ON 6/22/2024] busPIRone (BUSPAR) 5 MG tablet 5mg po 3x a day for 10 days, then 5mg po twice a day for 10 days then 5mg po daily x4 days then discontinue. 54 tablet 0    LORazepam (ATIVAN) 0.5 MG tablet Take 1 tablet (0.5 mg) by mouth every 6 hours as needed for anxiety or agitation 10 tablet 5    [START ON 6/21/2024] QUEtiapine (SEROQUEL) 25 MG tablet 12.5mg by mouth  at bedtime x3 nights then increase to 12.5mg twice a day 30 tablet 5    acetaminophen (TYLENOL) 500 MG tablet Take 1,000 mg by mouth 3 times daily And 1000mg daily PRN      baclofen (LIORESAL) 10 MG tablet Take 10 mg by mouth 2 times daily 3pm and 9pm, takes 5mg every morning      Calcium Carbonate (CALCIUM 600 PO) Take 1 tablet by mouth every other day      diclofenac (VOLTAREN) 1 % topical gel Apply 4 g topically 4 times daily Apply to knees      FLUoxetine (PROZAC) 10 MG capsule Take 2 capsules (20 mg) by mouth daily      gabapentin (NEURONTIN) 100 MG capsule 100mg po in AM and at 12N, 200mg po at bedtime and 100mg po every 4 hours as needed for RLS or pain 120 capsule 4    Lidocaine (LIDOCARE) 4 % Patch Place 1 patch onto the skin every 24 hours To prevent lidocaine toxicity, patient should be patch free for 12 hrs daily. Apply to L hip      melatonin 3 MG tablet Take 3 tablets (9 mg) by mouth At Bedtime      SENNA-docusate sodium (SENNA S) 8.6-50 MG tablet Take 1 tablet by mouth 2 times daily      traMADol (ULTRAM) 50 MG tablet Take 0.5 tablets (25 mg) by mouth 2 times daily. May also take 0.5 tablets (25 mg) every 4 hours as needed for severe pain. 30 tablet 5    traZODone (DESYREL) 50 MG tablet Take 1 tablet (50 mg) by mouth At Bedtime 31 tablet 11    vitamin D3 (CHOLECALCIFEROL) 50 mcg (2000 units) tablet Take 50 mcg by mouth every other day           REVIEW OF SYSTEMS:  4 point ROS neg other than the symptoms noted above in the HPI.  Denied chest pain or SOB.  Does not seem to be in pain from the facial bruising, staple placement and stitches in her mouth from her laceration to the lip.        PHYSICAL EXAM:  /51   Pulse 81   Temp 97  F (36.1  C)   Resp 18   Wt 46.1 kg (101 lb 9.6 oz)   SpO2 96%   BMI 18.58 kg/m    Anamaria was using a walker with staff by her for mobility.    Once in the recliner, she was restless.  Left arm constantly taking her hair towards the back of her head and she moves it  around.  Asked her if she could stop doing that on her own and she said she could but unconsciously she does it.  Her hair obviously has been pulled out or falling out left side towards the back.  Heart rate regular and strong.  No edema.  Lungs are clear.  No cough.  Voice is clear.    Skin:  facial bruising on right side.  Yellow and red noted.  Can see the small laceration to the upper lip.  Has two staple in her hair that is covered by her hair.    Able to make eye contact.  Does not wear glasses.  Abdomen is soft, flat, and non-tender.      No recent labs.    ASSESSMENT / PLAN:  (R45.1) Psychomotor restlessness  (primary encounter diagnosis)  (F41.8) Mixed anxiety and depressive disorder  Comment: feel what is left is to try a antipsychotic medication and feel now there is better reason.  Her psychomotor restlessness is intense and the medication could compliment the Prozac that she is already taking.  [place] a try to see how she responds to it.  Will also start a taper off of the Buspar.  Hard to know if working - typically when medication is removed - the behaviors may intensify - that way it is known it was working.  Will increase the Lorazepam to 0.5mg but move to every 6 hours PRN.    Scripts sent to pharmacy and orders sent to facility as did not write them until after conversation with daughter.  Plan: busPIRone (BUSPAR) 5 MG tablet, LORazepam         (ATIVAN) 0.5 MG tablet, QUEtiapine (SEROQUEL)         25 MG tablet    (G31.9) Neurodegenerative disorder (H24)  (R29.6) Falls frequently  Comment: Anamaria has a neurodegenerative disorder that is unclear how, why, what.  No plans to return to neurology.  Now has had three falls close together.    Was speaking with PT/OT earlier as they are seeing her too.  Talked about a wheelchair and they do not think she would be successful due to limited movement with right arm.  She does not mind walking but wants someone with her when ambulating.  Continue to watch Anamaria  change and go with her movements/symptoms.  Family is considering the memory care unit.  Anamaria still wants a 2BR apartment.  Family is against it as she does not even use everything she has now in the one BR.  Feel if it was more full, more things to hit if she falls.      (L29.9) Generalized pruritus  Comment: if she keeps up this complaint of itching did tell her that this NP may put her on Loratadine or Zyrtec to see if the sensation would go away.  Not adding anything today and too many changes occurring.     Orders:   Discontinue Buspar order.  Start on 6/22/24, Buspar 5mg TID for 10 days, then 5mg po BID for 10 days, then 5mg daily x4 days then discontinue  3.  Change Lorazepam order to 0.5mg by mouth every 6 hours PRN for anxiety/restlessness   4.  Seroquel 12.5mg po at HS x3 days then increase to 12.5mg twice a day for psychomotor restlessness/depression/anxiety  5.  UA UC to rule out an infection, dysuria, urgency.    Electronically signed by  ESPERANZA Fuentes CNP

## 2024-06-20 NOTE — LETTER
6/20/2024      Kylah Britt  [address]  [facility] [address]        [facility] GERIATRICS  ACUTE/EPISODIC VISIT    [facility] Medical Record Number:  [id number]  Place of Service where encounter took place:  [facility] [[id number]]    Chief Complaint   Patient presents with     RECHECK       HPI:    Kylah Britt is a 73 year old  (1950), who is being seen today for an episodic care visit.  HPI information obtained from: facility chart records, facility staff, patient report, [facility] chart review, and family/first contact daughter - Nicholas report.    Today's concern is:    Diagnoses         Codes Comments    Psychomotor restlessness    -  Primary R45.1     Mixed anxiety and depressive disorder     F41.8     Neurodegenerative disorder (H24)     G31.9     Falls frequently     R29.6     Generalized pruritus     L29.9           Came to see Anamaria today due to being in the ED twice in less than a week due to falling and having three falls in this time as well.  Most recent fall did not result in a ED visit.       Service (RN) had a talk with this NP today as she has spoken to Anamaria's daughter.   This NP was asked to speak with the daughter as well.  Care Conference is set up for 7/9/24 so that both Anamaria's children can attend.      Anamaria's health status is changing now that she is falling.  She still can be impulsive and not wait for staff to come despite putting on the pendent to let staff know she wants help.    Physically she is more restless, pulling out hair - always rearranging it.  Recently started PRN Lorazepam as she has been yelling and swinging at staff.      Found Anamaria in her apartment.  The aide finished helping her in the bathroom and then ambulated with her out to the lift chair in the living room.  Anamaria was wearing socks, shoes, and has a new soft pink velcro brace for right foot/calf that she is able to take off  herself.    Anamaria was trying to tell this NP the the #4 medication makes her itch (she started to rub her legs but no evidence of scratch marks).  Reviewed medications with her and think it was the Lorazepam that she is talking about.  She was totally not sure herself.  She stated she has had it only three times which would make sense for a PRN medication but has received it more than three times.      After the visit spoke with the [facility] again to throw out ideas and let her know of the visit.  Placed a call to the daughter and eventually were able to talk.  Family feels lost of what to do with her as well.  Stressful that she falls and knows she is impulsive.   Reviewed medications and gave suggestions of what could be done.  Things can be tried and see how she reacts.  Traditionally medication gets started and within 24 hours she has some excuse why she will not take it anymore.    Also brought up and to reinforce the [employer]'s suggestion of having Anamaria move to the secured memory care unit.  This NP feels she would thrive down there as there are more staff in close proximity.  Can not stop a fall, but at lease she is not at the end of the callahan.  Would be able to hear if something is going on.  She could be out in the [facility] area, socialize more, and be able to still ambulate but shorter distance to dining room.     ALLERGIES:    Allergies   Allergen Reactions     No Known Drug Allergy         MEDICATIONS:  Post Discharge Medication Reconciliation Status: patient was not discharged from an inpatient facility or TCU. Has been the local ED at [facility].    Current Outpatient Medications   Medication Sig Dispense Refill     [START ON 6/22/2024] busPIRone (BUSPAR) 5 MG tablet 5mg po 3x a day for 10 days, then 5mg po twice a day for 10 days then 5mg po daily x4 days then discontinue. 54 tablet 0     LORazepam (ATIVAN) 0.5 MG tablet Take 1 tablet (0.5 mg) by mouth every 6 hours as needed for anxiety or agitation 10  tablet 5     [START ON 6/21/2024] QUEtiapine (SEROQUEL) 25 MG tablet 12.5mg by mouth at bedtime x3 nights then increase to 12.5mg twice a day 30 tablet 5     acetaminophen (TYLENOL) 500 MG tablet Take 1,000 mg by mouth 3 times daily And 1000mg daily PRN       baclofen (LIORESAL) 10 MG tablet Take 10 mg by mouth 2 times daily 3pm and 9pm, takes 5mg every morning       Calcium Carbonate (CALCIUM 600 PO) Take 1 tablet by mouth every other day       diclofenac (VOLTAREN) 1 % topical gel Apply 4 g topically 4 times daily Apply to knees       FLUoxetine (PROZAC) 10 MG capsule Take 2 capsules (20 mg) by mouth daily       gabapentin (NEURONTIN) 100 MG capsule 100mg po in AM and at 12N, 200mg po at bedtime and 100mg po every 4 hours as needed for RLS or pain 120 capsule 4     Lidocaine (LIDOCARE) 4 % Patch Place 1 patch onto the skin every 24 hours To prevent lidocaine toxicity, patient should be patch free for 12 hrs daily. Apply to L hip       melatonin 3 MG tablet Take 3 tablets (9 mg) by mouth At Bedtime       SENNA-docusate sodium (SENNA S) 8.6-50 MG tablet Take 1 tablet by mouth 2 times daily       traMADol (ULTRAM) 50 MG tablet Take 0.5 tablets (25 mg) by mouth 2 times daily. May also take 0.5 tablets (25 mg) every 4 hours as needed for severe pain. 30 tablet 5     traZODone (DESYREL) 50 MG tablet Take 1 tablet (50 mg) by mouth At Bedtime 31 tablet 11     vitamin D3 (CHOLECALCIFEROL) 50 mcg (2000 units) tablet Take 50 mcg by mouth every other day           REVIEW OF SYSTEMS:  4 point ROS neg other than the symptoms noted above in the HPI.  Denied chest pain or SOB.  Does not seem to be in pain from the facial bruising, staple placement and stitches in her mouth from her laceration to the lip.        PHYSICAL EXAM:  /51   Pulse 81   Temp 97  F (36.1  C)   Resp 18   Wt 46.1 kg (101 lb 9.6 oz)   SpO2 96%   BMI 18.58 kg/m    Anamaria was using a walker with staff by her for mobility.    Once in the recliner, she  was restless.  Left arm constantly taking her hair towards the back of her head and she moves it around.  Asked her if she could stop doing that on her own and she said she could but unconsciously she does it.  Her hair obviously has been pulled out or falling out left side towards the back.  Heart rate regular and strong.  No edema.  Lungs are clear.  No cough.  Voice is clear.    Skin:  facial bruising on right side.  Yellow and red noted.  Can see the small laceration to the upper lip.  Has two staple in her hair that is covered by her hair.    Able to make eye contact.  Does not wear glasses.  Abdomen is soft, flat, and non-tender.      No recent labs.    ASSESSMENT / PLAN:  (R45.1) Psychomotor restlessness  (primary encounter diagnosis)  (F41.8) Mixed anxiety and depressive disorder  Comment: feel what is left is to try a antipsychotic medication and feel now there is better reason.  Her psychomotor restlessness is intense and the medication could compliment the Prozac that she is already taking.  [place] a try to see how she responds to it.  Will also start a taper off of the Buspar.  Hard to know if working - typically when medication is removed - the behaviors may intensify - that way it is known it was working.  Will increase the Lorazepam to 0.5mg but move to every 6 hours PRN.    Scripts sent to pharmacy and orders sent to facility as did not write them until after conversation with daughter.  Plan: busPIRone (BUSPAR) 5 MG tablet, LORazepam         (ATIVAN) 0.5 MG tablet, QUEtiapine (SEROQUEL)         25 MG tablet    (G31.9) Neurodegenerative disorder (H24)  (R29.6) Falls frequently  Comment: Anamaria has a neurodegenerative disorder that is unclear how, why, what.  No plans to return to neurology.  Now has had three falls close together.    Was speaking with PT/OT earlier as they are seeing her too.  Talked about a wheelchair and they do not think she would be successful due to limited movement with right  arm.  She does not mind walking but wants someone with her when ambulating.  Continue to watch Anamaria change and go with her movements/symptoms.  Family is considering the memory care unit.  Anamaria still wants a 2BR apartment.  Family is against it as she does not even use everything she has now in the one BR.  Feel if it was more full, more things to hit if she falls.      (L29.9) Generalized pruritus  Comment: if she keeps up this complaint of itching did tell her that this NP may put her on Loratadine or Zyrtec to see if the sensation would go away.  Not adding anything today and too many changes occurring.     Orders:   Discontinue Buspar order.  Start on 6/22/24, Buspar 5mg TID for 10 days, then 5mg po BID for 10 days, then 5mg daily x4 days then discontinue  3.  Change Lorazepam order to 0.5mg by mouth every 6 hours PRN for anxiety/restlessness   4.  Seroquel 12.5mg po at HS x3 days then increase to 12.5mg twice a day for psychomotor restlessness/depression/anxiety  5.  UA UC to rule out an infection, dysuria, urgency.    Electronically signed by  ESPERANZA Fuentes CNP            Sincerely,        ESPERANZA Fuentes CNP

## 2024-06-21 ENCOUNTER — LAB REQUISITION (OUTPATIENT)
Dept: LAB | Facility: CLINIC | Age: 74
End: 2024-06-21
Payer: MEDICARE

## 2024-06-21 DIAGNOSIS — R30.0 DYSURIA: ICD-10-CM

## 2024-06-21 PROCEDURE — 87086 URINE CULTURE/COLONY COUNT: CPT | Mod: ORL | Performed by: NURSE PRACTITIONER

## 2024-06-23 LAB — BACTERIA UR CULT: NORMAL

## 2024-07-03 DIAGNOSIS — M79.605 PAIN OF LEFT LOWER EXTREMITY: ICD-10-CM

## 2024-07-03 RX ORDER — TRAMADOL HYDROCHLORIDE 50 MG/1
25 TABLET ORAL 2 TIMES DAILY
Qty: 30 TABLET | Refills: 0 | Status: SHIPPED | OUTPATIENT
Start: 2024-07-03 | End: 2024-08-05

## 2024-07-08 ENCOUNTER — ASSISTED LIVING VISIT (OUTPATIENT)
Dept: GERIATRICS | Facility: CLINIC | Age: 74
End: 2024-07-08
Payer: COMMERCIAL

## 2024-07-08 VITALS
TEMPERATURE: 97.3 F | HEART RATE: 69 BPM | SYSTOLIC BLOOD PRESSURE: 136 MMHG | RESPIRATION RATE: 16 BRPM | WEIGHT: 102.8 LBS | BODY MASS INDEX: 18.8 KG/M2 | DIASTOLIC BLOOD PRESSURE: 74 MMHG

## 2024-07-08 DIAGNOSIS — R45.1 PSYCHOMOTOR RESTLESSNESS: Primary | ICD-10-CM

## 2024-07-08 DIAGNOSIS — R26.81 UNSTEADY GAIT: ICD-10-CM

## 2024-07-08 DIAGNOSIS — F41.8 MIXED ANXIETY AND DEPRESSIVE DISORDER: ICD-10-CM

## 2024-07-08 DIAGNOSIS — G31.9 NEURODEGENERATIVE DISORDER (H): ICD-10-CM

## 2024-07-08 DIAGNOSIS — F63.3 HAIR PULLING: ICD-10-CM

## 2024-07-08 PROCEDURE — 99349 HOME/RES VST EST MOD MDM 40: CPT | Performed by: NURSE PRACTITIONER

## 2024-07-08 NOTE — LETTER
7/8/2024      Kylah Britt  [address]  [facility] [address]        [facility] GERIATRICS  ACUTE/EPISODIC VISIT    [facility] Medical Record Number:  [id number]  Place of Service where encounter took place:  [facility] [[id number]]    Chief Complaint   Patient presents with     RECHECK       HPI:    Kylah Britt is a 73 year old  (1950), who is being seen today for an episodic care visit.  HPI information obtained from: facility chart records, facility staff, patient report, and [facility] chart review.    Today's concern is:  Diagnoses         Codes Comments    Psychomotor restlessness    -  Primary R45.1     Hair pulling     F63.3     Mixed anxiety and depressive disorder     F41.8     Neurodegenerative disorder (H24)     G31.9     Unsteady gait     R26.81           Came to see Anamaria today in follow-up how she is doing behavior wise since adding the Seroquel 12.5 mg twice a day and tapering her down off of the BuSpar.  It has been a couple weeks since this has occurred.  From Anamaria in her recliner in her apartment.  She was adjusting her hair which she has a tendency to pull on but did feel that it seemed to be a little more less and her hair was not tousled  all over.  She just has one area that she always just rearranges the strands of her hair.  Anamaria herself stated that she feels that she has not been yelling at the staff lately.  Did ask the [facility] how things were going for Anamaria.  She reminded this nurse practitioner that tomorrow is a family care conference and is willing to call this nurse practitioner to be on via the phone versus in person as planned.  The [facility] has noticed that staff have been using a gait belt and ambulating with her down the hallway to the dining room.  Feel that this is going to be addressed tomorrow during the care conference.  There is a possibility of her fitting in better down in the memory care unit where she could  have more interaction with other residents as well as having more help from staff.  Currently she lives at the very end of the hallway and with her mobility changing, she probably would need to have a change in the apartment location.    ALLERGIES:    Allergies   Allergen Reactions     No Known Drug Allergy         MEDICATIONS:  Post Discharge Medication Reconciliation Status: patient was not discharged from an inpatient facility or TCU.     Current Outpatient Medications   Medication Sig Dispense Refill     acetaminophen (TYLENOL) 500 MG tablet Take 1,000 mg by mouth 3 times daily And 1000mg daily PRN       baclofen (LIORESAL) 10 MG tablet Take 10 mg by mouth 2 times daily 3pm and 9pm, takes 5mg every morning       busPIRone (BUSPAR) 5 MG tablet 5mg po 3x a day for 10 days, then 5mg po twice a day for 10 days then 5mg po daily x4 days then discontinue. 54 tablet 0     Calcium Carbonate (CALCIUM 600 PO) Take 1 tablet by mouth every other day       diclofenac (VOLTAREN) 1 % topical gel Apply 4 g topically 4 times daily Apply to knees       FLUoxetine (PROZAC) 10 MG capsule Take 2 capsules (20 mg) by mouth daily       gabapentin (NEURONTIN) 100 MG capsule 100mg po in AM and at 12N, 200mg po at bedtime and 100mg po every 4 hours as needed for RLS or pain 120 capsule 4     Lidocaine (LIDOCARE) 4 % Patch Place 1 patch onto the skin every 24 hours To prevent lidocaine toxicity, patient should be patch free for 12 hrs daily. Apply to L hip       LORazepam (ATIVAN) 0.5 MG tablet Take 1 tablet (0.5 mg) by mouth every 6 hours as needed for anxiety or agitation 10 tablet 5     melatonin 3 MG tablet Take 3 tablets (9 mg) by mouth At Bedtime       QUEtiapine (SEROQUEL) 25 MG tablet 12.5mg by mouth at bedtime x3 nights then increase to 12.5mg twice a day 30 tablet 5     SENNA-docusate sodium (SENNA S) 8.6-50 MG tablet Take 1 tablet by mouth 2 times daily       traMADol (ULTRAM) 50 MG tablet Take 0.5 tablets (25 mg) by mouth 2  times daily 30 tablet 0     traZODone (DESYREL) 50 MG tablet Take 1 tablet (50 mg) by mouth At Bedtime 31 tablet 11     vitamin D3 (CHOLECALCIFEROL) 50 mcg (2000 units) tablet Take 50 mcg by mouth every other day         REVIEW OF SYSTEMS:  4 point ROS neg other than the symptoms noted above in the HPI.  Denied any chest pain, no shortness of breath, bowels have been moving well.      PHYSICAL EXAM:  /74   Pulse 69   Temp 97.3  F (36.3  C)   Resp 16   Wt 46.6 kg (102 lb 12.8 oz)   BMI 18.80 kg/m    Petite female sitting in her recliner, easily follows directions when asked.  Felt that she seemed to be a little bit more focused with the conversation today.  Continues to rearrange her hair but did not feel that it was actually pulling off her hair today.  Regardless her hair looked neatly groomed.  Skin is pink, dry, warm to touch  Heart rate is regular and strong with S1 and S2 heard.  No edema in her lower extremities.  Has not nice pink leather leg brace for her right lower extremity that is used when she ambulates with her four-wheel walker  Lung sounds are clear with good expansion  Abdomen is flat soft and nontender to touch.  Bowel sounds are present but quiet    No recent labs done except for checking for UTI in early June.      ASSESSMENT / PLAN:  (R45.1) Psychomotor restlessness  (primary encounter diagnosis)  (F63.3) Hair pulling  Comment: Kylah was tapered up to 12.5 mg of Seroquel twice a day.  Do feel that she has tolerated this medication and seems to be less restless.  The time this NP just sees her today is only a snapshot of what she is like day-to-day.  Will not be increasing the dose of the Seroquel today.  Will talk about it tomorrow during the family care conference.    (F41.8) Mixed anxiety and depressive disorder  Comment: Tapering Anamaria off of the BuSpar which should be done in the next week or so.  Remains on Prozac 20 mg daily and also has as needed lorazepam for times that  staff are not able to redirect her when she becomes anxious.  Anamaria was given the lorazepam 4 times in the month of June.    (G31.9) Neurodegenerative disorder (H24)  (R26.81) Unsteady gait  Comment: According to the [employer], Anamaria has been using her call light more and staff using the transfer belt to assist her with ambulating.  Last fall was documented 6/25/2024.  And this nurse practitioner's opinion, feel the Seroquel has slowed her slightly but in turn our staff trying to be proactive and helping her more with mobility with the transfer belt so she is not falling.  Anamaria does enjoy attending activities and goes out to the dining room for meals.  Will remain on baclofen to help with muscle relaxation    Orders:   No new orders    Electronically signed by  ESPERANZA Fuentes CNP           Sincerely,        ESPERANZA Fuentes CNP

## 2024-07-11 ENCOUNTER — ASSISTED LIVING VISIT (OUTPATIENT)
Dept: GERIATRICS | Facility: CLINIC | Age: 74
End: 2024-07-11
Payer: COMMERCIAL

## 2024-07-11 VITALS — SYSTOLIC BLOOD PRESSURE: 136 MMHG | DIASTOLIC BLOOD PRESSURE: 74 MMHG

## 2024-07-11 DIAGNOSIS — R26.81 UNSTEADY GAIT: ICD-10-CM

## 2024-07-11 DIAGNOSIS — R45.1 PSYCHOMOTOR RESTLESSNESS: ICD-10-CM

## 2024-07-11 DIAGNOSIS — G31.9 NEURODEGENERATIVE DISORDER (H): ICD-10-CM

## 2024-07-11 DIAGNOSIS — F41.8 MIXED ANXIETY AND DEPRESSIVE DISORDER: ICD-10-CM

## 2024-07-11 DIAGNOSIS — L97.519 ULCER OF TOE OF RIGHT FOOT, UNSPECIFIED ULCER STAGE (H): Primary | ICD-10-CM

## 2024-07-11 PROCEDURE — 99349 HOME/RES VST EST MOD MDM 40: CPT | Performed by: NURSE PRACTITIONER

## 2024-07-11 NOTE — LETTER
7/11/2024      Kylah Britt  [address]  [facility] [address]        [facility] GERIATRICS  ACUTE/EPISODIC VISIT    [facility] Medical Record Number:  [id number]  Place of Service where encounter took place:  [facility] [[id number]]    Chief Complaint   Patient presents with     RECHECK       HPI:    Kylah Britt is a 73 year old  (1950), who is being seen today for an episodic care visit.  HPI information obtained from: facility staff, patient report, and family/first contact daughter - Sloan report.    Today's concern is:    Diagnoses         Codes Comments    Ulcer of toe of right foot, unspecified ulcer stage (H)    -  Primary L97.519     Neurodegenerative disorder (H24)     G31.9     Unsteady gait     R26.81           Yesterday received an email from Anamaria's daughter with 2 pictures attached of Anamaria's toes.  Anamaria complained her foot was hurting to her daughter and so she checked her mom's foot and found an open sore on the underside of foot between 4th and 5th digit.  The pictures seem to show tunneling present and since this NP was in town again today, came to see this foot.  Prepared the daughter that home care would have to get involved and possibly the wound clinic in the hospital.      Today the daughter was present as well as two of the nurses to look at the sore to determine what was best to do.  Anamaria was in her apartment in her recliner.  She was ready to show the skin issue.  Due to Anamaria's neurological condition, her toes will tighten and curl up and so the daughter helped open the toes in order to measure the open area.    ALLERGIES:    Allergies   Allergen Reactions     No Known Drug Allergy         MEDICATIONS:  Post Discharge Medication Reconciliation Status: patient was not discharged from an inpatient facility or TCU.     Current Outpatient Medications   Medication Sig Dispense Refill     acetaminophen (TYLENOL) 500 MG tablet  Take 1,000 mg by mouth 3 times daily And 1000mg daily PRN       baclofen (LIORESAL) 10 MG tablet Take 10 mg by mouth 2 times daily 3pm and 9pm, takes 5mg every morning       busPIRone (BUSPAR) 5 MG tablet 5mg po 3x a day for 10 days, then 5mg po twice a day for 10 days then 5mg po daily x4 days then discontinue. 54 tablet 0     Calcium Carbonate (CALCIUM 600 PO) Take 1 tablet by mouth every other day       diclofenac (VOLTAREN) 1 % topical gel Apply 4 g topically 4 times daily Apply to knees       FLUoxetine (PROZAC) 10 MG capsule Take 2 capsules (20 mg) by mouth daily       gabapentin (NEURONTIN) 100 MG capsule 100mg po in AM and at 12N, 200mg po at bedtime and 100mg po every 4 hours as needed for RLS or pain 120 capsule 4     Lidocaine (LIDOCARE) 4 % Patch Place 1 patch onto the skin every 24 hours To prevent lidocaine toxicity, patient should be patch free for 12 hrs daily. Apply to L hip       LORazepam (ATIVAN) 0.5 MG tablet Take 1 tablet (0.5 mg) by mouth every 6 hours as needed for anxiety or agitation 10 tablet 5     melatonin 3 MG tablet Take 3 tablets (9 mg) by mouth At Bedtime       QUEtiapine (SEROQUEL) 25 MG tablet 12.5mg by mouth at bedtime x3 nights then increase to 12.5mg twice a day 30 tablet 5     SENNA-docusate sodium (SENNA S) 8.6-50 MG tablet Take 1 tablet by mouth 2 times daily       traMADol (ULTRAM) 50 MG tablet Take 0.5 tablets (25 mg) by mouth 2 times daily 30 tablet 0     traZODone (DESYREL) 50 MG tablet Take 1 tablet (50 mg) by mouth At Bedtime 31 tablet 11     vitamin D3 (CHOLECALCIFEROL) 50 mcg (2000 units) tablet Take 50 mcg by mouth every other day           REVIEW OF SYSTEMS:  4 point ROS neg other than the symptoms noted above in the HPI.  In no acute distress.  Cooperative with exam.  Admits the right foot has been sore but not on Monday when this NP was here seeing her.      PHYSICAL EXAM:  /73   Daughter lifted up her mom's right leg to see better.  Between the 4th and 5th  digit towards the bottom of the foot was an open area that appeared to have maceration more today than yesterday surrounding the opening.  Could not see any active drainage but when looking at her HERB socks that are designed to help with her feet curling, can see there is some small amounts of drainage.    Today there was obviously no tunneling.    Area measures roughly 0.9cm x 0.6cm and 0.2cm depth.  Base is healthy pink.  Maceration around the edges.  No odor.   Pushed on the base of the sore and no caving in.        ASSESSMENT / PLAN:  (L97.519) Ulcer of toe of right foot, unspecified ulcer stage (H)  (primary encounter diagnosis)  Comment: do think this can heal without having to send her to see wound care.  Will initiate a few things but will have home care come to see if they will help manage the wound.     Home care RN to evaluate and treat right foot toe ulcer between 4th and 5th digit.  Start daily by putting a dry gauze between the toes.  May need to wet the gauze down to remove from sore as do not want to rip off any new skin.    Xray of right toes/foot to make sure no bone involvement has occurred since how the picture looked like compared to what is seen today.  Keflex 500mg po TID x7 days for toe ulcer/infection prevention.    (G31.9) Neurodegenerative disorder (H24)  (R26.81) Unsteady gait  Comment: has the pink leather brace for right leg but it does not come close to touching the area of the sore.  She can continue to wear that with ambulating.  Uses a 4WW and staff escort.  Seroquel was started at 12.5mg BID to help with motor restlessness and her behaviors with staff.  It has helped some to slow her down.  Spoke with the daughter today about increasing the HS dose to 25mg to see if that makes a difference of calming her down her anxiety, and motor restlessness.  She was open to increasing the HS dose and leaving the AM dose at 12.5mg.     Orders:  1   Home care RN to evaluate and treat right foot  toe ulcer between 4th and 5th digit.  2   Start daily by putting a dry gauze between the toes.  May need to wet the gauze down to remove from sore   as do not want to rip off any new skin.    3.  Xray of right toes/foot to make sure no bone involvement has occurred since how the picture looked like compared to what is seen today.  4.  Keflex 500mg po TID x7 days for toe ulcer/infection prevention.  5.  Change seroquel to be 12.5mg in AM and 25mg at bedtime for psychosis, motor restlessness.      Electronically signed by  ESPERANZA Fuentes CNP            Documentation of Face-to-Face and Certification for Home Health Services     Patient: Kylah Britt   YOB: 1950  MR Number: [id number]  Today's Date: 7/12/2024    I certify that patient: Kylah Britt is under my care and that I, or a nurse practitioner or physician's assistant working with me, had a face-to-face encounter that meets the physician face-to-face encounter requirements with this patient on: 7/11/2024.    This encounter with the patient was in whole, or in part, for the following medical condition, which is the primary reason for home health care: open sore right foot between 4th and 5th digit -wound care management.    I certify that, based on my findings, the following services are medically necessary home health services: Nursing.    My clinical findings support the need for the above services because: Nurse is needed: evaluate and treat wound, vitals.    Further, I certify that my clinical findings support that this patient is homebound (i.e. absences from home require considerable and taxing effort and are for medical reasons or [employer] services or infrequently or of short duration when for other reasons) because: Requires assistance of another person or specialized equipment to access medical services because patient: Is unable to exit home safely on own due to: unsteady gait, risk of falling, getting lost. and  Requires supervision of another for safe transfer...    Based on the above findings. I certify that this patient is confined to the home and needs intermittent skilled nursing care, physical therapy and/or speech therapy.  The patient is under my care, and I have initiated the establishment of the plan of care.  This patient will be followed by a physician who will periodically review the plan of care.  Physician/Provider to provide follow up care: Gina Mejia    Responsible Medicare certified P[employer] Physician: Brandon Aviles MD  Physician Signature:     Dr. Brandon Aviles MD  Date: 7/11/2024      Sincerely,        ESPERANZA Fuentes CNP

## 2024-07-11 NOTE — PROGRESS NOTES
[facility] GERIATRICS  ACUTE/EPISODIC VISIT    [facility] Medical Record Number:  [id number]  Place of Service where encounter took place:  [facility] [[id number]]    Chief Complaint   Patient presents with    RECHECK       HPI:    Kylah Britt is a 73 year old  (1950), who is being seen today for an episodic care visit.  HPI information obtained from: facility staff, patient report, and family/first contact daughter - Sloan report.    Today's concern is:    Diagnoses         Codes Comments    Ulcer of toe of right foot, unspecified ulcer stage (H)    -  Primary L97.519     Neurodegenerative disorder (H24)     G31.9     Unsteady gait     R26.81           Yesterday received an email from Anamaria's daughter with 2 pictures attached of Anamaria's toes.  Anamaria complained her foot was hurting to her daughter and so she checked her mom's foot and found an open sore on the underside of foot between 4th and 5th digit.  The pictures seem to show tunneling present and since this NP was in town again today, came to see this foot.  Prepared the daughter that home care would have to get involved and possibly the wound clinic in the hospital.      Today the daughter was present as well as two of the nurses to look at the sore to determine what was best to do.  Anamaria was in her apartment in her recliner.  She was ready to show the skin issue.  Due to Anamaria's neurological condition, her toes will tighten and curl up and so the daughter helped open the toes in order to measure the open area.    ALLERGIES:    Allergies   Allergen Reactions    No Known Drug Allergy         MEDICATIONS:  Post Discharge Medication Reconciliation Status: patient was not discharged from an inpatient facility or TCU.     Current Outpatient Medications   Medication Sig Dispense Refill    acetaminophen (TYLENOL) 500 MG tablet Take 1,000 mg by mouth 3 times daily And 1000mg daily PRN      baclofen (LIORESAL)  10 MG tablet Take 10 mg by mouth 2 times daily 3pm and 9pm, takes 5mg every morning      busPIRone (BUSPAR) 5 MG tablet 5mg po 3x a day for 10 days, then 5mg po twice a day for 10 days then 5mg po daily x4 days then discontinue. 54 tablet 0    Calcium Carbonate (CALCIUM 600 PO) Take 1 tablet by mouth every other day      diclofenac (VOLTAREN) 1 % topical gel Apply 4 g topically 4 times daily Apply to knees      FLUoxetine (PROZAC) 10 MG capsule Take 2 capsules (20 mg) by mouth daily      gabapentin (NEURONTIN) 100 MG capsule 100mg po in AM and at 12N, 200mg po at bedtime and 100mg po every 4 hours as needed for RLS or pain 120 capsule 4    Lidocaine (LIDOCARE) 4 % Patch Place 1 patch onto the skin every 24 hours To prevent lidocaine toxicity, patient should be patch free for 12 hrs daily. Apply to L hip      LORazepam (ATIVAN) 0.5 MG tablet Take 1 tablet (0.5 mg) by mouth every 6 hours as needed for anxiety or agitation 10 tablet 5    melatonin 3 MG tablet Take 3 tablets (9 mg) by mouth At Bedtime      QUEtiapine (SEROQUEL) 25 MG tablet 12.5mg by mouth at bedtime x3 nights then increase to 12.5mg twice a day 30 tablet 5    SENNA-docusate sodium (SENNA S) 8.6-50 MG tablet Take 1 tablet by mouth 2 times daily      traMADol (ULTRAM) 50 MG tablet Take 0.5 tablets (25 mg) by mouth 2 times daily 30 tablet 0    traZODone (DESYREL) 50 MG tablet Take 1 tablet (50 mg) by mouth At Bedtime 31 tablet 11    vitamin D3 (CHOLECALCIFEROL) 50 mcg (2000 units) tablet Take 50 mcg by mouth every other day           REVIEW OF SYSTEMS:  4 point ROS neg other than the symptoms noted above in the HPI.  In no acute distress.  Cooperative with exam.  Admits the right foot has been sore but not on Monday when this NP was here seeing her.      PHYSICAL EXAM:  /73   Daughter lifted up her mom's right leg to see better.  Between the 4th and 5th digit towards the bottom of the foot was an open area that appeared to have maceration more today  than yesterday surrounding the opening.  Could not see any active drainage but when looking at her HERB socks that are designed to help with her feet curling, can see there is some small amounts of drainage.    Today there was obviously no tunneling.    Area measures roughly 0.9cm x 0.6cm and 0.2cm depth.  Base is healthy pink.  Maceration around the edges.  No odor.   Pushed on the base of the sore and no caving in.        ASSESSMENT / PLAN:  (L97.519) Ulcer of toe of right foot, unspecified ulcer stage (H)  (primary encounter diagnosis)  Comment: do think this can heal without having to send her to see wound care.  Will initiate a few things but will have home care come to see if they will help manage the wound.     Home care RN to evaluate and treat right foot toe ulcer between 4th and 5th digit.  Start daily by putting a dry gauze between the toes.  May need to wet the gauze down to remove from sore as do not want to rip off any new skin.    Xray of right toes/foot to make sure no bone involvement has occurred since how the picture looked like compared to what is seen today.  Keflex 500mg po TID x7 days for toe ulcer/infection prevention.    (G31.9) Neurodegenerative disorder (H24)  (R26.81) Unsteady gait  Comment: has the pink leather brace for right leg but it does not come close to touching the area of the sore.  She can continue to wear that with ambulating.  Uses a 4[employer] and staff escort.  Seroquel was started at 12.5mg BID to help with motor restlessness and her behaviors with staff.  It has helped some to slow her down.  Spoke with the daughter today about increasing the HS dose to 25mg to see if that makes a difference of calming her down her anxiety, and motor restlessness.  She was open to increasing the HS dose and leaving the AM dose at 12.5mg.     Orders:  1   Home care RN to evaluate and treat right foot toe ulcer between 4th and 5th digit.  2   Start daily by putting a dry gauze between the toes.   May need to wet the gauze down to remove from sore   as do not want to rip off any new skin.    3.  Xray of right toes/foot to make sure no bone involvement has occurred since how the picture looked like compared to what is seen today.  4.  Keflex 500mg po TID x7 days for toe ulcer/infection prevention.  5.  Change seroquel to be 12.5mg in AM and 25mg at bedtime for psychosis, motor restlessness.      Electronically signed by  ESPERANZA Fuentes CNP            Documentation of Face-to-Face and Certification for Home Health Services     Patient: Kylah Britt   YOB: 1950  MR Number: [id number]  Today's Date: 7/12/2024    I certify that patient: Kylah Britt is under my care and that I, or a nurse practitioner or physician's assistant working with me, had a face-to-face encounter that meets the physician face-to-face encounter requirements with this patient on: 7/11/2024.    This encounter with the patient was in whole, or in part, for the following medical condition, which is the primary reason for home health care: open sore right foot between 4th and 5th digit -wound care management.    I certify that, based on my findings, the following services are medically necessary home health services: Nursing.    My clinical findings support the need for the above services because: Nurse is needed: evaluate and treat wound, vitals.    Further, I certify that my clinical findings support that this patient is homebound (i.e. absences from home require considerable and taxing effort and are for medical reasons or [facility] services or infrequently or of short duration when for other reasons) because: Requires assistance of another person or specialized equipment to access medical services because patient: Is unable to exit home safely on own due to: unsteady gait, risk of falling, getting lost. and Requires supervision of another for safe transfer...    Based on the above findings. I certify  that this patient is confined to the home and needs intermittent skilled nursing care, physical therapy and/or speech therapy.  The patient is under my care, and I have initiated the establishment of the plan of care.  This patient will be followed by a physician who will periodically review the plan of care.  Physician/Provider to provide follow up care: Gina Mejia    Responsible Medicare certified P[employer] Physician: Brandon Aviles MD  Physician Signature:     Dr. Brandon Aviles MD  Date: 7/11/2024

## 2024-07-12 RX ORDER — QUETIAPINE FUMARATE 25 MG/1
TABLET, FILM COATED ORAL
Qty: 30 TABLET | Refills: 5 | Status: SHIPPED | OUTPATIENT
Start: 2024-07-12

## 2024-07-17 ENCOUNTER — APPOINTMENT (OUTPATIENT)
Dept: CT IMAGING | Facility: CLINIC | Age: 74
End: 2024-07-17
Attending: STUDENT IN AN ORGANIZED HEALTH CARE EDUCATION/TRAINING PROGRAM
Payer: MEDICARE

## 2024-07-17 ENCOUNTER — HOSPITAL ENCOUNTER (EMERGENCY)
Facility: CLINIC | Age: 74
Discharge: HOME | End: 2024-07-18
Attending: STUDENT IN AN ORGANIZED HEALTH CARE EDUCATION/TRAINING PROGRAM | Admitting: STUDENT IN AN ORGANIZED HEALTH CARE EDUCATION/TRAINING PROGRAM
Payer: MEDICARE

## 2024-07-17 VITALS
RESPIRATION RATE: 16 BRPM | SYSTOLIC BLOOD PRESSURE: 153 MMHG | OXYGEN SATURATION: 94 % | WEIGHT: 110 LBS | TEMPERATURE: 98.2 F | HEIGHT: 62 IN | BODY MASS INDEX: 20.24 KG/M2 | HEART RATE: 90 BPM | DIASTOLIC BLOOD PRESSURE: 70 MMHG

## 2024-07-17 DIAGNOSIS — S02.401A CLOSED FRACTURE OF MAXILLARY SINUS, INITIAL ENCOUNTER (H): ICD-10-CM

## 2024-07-17 DIAGNOSIS — S02.2XXA CLOSED FRACTURE OF NASAL BONE, INITIAL ENCOUNTER: ICD-10-CM

## 2024-07-17 PROCEDURE — 99284 EMERGENCY DEPT VISIT MOD MDM: CPT | Performed by: STUDENT IN AN ORGANIZED HEALTH CARE EDUCATION/TRAINING PROGRAM

## 2024-07-17 PROCEDURE — 70450 CT HEAD/BRAIN W/O DYE: CPT | Mod: MF

## 2024-07-17 PROCEDURE — 250N000013 HC RX MED GY IP 250 OP 250 PS 637: Performed by: STUDENT IN AN ORGANIZED HEALTH CARE EDUCATION/TRAINING PROGRAM

## 2024-07-17 PROCEDURE — 70486 CT MAXILLOFACIAL W/O DYE: CPT | Mod: MG

## 2024-07-17 PROCEDURE — 72125 CT NECK SPINE W/O DYE: CPT | Mod: MF

## 2024-07-17 PROCEDURE — 99284 EMERGENCY DEPT VISIT MOD MDM: CPT | Mod: 25 | Performed by: STUDENT IN AN ORGANIZED HEALTH CARE EDUCATION/TRAINING PROGRAM

## 2024-07-17 RX ORDER — ACETAMINOPHEN 325 MG/1
650 TABLET ORAL ONCE
Status: COMPLETED | OUTPATIENT
Start: 2024-07-17 | End: 2024-07-17

## 2024-07-17 RX ADMIN — ACETAMINOPHEN 650 MG: 325 TABLET, FILM COATED ORAL at 23:45

## 2024-07-17 ASSESSMENT — COLUMBIA-SUICIDE SEVERITY RATING SCALE - C-SSRS
1. IN THE PAST MONTH, HAVE YOU WISHED YOU WERE DEAD OR WISHED YOU COULD GO TO SLEEP AND NOT WAKE UP?: NO
6. HAVE YOU EVER DONE ANYTHING, STARTED TO DO ANYTHING, OR PREPARED TO DO ANYTHING TO END YOUR LIFE?: NO
2. HAVE YOU ACTUALLY HAD ANY THOUGHTS OF KILLING YOURSELF IN THE PAST MONTH?: NO

## 2024-07-17 ASSESSMENT — ACTIVITIES OF DAILY LIVING (ADL)
ADLS_ACUITY_SCORE: 39
ADLS_ACUITY_SCORE: 39

## 2024-07-18 PROCEDURE — 250N000013 HC RX MED GY IP 250 OP 250 PS 637: Performed by: STUDENT IN AN ORGANIZED HEALTH CARE EDUCATION/TRAINING PROGRAM

## 2024-07-18 RX ORDER — LORAZEPAM 0.5 MG/1
0.5 TABLET ORAL EVERY 30 MIN PRN
Status: DISCONTINUED | OUTPATIENT
Start: 2024-07-18 | End: 2024-07-18 | Stop reason: HOSPADM

## 2024-07-18 RX ADMIN — LORAZEPAM 0.5 MG: 0.5 TABLET ORAL at 00:22

## 2024-07-18 ASSESSMENT — ACTIVITIES OF DAILY LIVING (ADL): ADLS_ACUITY_SCORE: 39

## 2024-07-18 NOTE — ED NOTES
"Pt's daughter states that pt is able to ambulate utilizing a walker. This writer assisted with ambulating pt to the bathroom. Pt severely struggled with this task as she would not lift her right leg. Kept stating tearfully  \"I can't\" and continued to drag her right leg/foot and refused to bend it. Wheelchair provided by staff to assist pt back to the room.   "

## 2024-07-18 NOTE — ED PROVIDER NOTES
History     Chief Complaint   Patient presents with    Fall     HPI  Kylah Britt is a 73 year old female with complex medical history including vascular dementia, anxiety/depression who presents for evaluation of facial injury after a fall.  Patient has had frequent falls over the last several months.  She resides at an assisted living facility and patient/family are actively seeking some escalation in care due to the frequent falls.  Patient reports using her walker to get to the bathroom tonight when she accidentally tripped and fell.  She struck her face on the ground but did not lose consciousness and she denies any anticoagulant use.  Staff called EMS.  Patient reports minimal pain to the upper part of her nose and a tiny abrasion to the right upper lip.  Adamantly denies any preceding symptoms, headache or neck pain, focal numbness/tingling/weakness, other complaints.    Allergies:  Allergies   Allergen Reactions    No Known Drug Allergy        Problem List:    Patient Active Problem List    Diagnosis Date Noted    Psychomotor restlessness 06/20/2024     Priority: Medium    Neurodegenerative disorder (H24) 06/16/2024     Priority: Medium    Mixed anxiety and depressive disorder 06/16/2024     Priority: Medium    Facial laceration, initial encounter 04/13/2024     Priority: Medium    Pain of left lower leg 03/08/2024     Priority: Medium    ABLA (acute blood loss anemia) 11/28/2023     Priority: Medium    Status post left hip ORIF with long gamma nail by Dr. Franco on 11/4/2023 11/17/2023     Priority: Medium    Closed displaced intertrochanteric fracture of left femur with routine healing 11/07/2023     Priority: Medium    Sleep disorder 11/07/2023     Priority: Medium    Osteoporotic fracture of left hip, initial encounter (H) 11/03/2023     Priority: Medium    Abnormal gait 11/03/2023     Priority: Medium    Mild vascular dementia with mood disturbance (H) 02/07/2023     Priority: Medium     Adjustment insomnia 07/22/2022     Priority: Medium    Strain of right hamstring muscle 07/15/2022     Priority: Medium    Pain 07/15/2022     Priority: Medium    Drug-induced constipation 07/15/2022     Priority: Medium    Lyme disease 07/15/2022     Priority: Medium    Gastroesophageal reflux disease with esophagitis 07/15/2022     Priority: Medium    Pain of right thigh 07/06/2022     Priority: Medium    Unable to bear weight on right lower extremity 07/06/2022     Priority: Medium    Fall 07/06/2022     Priority: Medium    Osteopenia of necks of both femurs-per DEXA March 2022 07/06/2022     Priority: Medium    Allodynia-lateral right thigh 07/06/2022     Priority: Medium    Numbness of toes-right, chronic 07/06/2022     Priority: Medium    Closed Colles' fracture of left radius 03/12/2022     Priority: Medium    CARDIOVASCULAR SCREENING; LDL GOAL LESS THAN 160 10/31/2010     Priority: Medium    Tear meniscus knee 04/21/2010     Priority: Medium      Past Medical History:    Past Medical History:   Diagnosis Date    Abnormal Papanicolaou smear of cervix and cervical HPV 9/1/92    Depressive disorder, not elsewhere classified     Female stress incontinence     Lyme disease     Osteoarthrosis, unspecified whether generalized or localized, unspecified site     Other psychological or physical stress, not elsewhere classified(V62.89)      Past Surgical History:    Past Surgical History:   Procedure Laterality Date    COLONOSCOPY N/A 4/6/2022    Procedure: COLONOSCOPY, WITH POLYPECTOMY;  Surgeon: Bishnu Tsai MD;  Location: [facility] KNEE SCOPE, DIAGNOSTIC  1/31/89    Examination of right knee under anesthesia. Arthroscopy of right knee with debridement of torn anterior curciate ligament (medial meniscus not resected).     REMOVAL OF OVARY/TUBE(S)  2/20/90    Salpingo-Oophorectomy, bilateral    OPEN REDUCTION INTERNAL FIXATION CALCANEOUS Right 6/8/2017    Procedure: OPEN REDUCTION INTERNAL FIXATION CALCANEOUS;  " Open Reduction Internal Fixation Right Calcaneous;  Surgeon: Darnell Kincaid DPM;  Location: [facility] OR    OPEN REDUCTION INTERNAL FIXATION HIP NAILING Left 11/4/2023    Procedure: INTERNAL FIXATION, FRACTURE, INTERTROCHANTERIC, LEFT HIP, USING GAMMA RODS;  Surgeon: Yoni Franco MD;  Location: [facility] OR    ZZC APPENDECTOMY      ZZC LIGATE FALLOPIAN TUBE,POSTPARTUM  04/12/77    Bilateral tubal cauterization    ZZC TOTAL ABDOM HYSTERECTOMY  2/20/90    Hysterectomy, Total Abdominal    ZZHC COLONOSCOPY THRU STOMA, DIAGNOSTIC  06/24/98     Family History:    Family History   Problem Relation Age of Onset    Hypertension Mother     Heart Disease Mother     Cancer Father     Cancer Son         Hodgkin's    Cancer Paternal Aunt         two with cervical cancer     Social History:  Marital Status:   [5]  Social History     Tobacco Use    Smoking status: Never    Smokeless tobacco: Never   Vaping Use    Vaping status: Never Used   Substance Use Topics    Alcohol use: Yes     Comment: very rare    Drug use: No      Medications:    acetaminophen (TYLENOL) 500 MG tablet  baclofen (LIORESAL) 10 MG tablet  busPIRone (BUSPAR) 5 MG tablet  Calcium Carbonate (CALCIUM 600 PO)  diclofenac (VOLTAREN) 1 % topical gel  FLUoxetine (PROZAC) 10 MG capsule  gabapentin (NEURONTIN) 100 MG capsule  Lidocaine (LIDOCARE) 4 % Patch  LORazepam (ATIVAN) 0.5 MG tablet  melatonin 3 MG tablet  QUEtiapine (SEROQUEL) 25 MG tablet  SENNA-docusate sodium (SENNA S) 8.6-50 MG tablet  traMADol (ULTRAM) 50 MG tablet  traZODone (DESYREL) 50 MG tablet  vitamin D3 (CHOLECALCIFEROL) 50 mcg (2000 units) tablet      Review of Systems   All other systems reviewed and are negative.  See HPI.    Physical Exam   BP: (!) 155/79  Pulse: 88  Temp: 98.2  F (36.8  C)  Resp: 16  Height: 157.5 cm (5' 2\")  Weight: 49.9 kg (110 lb)  SpO2: 95 %    Physical Exam  Vitals and nursing note reviewed.   Constitutional:       General: She is not in acute distress.     " Appearance: Normal appearance. She is not diaphoretic.      Comments: Sitting comfortably on exam bed.  No distress.   HENT:      Head:      Comments: Mild tenderness to the upper bridge of the nose with no obvious deformity, no evidence of septal hematoma.  No facial instability.  No other signs of head trauma.  No Mendez sign.     Nose:      Comments: Tenderness to the bridge of the nose.  No septal hematoma.     Mouth/Throat:      Mouth: Mucous membranes are moist.   Eyes:      General: No scleral icterus.     Extraocular Movements: Extraocular movements intact.      Conjunctiva/sclera: Conjunctivae normal.      Pupils: Pupils are equal, round, and reactive to light.   Neck:      Comments: Completely normal range of motion, no midline tenderness.  Cardiovascular:      Rate and Rhythm: Normal rate and regular rhythm.      Pulses: Normal pulses.      Heart sounds: Normal heart sounds.   Pulmonary:      Effort: Pulmonary effort is normal. No respiratory distress.      Breath sounds: Normal breath sounds.   Abdominal:      General: Abdomen is flat.      Tenderness: There is no abdominal tenderness.   Musculoskeletal:         General: No tenderness. Normal range of motion.      Cervical back: Normal range of motion and neck supple. No rigidity or tenderness.      Comments: No focal areas of tenderness to the torso or extremities, no tenderness to the midline spine.   Skin:     General: Skin is warm.      Capillary Refill: Capillary refill takes less than 2 seconds.      Findings: No rash.   Neurological:      General: No focal deficit present.      Mental Status: She is alert.      Cranial Nerves: No cranial nerve deficit.      Sensory: No sensory deficit.      Motor: No weakness.   Psychiatric:         Mood and Affect: Mood normal.         ED Course        Procedures            Results for orders placed or performed during the hospital encounter of 07/17/24 (from the past 24 hour(s))   CT Head w/o Contrast     Narrative    EXAM: CT HEAD W/O CONTRAST, CT CERVICAL SPINE W/O CONTRAST, CT FACIAL BONES WITHOUT CONTRAST  LOCATION: [facility]  DATE/TIME: 7/17/2024 11:11 PM CDT    INDICATION: Fall, tenderness to the nasal bridge, right upper lip abrasion  COMPARISON:  CT head 6/18/2024, CT cervical spine 3/25/2024.  TECHNIQUE:   1) Routine CT Head without IV contrast. Multiplanar reformats. Dose reduction techniques were used.  2) Routine CT Facial Bones without IV contrast. Multiplanar reformats. Dose reduction techniques were used.  3) Routine CT Cervical Spine without IV contrast. Multiplanar reformats. Dose reduction techniques were used.    FINDINGS:  HEAD CT:   INTRACRANIAL CONTENTS: No intracranial hemorrhage, extraaxial collection, or mass effect.  No CT evidence of acute infarct. Mild presumed chronic small vessel ischemic changes. Mild generalized volume loss. No hydrocephalus.     BONES/SOFT TISSUES: No large scalp hematoma. No skull fracture.    FACIAL BONE CT:   OSSEOUS STRUCTURES/SOFT TISSUES:  Frontal/periorbital soft tissue swelling/inflammation. Mildly displaced right nasal bone fracture. Nondisplaced posteromedial and posterolateral right maxillary sinus fractures with trace posttraumatic gas in the right   infratemporal fossa. Dental structures are partially obscured by artifact from dental restorations.     ORBITAL CONTENTS: No intraorbital abnormality.     SINUSES: Posttraumatic hemorrhage in the right maxillary sinus.     CERVICAL SPINE CT:  VERTEBRA: Vertebral body heights and alignment are unchanged. No acute compression fracture or posttraumatic subluxation.     CANAL/FORAMINA: Multilevel spondylosis without high grade canal stenosis.    PARASPINAL: No prevertebral edema.      Impression    IMPRESSION:    HEAD CT:  1.  No acute intracranial process.    FACIAL BONE CT:  1.  Nondisplaced posteromedial and posterolateral right maxillary sinus fractures with intrasinus  hemorrhage.  2.  Mildly displaced right nasal bone fracture.    CERVICAL SPINE CT:  1.  No acute cervical spine fracture.   CT Facial Bones without Contrast    Narrative    EXAM: CT HEAD W/O CONTRAST, CT CERVICAL SPINE W/O CONTRAST, CT FACIAL BONES WITHOUT CONTRAST  LOCATION: [facility]  DATE/TIME: 7/17/2024 11:11 PM CDT    INDICATION: Fall, tenderness to the nasal bridge, right upper lip abrasion  COMPARISON:  CT head 6/18/2024, CT cervical spine 3/25/2024.  TECHNIQUE:   1) Routine CT Head without IV contrast. Multiplanar reformats. Dose reduction techniques were used.  2) Routine CT Facial Bones without IV contrast. Multiplanar reformats. Dose reduction techniques were used.  3) Routine CT Cervical Spine without IV contrast. Multiplanar reformats. Dose reduction techniques were used.    FINDINGS:  HEAD CT:   INTRACRANIAL CONTENTS: No intracranial hemorrhage, extraaxial collection, or mass effect.  No CT evidence of acute infarct. Mild presumed chronic small vessel ischemic changes. Mild generalized volume loss. No hydrocephalus.     BONES/SOFT TISSUES: No large scalp hematoma. No skull fracture.    FACIAL BONE CT:   OSSEOUS STRUCTURES/SOFT TISSUES:  Frontal/periorbital soft tissue swelling/inflammation. Mildly displaced right nasal bone fracture. Nondisplaced posteromedial and posterolateral right maxillary sinus fractures with trace posttraumatic gas in the right   infratemporal fossa. Dental structures are partially obscured by artifact from dental restorations.     ORBITAL CONTENTS: No intraorbital abnormality.     SINUSES: Posttraumatic hemorrhage in the right maxillary sinus.     CERVICAL SPINE CT:  VERTEBRA: Vertebral body heights and alignment are unchanged. No acute compression fracture or posttraumatic subluxation.     CANAL/FORAMINA: Multilevel spondylosis without high grade canal stenosis.    PARASPINAL: No prevertebral edema.      Impression    IMPRESSION:    HEAD  CT:  1.  No acute intracranial process.    FACIAL BONE CT:  1.  Nondisplaced posteromedial and posterolateral right maxillary sinus fractures with intrasinus hemorrhage.  2.  Mildly displaced right nasal bone fracture.    CERVICAL SPINE CT:  1.  No acute cervical spine fracture.   CT Cervical Spine w/o Contrast    Narrative    EXAM: CT HEAD W/O CONTRAST, CT CERVICAL SPINE W/O CONTRAST, CT FACIAL BONES WITHOUT CONTRAST  LOCATION: [facility]  DATE/TIME: 7/17/2024 11:11 PM CDT    INDICATION: Fall, tenderness to the nasal bridge, right upper lip abrasion  COMPARISON:  CT head 6/18/2024, CT cervical spine 3/25/2024.  TECHNIQUE:   1) Routine CT Head without IV contrast. Multiplanar reformats. Dose reduction techniques were used.  2) Routine CT Facial Bones without IV contrast. Multiplanar reformats. Dose reduction techniques were used.  3) Routine CT Cervical Spine without IV contrast. Multiplanar reformats. Dose reduction techniques were used.    FINDINGS:  HEAD CT:   INTRACRANIAL CONTENTS: No intracranial hemorrhage, extraaxial collection, or mass effect.  No CT evidence of acute infarct. Mild presumed chronic small vessel ischemic changes. Mild generalized volume loss. No hydrocephalus.     BONES/SOFT TISSUES: No large scalp hematoma. No skull fracture.    FACIAL BONE CT:   OSSEOUS STRUCTURES/SOFT TISSUES:  Frontal/periorbital soft tissue swelling/inflammation. Mildly displaced right nasal bone fracture. Nondisplaced posteromedial and posterolateral right maxillary sinus fractures with trace posttraumatic gas in the right   infratemporal fossa. Dental structures are partially obscured by artifact from dental restorations.     ORBITAL CONTENTS: No intraorbital abnormality.     SINUSES: Posttraumatic hemorrhage in the right maxillary sinus.     CERVICAL SPINE CT:  VERTEBRA: Vertebral body heights and alignment are unchanged. No acute compression fracture or posttraumatic subluxation.      CANAL/FORAMINA: Multilevel spondylosis without high grade canal stenosis.    PARASPINAL: No prevertebral edema.      Impression    IMPRESSION:    HEAD CT:  1.  No acute intracranial process.    FACIAL BONE CT:  1.  Nondisplaced posteromedial and posterolateral right maxillary sinus fractures with intrasinus hemorrhage.  2.  Mildly displaced right nasal bone fracture.    CERVICAL SPINE CT:  1.  No acute cervical spine fracture.       Medications   LORazepam (ATIVAN) tablet 0.5 mg (0.5 mg Oral $Given 7/18/24 0022)   acetaminophen (TYLENOL) tablet 650 mg (650 mg Oral $Given 7/17/24 2345)       Assessments & Plan (with Medical Decision Making)     I have reviewed the nursing notes.    I have reviewed the findings, diagnosis, plan and need for follow up with the patient.  Medical Decision Making  Kylah Britt is a 73 year old female with complex medical history including vascular dementia, anxiety/depression who presents for evaluation of facial injury after a fall.  Normal vitals.  Exam revealed minimal tenderness to the nasal bridge and right side of her face with no evidence of septal hematoma, no facial instability, no other signs of trauma to the head or neck.  She had no midline tenderness and neurological exam was nonfocal.  Discussed circumstances of fall extensively with patient and family at bedside.  Patient describes moving too fast with her walker and adamantly denies any preceding symptoms.  We spent a great deal of time talking about her frequent falls over the last month and it sounds as though she is making some medication adjustments at her facility to balance baseline anxiety with her frequent falls.  Patient and family do not believe she requires escalation to higher level of care at this time.      CT scans of the head, face, cervical spine were significant for nondisplaced right maxillary sinus fractures with small intrasinus hemorrhage and also mildly displaced right nasal bone fracture.   She has minimal tenderness and still no evidence of septal hematoma on repeat exam.  With closed, nondisplaced fracture, will refer to ENT for evaluation as soon as possible.  Discussed nasal precautions.  Recommended Tylenol and existing prescription for tramadol for pain control.  Patient receives several medications nightly for anxiety and felt quite anxious toward the end of her ED visit.  We discussed risks and benefits of providing a dose of Ativan, given her recent falls; patient and family member did ultimately wish to proceed with a dose before transferring back to her facility.  Recommended PCP/ENT follow-up as soon as possible.  They agree to return to the emergency department sooner for any new or worsening symptoms.    Discharge Medication List as of 7/18/2024  1:09 AM        Final diagnoses:   Closed fracture of nasal bone, initial encounter   Closed fracture of maxillary sinus, initial encounter (H)     7/17/2024   [facility] EMERGENCY DEPT       Florin Barone MD  07/18/24 0218

## 2024-07-18 NOTE — DISCHARGE INSTRUCTIONS
You do have a few small fractures of your nasal bone and your maxillary sinus bones on the right side.    Make an appointment with our ear nose and throat doctor as soon as possible for evaluation.    Use Tylenol and prescribed tramadol for discomfort.  Do your best to avoid blowing the nose or sticking anything in the nostrils until you are evaluated by ENT.    Continue working with staff at your facility and adjust medications to help address your frequent falls.    Return to the emergency department immediately for any new or worsening symptoms.

## 2024-07-18 NOTE — ED TRIAGE NOTES
Pt had an unwitnessed fall in the bathroom at [facility]. Brought in by EMS. Denies LOC. Nasal bridge pain. Several recent falls. Daughter present in room

## 2024-07-22 ENCOUNTER — ASSISTED LIVING VISIT (OUTPATIENT)
Dept: GERIATRICS | Facility: CLINIC | Age: 74
End: 2024-07-22
Payer: COMMERCIAL

## 2024-07-22 VITALS
BODY MASS INDEX: 18.8 KG/M2 | SYSTOLIC BLOOD PRESSURE: 123 MMHG | DIASTOLIC BLOOD PRESSURE: 64 MMHG | WEIGHT: 102.8 LBS | OXYGEN SATURATION: 96 % | RESPIRATION RATE: 16 BRPM | HEART RATE: 90 BPM | TEMPERATURE: 97.5 F

## 2024-07-22 DIAGNOSIS — S00.83XD CONTUSION OF FACE, SUBSEQUENT ENCOUNTER: ICD-10-CM

## 2024-07-22 DIAGNOSIS — S02.401D CLOSED FRACTURE OF MAXILLARY SINUS WITH ROUTINE HEALING, SUBSEQUENT ENCOUNTER: ICD-10-CM

## 2024-07-22 DIAGNOSIS — S02.2XXD CLOSED FRACTURE OF NASAL BONE WITH ROUTINE HEALING, SUBSEQUENT ENCOUNTER: Primary | ICD-10-CM

## 2024-07-22 DIAGNOSIS — R26.81 UNSTEADY GAIT: ICD-10-CM

## 2024-07-22 DIAGNOSIS — G31.9 NEURODEGENERATIVE DISORDER (H): ICD-10-CM

## 2024-07-22 PROCEDURE — 99349 HOME/RES VST EST MOD MDM 40: CPT | Performed by: NURSE PRACTITIONER

## 2024-07-22 NOTE — PROGRESS NOTES
[facility] GERIATRICS  ACUTE/EPISODIC VISIT    [facility] Medical Record Number:  [id number]  Place of Service where encounter took place:  [facility] [[id number]]    Chief Complaint   Patient presents with    RECHECK       HPI:    Kylah Britt is a 73 year old  (1950), who is being seen today for an episodic care visit.  HPI information obtained from: facility chart records, facility staff, patient report, and [facility] chart review.    Today's concern is:    Diagnoses         Codes Comments    Closed fracture of nasal bone with routine healing, subsequent encounter    -  Primary S02.2XXD     Closed fracture of maxillary sinus with routine healing, subsequent encounter     S02.401D     Contusion of face, subsequent encounter     S00.83XD     Neurodegenerative disorder (H24)     G31.9     Unsteady gait     R26.81           Came to see Anamaria today to follow up with her recent ER visit after having a fall in her apartment.  She was found to have fracture of a nasal bone and maxillary sinus.  She had a small abrasion on her lip which has now resolved.  Barely can see it unless pointed out.      Found Anamaria in her apartment reclined back in her recliner with her feet up.  Wearing her Pink leather brace on her right foot and special compression stockings that help keep her toes from curling.  Anamaria was alert and pleasant.  Pulling on her hair which is her baseline.  Anamaria acknowledges her fractures but she states it does not hurts all the time.    ALLERGIES:    Allergies   Allergen Reactions    No Known Drug Allergy         MEDICATIONS:  Post Discharge Medication Reconciliation Status: medications reviewed today.     Current Outpatient Medications   Medication Sig Dispense Refill    acetaminophen (TYLENOL) 500 MG tablet Take 1,000 mg by mouth 3 times daily And 1000mg daily PRN      baclofen (LIORESAL) 10 MG tablet Take 10 mg by mouth 2 times daily 3pm and 9pm,  takes 5mg every morning      busPIRone (BUSPAR) 5 MG tablet 5mg po 3x a day for 10 days, then 5mg po twice a day for 10 days then 5mg po daily x4 days then discontinue. 54 tablet 0    Calcium Carbonate (CALCIUM 600 PO) Take 1 tablet by mouth every other day      diclofenac (VOLTAREN) 1 % topical gel Apply 4 g topically 4 times daily Apply to knees      FLUoxetine (PROZAC) 10 MG capsule Take 2 capsules (20 mg) by mouth daily      gabapentin (NEURONTIN) 100 MG capsule 100mg po in AM and at 12N, 200mg po at bedtime and 100mg po every 4 hours as needed for RLS or pain 120 capsule 4    Lidocaine (LIDOCARE) 4 % Patch Place 1 patch onto the skin every 24 hours To prevent lidocaine toxicity, patient should be patch free for 12 hrs daily. Apply to L hip      LORazepam (ATIVAN) 0.5 MG tablet Take 1 tablet (0.5 mg) by mouth every 6 hours as needed for anxiety or agitation 10 tablet 5    melatonin 3 MG tablet Take 3 tablets (9 mg) by mouth At Bedtime      QUEtiapine (SEROQUEL) 25 MG tablet 12.5mg in AM and 25mg at HS 30 tablet 5    SENNA-docusate sodium (SENNA S) 8.6-50 MG tablet Take 1 tablet by mouth 2 times daily      traMADol (ULTRAM) 50 MG tablet Take 0.5 tablets (25 mg) by mouth 2 times daily 30 tablet 0    traZODone (DESYREL) 50 MG tablet Take 1 tablet (50 mg) by mouth At Bedtime 31 tablet 11    vitamin D3 (CHOLECALCIFEROL) 50 mcg (2000 units) tablet Take 50 mcg by mouth every other day           REVIEW OF SYSTEMS:  4 point ROS neg other than the symptoms noted above in the HPI.  No chest pain, no shortness of breath.      PHYSICAL EXAM:  /64   Pulse 90   Temp 97.5  F (36.4  C)   Resp 16   Wt 46.6 kg (102 lb 12.8 oz)   SpO2 96%   BMI 18.80 kg/m    Petite female, alert and oriented to self and surroundings.  Place and time is vague.    Faded bruising noted below eyes.    Nose is yellow and slightly crocked mid nose.  Otherwise she is touching her nose lightly and not making faces.    Heart rate regular and  "strong.  No edema in lower legs  Lungs are clear with fair expansion.  Abdomen is flat soft and non-tender to touch.      A1 with transfers and ambulating with walker.        ASSESSMENT / PLAN:  (S02.2XXD) Closed fracture of nasal bone with routine healing, subsequent encounter  (primary encounter diagnosis)  (S02.401D) Closed fracture of maxillary sinus with routine healing, subsequent encounter  (S00.83XD) Contusion of face, subsequent encounter  Comment: healing without complications.  Is on Tylenol ES 1000mg po TID already.  Continue to monitor healing process.  Doing well.  Spirit is bright.    (G31.9) Neurodegenerative disorder (H24)  (R26.81) Unsteady gait  Comment: high risk of falls.  Family is considering moving Anamaria down to memory care where she can have more \"eyes\" on her and ambulation can occur but distances to get places are shorter.    Remains on Baclofen for spasms, tramadol twice a day for pain and Gabapentin for pain. Continue to monitor and adjust medication doses as needed.    Orders:  Next lab day:  CBC, BMP, Vitamin D level, TSH and Free T4 due to repeat falls, osteopenia, and depression.      Electronically signed by  ESPERANZA Fuentes CNP        "

## 2024-07-22 NOTE — LETTER
7/22/2024      Kylah Britt  [address]  [facility] [address]        [facility] GERIATRICS  ACUTE/EPISODIC VISIT    [facility] Medical Record Number:  [id number]  Place of Service where encounter took place:  [facility] [[id number]]    Chief Complaint   Patient presents with     RECHECK       HPI:    Kylah Britt is a 73 year old  (1950), who is being seen today for an episodic care visit.  HPI information obtained from: facility chart records, facility staff, patient report, and [facility] chart review.    Today's concern is:    Diagnoses         Codes Comments    Closed fracture of nasal bone with routine healing, subsequent encounter    -  Primary S02.2XXD     Closed fracture of maxillary sinus with routine healing, subsequent encounter     S02.401D     Contusion of face, subsequent encounter     S00.83XD     Neurodegenerative disorder (H24)     G31.9     Unsteady gait     R26.81           Came to see Anamaria today to follow up with her recent ER visit after having a fall in her apartment.  She was found to have fracture of a nasal bone and maxillary sinus.  She had a small abrasion on her lip which has now resolved.  Barely can see it unless pointed out.      Found Anamaria in her apartment reclined back in her recliner with her feet up.  Wearing her Pink leather brace on her right foot and special compression stockings that help keep her toes from curling.  Anamaria was alert and pleasant.  Pulling on her hair which is her baseline.  Anamaria acknowledges her fractures but she states it does not hurts all the time.    ALLERGIES:    Allergies   Allergen Reactions     No Known Drug Allergy         MEDICATIONS:  Post Discharge Medication Reconciliation Status: medications reviewed today.     Current Outpatient Medications   Medication Sig Dispense Refill     acetaminophen (TYLENOL) 500 MG tablet Take 1,000 mg by mouth 3 times daily And 1000mg daily PRN        baclofen (LIORESAL) 10 MG tablet Take 10 mg by mouth 2 times daily 3pm and 9pm, takes 5mg every morning       busPIRone (BUSPAR) 5 MG tablet 5mg po 3x a day for 10 days, then 5mg po twice a day for 10 days then 5mg po daily x4 days then discontinue. 54 tablet 0     Calcium Carbonate (CALCIUM 600 PO) Take 1 tablet by mouth every other day       diclofenac (VOLTAREN) 1 % topical gel Apply 4 g topically 4 times daily Apply to knees       FLUoxetine (PROZAC) 10 MG capsule Take 2 capsules (20 mg) by mouth daily       gabapentin (NEURONTIN) 100 MG capsule 100mg po in AM and at 12N, 200mg po at bedtime and 100mg po every 4 hours as needed for RLS or pain 120 capsule 4     Lidocaine (LIDOCARE) 4 % Patch Place 1 patch onto the skin every 24 hours To prevent lidocaine toxicity, patient should be patch free for 12 hrs daily. Apply to L hip       LORazepam (ATIVAN) 0.5 MG tablet Take 1 tablet (0.5 mg) by mouth every 6 hours as needed for anxiety or agitation 10 tablet 5     melatonin 3 MG tablet Take 3 tablets (9 mg) by mouth At Bedtime       QUEtiapine (SEROQUEL) 25 MG tablet 12.5mg in AM and 25mg at HS 30 tablet 5     SENNA-docusate sodium (SENNA S) 8.6-50 MG tablet Take 1 tablet by mouth 2 times daily       traMADol (ULTRAM) 50 MG tablet Take 0.5 tablets (25 mg) by mouth 2 times daily 30 tablet 0     traZODone (DESYREL) 50 MG tablet Take 1 tablet (50 mg) by mouth At Bedtime 31 tablet 11     vitamin D3 (CHOLECALCIFEROL) 50 mcg (2000 units) tablet Take 50 mcg by mouth every other day           REVIEW OF SYSTEMS:  4 point ROS neg other than the symptoms noted above in the HPI.  No chest pain, no shortness of breath.      PHYSICAL EXAM:  /64   Pulse 90   Temp 97.5  F (36.4  C)   Resp 16   Wt 46.6 kg (102 lb 12.8 oz)   SpO2 96%   BMI 18.80 kg/m    Petite female, alert and oriented to self and surroundings.  Place and time is vague.    Faded bruising noted below eyes.    Nose is yellow and slightly crocked mid nose.   "Otherwise she is touching her nose lightly and not making faces.    Heart rate regular and strong.  No edema in lower legs  Lungs are clear with fair expansion.  Abdomen is flat soft and non-tender to touch.      A1 with transfers and ambulating with walker.        ASSESSMENT / PLAN:  (S02.2XXD) Closed fracture of nasal bone with routine healing, subsequent encounter  (primary encounter diagnosis)  (S02.401D) Closed fracture of maxillary sinus with routine healing, subsequent encounter  (S00.83XD) Contusion of face, subsequent encounter  Comment: healing without complications.  Is on Tylenol ES 1000mg po TID already.  Continue to monitor healing process.  Doing well.  Spirit is bright.    (G31.9) Neurodegenerative disorder (H24)  (R26.81) Unsteady gait  Comment: high risk of falls.  Family is considering moving Anamaria down to memory care where she can have more \"eyes\" on her and ambulation can occur but distances to get places are shorter.    Remains on Baclofen for spasms, tramadol twice a day for pain and Gabapentin for pain. Continue to monitor and adjust medication doses as needed.    Orders:  Next lab day:  CBC, BMP, Vitamin D level, TSH and Free T4 due to repeat falls, osteopenia, and depression.      Electronically signed by  ESPERANZA Fuentes CNP            Sincerely,        ESPERANZA Fuentes CNP      "

## 2024-07-23 ENCOUNTER — LAB REQUISITION (OUTPATIENT)
Dept: LAB | Facility: CLINIC | Age: 74
End: 2024-07-23
Payer: MEDICARE

## 2024-07-23 DIAGNOSIS — R29.6 REPEATED FALLS: ICD-10-CM

## 2024-07-23 DIAGNOSIS — F32.A DEPRESSION, UNSPECIFIED: ICD-10-CM

## 2024-07-24 LAB
ANION GAP SERPL CALCULATED.3IONS-SCNC: 9 MMOL/L (ref 7–15)
BASOPHILS # BLD AUTO: 0 10E3/UL (ref 0–0.2)
BASOPHILS NFR BLD AUTO: 1 %
BUN SERPL-MCNC: 23.8 MG/DL (ref 8–23)
CALCIUM SERPL-MCNC: 9.3 MG/DL (ref 8.8–10.4)
CHLORIDE SERPL-SCNC: 106 MMOL/L (ref 98–107)
CREAT SERPL-MCNC: 0.71 MG/DL (ref 0.51–0.95)
EGFRCR SERPLBLD CKD-EPI 2021: 89 ML/MIN/1.73M2
EOSINOPHIL # BLD AUTO: 0.1 10E3/UL (ref 0–0.7)
EOSINOPHIL NFR BLD AUTO: 3 %
ERYTHROCYTE [DISTWIDTH] IN BLOOD BY AUTOMATED COUNT: 13.2 % (ref 10–15)
GLUCOSE SERPL-MCNC: 87 MG/DL (ref 70–99)
HCO3 SERPL-SCNC: 28 MMOL/L (ref 22–29)
HCT VFR BLD AUTO: 35.8 % (ref 35–47)
HGB BLD-MCNC: 11.4 G/DL (ref 11.7–15.7)
IMM GRANULOCYTES # BLD: 0 10E3/UL
IMM GRANULOCYTES NFR BLD: 0 %
LYMPHOCYTES # BLD AUTO: 2.1 10E3/UL (ref 0.8–5.3)
LYMPHOCYTES NFR BLD AUTO: 44 %
MCH RBC QN AUTO: 30.5 PG (ref 26.5–33)
MCHC RBC AUTO-ENTMCNC: 31.8 G/DL (ref 31.5–36.5)
MCV RBC AUTO: 96 FL (ref 78–100)
MONOCYTES # BLD AUTO: 0.5 10E3/UL (ref 0–1.3)
MONOCYTES NFR BLD AUTO: 11 %
NEUTROPHILS # BLD AUTO: 1.9 10E3/UL (ref 1.6–8.3)
NEUTROPHILS NFR BLD AUTO: 41 %
NRBC # BLD AUTO: 0 10E3/UL
NRBC BLD AUTO-RTO: 0 /100
PLATELET # BLD AUTO: 361 10E3/UL (ref 150–450)
POTASSIUM SERPL-SCNC: 4.1 MMOL/L (ref 3.4–5.3)
RBC # BLD AUTO: 3.74 10E6/UL (ref 3.8–5.2)
SODIUM SERPL-SCNC: 143 MMOL/L (ref 135–145)
T4 FREE SERPL-MCNC: 1.07 NG/DL (ref 0.9–1.7)
TSH SERPL DL<=0.005 MIU/L-ACNC: 2.12 UIU/ML (ref 0.3–4.2)
VIT D+METAB SERPL-MCNC: 32 NG/ML (ref 20–50)
WBC # BLD AUTO: 4.7 10E3/UL (ref 4–11)

## 2024-07-24 PROCEDURE — P9604 ONE-WAY ALLOW PRORATED TRIP: HCPCS | Mod: ORL | Performed by: NURSE PRACTITIONER

## 2024-07-24 PROCEDURE — 84443 ASSAY THYROID STIM HORMONE: CPT | Mod: ORL | Performed by: NURSE PRACTITIONER

## 2024-07-24 PROCEDURE — 85025 COMPLETE CBC W/AUTO DIFF WBC: CPT | Mod: ORL | Performed by: NURSE PRACTITIONER

## 2024-07-24 PROCEDURE — 36415 COLL VENOUS BLD VENIPUNCTURE: CPT | Mod: ORL | Performed by: NURSE PRACTITIONER

## 2024-07-24 PROCEDURE — 82306 VITAMIN D 25 HYDROXY: CPT | Mod: ORL | Performed by: NURSE PRACTITIONER

## 2024-07-24 PROCEDURE — 80048 BASIC METABOLIC PNL TOTAL CA: CPT | Mod: ORL | Performed by: NURSE PRACTITIONER

## 2024-07-24 PROCEDURE — 84439 ASSAY OF FREE THYROXINE: CPT | Mod: ORL | Performed by: NURSE PRACTITIONER

## 2024-07-26 NOTE — PROGRESS NOTES
ENT Consultation    Kylah Britt is a 73 year old female who is seen in consultation at the request of .      History of Present Illness - Kylah Britt is a 73 year old female presents for evaluation of nasal fracture.    The patient was seen in the ED on 07/17/2024 for a facial injury after a fall.   She has a complex medical history including vascular dementia and depression/anxiety.    She resides at an assistant living facility, which the family is actively seeking some escalation in care due to frequent falls.     The patient tells me that she was using her walker to get to the bathroom when she accidentally tripped and fell. She struck her face on the ground but didn't lose consciousness.     She denies any anticoagulant use.     A month prior to being in seen in the ED she had another fall but there was no injury.     CT SCAN 07/17/24:    Study Result    Narrative & Impression   EXAM: CT HEAD W/O CONTRAST, CT CERVICAL SPINE W/O CONTRAST, CT FACIAL BONES WITHOUT CONTRAST  LOCATION: [facility]  DATE/TIME: 7/17/2024 11:11 PM CDT     INDICATION: Fall, tenderness to the nasal bridge, right upper lip abrasion  COMPARISON:  CT head 6/18/2024, CT cervical spine 3/25/2024.  TECHNIQUE:   1) Routine CT Head without IV contrast. Multiplanar reformats. Dose reduction techniques were used.  2) Routine CT Facial Bones without IV contrast. Multiplanar reformats. Dose reduction techniques were used.  3) Routine CT Cervical Spine without IV contrast. Multiplanar reformats. Dose reduction techniques were used.     FINDINGS:  HEAD CT:   INTRACRANIAL CONTENTS: No intracranial hemorrhage, extraaxial collection, or mass effect.  No CT evidence of acute infarct. Mild presumed chronic small vessel ischemic changes. Mild generalized volume loss. No hydrocephalus.      BONES/SOFT TISSUES: No large scalp hematoma. No skull fracture.     FACIAL BONE CT:   OSSEOUS STRUCTURES/SOFT TISSUES:   Frontal/periorbital soft tissue swelling/inflammation. Mildly displaced right nasal bone fracture. Nondisplaced posteromedial and posterolateral right maxillary sinus fractures with trace posttraumatic gas in the right   infratemporal fossa. Dental structures are partially obscured by artifact from dental restorations.      ORBITAL CONTENTS: No intraorbital abnormality.      SINUSES: Posttraumatic hemorrhage in the right maxillary sinus.      CERVICAL SPINE CT:  VERTEBRA: Vertebral body heights and alignment are unchanged. No acute compression fracture or posttraumatic subluxation.      CANAL/FORAMINA: Multilevel spondylosis without high grade canal stenosis.     PARASPINAL: No prevertebral edema.                                                                      IMPRESSION:     HEAD CT:  1.  No acute intracranial process.     FACIAL BONE CT:  1.  Nondisplaced posteromedial and posterolateral right maxillary sinus fractures with intrasinus hemorrhage.  2.  Mildly displaced right nasal bone fracture.     CERVICAL SPINE CT:  1.  No acute cervical spine fracture.       Past Medical History -   Past Medical History:   Diagnosis Date    Abnormal Papanicolaou smear of cervix and cervical HPV 9/1/92    vaginitis with abnormal Pap    Depressive disorder, not elsewhere classified     depression    Female stress incontinence     urinary incontinence    Lyme disease     Osteoarthrosis, unspecified whether generalized or localized, unspecified site      monoarticular arthritis in the right first MTP joint    Other psychological or physical stress, not elsewhere classified(V62.89)     delayed stress syndrome       Current Medications -   Current Outpatient Medications:     acetaminophen (TYLENOL) 500 MG tablet, Take 1,000 mg by mouth 3 times daily And 1000mg daily PRN, Disp: , Rfl:     baclofen (LIORESAL) 10 MG tablet, Take 10 mg by mouth 2 times daily 3pm and 9pm, takes 5mg every morning, Disp: , Rfl:     busPIRone (BUSPAR) 5  MG tablet, 5mg po 3x a day for 10 days, then 5mg po twice a day for 10 days then 5mg po daily x4 days then discontinue., Disp: 54 tablet, Rfl: 0    Calcium Carbonate (CALCIUM 600 PO), Take 1 tablet by mouth every other day, Disp: , Rfl:     diclofenac (VOLTAREN) 1 % topical gel, Apply 4 g topically 4 times daily Apply to knees, Disp: , Rfl:     FLUoxetine (PROZAC) 10 MG capsule, Take 2 capsules (20 mg) by mouth daily, Disp: , Rfl:     gabapentin (NEURONTIN) 100 MG capsule, 100mg po in AM and at 12N, 200mg po at bedtime and 100mg po every 4 hours as needed for RLS or pain, Disp: 120 capsule, Rfl: 4    Lidocaine (LIDOCARE) 4 % Patch, Place 1 patch onto the skin every 24 hours To prevent lidocaine toxicity, patient should be patch free for 12 hrs daily. Apply to L hip, Disp: , Rfl:     LORazepam (ATIVAN) 0.5 MG tablet, Take 1 tablet (0.5 mg) by mouth every 6 hours as needed for anxiety or agitation, Disp: 10 tablet, Rfl: 5    melatonin 3 MG tablet, Take 3 tablets (9 mg) by mouth At Bedtime, Disp: , Rfl:     QUEtiapine (SEROQUEL) 25 MG tablet, 12.5mg in AM and 25mg at HS, Disp: 30 tablet, Rfl: 5    SENNA-docusate sodium (SENNA S) 8.6-50 MG tablet, Take 1 tablet by mouth 2 times daily, Disp: , Rfl:     traMADol (ULTRAM) 50 MG tablet, Take 0.5 tablets (25 mg) by mouth 2 times daily, Disp: 30 tablet, Rfl: 0    traZODone (DESYREL) 50 MG tablet, Take 1 tablet (50 mg) by mouth At Bedtime, Disp: 31 tablet, Rfl: 11    vitamin D3 (CHOLECALCIFEROL) 50 mcg (2000 units) tablet, Take 50 mcg by mouth every other day, Disp: , Rfl:     Allergies -   Allergies   Allergen Reactions    No Known Drug Allergy        Social History -   Social History     Socioeconomic History    Marital status:    Tobacco Use    Smoking status: Never    Smokeless tobacco: Never   Vaping Use    Vaping status: Never Used   Substance and Sexual Activity    Alcohol use: Yes     Comment: very rare    Drug use: No       Family History -   Family History    Problem Relation Age of Onset    Hypertension Mother     Heart Disease Mother     Cancer Father     Cancer Son         Hodgkin's    Cancer Paternal Aunt         two with cervical cancer       Review of Systems - As per HPI and PMHx, otherwise review of system review of the head and neck negative. Otherwise 10+ review systems negative.    Physical Exam  There were no vitals taken for this visit.  BMI: There is no height or weight on file to calculate BMI.    General - The patient is well nourished and well developed, and appears to have good nutritional status.  Alert and oriented to person and place, answers questions and cooperates with examination appropriately.    SKIN - No suspicious lesions or rashes.  Respiration - No respiratory distress.  Head and Face - Normocephalic and atraumatic, with no gross asymmetry noted of the contour of the facial features.  The facial nerve is intact, with strong symmetric movements.    Voice and Breathing - The patient was breathing comfortably without the use of accessory muscles. The patients voice was clear and strong, and had appropriate pitch and quality.    Ears - Bilateral cerumen build up.     Eyes - Extraocular movements intact.  Sclera were not icteric or injected, conjunctiva were pink and moist.    Mouth - Examination of the oral cavity showed pink, healthy oral mucosa. No lesions or ulcerations noted.  The tongue was mobile and midline, and the dentition were in good condition.      Throat - The walls of the oropharynx were smooth, pink, moist, symmetric, and had no lesions or ulcerations.  The tonsillar pillars and soft palate were symmetric.  The uvula was midline on elevation.    Neck - Normal midline excursion of the laryngotracheal complex during swallowing.  Full range of motion on passive movement.  Palpation of the occipital, submental, submandibular, internal jugular chain, and supraclavicular nodes did not demonstrate any abnormal lymph nodes or masses.   The carotid pulse was palpable bilaterally.  Palpation of the thyroid was soft and smooth, with no nodules or goiter appreciated.  The trachea was mobile and midline.    Nose - Septum has a slight deviation to the left. Slight rotation to the left with a callus.      Neuro - Nonfocal neuro exam is normal, CN 2 through 12 intact, normal gait and muscle tone.    Encounter Diagnoses   Name Primary?    Closed fracture of nasal bone with routine healing, subsequent encounter Yes    Closed fracture of maxillary sinus, with routine healing, subsequent encounter          A/P - Kylah RALPH Britt is a 73 year old female presents to the clinic for a recheck from her nasal and maxillary sinus fracture. Based on my examination, the patients fractures are healing well. At this time, I would not make any surgical recommendation at this time.    Recommended that she uses Debrox cerumen softer 1-2 times per day for the next 3-4 weeks. Then return for a ear cleaning.     ESPERANZA Delgado CNP  Otolaryngology  [facility] [place]     ESPERANZA Delgado CNP  July 26, 2024

## 2024-07-30 ENCOUNTER — OFFICE VISIT (OUTPATIENT)
Dept: OTOLARYNGOLOGY | Facility: CLINIC | Age: 74
End: 2024-07-30
Attending: STUDENT IN AN ORGANIZED HEALTH CARE EDUCATION/TRAINING PROGRAM
Payer: COMMERCIAL

## 2024-07-30 VITALS — TEMPERATURE: 97.7 F

## 2024-07-30 DIAGNOSIS — S02.401D: ICD-10-CM

## 2024-07-30 DIAGNOSIS — S02.2XXD CLOSED FRACTURE OF NASAL BONE WITH ROUTINE HEALING, SUBSEQUENT ENCOUNTER: Primary | ICD-10-CM

## 2024-07-30 PROCEDURE — 99203 OFFICE O/P NEW LOW 30 MIN: CPT

## 2024-07-30 NOTE — LETTER
7/30/2024      Kylah Britt  [address]  [facility] [address]      Dear Colleague,    Thank you for referring your patient, Kylah Britt, to the [facility]. Please see a copy of my visit note below.    ENT Consultation    Kylah Britt is a 73 year old female who is seen in consultation at the request of .      History of Present Illness - Kylah Britt is a 73 year old female presents for evaluation of nasal fracture.    The patient was seen in the ED on 07/17/2024 for a facial injury after a fall.   She has a complex medical history including vascular dementia and depression/anxiety.    She resides at an assistant living facility, which the family is actively seeking some escalation in care due to frequent falls.     The patient tells me that she was using her walker to get to the bathroom when she accidentally tripped and fell. She struck her face on the ground but didn't lose consciousness.     She denies any anticoagulant use.     A month prior to being in seen in the ED she had another fall but there was no injury.     CT SCAN 07/17/24:    Study Result    Narrative & Impression   EXAM: CT HEAD W/O CONTRAST, CT CERVICAL SPINE W/O CONTRAST, CT FACIAL BONES WITHOUT CONTRAST  LOCATION: [facility]  DATE/TIME: 7/17/2024 11:11 PM CDT     INDICATION: Fall, tenderness to the nasal bridge, right upper lip abrasion  COMPARISON:  CT head 6/18/2024, CT cervical spine 3/25/2024.  TECHNIQUE:   1) Routine CT Head without IV contrast. Multiplanar reformats. Dose reduction techniques were used.  2) Routine CT Facial Bones without IV contrast. Multiplanar reformats. Dose reduction techniques were used.  3) Routine CT Cervical Spine without IV contrast. Multiplanar reformats. Dose reduction techniques were used.     FINDINGS:  HEAD CT:   INTRACRANIAL CONTENTS: No intracranial hemorrhage, extraaxial collection, or mass effect.  No CT evidence of  acute infarct. Mild presumed chronic small vessel ischemic changes. Mild generalized volume loss. No hydrocephalus.      BONES/SOFT TISSUES: No large scalp hematoma. No skull fracture.     FACIAL BONE CT:   OSSEOUS STRUCTURES/SOFT TISSUES:  Frontal/periorbital soft tissue swelling/inflammation. Mildly displaced right nasal bone fracture. Nondisplaced posteromedial and posterolateral right maxillary sinus fractures with trace posttraumatic gas in the right   infratemporal fossa. Dental structures are partially obscured by artifact from dental restorations.      ORBITAL CONTENTS: No intraorbital abnormality.      SINUSES: Posttraumatic hemorrhage in the right maxillary sinus.      CERVICAL SPINE CT:  VERTEBRA: Vertebral body heights and alignment are unchanged. No acute compression fracture or posttraumatic subluxation.      CANAL/FORAMINA: Multilevel spondylosis without high grade canal stenosis.     PARASPINAL: No prevertebral edema.                                                                      IMPRESSION:     HEAD CT:  1.  No acute intracranial process.     FACIAL BONE CT:  1.  Nondisplaced posteromedial and posterolateral right maxillary sinus fractures with intrasinus hemorrhage.  2.  Mildly displaced right nasal bone fracture.     CERVICAL SPINE CT:  1.  No acute cervical spine fracture.       Past Medical History -   Past Medical History:   Diagnosis Date     Abnormal Papanicolaou smear of cervix and cervical HPV 9/1/92    vaginitis with abnormal Pap     Depressive disorder, not elsewhere classified     depression     Female stress incontinence     urinary incontinence     Lyme disease      Osteoarthrosis, unspecified whether generalized or localized, unspecified site      monoarticular arthritis in the right first MTP joint     Other psychological or physical stress, not elsewhere classified(V62.89)     delayed stress syndrome       Current Medications -   Current Outpatient Medications:       acetaminophen (TYLENOL) 500 MG tablet, Take 1,000 mg by mouth 3 times daily And 1000mg daily PRN, Disp: , Rfl:      baclofen (LIORESAL) 10 MG tablet, Take 10 mg by mouth 2 times daily 3pm and 9pm, takes 5mg every morning, Disp: , Rfl:      busPIRone (BUSPAR) 5 MG tablet, 5mg po 3x a day for 10 days, then 5mg po twice a day for 10 days then 5mg po daily x4 days then discontinue., Disp: 54 tablet, Rfl: 0     Calcium Carbonate (CALCIUM 600 PO), Take 1 tablet by mouth every other day, Disp: , Rfl:      diclofenac (VOLTAREN) 1 % topical gel, Apply 4 g topically 4 times daily Apply to knees, Disp: , Rfl:      FLUoxetine (PROZAC) 10 MG capsule, Take 2 capsules (20 mg) by mouth daily, Disp: , Rfl:      gabapentin (NEURONTIN) 100 MG capsule, 100mg po in AM and at 12N, 200mg po at bedtime and 100mg po every 4 hours as needed for RLS or pain, Disp: 120 capsule, Rfl: 4     Lidocaine (LIDOCARE) 4 % Patch, Place 1 patch onto the skin every 24 hours To prevent lidocaine toxicity, patient should be patch free for 12 hrs daily. Apply to L hip, Disp: , Rfl:      LORazepam (ATIVAN) 0.5 MG tablet, Take 1 tablet (0.5 mg) by mouth every 6 hours as needed for anxiety or agitation, Disp: 10 tablet, Rfl: 5     melatonin 3 MG tablet, Take 3 tablets (9 mg) by mouth At Bedtime, Disp: , Rfl:      QUEtiapine (SEROQUEL) 25 MG tablet, 12.5mg in AM and 25mg at HS, Disp: 30 tablet, Rfl: 5     SENNA-docusate sodium (SENNA S) 8.6-50 MG tablet, Take 1 tablet by mouth 2 times daily, Disp: , Rfl:      traMADol (ULTRAM) 50 MG tablet, Take 0.5 tablets (25 mg) by mouth 2 times daily, Disp: 30 tablet, Rfl: 0     traZODone (DESYREL) 50 MG tablet, Take 1 tablet (50 mg) by mouth At Bedtime, Disp: 31 tablet, Rfl: 11     vitamin D3 (CHOLECALCIFEROL) 50 mcg (2000 units) tablet, Take 50 mcg by mouth every other day, Disp: , Rfl:     Allergies -   Allergies   Allergen Reactions     No Known Drug Allergy        Social History -   Social History     Socioeconomic  History     Marital status:    Tobacco Use     Smoking status: Never     Smokeless tobacco: Never   Vaping Use     Vaping status: Never Used   Substance and Sexual Activity     Alcohol use: Yes     Comment: very rare     Drug use: No       Family History -   Family History   Problem Relation Age of Onset     Hypertension Mother      Heart Disease Mother      Cancer Father      Cancer Son         Hodgkin's     Cancer Paternal Aunt         two with cervical cancer       Review of Systems - As per HPI and PMHx, otherwise review of system review of the head and neck negative. Otherwise 10+ review systems negative.    Physical Exam  There were no vitals taken for this visit.  BMI: There is no height or weight on file to calculate BMI.    General - The patient is well nourished and well developed, and appears to have good nutritional status.  Alert and oriented to person and place, answers questions and cooperates with examination appropriately.    SKIN - No suspicious lesions or rashes.  Respiration - No respiratory distress.  Head and Face - Normocephalic and atraumatic, with no gross asymmetry noted of the contour of the facial features.  The facial nerve is intact, with strong symmetric movements.    Voice and Breathing - The patient was breathing comfortably without the use of accessory muscles. The patients voice was clear and strong, and had appropriate pitch and quality.    Ears - Bilateral pinna and EACs with normal appearing overlying skin. Tympanic membrane intact with good mobility on pneumatic otoscopy bilaterally. Bony landmarks of the ossicular chain are normal. The tympanic membranes are normal in appearance. No retraction, perforation, or masses.  No fluid or purulence was seen in the external canal or the middle ear.     Eyes - Extraocular movements intact.  Sclera were not icteric or injected, conjunctiva were pink and moist.    Mouth - Examination of the oral cavity showed pink, healthy oral  mucosa. No lesions or ulcerations noted.  The tongue was mobile and midline, and the dentition were in good condition.      Throat - The walls of the oropharynx were smooth, pink, moist, symmetric, and had no lesions or ulcerations.  The tonsillar pillars and soft palate were symmetric.  The uvula was midline on elevation.    Neck - Normal midline excursion of the laryngotracheal complex during swallowing.  Full range of motion on passive movement.  Palpation of the occipital, submental, submandibular, internal jugular chain, and supraclavicular nodes did not demonstrate any abnormal lymph nodes or masses.  The carotid pulse was palpable bilaterally.  Palpation of the thyroid was soft and smooth, with no nodules or goiter appreciated.  The trachea was mobile and midline.    Nose - Septum has a slight deviation to the left. Slight rotation to the left with a callus.      Neuro - Nonfocal neuro exam is normal, CN 2 through 12 intact, normal gait and muscle tone.    Encounter Diagnoses   Name Primary?     Closed fracture of nasal bone with routine healing, subsequent encounter Yes     Closed fracture of maxillary sinus, with routine healing, subsequent encounter          A/P - Kylah ROSAS Balwinder is a 73 year old female presents to the clinic for a recheck from her nasal and maxillary sinus fracture. Based on my examination, the patients fractures are healing well. At this time, I would not make any surgical recommendation at this time.    Recommended that she uses Debrox cerumen softer 1-2 times per day for the next 3-4 weeks. Then return for a ear cleaning.     ESPERANZA Delgado CNP  Otolaryngology  [place] [facility] [place]     ESPERANZA Delgado CNP  July 26, 2024      Again, thank you for allowing me to participate in the care of your patient.        Sincerely,        ESPERANZA Delgado CNP

## 2024-07-30 NOTE — PATIENT INSTRUCTIONS
You were seen by Michael Herrera CNP.  If you have questions or concerns regarding your appointment today, you can reach out to our call center at [phone].  The following has been recommended at your appointment today:    Use Debrox wax softening drops in both ears twice daily for the next 3-4 weeks. Then schedule a follow up with me for a ear cleaning.

## 2024-08-05 DIAGNOSIS — M79.605 PAIN OF LEFT LOWER EXTREMITY: ICD-10-CM

## 2024-08-05 RX ORDER — TRAMADOL HYDROCHLORIDE 50 MG/1
25 TABLET ORAL 2 TIMES DAILY
Qty: 30 TABLET | Refills: 0 | Status: SHIPPED | OUTPATIENT
Start: 2024-08-05 | End: 2024-08-28

## 2024-08-08 ENCOUNTER — ASSISTED LIVING VISIT (OUTPATIENT)
Dept: GERIATRICS | Facility: CLINIC | Age: 74
End: 2024-08-08
Payer: COMMERCIAL

## 2024-08-08 VITALS
TEMPERATURE: 98.1 F | BODY MASS INDEX: 18.8 KG/M2 | SYSTOLIC BLOOD PRESSURE: 178 MMHG | OXYGEN SATURATION: 97 % | WEIGHT: 102.8 LBS | DIASTOLIC BLOOD PRESSURE: 86 MMHG | RESPIRATION RATE: 20 BRPM | HEART RATE: 88 BPM

## 2024-08-08 DIAGNOSIS — F41.8 MIXED ANXIETY AND DEPRESSIVE DISORDER: ICD-10-CM

## 2024-08-08 DIAGNOSIS — H61.23 BILATERAL IMPACTED CERUMEN: Primary | ICD-10-CM

## 2024-08-08 DIAGNOSIS — S02.401D CLOSED FRACTURE OF MAXILLARY SINUS WITH ROUTINE HEALING, SUBSEQUENT ENCOUNTER: ICD-10-CM

## 2024-08-08 DIAGNOSIS — G47.9 SLEEP DISORDER: ICD-10-CM

## 2024-08-08 DIAGNOSIS — S02.2XXD CLOSED FRACTURE OF NASAL BONE WITH ROUTINE HEALING, SUBSEQUENT ENCOUNTER: ICD-10-CM

## 2024-08-08 PROCEDURE — 99349 HOME/RES VST EST MOD MDM 40: CPT | Performed by: NURSE PRACTITIONER

## 2024-08-08 NOTE — LETTER
8/8/2024      Kylah Britt  [address]  [facility] [address]        [facility] GERIATRICS  ACUTE/EPISODIC VISIT    [facility] Medical Record Number:  [id number]  Place of Service where encounter took place:  [facility] [[id number]]    Chief Complaint   Patient presents with     RECHECK       HPI:    Kylah Britt is a 73 year old  (1950), who is being seen today for an episodic care visit.  HPI information obtained from: facility chart records, facility staff, patient report, and [facility] chart review.    Today's concern is:    Diagnoses         Codes Comments    Bilateral impacted cerumen    -  Primary H61.23     Closed fracture of nasal bone with routine healing, subsequent encounter     S02.2XXD     Closed fracture of maxillary sinus with routine healing, subsequent encounter     S02.401D     Mixed anxiety and depressive disorder     F41.8     Sleep disorder     G47.9           In the follow-up with Anamaria today as she had a recent checkup in the clinic in regards to her fractured nasal bone and fracture maxillary sinus.  At that point they had looked in her ears which were full of cerumen.  Advised her to use Debrox 1-2 times a day and then come back in a few weeks to have her ears cleaned out.    Nursing asked if this was something that can be done at the facility?  So today brought the otoscope in order to look in her ears to see what amount is present and give new orders.  Debrox order just started within last 24 hours.    Found Anamaria in her room reclined in the recliner.  She recognizes this nurse practitioner and right away starts pulling on her hair.  The hair pulling is fairly normal part of her neurodegenerative disorder as well as her restless psychomotor disorder.  Do not think that she probably would have noticed she had wax in the ear has never heard her saying her hearing is muffled.  She is well aware that she was told there is  cerumen present and to get it cleaned out.      ALLERGIES:    Allergies   Allergen Reactions     No Known Drug Allergy         MEDICATIONS:  Post Discharge Medication Reconciliation Status: patient was not discharged from an inpatient facility or TCU.     Current Outpatient Medications   Medication Sig Dispense Refill     acetaminophen (TYLENOL) 500 MG tablet Take 1,000 mg by mouth 3 times daily And 1000mg daily PRN       baclofen (LIORESAL) 10 MG tablet Take 10 mg by mouth 2 times daily 3pm and 9pm, takes 5mg every morning       busPIRone (BUSPAR) 5 MG tablet 5mg po 3x a day for 10 days, then 5mg po twice a day for 10 days then 5mg po daily x4 days then discontinue. 54 tablet 0     Calcium Carbonate (CALCIUM 600 PO) Take 1 tablet by mouth every other day       diclofenac (VOLTAREN) 1 % topical gel Apply 4 g topically 4 times daily Apply to knees       FLUoxetine (PROZAC) 10 MG capsule Take 2 capsules (20 mg) by mouth daily       gabapentin (NEURONTIN) 100 MG capsule 100mg po in AM and at 12N, 200mg po at bedtime and 100mg po every 4 hours as needed for RLS or pain 120 capsule 4     Lidocaine (LIDOCARE) 4 % Patch Place 1 patch onto the skin every 24 hours To prevent lidocaine toxicity, patient should be patch free for 12 hrs daily. Apply to L hip       LORazepam (ATIVAN) 0.5 MG tablet Take 1 tablet (0.5 mg) by mouth every 6 hours as needed for anxiety or agitation 10 tablet 5     melatonin 3 MG tablet Take 3 tablets (9 mg) by mouth At Bedtime       QUEtiapine (SEROQUEL) 25 MG tablet 12.5mg in AM and 25mg at HS 30 tablet 5     SENNA-docusate sodium (SENNA S) 8.6-50 MG tablet Take 1 tablet by mouth 2 times daily       traMADol (ULTRAM) 50 MG tablet Take 0.5 tablets (25 mg) by mouth 2 times daily 30 tablet 0     traZODone (DESYREL) 50 MG tablet Take 1 tablet (50 mg) by mouth At Bedtime 31 tablet 11     vitamin D3 (CHOLECALCIFEROL) 50 mcg (2000 units) tablet Take 50 mcg by mouth every other day         REVIEW OF  SYSTEMS:  4 point ROS neg other than the symptoms noted above in the HPI.  No chest pain, no SOB.  Has pain in legs.      PHYSICAL EXAM:  BP (!) 178/86   Pulse 88   Temp 98.1  F (36.7  C)   Resp 20   Wt 46.6 kg (102 lb 12.8 oz)   SpO2 97%   BMI 18.80 kg/m    Anamaria is a petite female who is in her recliner that she is able to use remote control to lift up.  Her 2 wheeled walker is in front of her which is a security blanket for her.  To say this because if it is moved out of the way, before leaving her apartment she asked that it gets moved back in front of her.    Heart rate regular and strong.  No edema in her lower extremities.  She has special compression socks that give her little tension on her toes so they do not curl up.  She also has her pink leather soft AFO on the right lower extremity.  She wears this specifically when she is ambulating    Lungs are clear to auscultation with good expansion  Abdomen is flat soft and nontender to touch    With the otoscope look in both ears and do see cerumen impacted, is not able to see the tympanic membranes.        ASSESSMENT / PLAN:  (H61.23) Bilateral impacted cerumen  (primary encounter diagnosis)  Comment: will have Debrox order stopped now and will restart them in a few days.  Want to time when she gets them with this NP returning and then flush her ears out or use tools to pull out pending the position of the cerumen.  Spoke to Anamaria about the cerumen as she had questions of what is it, and why do people get it.        (S02.2XXD) Closed fracture of nasal bone with routine healing, subsequent encounter  (S02.401D) Closed fracture of maxillary sinus with routine healing, subsequent encounter  Comment: face does not show signs of bruising or trouble breathing through her nose.  Does not complain of discomfort either.  Resolving the issue.    (F41.8) Mixed anxiety and depressive disorder  (G47.9) Sleep disorder  Comment: Anamaria can still be impulsive and not ask  for help which put her at risk of falls.  Staff escort her back and forth to the dining room with her walker and a transfer belt for her safety and as her legs could buckle underneath her.  Anamaria does not complain of any sleep problems.  She typically can voice her concerns if she is having trouble sleeping.  Anamaria does have melatonin 9 mg for sleeping at bedtime.    Have weaned her off of the BuSpar per her request.  She does continue with Prozac 20 mg daily that certainly can help with her mood and anxiety.  It so happens that she does take gabapentin as well for pain control but this is also known to help with mood as well.  Have not been approached by nursing or family that she either seems too sedated or having a lot of anxiety.  No changes at this time      Orders:   Stop Debrox drops now  Start Debrox drops 3 drops both ear canals twice a day from Sun PM through Wed AM for cerumen impaction    Electronically signed by  ESPERANZA Fuentes CNP            Sincerely,        ESPERANZA Fuentes CNP

## 2024-08-08 NOTE — PROGRESS NOTES
[facility] GERIATRICS  ACUTE/EPISODIC VISIT    [facility] Medical Record Number:  [id number]  Place of Service where encounter took place:  [facility] [[id number]]    Chief Complaint   Patient presents with    RECHECK       HPI:    Kylah Britt is a 73 year old  (1950), who is being seen today for an episodic care visit.  HPI information obtained from: facility chart records, facility staff, patient report, and [facility] chart review.    Today's concern is:    Diagnoses         Codes Comments    Bilateral impacted cerumen    -  Primary H61.23     Closed fracture of nasal bone with routine healing, subsequent encounter     S02.2XXD     Closed fracture of maxillary sinus with routine healing, subsequent encounter     S02.401D     Mixed anxiety and depressive disorder     F41.8     Sleep disorder     G47.9           In the follow-up with Anamaria today as she had a recent checkup in the clinic in regards to her fractured nasal bone and fracture maxillary sinus.  At that point they had looked in her ears which were full of cerumen.  Advised her to use Debrox 1-2 times a day and then come back in a few weeks to have her ears cleaned out.    Nursing asked if this was something that can be done at the facility?  So today brought the otoscope in order to look in her ears to see what amount is present and give new orders.  Debrox order just started within last 24 hours.    Found Anamaria in her room reclined in the recliner.  She recognizes this nurse practitioner and right away starts pulling on her hair.  The hair pulling is fairly normal part of her neurodegenerative disorder as well as her restless psychomotor disorder.  Do not think that she probably would have noticed she had wax in the ear has never heard her saying her hearing is muffled.  She is well aware that she was told there is cerumen present and to get it cleaned out.      ALLERGIES:    Allergies    Allergen Reactions    No Known Drug Allergy         MEDICATIONS:  Post Discharge Medication Reconciliation Status: patient was not discharged from an inpatient facility or TCU.     Current Outpatient Medications   Medication Sig Dispense Refill    acetaminophen (TYLENOL) 500 MG tablet Take 1,000 mg by mouth 3 times daily And 1000mg daily PRN      baclofen (LIORESAL) 10 MG tablet Take 10 mg by mouth 2 times daily 3pm and 9pm, takes 5mg every morning      busPIRone (BUSPAR) 5 MG tablet 5mg po 3x a day for 10 days, then 5mg po twice a day for 10 days then 5mg po daily x4 days then discontinue. 54 tablet 0    Calcium Carbonate (CALCIUM 600 PO) Take 1 tablet by mouth every other day      diclofenac (VOLTAREN) 1 % topical gel Apply 4 g topically 4 times daily Apply to knees      FLUoxetine (PROZAC) 10 MG capsule Take 2 capsules (20 mg) by mouth daily      gabapentin (NEURONTIN) 100 MG capsule 100mg po in AM and at 12N, 200mg po at bedtime and 100mg po every 4 hours as needed for RLS or pain 120 capsule 4    Lidocaine (LIDOCARE) 4 % Patch Place 1 patch onto the skin every 24 hours To prevent lidocaine toxicity, patient should be patch free for 12 hrs daily. Apply to L hip      LORazepam (ATIVAN) 0.5 MG tablet Take 1 tablet (0.5 mg) by mouth every 6 hours as needed for anxiety or agitation 10 tablet 5    melatonin 3 MG tablet Take 3 tablets (9 mg) by mouth At Bedtime      QUEtiapine (SEROQUEL) 25 MG tablet 12.5mg in AM and 25mg at HS 30 tablet 5    SENNA-docusate sodium (SENNA S) 8.6-50 MG tablet Take 1 tablet by mouth 2 times daily      traMADol (ULTRAM) 50 MG tablet Take 0.5 tablets (25 mg) by mouth 2 times daily 30 tablet 0    traZODone (DESYREL) 50 MG tablet Take 1 tablet (50 mg) by mouth At Bedtime 31 tablet 11    vitamin D3 (CHOLECALCIFEROL) 50 mcg (2000 units) tablet Take 50 mcg by mouth every other day         REVIEW OF SYSTEMS:  4 point ROS neg other than the symptoms noted above in the HPI.  No chest pain, no  SOB.  Has pain in legs.      PHYSICAL EXAM:  BP (!) 178/86   Pulse 88   Temp 98.1  F (36.7  C)   Resp 20   Wt 46.6 kg (102 lb 12.8 oz)   SpO2 97%   BMI 18.80 kg/m    Anamaria is a petite female who is in her recliner that she is able to use remote control to lift up.  Her 2 wheeled walker is in front of her which is a security blanket for her.  To say this because if it is moved out of the way, before leaving her apartment she asked that it gets moved back in front of her.    Heart rate regular and strong.  No edema in her lower extremities.  She has special compression socks that give her little tension on her toes so they do not curl up.  She also has her pink leather soft AFO on the right lower extremity.  She wears this specifically when she is ambulating    Lungs are clear to auscultation with good expansion  Abdomen is flat soft and nontender to touch    With the otoscope look in both ears and do see cerumen impacted, is not able to see the tympanic membranes.        ASSESSMENT / PLAN:  (H61.23) Bilateral impacted cerumen  (primary encounter diagnosis)  Comment: will have Debrox order stopped now and will restart them in a few days.  Want to time when she gets them with this NP returning and then flush her ears out or use tools to pull out pending the position of the cerumen.  Spoke to Anamaria about the cerumen as she had questions of what is it, and why do people get it.        (S02.2XXD) Closed fracture of nasal bone with routine healing, subsequent encounter  (S02.401D) Closed fracture of maxillary sinus with routine healing, subsequent encounter  Comment: face does not show signs of bruising or trouble breathing through her nose.  Does not complain of discomfort either.  Resolving the issue.    (F41.8) Mixed anxiety and depressive disorder  (G47.9) Sleep disorder  Comment: Anamaria can still be impulsive and not ask for help which put her at risk of falls.  Staff escort her back and forth to the dining room  with her walker and a transfer belt for her safety and as her legs could buckle underneath her.  Anamaria does not complain of any sleep problems.  She typically can voice her concerns if she is having trouble sleeping.  Anamaria does have melatonin 9 mg for sleeping at bedtime.    Have weaned her off of the BuSpar per her request.  She does continue with Prozac 20 mg daily that certainly can help with her mood and anxiety.  It so happens that she does take gabapentin as well for pain control but this is also known to help with mood as well.  Have not been approached by nursing or family that she either seems too sedated or having a lot of anxiety.  No changes at this time      Orders:   Stop Debrox drops now  Start Debrox drops 3 drops both ear canals twice a day from Sun PM through Wed AM for cerumen impaction    Electronically signed by  ESPERANZA Fuentes CNP

## 2024-08-14 ENCOUNTER — ASSISTED LIVING VISIT (OUTPATIENT)
Dept: GERIATRICS | Facility: CLINIC | Age: 74
End: 2024-08-14
Payer: COMMERCIAL

## 2024-08-14 VITALS
TEMPERATURE: 98.1 F | DIASTOLIC BLOOD PRESSURE: 86 MMHG | WEIGHT: 102.8 LBS | RESPIRATION RATE: 20 BRPM | HEART RATE: 88 BPM | OXYGEN SATURATION: 97 % | BODY MASS INDEX: 18.8 KG/M2 | SYSTOLIC BLOOD PRESSURE: 178 MMHG

## 2024-08-14 DIAGNOSIS — R45.1 PSYCHOMOTOR RESTLESSNESS: ICD-10-CM

## 2024-08-14 DIAGNOSIS — L97.519 ULCER OF TOE OF RIGHT FOOT, UNSPECIFIED ULCER STAGE (H): ICD-10-CM

## 2024-08-14 DIAGNOSIS — H61.23 BILATERAL IMPACTED CERUMEN: Primary | ICD-10-CM

## 2024-08-14 DIAGNOSIS — G31.9 NEURODEGENERATIVE DISORDER (H): ICD-10-CM

## 2024-08-14 PROCEDURE — 99349 HOME/RES VST EST MOD MDM 40: CPT | Mod: 25 | Performed by: NURSE PRACTITIONER

## 2024-08-14 PROCEDURE — 69209 REMOVE IMPACTED EAR WAX UNI: CPT | Mod: RT | Performed by: NURSE PRACTITIONER

## 2024-08-14 NOTE — LETTER
8/14/2024      Kylah Britt  [address]  [facility] [address]        [facility] GERIATRICS  ACUTE/EPISODIC VISIT    [facility] Medical Record Number:  [id number]  Place of Service where encounter took place:  [facility] [[id number]]    Chief Complaint   Patient presents with     RECHECK       HPI:    Kylah Britt is a 73 year old  (1950), who is being seen today for an episodic care visit.  HPI information obtained from: facility chart records, facility staff, patient report, and [facility] chart review.    Today's concern is:    Diagnoses         Codes Comments    Bilateral impacted cerumen    -  Primary H61.23     Ulcer of toe of right foot, unspecified ulcer stage (H)     L97.519     Neurodegenerative disorder (H24)     G31.9     Psychomotor restlessness     R45.1           Came to see Anamaria today as attempting to clean the cerumen out of her ears as she received a few days of Debrox ear drops to soften the wax.    Anamaria was in her recliner.  Nursing was going to come up if needing to flush her ears and help with the process.  Had Anamaria move her chair so she could be on the edge closer but sitting up higher instead of reclined.  Both ears full of cerumen and so asked nursing to come up to help.      Was also asked to look at the ulcer in between her right 4th and 5th toe today as home care wants to change the treatment as current one not working.      ALLERGIES:    Allergies   Allergen Reactions     No Known Drug Allergy         MEDICATIONS:  Post Discharge Medication Reconciliation Status: patient was not discharged from an inpatient facility or TCU.     Current Outpatient Medications   Medication Sig Dispense Refill     acetaminophen (TYLENOL) 500 MG tablet Take 1,000 mg by mouth 3 times daily And 1000mg daily PRN       baclofen (LIORESAL) 10 MG tablet Take 10 mg by mouth 2 times daily 3pm and 9pm, takes 5mg every morning       Calcium Carbonate  (CALCIUM 600 PO) Take 1 tablet by mouth every other day       diclofenac (VOLTAREN) 1 % topical gel Apply 4 g topically 4 times daily Apply to knees       FLUoxetine (PROZAC) 10 MG capsule Take 2 capsules (20 mg) by mouth daily       gabapentin (NEURONTIN) 100 MG capsule 100mg po in AM and at 12N, 200mg po at bedtime and 100mg po every 4 hours as needed for RLS or pain 120 capsule 4     Lidocaine (LIDOCARE) 4 % Patch Place 1 patch onto the skin every 24 hours To prevent lidocaine toxicity, patient should be patch free for 12 hrs daily. Apply to L hip       LORazepam (ATIVAN) 0.5 MG tablet Take 1 tablet (0.5 mg) by mouth every 6 hours as needed for anxiety or agitation 10 tablet 5     melatonin 3 MG tablet Take 3 tablets (9 mg) by mouth At Bedtime       QUEtiapine (SEROQUEL) 25 MG tablet 12.5mg in AM and 25mg at HS 30 tablet 5     SENNA-docusate sodium (SENNA S) 8.6-50 MG tablet Take 1 tablet by mouth 2 times daily       traMADol (ULTRAM) 50 MG tablet Take 0.5 tablets (25 mg) by mouth 2 times daily 30 tablet 0     traZODone (DESYREL) 50 MG tablet Take 1 tablet (50 mg) by mouth At Bedtime 31 tablet 11     vitamin D3 (CHOLECALCIFEROL) 50 mcg (2000 units) tablet Take 50 mcg by mouth every other day         REVIEW OF SYSTEMS:  4 point ROS neg other than the symptoms noted above in the HPI.      PHYSICAL EXAM:  BP (!) 178/86   Pulse 88   Temp 98.1  F (36.7  C)   Resp 20   Wt 46.6 kg (102 lb 12.8 oz)   SpO2 97%   BMI 18.80 kg/m    Anamaria is alert and oriented to herself with time place being vague.  Able to follow directions as given in order to reposition herself to look in her ears.  Heart rate was regular and strong.  No edema in her lower extremities  Lungs were clear with good expansion upon command.  Does not use oxygen  Abdomen is flat, soft, nontender to touch with bowel sounds present    With the elephant ear wash system attempted to flush both the ears with warm water and steady pressure from the nozzle of  the ear wash.  Only got cerumen out of the right ear today and nothing out of the left ear.  After looking back in her left ear could see that she could use a little bit more Debrox solution and then another attempt at flushing.  Right tympanic membrane is pearly gray.  Scant amount of cerumen on the walls better than what it was before    Found the dressing to the right ulcer on the bottom side of of her foot.  Nothing was covering the ulcer.  Because she moves her toes a lot and they contract and get tight as well, feel it is very hard to secure a dressing between the toes.  There is a small opening approximately 2 to 3 mm round with the edges macerated. Probably 1-2mm in depth.  No particular order except for perspiration in between the toes.  Base of wound is gray.      ASSESSMENT / PLAN:  (H61.23) Bilateral impacted cerumen  (primary encounter diagnosis)  Comment: At this time only able to remove the right ear cerumen and came up with a plan for the left ear to get flushed out tomorrow because this nurse practitioner will be back in the building.  Giving orders today for Debrox 3 drops to the left ear twice today and tomorrow morning.  Will come back to look in her ear again and see if she needs flushing or if able to remove with tools.  Plan: MI REMOVAL IMPACTED CERUMEN IRRIGATION/LVG         UNILAT    (L97.519) Ulcer of toe of right foot, unspecified ulcer stage (H)  Comment: Will give an order today stating that it is okay for home care to make the decision of changing the dressing treatment and sending orders over to this nurse practitioner to sign.  They have a thought already of what they would like to use and anything is worth a try.  Unfortunately it is very difficult place to have an opening with Anamaria's neurodegenerative disorder.  For now we will DC current treatment.  Will have staff put a piece of dry gauze between her toes and check daily.  Would like the aides to be able to put the gauze back in  place if it also slides out when her doing her cares.  Purpose of this is just to hopefully dry out the ulcer area before the new treatments get started.  Home care should be here on Thursday    (G31.9) Neurodegenerative disorder (H24)  (R45.1) Psychomotor restlessness  Comment: Remains on baclofen 5 mg every morning and then 10 mg in the afternoon and at bedtime.  She also has discomfort in her legs and when she does take gabapentin.  She has been needing more assist for safety in her apartment.  Unfortunately her apartment is way at the end of the building.  Staff to walk with her to and from meals with her walker and transfer belt.  Family is looking into possibly having her be down in memory care where shorter distances for her to ambulate but there is also more assistance for her versus being in an apartment by herself down at the end of a long hallway.    Orders:  Debrox 3 drops to the left ear twice today and tomorrow morning.  NP will come back tomorrow to flush her left ear out for cerumen impaction  Discontinued current treatment to between the toes of the fourth and fifth on right foot  Please place a dry gauze between the toes to help wick away moisture and let that ulcer area dry out from maceration.  Please update home care that it is okay for them to change the dressing treatment and try something else.    Electronically signed by  ESPERANZA Fuentes CNP            Sincerely,        ESPERANZA Fuentes CNP

## 2024-08-14 NOTE — PROGRESS NOTES
[facility] GERIATRICS  ACUTE/EPISODIC VISIT    [facility] Medical Record Number:  [id number]  Place of Service where encounter took place:  [facility] ([facility] [[id number]]    Chief Complaint   Patient presents with    RECHECK       HPI:    Kylah Britt is a 73 year old  (1950), who is being seen today for an episodic care visit.  HPI information obtained from: facility chart records, facility staff, patient report, and [facility] chart review.    Today's concern is:    Diagnoses         Codes Comments    Bilateral impacted cerumen    -  Primary H61.23     Ulcer of toe of right foot, unspecified ulcer stage (H)     L97.519     Neurodegenerative disorder (H24)     G31.9     Psychomotor restlessness     R45.1           Came to see Anamaria today as attempting to clean the cerumen out of her ears as she received a few days of Debrox ear drops to soften the wax.    Anamaria was in her recliner.  Nursing was going to come up if needing to flush her ears and help with the process.  Had Anamaria move her chair so she could be on the edge closer but sitting up higher instead of reclined.  Both ears full of cerumen and so asked nursing to come up to help.      Was also asked to look at the ulcer in between her right 4th and 5th toe today as home care wants to change the treatment as current one not working.      ALLERGIES:    Allergies   Allergen Reactions    No Known Drug Allergy         MEDICATIONS:  Post Discharge Medication Reconciliation Status: patient was not discharged from an inpatient facility or TCU.     Current Outpatient Medications   Medication Sig Dispense Refill    acetaminophen (TYLENOL) 500 MG tablet Take 1,000 mg by mouth 3 times daily And 1000mg daily PRN      baclofen (LIORESAL) 10 MG tablet Take 10 mg by mouth 2 times daily 3pm and 9pm, takes 5mg every morning      Calcium Carbonate (CALCIUM 600 PO) Take 1 tablet by mouth every other day      diclofenac  (VOLTAREN) 1 % topical gel Apply 4 g topically 4 times daily Apply to knees      FLUoxetine (PROZAC) 10 MG capsule Take 2 capsules (20 mg) by mouth daily      gabapentin (NEURONTIN) 100 MG capsule 100mg po in AM and at 12N, 200mg po at bedtime and 100mg po every 4 hours as needed for RLS or pain 120 capsule 4    Lidocaine (LIDOCARE) 4 % Patch Place 1 patch onto the skin every 24 hours To prevent lidocaine toxicity, patient should be patch free for 12 hrs daily. Apply to L hip      LORazepam (ATIVAN) 0.5 MG tablet Take 1 tablet (0.5 mg) by mouth every 6 hours as needed for anxiety or agitation 10 tablet 5    melatonin 3 MG tablet Take 3 tablets (9 mg) by mouth At Bedtime      QUEtiapine (SEROQUEL) 25 MG tablet 12.5mg in AM and 25mg at HS 30 tablet 5    SENNA-docusate sodium (SENNA S) 8.6-50 MG tablet Take 1 tablet by mouth 2 times daily      traMADol (ULTRAM) 50 MG tablet Take 0.5 tablets (25 mg) by mouth 2 times daily 30 tablet 0    traZODone (DESYREL) 50 MG tablet Take 1 tablet (50 mg) by mouth At Bedtime 31 tablet 11    vitamin D3 (CHOLECALCIFEROL) 50 mcg (2000 units) tablet Take 50 mcg by mouth every other day         REVIEW OF SYSTEMS:  4 point ROS neg other than the symptoms noted above in the HPI.      PHYSICAL EXAM:  BP (!) 178/86   Pulse 88   Temp 98.1  F (36.7  C)   Resp 20   Wt 46.6 kg (102 lb 12.8 oz)   SpO2 97%   BMI 18.80 kg/m    Anamaria is alert and oriented to herself with time place being vague.  Able to follow directions as given in order to reposition herself to look in her ears.  Heart rate was regular and strong.  No edema in her lower extremities  Lungs were clear with good expansion upon command.  Does not use oxygen  Abdomen is flat, soft, nontender to touch with bowel sounds present    With the elephant ear wash system attempted to flush both the ears with warm water and steady pressure from the nozzle of the ear wash.  Only got cerumen out of the right ear today and nothing out of the  left ear.  After looking back in her left ear could see that she could use a little bit more Debrox solution and then another attempt at flushing.  Right tympanic membrane is pearly gray.  Scant amount of cerumen on the walls better than what it was before    Found the dressing to the right ulcer on the bottom side of of her foot.  Nothing was covering the ulcer.  Because she moves her toes a lot and they contract and get tight as well, feel it is very hard to secure a dressing between the toes.  There is a small opening approximately 2 to 3 mm round with the edges macerated. Probably 1-2mm in depth.  No particular order except for perspiration in between the toes.  Base of wound is gray.      ASSESSMENT / PLAN:  (H61.23) Bilateral impacted cerumen  (primary encounter diagnosis)  Comment: At this time only able to remove the right ear cerumen and came up with a plan for the left ear to get flushed out tomorrow because this nurse practitioner will be back in the building.  Giving orders today for Debrox 3 drops to the left ear twice today and tomorrow morning.  Will come back to look in her ear again and see if she needs flushing or if able to remove with tools.  Plan: [place] REMOVAL IMPACTED CERUMEN IRRIGATION/LVG         UNILAT    (L97.519) Ulcer of toe of right foot, unspecified ulcer stage (H)  Comment: Will give an order today stating that it is okay for home care to make the decision of changing the dressing treatment and sending orders over to this nurse practitioner to sign.  They have a thought already of what they would like to use and anything is worth a try.  Unfortunately it is very difficult place to have an opening with Anamaria's neurodegenerative disorder.  For now we will DC current treatment.  Will have staff put a piece of dry gauze between her toes and check daily.  Would like the aides to be able to put the gauze back in place if it also slides out when her doing her cares.  Purpose of this is just to  hopefully dry out the ulcer area before the new treatments get started.  Home care should be here on Thursday    (G31.9) Neurodegenerative disorder (H24)  (R45.1) Psychomotor restlessness  Comment: Remains on baclofen 5 mg every morning and then 10 mg in the afternoon and at bedtime.  She also has discomfort in her legs and when she does take gabapentin.  She has been needing more assist for safety in her apartment.  Unfortunately her apartment is way at the end of the building.  Staff to walk with her to and from meals with her walker and transfer belt.  Family is looking into possibly having her be down in memory care where shorter distances for her to ambulate but there is also more assistance for her versus being in an apartment by herself down at the end of a long hallway.    Orders:  Debrox 3 drops to the left ear twice today and tomorrow morning.  NP will come back tomorrow to flush her left ear out for cerumen impaction  Discontinued current treatment to between the toes of the fourth and fifth on right foot  Please place a dry gauze between the toes to help wick away moisture and let that ulcer area dry out from maceration.  Please update home care that it is okay for them to change the dressing treatment and try something else.    Electronically signed by  ESPERANZA Fuentes CNP

## 2024-08-15 ENCOUNTER — ASSISTED LIVING VISIT (OUTPATIENT)
Dept: GERIATRICS | Facility: CLINIC | Age: 74
End: 2024-08-15
Payer: COMMERCIAL

## 2024-08-15 VITALS
OXYGEN SATURATION: 97 % | HEART RATE: 88 BPM | TEMPERATURE: 98.1 F | SYSTOLIC BLOOD PRESSURE: 178 MMHG | DIASTOLIC BLOOD PRESSURE: 86 MMHG | RESPIRATION RATE: 20 BRPM | WEIGHT: 102.8 LBS | BODY MASS INDEX: 18.8 KG/M2

## 2024-08-15 DIAGNOSIS — H61.22 IMPACTED CERUMEN OF LEFT EAR: Primary | ICD-10-CM

## 2024-08-15 DIAGNOSIS — R26.9 ABNORMAL GAIT: ICD-10-CM

## 2024-08-15 DIAGNOSIS — R45.1 PSYCHOMOTOR RESTLESSNESS: ICD-10-CM

## 2024-08-15 DIAGNOSIS — K21.00 GASTROESOPHAGEAL REFLUX DISEASE WITH ESOPHAGITIS WITHOUT HEMORRHAGE: ICD-10-CM

## 2024-08-15 DIAGNOSIS — G31.9 NEURODEGENERATIVE DISORDER (H): ICD-10-CM

## 2024-08-15 PROCEDURE — 69209 REMOVE IMPACTED EAR WAX UNI: CPT | Mod: LT | Performed by: NURSE PRACTITIONER

## 2024-08-15 PROCEDURE — 99349 HOME/RES VST EST MOD MDM 40: CPT | Mod: 25 | Performed by: NURSE PRACTITIONER

## 2024-08-15 NOTE — LETTER
" 8/15/2024      Kylah Britt  [address]  [facility] [address]        [facility] GERIATRICS  ACUTE/EPISODIC VISIT    [facility] Medical Record Number:  [id number]  Place of Service where encounter took place:  [facility] [[id number]]    Chief Complaint   Patient presents with     RECHECK       HPI:    Kylah Britt is a 73 year old  (1950), who is being seen today for an episodic care visit.  HPI information obtained from: facility chart records, facility staff, patient report, and [facility] chart review.    Today's concern is:    Diagnoses         Codes Comments    Impacted cerumen of left ear    -  Primary H61.22     Neurodegenerative disorder (H24)     G31.9     Psychomotor restlessness     R45.1     Abnormal gait     R26.9     Gastroesophageal reflux disease with esophagitis without hemorrhage     K21.00           Came back to see Anamaria today to flush out the left ear from cerumen after receiving 3 more doses of Debrox.  Nursing stated she was waiting all morning to get her ear cleaned out.  Nursing came along again today just because it seemed to be difficult to get the cerumen even out of the right ear yesterday.  Anamaria's canal seems to be crooked and so trying not to hurt her with lavaging or even using the tools to pull out the cerumen along the walls of the canal.    Did see staff ambulate with Anamaria today after lunch.  She puts on her pendant to notify staff when she is ready to be escorted back to her room.  Walking alone would be very difficult for her as staff hold onto the transfer belt to walk beside her as she is pushing the 2 wheeled walker.  Her gait is very \"choppy\".  No smooth steps    ALLERGIES:    Allergies   Allergen Reactions     No Known Drug Allergy         MEDICATIONS:  Post Discharge Medication Reconciliation Status: patient was not discharged from an inpatient facility or TCU.     Current Outpatient Medications "   Medication Sig Dispense Refill     acetaminophen (TYLENOL) 500 MG tablet Take 1,000 mg by mouth 3 times daily And 1000mg daily PRN       baclofen (LIORESAL) 10 MG tablet Take 10 mg by mouth 2 times daily 3pm and 9pm, takes 5mg every morning       busPIRone (BUSPAR) 5 MG tablet 5mg po 3x a day for 10 days, then 5mg po twice a day for 10 days then 5mg po daily x4 days then discontinue. 54 tablet 0     Calcium Carbonate (CALCIUM 600 PO) Take 1 tablet by mouth every other day       diclofenac (VOLTAREN) 1 % topical gel Apply 4 g topically 4 times daily Apply to knees       FLUoxetine (PROZAC) 10 MG capsule Take 2 capsules (20 mg) by mouth daily       gabapentin (NEURONTIN) 100 MG capsule 100mg po in AM and at 12N, 200mg po at bedtime and 100mg po every 4 hours as needed for RLS or pain 120 capsule 4     Lidocaine (LIDOCARE) 4 % Patch Place 1 patch onto the skin every 24 hours To prevent lidocaine toxicity, patient should be patch free for 12 hrs daily. Apply to L hip       LORazepam (ATIVAN) 0.5 MG tablet Take 1 tablet (0.5 mg) by mouth every 6 hours as needed for anxiety or agitation 10 tablet 5     melatonin 3 MG tablet Take 3 tablets (9 mg) by mouth At Bedtime       QUEtiapine (SEROQUEL) 25 MG tablet 12.5mg in AM and 25mg at HS 30 tablet 5     SENNA-docusate sodium (SENNA S) 8.6-50 MG tablet Take 1 tablet by mouth 2 times daily       traMADol (ULTRAM) 50 MG tablet Take 0.5 tablets (25 mg) by mouth 2 times daily 30 tablet 0     traZODone (DESYREL) 50 MG tablet Take 1 tablet (50 mg) by mouth At Bedtime 31 tablet 11     vitamin D3 (CHOLECALCIFEROL) 50 mcg (2000 units) tablet Take 50 mcg by mouth every other day           REVIEW OF SYSTEMS:  4 point ROS neg other than the symptoms noted above in the HPI.   Denies any chest pain or shortness of breath since yesterday.  Smiles and laughs.      PHYSICAL EXAM:  BP (!) 178/86   Pulse 88   Temp 98.1  F (36.7  C)   Resp 20   Wt 46.6 kg (102 lb 12.8 oz)   SpO2 97%   BMI  18.80 kg/m    Anamaria once again positions herself with her electric recliner so she is sitting up higher without being in a standing position in order to look at her ears.  Do see cerumen in her left canal.  Once again with the elephant ear wash system with steady pressure attempted to flush her left ear canal out could tell that she is given sensitive so most likely probably hitting her eardrum with the water.  Inspected her ear which pulled in a few directions in order to straighten the canal could see that there was cerumen along the wall of the canal and minor amount back by the tympanic membrane.  Decide flush her ears again which did not take too long as she started wincing with facial expression.  Looked in the ear and able to pull out some large amounts of wax along the wall but unfortunately there was some blood where the wax was removed on the canal walls.    Same nurse yusuf and I like can you please bring up some of your dirty laundry K s yesterday was present.  She had already been up to see Anamaria and to look at the area between her fourth and fifth toe.  Looks about the same, still macerated but obviously the gauze was not between the toes.  Do anticipate that the gauze will not stay between her toes because of the fact that she contracts her toes and moves her feet quite a bit.  Anamaria is wearing her special support stockings that hopefully help her prevent from curling her toes as the material is pretty taunt and on the distal end of the stocking.      ASSESSMENT / PLAN:  (H61.22) Impacted cerumen of left ear  (primary encounter diagnosis)  Comment: Able to remove cerumen from the left ear today but plan to use Cortisporin eardrops for a couple of days just to help dry out the eardrum because of the bleeding noted.  Cortisporin eardrops 3 drops to left ear twice a day for 3 days to prevent infection.  Informed Anamaria that now she will not need to have to go back to get her ears cleaned.  But if she feels  that her ears seem to be followed wax or muffled hearing then to let staff know and able to come take a look at her ears again.  Plan: [place] REMOVAL IMPACTED CERUMEN IRRIGATION/LVG         UNILAT    (G31.9) Neurodegenerative disorder (H24)  (R45.1) Psychomotor restlessness  (R26.9) Abnormal gait  Comment: Anamaria is able to manage in her apartment with the help of staff but at times does have to wait until they can arrive.  Reminded her to use her pendant and not be impulsive before they come as want to keep her safe in the environment as she is a big risk of falling.  Obviously the last fall she was in the emergency room and had a fractured nose.    (K21.00) Gastroesophageal reflux disease with esophagitis without hemorrhage  Comment: Currently does not take any medication for GERD.  Denies any burning sensation in her esophagus, coughing at night, or belching.  She is on a lot of medications that could potentially irritate her stomach and so periodically asking her how her stomach is feeling or any symptoms of GERD.    Orders:  Cortisporin eardrops -3 drops twice a day to left ear canal x 3 days to help prevent infection from removing cerumen impaction    Electronically signed by  ESPERANZA Fuentes CNP            Sincerely,        ESPERANZA Fuentes CNP

## 2024-08-15 NOTE — PROGRESS NOTES
"[facility] GERIATRICS  ACUTE/EPISODIC VISIT    [facility] Medical Record Number:  [id number]  Place of Service where encounter took place:  [facility] ([facility] [[id number]]    Chief Complaint   Patient presents with    RECHECK       HPI:    Kylah Britt is a 73 year old  (1950), who is being seen today for an episodic care visit.  HPI information obtained from: facility chart records, facility staff, patient report, and [facility] chart review.    Today's concern is:    Diagnoses         Codes Comments    Impacted cerumen of left ear    -  Primary H61.22     Neurodegenerative disorder (H24)     G31.9     Psychomotor restlessness     R45.1     Abnormal gait     R26.9     Gastroesophageal reflux disease with esophagitis without hemorrhage     K21.00           Came back to see Anamaria today to flush out the left ear from cerumen after receiving 3 more doses of Debrox.  Nursing stated she was waiting all morning to get her ear cleaned out.  Nursing came along again today just because it seemed to be difficult to get the cerumen even out of the right ear yesterday.  Anamaria's canal seems to be crooked and so trying not to hurt her with lavaging or even using the tools to pull out the cerumen along the walls of the canal.    Did see staff ambulate with Anamaria today after lunch.  She puts on her pendant to notify staff when she is ready to be escorted back to her room.  Walking alone would be very difficult for her as staff hold onto the transfer belt to walk beside her as she is pushing the 2 wheeled walker.  Her gait is very \"choppy\".  No smooth steps    ALLERGIES:    Allergies   Allergen Reactions    No Known Drug Allergy         MEDICATIONS:  Post Discharge Medication Reconciliation Status: patient was not discharged from an inpatient facility or TCU.     Current Outpatient Medications   Medication Sig Dispense Refill    acetaminophen (TYLENOL) 500 MG tablet Take 1,000 mg by " mouth 3 times daily And 1000mg daily PRN      baclofen (LIORESAL) 10 MG tablet Take 10 mg by mouth 2 times daily 3pm and 9pm, takes 5mg every morning      busPIRone (BUSPAR) 5 MG tablet 5mg po 3x a day for 10 days, then 5mg po twice a day for 10 days then 5mg po daily x4 days then discontinue. 54 tablet 0    Calcium Carbonate (CALCIUM 600 PO) Take 1 tablet by mouth every other day      diclofenac (VOLTAREN) 1 % topical gel Apply 4 g topically 4 times daily Apply to knees      FLUoxetine (PROZAC) 10 MG capsule Take 2 capsules (20 mg) by mouth daily      gabapentin (NEURONTIN) 100 MG capsule 100mg po in AM and at 12N, 200mg po at bedtime and 100mg po every 4 hours as needed for RLS or pain 120 capsule 4    Lidocaine (LIDOCARE) 4 % Patch Place 1 patch onto the skin every 24 hours To prevent lidocaine toxicity, patient should be patch free for 12 hrs daily. Apply to L hip      LORazepam (ATIVAN) 0.5 MG tablet Take 1 tablet (0.5 mg) by mouth every 6 hours as needed for anxiety or agitation 10 tablet 5    melatonin 3 MG tablet Take 3 tablets (9 mg) by mouth At Bedtime      QUEtiapine (SEROQUEL) 25 MG tablet 12.5mg in AM and 25mg at HS 30 tablet 5    SENNA-docusate sodium (SENNA S) 8.6-50 MG tablet Take 1 tablet by mouth 2 times daily      traMADol (ULTRAM) 50 MG tablet Take 0.5 tablets (25 mg) by mouth 2 times daily 30 tablet 0    traZODone (DESYREL) 50 MG tablet Take 1 tablet (50 mg) by mouth At Bedtime 31 tablet 11    vitamin D3 (CHOLECALCIFEROL) 50 mcg (2000 units) tablet Take 50 mcg by mouth every other day           REVIEW OF SYSTEMS:  4 point ROS neg other than the symptoms noted above in the HPI.   Denies any chest pain or shortness of breath since yesterday.  Smiles and laughs.      PHYSICAL EXAM:  BP (!) 178/86   Pulse 88   Temp 98.1  F (36.7  C)   Resp 20   Wt 46.6 kg (102 lb 12.8 oz)   SpO2 97%   BMI 18.80 kg/m    Anamaria once again positions herself with her electric recliner so she is sitting up higher  without being in a standing position in order to look at her ears.  Do see cerumen in her left canal.  Once again with the elephant ear wash system with steady pressure attempted to flush her left ear canal out could tell that she is given sensitive so most likely probably hitting her eardrum with the water.  Inspected her ear which pulled in a few directions in order to straighten the canal could see that there was cerumen along the wall of the canal and minor amount back by the tympanic membrane.  Decide flush her ears again which did not take too long as she started wincing with facial expression.  Looked in the ear and able to pull out some large amounts of wax along the wall but unfortunately there was some blood where the wax was removed on the canal walls.    Same nurse a and I like can you please bring up some of your dirty laundry K s yesterday was present.  She had already been up to see Anamaria and to look at the area between her fourth and fifth toe.  Looks about the same, still macerated but obviously the gauze was not between the toes.  Do anticipate that the gauze will not stay between her toes because of the fact that she contracts her toes and moves her feet quite a bit.  Anamaria is wearing her special support stockings that hopefully help her prevent from curling her toes as the material is pretty taunt and on the distal end of the stocking.      ASSESSMENT / PLAN:  (H61.22) Impacted cerumen of left ear  (primary encounter diagnosis)  Comment: Able to remove cerumen from the left ear today but plan to use Cortisporin eardrops for a couple of days just to help dry out the eardrum because of the bleeding noted.  Cortisporin eardrops 3 drops to left ear twice a day for 3 days to prevent infection.  Informed Anamaria that now she will not need to have to go back to get her ears cleaned.  But if she feels that her ears seem to be followed wax or muffled hearing then to let staff know and able to come take a  look at her ears again.  Plan: [place] REMOVAL IMPACTED CERUMEN IRRIGATION/LVG         UNILAT    (G31.9) Neurodegenerative disorder (H24)  (R45.1) Psychomotor restlessness  (R26.9) Abnormal gait  Comment: Anamaria is able to manage in her apartment with the help of staff but at times does have to wait until they can arrive.  Reminded her to use her pendant and not be impulsive before they come as want to keep her safe in the environment as she is a big risk of falling.  Obviously the last fall she was in the emergency room and had a fractured nose.    (K21.00) Gastroesophageal reflux disease with esophagitis without hemorrhage  Comment: Currently does not take any medication for GERD.  Denies any burning sensation in her esophagus, coughing at night, or belching.  She is on a lot of medications that could potentially irritate her stomach and so periodically asking her how her stomach is feeling or any symptoms of GERD.    Orders:  Cortisporin eardrops -3 drops twice a day to left ear canal x 3 days to help prevent infection from removing cerumen impaction    Electronically signed by  ESPERANZA Fuentes CNP

## 2024-08-16 DIAGNOSIS — M79.605 PAIN OF LEFT LOWER EXTREMITY: ICD-10-CM

## 2024-08-16 RX ORDER — TRAMADOL HYDROCHLORIDE 50 MG/1
TABLET ORAL
Qty: 15 TABLET | Refills: 0 | OUTPATIENT
Start: 2024-08-16

## 2024-08-28 DIAGNOSIS — M79.605 PAIN OF LEFT LOWER EXTREMITY: ICD-10-CM

## 2024-08-28 RX ORDER — TRAMADOL HYDROCHLORIDE 50 MG/1
25 TABLET ORAL 2 TIMES DAILY
Qty: 30 TABLET | Refills: 5 | Status: SHIPPED | OUTPATIENT
Start: 2024-08-28

## 2024-09-19 ENCOUNTER — ASSISTED LIVING VISIT (OUTPATIENT)
Dept: GERIATRICS | Facility: CLINIC | Age: 74
End: 2024-09-19
Payer: COMMERCIAL

## 2024-09-19 VITALS
HEART RATE: 66 BPM | OXYGEN SATURATION: 95 % | SYSTOLIC BLOOD PRESSURE: 131 MMHG | TEMPERATURE: 97.3 F | DIASTOLIC BLOOD PRESSURE: 56 MMHG | WEIGHT: 112 LBS | RESPIRATION RATE: 16 BRPM | BODY MASS INDEX: 20.49 KG/M2

## 2024-09-19 DIAGNOSIS — G31.9 NEURODEGENERATIVE DISORDER (H): ICD-10-CM

## 2024-09-19 DIAGNOSIS — L97.519 ULCER OF TOE OF RIGHT FOOT, UNSPECIFIED ULCER STAGE (H): Primary | ICD-10-CM

## 2024-09-19 DIAGNOSIS — F01.A3 MILD VASCULAR DEMENTIA WITH MOOD DISTURBANCE (H): ICD-10-CM

## 2024-09-19 DIAGNOSIS — R45.1 PSYCHOMOTOR RESTLESSNESS: ICD-10-CM

## 2024-09-19 DIAGNOSIS — R26.9 ABNORMAL GAIT: ICD-10-CM

## 2024-09-19 PROCEDURE — 99349 HOME/RES VST EST MOD MDM 40: CPT | Performed by: NURSE PRACTITIONER

## 2024-09-19 NOTE — PROGRESS NOTES
[facility] GERIATRICS  ACUTE/EPISODIC VISIT    [facility] Medical Record Number:  [id number]  Place of Service where encounter took place:  [facility] ([facility] [[id number]]    Chief Complaint   Patient presents with    RECHECK       HPI:    Kylah Britt is a 73 year old  (1950), who is being seen today for an episodic care visit.  HPI information obtained from: facility chart records, facility staff, patient report, [facility] chart review, and family/first contact home care nurse report.    Today's concern is:    Diagnoses         Codes Comments    Ulcer of toe of right foot, unspecified ulcer stage (H)    -  Primary L97.519     Neurodegenerative disorder (H24)     G31.9     Psychomotor restlessness     R45.1     Abnormal gait     R26.9     Mild vascular dementia with mood disturbance (H)     F01.A3           Came to see Anamaria today as home care RN asked for this NP to look at the ulcer between her right foot 4th/5th toe.  Treatments being done are not helping.  She has this tiny ulcer between the toes.  Unknown origin.  Toe contracted so hard to get between them.  Now it seems to be a scab on the top and home care thought this NP could remove it.  Did not bring supplies to do that but also question if she should be seen in wound clinic for an evaluation and recommendations.  Pretty soon this will be considered chronic.      Found Anamaria in her new apartment in memory care watching TV.  In her recliner.  Starts pulling on her hair which is a bad habit that she can not stop nor has medication helped to slow her down.  She removed the sock before getting there to see her and then helped put back on the special compression sock that is to help keep her toes straighter.      ALLERGIES:    Allergies   Allergen Reactions    No Known Drug Allergy         MEDICATIONS:  Post Discharge Medication Reconciliation Status: patient was not discharged from an inpatient facility or  TCU.     Current Outpatient Medications   Medication Sig Dispense Refill    acetaminophen (TYLENOL) 500 MG tablet Take 1,000 mg by mouth 3 times daily And 1000mg daily PRN      baclofen (LIORESAL) 10 MG tablet Take 10 mg by mouth 2 times daily 3pm and 9pm, takes 5mg every morning      Calcium Carbonate (CALCIUM 600 PO) Take 1 tablet by mouth every other day      diclofenac (VOLTAREN) 1 % topical gel Apply 4 g topically 4 times daily Apply to knees      FLUoxetine (PROZAC) 10 MG capsule Take 2 capsules (20 mg) by mouth daily      gabapentin (NEURONTIN) 100 MG capsule 100mg po in AM and at 12N, 200mg po at bedtime and 100mg po every 4 hours as needed for RLS or pain 120 capsule 4    Lidocaine (LIDOCARE) 4 % Patch Place 1 patch onto the skin every 24 hours To prevent lidocaine toxicity, patient should be patch free for 12 hrs daily. Apply to L hip      LORazepam (ATIVAN) 0.5 MG tablet Take 1 tablet (0.5 mg) by mouth every 6 hours as needed for anxiety or agitation 10 tablet 5    melatonin 3 MG tablet Take 3 tablets (9 mg) by mouth At Bedtime      QUEtiapine (SEROQUEL) 25 MG tablet 12.5mg in AM and 25mg at HS 30 tablet 5    SENNA-docusate sodium (SENNA S) 8.6-50 MG tablet Take 1 tablet by mouth 2 times daily      traMADol (ULTRAM) 50 MG tablet Take 0.5 tablets (25 mg) by mouth 2 times daily. 30 tablet 5    traZODone (DESYREL) 50 MG tablet Take 1 tablet (50 mg) by mouth At Bedtime 31 tablet 11    vitamin D3 (CHOLECALCIFEROL) 50 mcg (2000 units) tablet Take 50 mcg by mouth every other day         REVIEW OF SYSTEMS:  4 point ROS neg other than the symptoms noted above in the HPI.  No complaints of chest pain or shortness of breath.  Cooperative with exam.      PHYSICAL EXAM:  /56   Pulse 66   Temp 97.3  F (36.3  C)   Resp 16   Wt 50.8 kg (112 lb)   SpO2 95%   BMI 20.49 kg/m    Anamaria is in electric recliner.  Does have some body movements at rest.  Uses a 4WW for mobility with staff walking with her.  Anamaria does  have a leather AFO she wears on the right lower extremity.      Heart rate regular and strong.  No edema.  Lungs are clear with good expansion upon command.  Abdomen is flat, soft, and non-tender.  Skin is pale/pink, dry and warm.  In looking between the toes there is a ulcer that is roughly 0.3cm round.  Dry scab over the top.  Iodine swabs used to dry it out but unfortunately it is in a moist area.  Toes contract so hard to move apart the toes to get a good look at it.        ASSESSMENT / PLAN:  (L97.519) Ulcer of toe of right foot, unspecified ulcer stage (H)  (primary encounter diagnosis)  Comment: recommend that she be seen in the wound clinic at [facility].  They could see or suggest treatment and if they want to see her ongoing.  Text to the daughter about referral being made.  If questions then can text back or call nursing.    (G31.9) Neurodegenerative disorder (H24)  (R45.1) Psychomotor restlessness  (R26.9) Abnormal gait  Comment: now is in memory care where there is more assistance available but also shorter distance to travel to get to her.  She has a nice big apartment and seems to be adjusting.  More activities available to do if she chooses to participate.    Remains on Baclofen for spasms and Gabapentin for pain in limbs.    (F01.A3) Mild vascular dementia with mood disturbance (H)  Comment: Anamaria is now in memory care and feel this will be a good fit for her in many ways.  She does have anxiety and depression.  Trazodone to help sleep and Seroquel to help with her mood/anxiety as well to her psychomotor restlessness.     Orders:  Will refer to wound care clinic.  Pending on referral would have them call the daughter for an appointment but either way will give the information to the daughter.    Electronically signed by  ESPERANZA Fuentes CNP

## 2024-09-19 NOTE — LETTER
9/19/2024      Kylah Britt  [address]  [facility] [address]        [facility] GERIATRICS  ACUTE/EPISODIC VISIT    [facility] Medical Record Number:  [id number]  Place of Service where encounter took place:  [facility] [[id number]]    Chief Complaint   Patient presents with     RECHECK       HPI:    Kylah Britt is a 73 year old  (1950), who is being seen today for an episodic care visit.  HPI information obtained from: facility chart records, facility staff, patient report, [facility] chart review, and family/first contact home care nurse report.    Today's concern is:    Diagnoses         Codes Comments    Ulcer of toe of right foot, unspecified ulcer stage (H)    -  Primary L97.519     Neurodegenerative disorder (H24)     G31.9     Psychomotor restlessness     R45.1     Abnormal gait     R26.9     Mild vascular dementia with mood disturbance (H)     F01.A3           Came to see Anamaria today as home care RN asked for this NP to look at the ulcer between her right foot 4th/5th toe.  Treatments being done are not helping.  She has this tiny ulcer between the toes.  Unknown origin.  Toe contracted so hard to get between them.  Now it seems to be a scab on the top and home care thought this NP could remove it.  Did not bring supplies to do that but also question if she should be seen in wound clinic for an evaluation and recommendations.  Pretty soon this will be considered chronic.      Found Anamaria in her new apartment in memory care watching TV.  In her recliner.  Starts pulling on her hair which is a bad habit that she can not stop nor has medication helped to slow her down.  She removed the sock before getting there to see her and then helped put back on the special compression sock that is to help keep her toes straighter.      ALLERGIES:    Allergies   Allergen Reactions     No Known Drug Allergy         MEDICATIONS:  Post Discharge Medication  Reconciliation Status: patient was not discharged from an inpatient facility or TCU.     Current Outpatient Medications   Medication Sig Dispense Refill     acetaminophen (TYLENOL) 500 MG tablet Take 1,000 mg by mouth 3 times daily And 1000mg daily PRN       baclofen (LIORESAL) 10 MG tablet Take 10 mg by mouth 2 times daily 3pm and 9pm, takes 5mg every morning       Calcium Carbonate (CALCIUM 600 PO) Take 1 tablet by mouth every other day       diclofenac (VOLTAREN) 1 % topical gel Apply 4 g topically 4 times daily Apply to knees       FLUoxetine (PROZAC) 10 MG capsule Take 2 capsules (20 mg) by mouth daily       gabapentin (NEURONTIN) 100 MG capsule 100mg po in AM and at 12N, 200mg po at bedtime and 100mg po every 4 hours as needed for RLS or pain 120 capsule 4     Lidocaine (LIDOCARE) 4 % Patch Place 1 patch onto the skin every 24 hours To prevent lidocaine toxicity, patient should be patch free for 12 hrs daily. Apply to L hip       LORazepam (ATIVAN) 0.5 MG tablet Take 1 tablet (0.5 mg) by mouth every 6 hours as needed for anxiety or agitation 10 tablet 5     melatonin 3 MG tablet Take 3 tablets (9 mg) by mouth At Bedtime       QUEtiapine (SEROQUEL) 25 MG tablet 12.5mg in AM and 25mg at HS 30 tablet 5     SENNA-docusate sodium (SENNA S) 8.6-50 MG tablet Take 1 tablet by mouth 2 times daily       traMADol (ULTRAM) 50 MG tablet Take 0.5 tablets (25 mg) by mouth 2 times daily. 30 tablet 5     traZODone (DESYREL) 50 MG tablet Take 1 tablet (50 mg) by mouth At Bedtime 31 tablet 11     vitamin D3 (CHOLECALCIFEROL) 50 mcg (2000 units) tablet Take 50 mcg by mouth every other day         REVIEW OF SYSTEMS:  4 point ROS neg other than the symptoms noted above in the HPI.  No complaints of chest pain or shortness of breath.  Cooperative with exam.      PHYSICAL EXAM:  /56   Pulse 66   Temp 97.3  F (36.3  C)   Resp 16   Wt 50.8 kg (112 lb)   SpO2 95%   BMI 20.49 kg/m    Anamaria is in electric recliner.  Does have  some body movements at rest.  Uses a 4WW for mobility with staff walking with her.  Anamaria does have a leather AFO she wears on the right lower extremity.      Heart rate regular and strong.  No edema.  Lungs are clear with good expansion upon command.  Abdomen is flat, soft, and non-tender.  Skin is pale/pink, dry and warm.  In looking between the toes there is a ulcer that is roughly 0.3cm round.  Dry scab over the top.  Iodine swabs used to dry it out but unfortunately it is in a moist area.  Toes contract so hard to move apart the toes to get a good look at it.        ASSESSMENT / PLAN:  (L97.519) Ulcer of toe of right foot, unspecified ulcer stage (H)  (primary encounter diagnosis)  Comment: recommend that she be seen in the wound clinic at [facility].  They could see or suggest treatment and if they want to see her ongoing.  Text to the daughter about referral being made.  If questions then can text back or call nursing.    (G31.9) Neurodegenerative disorder (H24)  (R45.1) Psychomotor restlessness  (R26.9) Abnormal gait  Comment: now is in memory care where there is more assistance available but also shorter distance to travel to get to her.  She has a nice big apartment and seems to be adjusting.  More activities available to do if she chooses to participate.    Remains on Baclofen for spasms and Gabapentin for pain in limbs.    (F01.A3) Mild vascular dementia with mood disturbance (H)  Comment: Anamaria is now in memory care and feel this will be a good fit for her in many ways.  She does have anxiety and depression.  Trazodone to help sleep and Seroquel to help with her mood/anxiety as well to her psychomotor restlessness.     Orders:  Will refer to wound care clinic.  Pending on referral would have them call the daughter for an appointment but either way will give the information to the daughter.    Electronically signed by  ESPERANZA Fuentes CNP            Sincerely,        Gina Omalley  ESPERANZA Mejia CNP

## 2024-09-30 ENCOUNTER — HOSPITAL ENCOUNTER (OUTPATIENT)
Dept: WOUND CARE | Facility: CLINIC | Age: 74
Discharge: HOME | End: 2024-09-30
Attending: NURSE PRACTITIONER | Admitting: NURSE PRACTITIONER
Payer: MEDICARE

## 2024-09-30 DIAGNOSIS — L97.519 ULCER OF TOE OF RIGHT FOOT, UNSPECIFIED ULCER STAGE (H): ICD-10-CM

## 2024-09-30 PROCEDURE — G0463 HOSPITAL OUTPT CLINIC VISIT: HCPCS

## 2024-09-30 NOTE — PATIENT INSTRUCTIONS
Today the wound on your right foot at the base of the 4th and 5th toes on the under side is clean.  It is a full thickness wound meaning it goes deeper than the skin level.    Today it measured 0.3 cm L x 0.2 cm W x 0.2 cm D.  The edges are calloused and that may be an issue long term, we will see how it goes but may need to have the podiatrist see you again.    For now new wound care recommendations:  1 Wash the wound with soap and water, saline or a no rinse wound cleanser and dry well.  2 Apply a small dab of the Iodosorb paste, just enough to fill the wound bed.  3 Try for now covering with the small 'dot' band aid, then cover with gauze and a little tape, the dot may stay in place even if the gauze comes off.    4 Change the bandage daily, if it stays in place longer term we can leave it on for up to 72 hours as long as there is no moisture built..    No moisturizers on the site or callous around the wound.    I have you scheduled to return in about 3 weeks on Wednesday 10/23 at 1 pm.  Call with any concerns or questions [phone].  I will send a message to Mariana Mejia via a staff message in [employer] of this same AVS.    Will Silva RN cwocn

## 2024-09-30 NOTE — PROGRESS NOTES
[facility] Wound and Ostomy Clinic    Start of Care in [facility] Wound Clinic: 9/30/2024   Referring Provider: Mariana Mejia CNP dated 9/30/24  Primary Care Provider: Gina Mejia   Wound Location: Ulcer between the 4th and 5th toes at the base.     Wound Clinic Visit: Initial visit today 9/30/24    Reason for Visit:  Assess and treat right foot wound     Subjective:  On arrival to the Wound and Ostomy clinic for her initial visit 9/30/24, with her daughter Lulu, they add to the Wound History below.     Wound History: Pt is a resident at [facility].  Pt was noted to have a wound to her right foot, plantar aspect at the base of the 4th and 5th toes on July 10 2024.  Initial pictures of the wound shown to [facility] nurse at initial clinic visit show a full thickness wound estimated 1 x 1.5 cm with depth, full thickness, red wound base and white macerated edges and periwound skin, daughter confirmed the inner wound was larger than the opening.  Per BIANCA Mejia's direction dry gauze was being packed into the site and it has improved but not healed.  There has been minimal drainage, no signs of infection.  Pt wears a firm leather AFO brace on the right foot which does not make contact with the wound site.  Pt's toes are contracted and it takes some manipulating them to expose the wound well.  Per her daughter and witnessed today by [facility] the pt removes her brace and seldom is wearing in the facility, she also removes her sock and tries to remove the wound dressing.  At night the pt has reported restless lower legs and feet, moving often and socks again come off.      Past Medical History:  Patient Active Problem List   Diagnosis    Tear meniscus knee    CARDIOVASCULAR SCREENING; LDL GOAL LESS THAN 160    Closed Colles' fracture of left radius    Pain of right thigh    Unable to bear weight on right lower extremity    Fall    Osteopenia of necks of both femurs-per DEXA  March 2022    Allodynia-lateral right thigh    Numbness of toes-right, chronic    Strain of right hamstring muscle    Pain    Drug-induced constipation    Lyme disease    Gastroesophageal reflux disease with esophagitis    Adjustment insomnia    Mild vascular dementia with mood disturbance (H)    Osteoporotic fracture of left hip, initial encounter (H)    Abnormal gait    Closed displaced intertrochanteric fracture of left femur with routine healing    Sleep disorder    Status post left hip ORIF with long gamma nail by Dr. Franco on 11/4/2023    ABLA (acute blood loss anemia)    Pain of left lower leg    Facial laceration, initial encounter    Neurodegenerative disorder (H)    Mixed anxiety and depressive disorder    Psychomotor restlessness                  Tobacco Use:     Tobacco Use      Smoking status: Never      Smokeless tobacco: Never    Diabetic: No  HgbA1C:   Hemoglobin A1C   Date Value Ref Range Status   04/12/2002 5.7 4.4 - 6.5 %    Checks Blood Glucose?:  No    Personal/social history:  Lives at [facility] here in [place], has home care services through [facility] with nursing one time weekly.    Objective:   Current treatment plan: Dry gauze secured with taper tape.  Last changed: earlier today.    Wound #1Right foot, plantar aspect within fold at the base of the 5th toe.  Unclear origin, see Assessment for details.  Stage/tissue depth: full thickness  0.3 cm L x 0.2 cm W x 0.2 cm D  Tunneling: none noted  Undermining: none noted  Wound bed type/amount: 100 % pale pink adipose tissue; Not fluctuant  Wound Edges: mostly closed with semi moist to dry callous  Periwound: callous 1.5 cm L x 1.5 cm W with protrusion of approximately 3 mm, approximately 80 % dry and firm and 20 % moist to macerated.  Drainage: none noted but wound bed is moist  Odor: no  Pain: pt denies pain at the site    Photo's from today's initial visit to the Wound and Ostomy clinic 9/30/24.    Dorsalis Pedal Pulse:  palpable: NA doppler: NA phasic  Posterior Tibial Pulse: palpable: NA doppler: NA phasic  Hair growth: present on the lower leg to ankle in small amounts  Capillary Refill: less than 3 seconds  Feet/toes color: pale pink  Nails: wnl, some are slightly long  R Leg: Edema None noted. Ankle circumference NA cm. Calf circumference NA cm.  L Leg: Edema Not assessed this visit. Ankle circumference NA cm. Calf circumference NA cm.    Mobility: Limited, mostly wheelchair bound  Current offloading/footwear: regular sneakers with use of a AFO brace on the right   Sensation: pt denies any neuropathy symptoms at wound clinic but has 'chronic numbness of the right toes' on her Past Medical History list.    Other callusing/areas of concern: none noted on the right foot.    Diet: Specific diet not assessed this visit, per daughter pt has poor to fair appetite, no protein supplements noted    Assessment:  The wound is located at the skin fold of the plantar right 5 th toe to the medial of center.  When pt relaxes her toes it is easy to open the fold and see the wound as in pictures above.  The wound bed initially appeared to have slough present but with saline rinse and use of the wooden end of a cotton tipped applicator wound cleaned out fine and wound be is clean pale pink adipose tissue with depth.  Callous is present all around the wound directly up to the closed wound edges. The distal portion of the callous is pale and has small amount of macerated soft white callous present.  The callous to the proximal is dry and thick.      The wound has all the similar characteristics of a diabetic, aka neuropathic ulcer but pt does not have diabetes as a diagnosis.  Pt denies any neuropathy symptoms at wound clinic but has 'chronic numbness of the right toes' on her Past Medical History list and Neurodegenerative Disorder diagnosis.  Wound has complication of constant intertriginous contact but this does not appear to be the primary  cause.  Pressure is also chronic in the site due to contractures but does not appear like a true pressure ulcer.    Best description is wound of unclear etiology, likely neuropathic in origin complicated by constant and chronic intertriginous contact and toe contractures.      Factors impacting wound healing:   Poor nutrition: inadequate supply of protein, carbohydrates, fatty acids, and trace elements essential for all phases of wound healing.  Teaching on the need for increased protein in diet for healing today at our initial visit.  Delayed healing as part of normal aging process, present  Reduced Blood Supply: inadequate perfusion to heal wound, arterial flow does appear adequate  Medication: NA  Chemotherapy: suppresses the immune system and inflammatory response, NA  Radiotherapy: increases production of free radical which damage cells, NA  Psychological stress: none noted  Obesity: decreases tissue perfusion, NA  Infection: prolongs inflammatory phase, uses vital nutrients, impairs epithelialization and releases toxins, none noted, did start antimicrobial primary dressing today 9/30 at initial visit.  Underlying Disease: toe contractures, dementia,   Maceration: reduces wound tensile strength and inhibits epithelial migration, small amounts present within callousing noted at initial visit to clinic, addressed with start of Iodosorb paste to dry.  Patient compliance, difficult to make pt stop removing socks and dressings and chronic lower leg, feet and toe movements  Unrelieved pressure, NA  Immobility, limited  Substance abuse: NA    Plan:  I feel the pt's right foot wound would benefit from a drying and antimicrobial product to fill dead space, will use Iodosorb to fill in small amounts.  Will try to cover the wound initially with a small Dot band aid, and secure/protect with an additional gauze 2x2 folded and inserted between the 4th and 5th toes, secured with Micropore S tape today.  Goal would be to  change once daily but will ultimately depend on pt leaving the dressing in place  If the callous needs to be removed or debulked I will request DPM referral.  At this time I do not feel any application of emollients or moisturizers is appropriate to the callous as it will likely increase moisture within the skin fold and cause greater damage.    I have pt scheduled to return in about 3 weeks on Wednesday 9/23/24.  I will sent a message to Mariana Mejia via a staff message in Epic of the AVS from today's visit.    Discussed/Education provided: plan of care with rationale;     Topical care: Right foot Plantar aspect within fold at the base of the 5th toe:  1 Wash the wound with soap and water, saline or a no rinse wound cleanser and dry well.  2 Apply a small dab of the Iodosorb paste, just enough to fill the wound bed.  3 Try for now covering with the small 'dot' band aid, then cover with gauze and a little tape, the dot may stay in place even if the gauze comes off.    4 Change the bandage daily, if it stays in place longer term we can leave it on for up to 72 hours as long as there is no moisture build up.    Pt is unable to do self cares, [facility] facility and Home Care nursing able and have been caring for wound.    Additional recommendations: None at this time.    The following discharge instructions were reviewed with and sent home with the patient:  See AVS    The following supplies were sent home with the patient:  Remains of the Idosorb opened today and extra Dot band aids.    Return visit: 10/23/24    Verbal, written, & demonstrative education provided.  Face to face time: approximately 40 minutes  Procedure: DI Silva RN, CWOCN  [phone]

## 2024-10-03 ENCOUNTER — ASSISTED LIVING VISIT (OUTPATIENT)
Dept: GERIATRICS | Facility: CLINIC | Age: 74
End: 2024-10-03
Payer: COMMERCIAL

## 2024-10-03 VITALS
DIASTOLIC BLOOD PRESSURE: 63 MMHG | BODY MASS INDEX: 20.49 KG/M2 | OXYGEN SATURATION: 96 % | RESPIRATION RATE: 18 BRPM | TEMPERATURE: 97 F | HEART RATE: 60 BPM | WEIGHT: 112 LBS | SYSTOLIC BLOOD PRESSURE: 110 MMHG

## 2024-10-03 DIAGNOSIS — M79.605 PAIN OF LEFT LOWER EXTREMITY: ICD-10-CM

## 2024-10-03 DIAGNOSIS — G31.9 NEURODEGENERATIVE DISORDER (H): ICD-10-CM

## 2024-10-03 DIAGNOSIS — R45.1 PSYCHOMOTOR RESTLESSNESS: Primary | ICD-10-CM

## 2024-10-03 PROCEDURE — 99349 HOME/RES VST EST MOD MDM 40: CPT | Performed by: NURSE PRACTITIONER

## 2024-10-03 NOTE — LETTER
10/3/2024      Kylah Britt  [address]  [facility] [address]        [facility] GERIATRICS  ACUTE/EPISODIC VISIT    [facility] Medical Record Number:  [id number]  Place of Service where encounter took place:  [facility] ([facility] [[id number]]    Chief Complaint   Patient presents with     RECHECK       HPI:    Kylah Britt is a 73 year old  (1950), who is being seen today for an episodic care visit.  HPI information obtained from: facility chart records, facility staff, patient report, and [facility] chart review.    Today's concern is:    Diagnoses         Codes Comments    Psychomotor restlessness    -  Primary R45.1     Pain of left lower extremity     M79.605     Neurodegenerative disorder (H)     G31.9           Received a message from nursing that Anamaria has been having more pain during the night  and staff have been giving her PRN Gabapentin.  Asking if a 2am dose could be scheduled.      Reviewed her medications and question if a 2am dose scheduled of Gabapentin would be wise.  If she is sleeping would she be woken up or want to be woken up.  Figured it would be best to increase her bedtime dose.      Found Anamaria in her apartment and her aide was preparing her medications.  Right away Anamaria starts to pull on her hair out of habit? Nerves?  This is something that she just does.    Spoke to her about staff passed on to this NP and she agreed with the pain at night.  Told her the plan of care and made sure she understood that she will still have as needed doses and can still ask.  Maybe the increase HS dose of Gabapentin may sustain her longer at night.      ALLERGIES:    Allergies   Allergen Reactions     No Known Drug Allergy         MEDICATIONS:  Post Discharge Medication Reconciliation Status: medications reconciled today.     Current Outpatient Medications   Medication Sig Dispense Refill     gabapentin (NEURONTIN) 100 MG capsule 100mg po in AM and at  12N, 300mg po at bedtime and 100mg po every 4 hours as needed for RLS or pain       acetaminophen (TYLENOL) 500 MG tablet Take 1,000 mg by mouth 3 times daily And 1000mg daily PRN       baclofen (LIORESAL) 10 MG tablet Take 10 mg by mouth 2 times daily 3pm and 9pm, takes 5mg every morning       Calcium Carbonate (CALCIUM 600 PO) Take 1 tablet by mouth every other day       diclofenac (VOLTAREN) 1 % topical gel Apply 4 g topically 4 times daily Apply to knees       FLUoxetine (PROZAC) 10 MG capsule Take 2 capsules (20 mg) by mouth daily       Lidocaine (LIDOCARE) 4 % Patch Place 1 patch onto the skin every 24 hours To prevent lidocaine toxicity, patient should be patch free for 12 hrs daily. Apply to L hip       LORazepam (ATIVAN) 0.5 MG tablet Take 1 tablet (0.5 mg) by mouth every 6 hours as needed for anxiety or agitation 10 tablet 5     melatonin 3 MG tablet Take 3 tablets (9 mg) by mouth At Bedtime       QUEtiapine (SEROQUEL) 25 MG tablet 12.5mg in AM and 25mg at HS 30 tablet 5     SENNA-docusate sodium (SENNA S) 8.6-50 MG tablet Take 1 tablet by mouth 2 times daily       traMADol (ULTRAM) 50 MG tablet Take 0.5 tablets (25 mg) by mouth 2 times daily. 30 tablet 5     traZODone (DESYREL) 50 MG tablet Take 1 tablet (50 mg) by mouth At Bedtime 31 tablet 11     vitamin D3 (CHOLECALCIFEROL) 50 mcg (2000 units) tablet Take 50 mcg by mouth every other day           REVIEW OF SYSTEMS:  4 point ROS neg other than the symptoms noted above in the HPI.  Denied chest pain, no shortness of breath.        PHYSICAL EXAM:  /63   Pulse 60   Temp 97  F (36.1  C)   Resp 18   Wt 50.8 kg (112 lb)   SpO2 96%   BMI 20.49 kg/m    Sitting in her recliner which is her typical spoke besides getting help to the restroom or when ambulating to the dining room.  No jerking noted in extremities.  Uses her left hand to manipulate her environment.  Right hand appears contracted but then stretches out her hand and uses it to pull on the  blanket.    No edema.  Has a leather AFO for right foot.  Heart rate regular and strong.    Lungs clear with no coughing noted and good expansion.  Skin is pink/pale, warm and dry.      ASSESSMENT / PLAN:  (R45.1) Psychomotor restlessness  (primary encounter diagnosis)  (M79.605) Pain of left lower extremity  (G31.9) Neurodegenerative disorder (H)  Comment: Anamaria recently moved to the memory care unit per advise of facility has she has a hx of falls and impulsivity.  Seems to be adjusting well.  Has a nice big apartment.  Have not heard any complaints and so feel having more eyes on her is better than being at the end of the hallway and taking time for staff to get to her for help.    Anamaria is in agreement with the increase in her Gabapentin from 200mg to 300mg at HS.  Keeping the other two doses the same that are scheduled.  There is a PRN dose available and that will stay the same< 100mg po every 4 hours PRN.  Plan: gabapentin (NEURONTIN) 100 MG capsule     Orders:   Increase HS Gabapentin to 300mg and keep the morning and afternoon dose at 100mg for restless legs and lower extremity pain    Electronically signed by  ESPERANZA Fuentes CNP            Sincerely,        ESPERANZA Fuentes CNP

## 2024-10-03 NOTE — PROGRESS NOTES
[facility] GERIATRICS  ACUTE/EPISODIC VISIT    [facility] Medical Record Number:  [id number]  Place of Service where encounter took place:  [facility] [[id number]]    Chief Complaint   Patient presents with    RECHECK       HPI:    Kylah Britt is a 73 year old  (1950), who is being seen today for an episodic care visit.  HPI information obtained from: facility chart records, facility staff, patient report, and [facility] chart review.    Today's concern is:    Diagnoses         Codes Comments    Psychomotor restlessness    -  Primary R45.1     Pain of left lower extremity     M79.605     Neurodegenerative disorder (H)     G31.9           Received a message from nursing that Anamaria has been having more pain during the night  and staff have been giving her PRN Gabapentin.  Asking if a 2am dose could be scheduled.      Reviewed her medications and question if a 2am dose scheduled of Gabapentin would be wise.  If she is sleeping would she be woken up or want to be woken up.  Figured it would be best to increase her bedtime dose.      Found Anamaria in her apartment and her aide was preparing her medications.  Right away Anamaria starts to pull on her hair out of habit? Nerves?  This is something that she just does.    Spoke to her about staff passed on to this NP and she agreed with the pain at night.  Told her the plan of care and made sure she understood that she will still have as needed doses and can still ask.  Maybe the increase HS dose of Gabapentin may sustain her longer at night.      ALLERGIES:    Allergies   Allergen Reactions    No Known Drug Allergy         MEDICATIONS:  Post Discharge Medication Reconciliation Status: medications reconciled today.     Current Outpatient Medications   Medication Sig Dispense Refill    gabapentin (NEURONTIN) 100 MG capsule 100mg po in AM and at 12N, 300mg po at bedtime and 100mg po every 4 hours as needed for RLS or pain       acetaminophen (TYLENOL) 500 MG tablet Take 1,000 mg by mouth 3 times daily And 1000mg daily PRN      baclofen (LIORESAL) 10 MG tablet Take 10 mg by mouth 2 times daily 3pm and 9pm, takes 5mg every morning      Calcium Carbonate (CALCIUM 600 PO) Take 1 tablet by mouth every other day      diclofenac (VOLTAREN) 1 % topical gel Apply 4 g topically 4 times daily Apply to knees      FLUoxetine (PROZAC) 10 MG capsule Take 2 capsules (20 mg) by mouth daily      Lidocaine (LIDOCARE) 4 % Patch Place 1 patch onto the skin every 24 hours To prevent lidocaine toxicity, patient should be patch free for 12 hrs daily. Apply to L hip      LORazepam (ATIVAN) 0.5 MG tablet Take 1 tablet (0.5 mg) by mouth every 6 hours as needed for anxiety or agitation 10 tablet 5    melatonin 3 MG tablet Take 3 tablets (9 mg) by mouth At Bedtime      QUEtiapine (SEROQUEL) 25 MG tablet 12.5mg in AM and 25mg at HS 30 tablet 5    SENNA-docusate sodium (SENNA S) 8.6-50 MG tablet Take 1 tablet by mouth 2 times daily      traMADol (ULTRAM) 50 MG tablet Take 0.5 tablets (25 mg) by mouth 2 times daily. 30 tablet 5    traZODone (DESYREL) 50 MG tablet Take 1 tablet (50 mg) by mouth At Bedtime 31 tablet 11    vitamin D3 (CHOLECALCIFEROL) 50 mcg (2000 units) tablet Take 50 mcg by mouth every other day           REVIEW OF SYSTEMS:  4 point ROS neg other than the symptoms noted above in the HPI.  Denied chest pain, no shortness of breath.        PHYSICAL EXAM:  /63   Pulse 60   Temp 97  F (36.1  C)   Resp 18   Wt 50.8 kg (112 lb)   SpO2 96%   BMI 20.49 kg/m    Sitting in her recliner which is her typical spoke besides getting help to the restroom or when ambulating to the dining room.  No jerking noted in extremities.  Uses her left hand to manipulate her environment.  Right hand appears contracted but then stretches out her hand and uses it to pull on the blanket.    No edema.  Has a leather AFO for right foot.  Heart rate regular and strong.     Lungs clear with no coughing noted and good expansion.  Skin is pink/pale, warm and dry.      ASSESSMENT / PLAN:  (R45.1) Psychomotor restlessness  (primary encounter diagnosis)  (M79.605) Pain of left lower extremity  (G31.9) Neurodegenerative disorder (H)  Comment: Anamaria recently moved to the memory care unit per advise of facility has she has a hx of falls and impulsivity.  Seems to be adjusting well.  Has a nice big apartment.  Have not heard any complaints and so feel having more eyes on her is better than being at the end of the hallway and taking time for staff to get to her for help.    Anamaria is in agreement with the increase in her Gabapentin from 200mg to 300mg at HS.  Keeping the other two doses the same that are scheduled.  There is a PRN dose available and that will stay the same< 100mg po every 4 hours PRN.  Plan: gabapentin (NEURONTIN) 100 MG capsule     Orders:   Increase HS Gabapentin to 300mg and keep the morning and afternoon dose at 100mg for restless legs and lower extremity pain    Electronically signed by  ESPERANZA Fuentes CNP

## 2024-10-04 RX ORDER — GABAPENTIN 100 MG/1
CAPSULE ORAL
Status: SHIPPED
Start: 2024-10-04

## 2024-10-23 ENCOUNTER — HOSPITAL ENCOUNTER (OUTPATIENT)
Dept: WOUND CARE | Facility: CLINIC | Age: 74
Discharge: HOME | End: 2024-10-23
Attending: NURSE PRACTITIONER | Admitting: NURSE PRACTITIONER
Payer: MEDICARE

## 2024-10-23 PROCEDURE — G0463 HOSPITAL OUTPT CLINIC VISIT: HCPCS

## 2024-10-23 NOTE — PATIENT INSTRUCTIONS
Today the wound on your right foot at the base of the 4th and 5th toes on the under side is clean.  It is nearly closed, only measures 0.1 cm L x 0.1 cm W x 0.1 cm D.    The callous is smaller, less moist where it was very wet, less thick and dry on the side closest to heel.    Even though the wound is nearly closed I recommend to continue with the daily cares as listed below.  I will have you return in two weeks to re evaluate the wound and the callous.    Continued Wound care recommendations:  1 Wash the wound with soap and water, saline or a no rinse wound cleanser and dry well.  2 Apply a small dab of the Iodosorb paste, just enough to fill the wound bed.  3 Try for now covering with the small 'dot' band aid, then cover with gauze and a little tape, the dot may stay in place even if the gauze comes off.    4 Change the bandage daily, if it stays in place longer term we can leave it on for up to 72 hours as long as there is no moisture built..     No moisturizers on the site or callous around the wound.     I have you scheduled to return in 2 weeks on Wednesday 11/6 at 1 pm.  Call with any concerns or questions [phone].  I will send a message to Mariana Mejia via a staff message in [employer] of this same AVS.     Will Silva RN cwocn

## 2024-10-23 NOTE — PROGRESS NOTES
[facility]    Start of Care in [facility]: 9/30/2024   Referring Provider: Mariana Mejia CNP dated 9/30/24  Primary Care Provider: Gina Mejia   Wound Location: Ulcer between the 4th and 5th toes at the base.     Wound Clinic Visit:  Scheduled follow up.    Reason for Visit:  Assess and treat right foot wound     Subjective:  On arrival to the Wound and Ostomy clinic today 10/23/24, with her daughter Lulu, they report the following:  There was some delay in starting the recommended wound cares after our initial visit 9/30/24.  But facility and home care have been doing the recommended cares.  Lulu does not have any information on how well the bandage has been staying in place, with the use of the dot band aid over the wound and idosorb paste and then the gauze 2x2 folded, wedged between the 4th and 5 th toes and secured with additional tape.  Per Lulu the home care nurse contacted her and said they will need to discharge the pt as the wound is closed, this has not happened yet.     Wound History:  Pt is a resident at [facility].  Pt was noted to have a wound to her right foot, plantar aspect at the base of the 4th and 5th toes on July 10 2024.  Initial pictures of the wound shown to [facility] nurse at initial clinic visit show a full thickness wound estimated 1 x 1.5 cm with depth, full thickness, red wound base and white macerated edges and periwound skin, daughter confirmed the inner wound was larger than the opening.  Per BIANCA Mejia's direction dry gauze was being packed into the site and it has improved but not healed.  There has been minimal drainage, no signs of infection.  Pt wears a firm leather AFO brace on the right foot which does not make contact with the wound site.  Pt's toes are contracted and it takes some manipulating them to expose the wound well.  Per her daughter and witnessed at her initial clinic  visit by wo the pt removes her brace and seldom is wearing in the facility, she also removes her sock and tries to remove the wound dressing.  At night the pt has reported restless lower legs and feet, moving often and socks again come off.  At initial clinic visit 9/30 started use of Iodosorb paste, dot band aid and dry 2x2 folded and secured with tape.      Past Medical History:  Patient Active Problem List   Diagnosis    Tear meniscus knee    CARDIOVASCULAR SCREENING; LDL GOAL LESS THAN 160    Closed Colles' fracture of left radius    Pain of right thigh    Unable to bear weight on right lower extremity    Fall    Osteopenia of necks of both femurs-per DEXA March 2022    Allodynia-lateral right thigh    Numbness of toes-right, chronic    Strain of right hamstring muscle    Pain    Drug-induced constipation    Lyme disease    Gastroesophageal reflux disease with esophagitis    Adjustment insomnia    Mild vascular dementia with mood disturbance (H)    Osteoporotic fracture of left hip, initial encounter (H)    Abnormal gait    Closed displaced intertrochanteric fracture of left femur with routine healing    Sleep disorder    Status post left hip ORIF with long gamma nail by Dr. Franco on 11/4/2023    ABLA (acute blood loss anemia)    Pain of left lower leg    Facial laceration, initial encounter    Neurodegenerative disorder (H)    Mixed anxiety and depressive disorder    Psychomotor restlessness                  Tobacco Use:     Tobacco Use      Smoking status: Never      Smokeless tobacco: Never    Diabetic: No  HgbA1C:   Hemoglobin A1C   Date Value Ref Range Status   04/12/2002 5.7 4.4 - 6.5 %    Checks Blood Glucose?:  No    Personal/social history:  Lives at [facility] here in [place], has home care services through [facility] with nursing one time weekly.    Objective:   Current treatment plan:   Right foot 5th toe plantar aspect wound - Iodosorb to wound, covered with dot band aid, secured  with folded gauze 2x2 and tape.  Last changed: Unclear, was open to air with no dressing in place on arrival today.      Wound #1Right foot, plantar aspect within fold at the base of the 5th toe.  Unclear origin, see Assessment for details.  Stage/tissue depth: full thickness  0.1 cm L x 0.1 cm W x 0.1 cm D  Tunneling: none noted  Undermining: none noted  Wound bed type/amount: as able to visualize 100 % granular tissue; Not fluctuant  Wound Edges: mostly closed with thin callous  Periwound: callous 0.7 cm L x 0.7 cm W with protrusion of approximately 2 mm, approximately is now 80 % dry but thin and 20 % hypopigmented but also dry.    Drainage: none noted but wound bed is moist  Odor: no  Pain: pt denies pain at the site    Photo's from today's visit 10/23/24.      Photo's from initial visit to the Wound and Ostomy clinic 9/30/24.    Dorsalis Pedal Pulse: palpable: NA doppler: NA phasic  Posterior Tibial Pulse: palpable: NA doppler: NA phasic  Hair growth: present on the lower leg to ankle in small amounts  Capillary Refill: less than 3 seconds  Feet/toes color: pale pink  Nails: wnl, some are slightly long  R Leg: Edema None noted. Ankle circumference NA cm. Calf circumference NA cm.  L Leg: Edema Not assessed this visit. Ankle circumference NA cm. Calf circumference NA cm.    Mobility: Limited, mostly wheelchair bound  Current offloading/footwear: regular sneakers with use of a AFO brace on the right   Sensation: pt denies any neuropathy symptoms at wound clinic but has 'chronic numbness of the right toes' on her Past Medical History list.    Other callusing/areas of concern: none noted on the right foot.    Diet: Specific diet not assessed this visit, per daughter pt has poor to fair appetite, no protein supplements noted    Assessment:  The wound is located at the skin fold of the plantar right 5 th toe to the medial of center.  When pt relaxes her toes it is easy to open the fold and see the wound as in  pictures above.    The wound opening was not visible initially, had to spread the toe more open today to see small 1 mm diameter open wound with 1 mm of depth.  I probed the site firmly all around the edges with the wooden end of a cotton tipped applicator, was unable to fine any greater depth, unable to find any undermining or tunneling.  Callous is present all around the wound directly up to the closed wound edges.  The callous is more stable, less dry where it was very dry and protuberant, no longer moist on the distal end, remains hypopigmented to the distal but improved.   Total size of the callous is less today by about half and protrudes less today.    Origin discussion from initial visit, saved and will continue to keep in each Progress note.   The wound has all the similar characteristics of a diabetic, aka neuropathic ulcer but pt does not have diabetes as a diagnosis.  Pt denies any neuropathy symptoms at wound clinic but has 'chronic numbness of the right toes' on her Past Medical History list and Neurodegenerative Disorder diagnosis.  Wound has complication of constant intertriginous contact but this does not appear to be the primary cause.  Pressure is also chronic in the site due to contractures but does not appear like a true pressure ulcer.  Best description is wound of unclear etiology, likely neuropathic in origin complicated by constant and chronic intertriginous contact and toe contractures.      Factors impacting wound healing:   Poor nutrition: inadequate supply of protein, carbohydrates, fatty acids, and trace elements essential for all phases of wound healing.  Teaching on the need for increased protein in diet for healing today at our initial visit.  Delayed healing as part of normal aging process, present  Reduced Blood Supply: inadequate perfusion to heal wound, arterial flow does appear adequate  Medication: NA  Chemotherapy: suppresses the immune system and inflammatory response,  NA  Radiotherapy: increases production of free radical which damage cells, NA  Psychological stress: none noted  Obesity: decreases tissue perfusion, NA  Infection: prolongs inflammatory phase, uses vital nutrients, impairs epithelialization and releases toxins, none noted, started antimicrobial primary dressing 9/30 at initial visit.  Underlying Disease: toe contractures, dementia,   Maceration: reduces wound tensile strength and inhibits epithelial migration, none noted, when initially noted at first visit visit addressed with Iodosorb paste and will continue.  Patient compliance, difficult to make pt stop removing socks and dressings and chronic lower leg, feet and toe movements  Unrelieved pressure, NA  Immobility, limited  Substance abuse: NA    Plan:  The wound and the periwound callousing has all improved with the Iodosorb paste and secondary dressings.    Recommend to continue with the same plan of care for another two weeks.  The Iodosorb appears to be be helping as planned with reducing moist callous present and also unexpectedly appears to be keeping dry callous more appropriate moisturized.    I have pt scheduled to return in two weeks for reevaluation of the wound and callous.   I will sent a message to Mariana Mejia via a staff message in Epic of the AVS from today's visit.    Discussed/Education provided: plan of care with rationale.    Topical care: Right foot Plantar aspect within fold at the base of the 5th toe:  1 Wash the wound with soap and water, saline or a no rinse wound cleanser and dry well.  2 Apply a small dab of the Iodosorb paste, just enough to fill the wound bed.  3 Tcover with the small 'dot' band aid, then cover with gauze and a little tape, the dot may stay in place even if the gauze comes off.    4 Change the bandage daily, if it stays in place longer term we can leave it on for up to 72 hours as long as there is no moisture build up.    Pt is unable to do self cares, AL facility  and Home Care nursing able and have been caring for wound.    Additional recommendations: None at this time.    The following discharge instructions were reviewed with and sent home with the patient:  See AVS    The following supplies were sent home with the patient:  Remains of the Idosorb opened today and extra Dot band aids.    Return visit: 11/6/24    Verbal, written, & demonstrative education provided.  Face to face time: approximately 25 minutes  Procedure: DI Silva RN, CWOCN  [phone]

## 2024-11-06 ENCOUNTER — HOSPITAL ENCOUNTER (OUTPATIENT)
Dept: WOUND CARE | Facility: CLINIC | Age: 74
Discharge: HOME | End: 2024-11-06
Attending: NURSE PRACTITIONER | Admitting: NURSE PRACTITIONER
Payer: MEDICARE

## 2024-11-06 PROCEDURE — G0463 HOSPITAL OUTPT CLINIC VISIT: HCPCS

## 2024-11-06 NOTE — PROGRESS NOTES
[facility]    Start of Care in [facility]: 9/30/2024   Referring Provider: Mariana Mejia CNP dated 9/30/24  Primary Care Provider: Gina Mejia   Wound Location: Ulcer between the 4th and 5th toes at the base.     Wound Clinic Visit:  Scheduled follow up.    Reason for Visit:  Assess and treat right foot wound     Subjective:  On arrival to the Wound and Ostomy clinic today 11/6/24, with her daughter Lulu, they report the following:  No new concerns, facility has not seen any open wound so no Iodoform or bandage to the right 5 th toe.       Wound History:  Pt is a resident at [facility].  Pt was noted to have a wound to her right foot, plantar aspect at the base of the 4th and 5th toes on July 10 2024.  Initial pictures of the wound shown to [facility] nurse at initial clinic visit show a full thickness wound estimated 1 x 1.5 cm with depth, full thickness, red wound base and white macerated edges and periwound skin, daughter confirmed the inner wound was larger than the opening.  Per BIANCA Mejia's direction dry gauze was being packed into the site and it has improved but not healed.  There has been minimal drainage, no signs of infection.  Pt wears a firm leather AFO brace on the right foot which does not make contact with the wound site.  Pt's toes are contracted and it takes some manipulating them to expose the wound well.  Per her daughter and witnessed at her initial clinic visit by [facility] the pt removes her brace and seldom is wearing in the facility, she also removes her sock and tries to remove the wound dressing.  At night the pt has reported restless lower legs and feet, moving often and socks again come off.  At initial clinic visit 9/30 started use of Iodosorb paste, dot band aid and dry 2x2 folded and secured with tape.      Past Medical History:  Patient Active Problem List   Diagnosis    Tear meniscus knee     CARDIOVASCULAR SCREENING; LDL GOAL LESS THAN 160    Closed Colles' fracture of left radius    Pain of right thigh    Unable to bear weight on right lower extremity    Fall    Osteopenia of necks of both femurs-per DEXA March 2022    Allodynia-lateral right thigh    Numbness of toes-right, chronic    Strain of right hamstring muscle    Pain    Drug-induced constipation    Lyme disease    Gastroesophageal reflux disease with esophagitis    Adjustment insomnia    Mild vascular dementia with mood disturbance (H)    Osteoporotic fracture of left hip, initial encounter (H)    Abnormal gait    Closed displaced intertrochanteric fracture of left femur with routine healing    Sleep disorder    Status post left hip ORIF with long gamma nail by Dr. Franco on 11/4/2023    ABLA (acute blood loss anemia)    Pain of left lower leg    Facial laceration, initial encounter    Neurodegenerative disorder (H)    Mixed anxiety and depressive disorder    Psychomotor restlessness                  Tobacco Use:     Tobacco Use      Smoking status: Never      Smokeless tobacco: Never    Diabetic: No  HgbA1C:   Hemoglobin A1C   Date Value Ref Range Status   04/12/2002 5.7 4.4 - 6.5 %    Checks Blood Glucose?:  No    Personal/social history:  Lives at [facility] here in [place], has home care services through [facility] with nursing one time weekly.    Objective:   Current treatment plan:   Right foot 5th toe plantar aspect wound - Iodosorb to wound, covered with dot band aid, secured with folded gauze 2x2 and tape.  Last changed: Unclear, was open to air with no dressing in place on arrival today.      Wound #1Right foot, plantar aspect within fold at the base of the 5th toe.  Unclear origin, see Assessment for details.  Stage/tissue depth: full thickness  0 cm L x 0 cm W x 0 cm D  Tunneling: none noted  Undermining: none noted  Wound bed type/amount: no open wound currently; Not fluctuant  Wound Edges: NA no open wound.    Periwound: callous 0.7 cm L x 0.7 cm W with protrusion of approximately 1 mm, approximately is now 95 % dry but thin and 5 % hypopigmented but also dry.    Drainage: none noted, no increased moisture noted between the toes within fold today.   Odor: no  Pain: pt denies pain at the site    Photo's from today's visit 11/6/24.      Photo's from 10/23/24.      Photo's from initial visit to the Wound and Ostomy clinic 9/30/24.    Dorsalis Pedal Pulse: palpable: NA doppler: NA phasic  Posterior Tibial Pulse: palpable: NA doppler: NA phasic  Hair growth: present on the lower leg to ankle in small amounts  Capillary Refill: less than 3 seconds  Feet/toes color: pale pink  Nails: wnl, some are slightly long  R Leg: Edema None noted. Ankle circumference NA cm. Calf circumference NA cm.  L Leg: Edema Not assessed this visit. Ankle circumference NA cm. Calf circumference NA cm.    Mobility: Limited, mostly wheelchair bound  Current offloading/footwear: regular sneakers with use of a AFO brace on the right   Sensation: pt denies any neuropathy symptoms at wound clinic but has 'chronic numbness of the right toes' on her Past Medical History list.    Other callusing/areas of concern: none noted on the right foot.    Diet: Specific diet not assessed this visit, per daughter pt has poor to fair appetite, no protein supplements noted    Assessment:  The wound is located at the skin fold of the plantar right 5 th toe to the medial of center.  When pt relaxes her toes it is easy to open the fold and see the site.    No visible open wound noted on initial assessment.  Cleanse and dried site well with saline and gauze, then additionally probed and dried with dry cotton tipped applicator. Some loose moist callous came off with cotton tipped applicator but no open wound found.  No significant moisture present at the callous or surrounding skin folds of the 4 th and 5 th toes.    Callus about the same in size but remains thin and soft and  dry for the most part with scant hypopigmented areas noted.      Origin discussion from initial visit, saved and will continue to keep in each Progress note.   The wound has all the similar characteristics of a diabetic, aka neuropathic ulcer but pt does not have diabetes as a diagnosis.  Pt denies any neuropathy symptoms at wound clinic but has 'chronic numbness of the right toes' on her Past Medical History list and Neurodegenerative Disorder diagnosis.  Wound has complication of constant intertriginous contact but this does not appear to be the primary cause.  Pressure is also chronic in the site due to contractures but does not appear like a true pressure ulcer.  Best description is wound of unclear etiology, likely neuropathic in origin complicated by constant and chronic intertriginous contact and toe contractures.      Factors impacting wound healing:   Poor nutrition: inadequate supply of protein, carbohydrates, fatty acids, and trace elements essential for all phases of wound healing.  Teaching on the need for increased protein in diet for healing at our initial visit with daughter, reinforced each visit.  Delayed healing as part of normal aging process, present  Reduced Blood Supply: inadequate perfusion to heal wound, arterial flow does appear adequate  Medication: NA  Chemotherapy: suppresses the immune system and inflammatory response, NA  Radiotherapy: increases production of free radical which damage cells, NA  Psychological stress: none noted  Obesity: decreases tissue perfusion, NA  Infection: prolongs inflammatory phase, uses vital nutrients, impairs epithelialization and releases toxins, none noted.  Underlying Disease: toe contractures, dementia,   Maceration: reduces wound tensile strength and inhibits epithelial migration, none noted, when initially noted at first visit visit addressed with Iodosorb paste and will continue.  Patient compliance, difficult to make pt stop removing socks and  dressings and chronic lower leg, feet and toe movements  Unrelieved pressure, NA  Immobility, limited  Substance abuse: NA    Plan:  The wound on the right foot has closed, the callous is well maintained right now with no dressing or ointments.  There is no longer any need for any topical treatments.  Site is an area to monitor often due to location, within skin fold and chronic callous and contractures.  Recommend no moisturizer use and to dry very well after showering.     Discussed/Education provided: plan of care with rationale.    Topical care: Right foot Plantar aspect within fold at the base of the 5th toe:  Open to air.     Pt is unable to do self cares, [facility] facility and Home Care nursing able and have been caring for wound.    Additional recommendations: None at this time.    The following discharge instructions were reviewed with and sent home with the patient:  See AVS    The following supplies were sent home with the patient:  None this visit.    Return visit: None scheduled, daughter knows how to contact us for return visit if needed.    Verbal, written, & demonstrative education provided.  Face to face time: approximately 20 minutes  Procedure: DI Silva RN, CWOCN  [phone]

## 2024-11-06 NOTE — PATIENT INSTRUCTIONS
No open wound noted on the right base of the 5 th toe.  Callus is present but is smaller and softer.    Just keep the site clean and dry, if new wound appears apply the Iodosorb paste and call to schedule a revisit with me in clinic.    [phone].    Will Silva RN cwocn

## 2024-11-07 DIAGNOSIS — M79.605 PAIN OF LEFT LOWER EXTREMITY: ICD-10-CM

## 2024-11-07 DIAGNOSIS — F41.8 MIXED ANXIETY AND DEPRESSIVE DISORDER: ICD-10-CM

## 2024-11-07 RX ORDER — LORAZEPAM 0.5 MG/1
TABLET ORAL
Qty: 30 TABLET | Refills: 5 | Status: SHIPPED | OUTPATIENT
Start: 2024-11-07

## 2024-11-07 RX ORDER — TRAMADOL HYDROCHLORIDE 50 MG/1
TABLET ORAL
Qty: 30 TABLET | Refills: 5 | Status: SHIPPED | OUTPATIENT
Start: 2024-11-07

## 2024-11-07 NOTE — PROGRESS NOTES
"Facility went off a AVS from the WOC-RN at the [facility] and discontinued the PRN Tramadol and scheduled Lorazepam.  Was not to be touched.  In now way shape or form did the paper say \"new orders\"  instructed the fill in staff not to follow the AVS's as not always accurate.    Will send scripts over for the Lorazepam and Tramadol  To the facility pharmacy.    Gina Mejia, ESPERANZA CNP    "

## 2024-11-14 ENCOUNTER — ASSISTED LIVING VISIT (OUTPATIENT)
Dept: GERIATRICS | Facility: CLINIC | Age: 74
End: 2024-11-14
Payer: COMMERCIAL

## 2024-11-14 VITALS
SYSTOLIC BLOOD PRESSURE: 123 MMHG | TEMPERATURE: 98.1 F | RESPIRATION RATE: 18 BRPM | HEART RATE: 84 BPM | WEIGHT: 98 LBS | DIASTOLIC BLOOD PRESSURE: 95 MMHG | BODY MASS INDEX: 17.92 KG/M2 | OXYGEN SATURATION: 97 %

## 2024-11-14 DIAGNOSIS — M79.671 BILATERAL LEG AND FOOT PAIN: ICD-10-CM

## 2024-11-14 DIAGNOSIS — M79.605 BILATERAL LEG AND FOOT PAIN: ICD-10-CM

## 2024-11-14 DIAGNOSIS — M79.604 BILATERAL LEG AND FOOT PAIN: ICD-10-CM

## 2024-11-14 DIAGNOSIS — R45.1 PSYCHOMOTOR RESTLESSNESS: ICD-10-CM

## 2024-11-14 DIAGNOSIS — M79.672 BILATERAL LEG AND FOOT PAIN: ICD-10-CM

## 2024-11-14 DIAGNOSIS — G31.9 NEURODEGENERATIVE DISORDER (H): Primary | ICD-10-CM

## 2024-11-14 DIAGNOSIS — R26.9 ABNORMAL GAIT: ICD-10-CM

## 2024-11-14 PROCEDURE — 99349 HOME/RES VST EST MOD MDM 40: CPT | Performed by: NURSE PRACTITIONER

## 2024-11-14 NOTE — LETTER
11/14/2024      Kylah Britt  [address]  [facility] [address]        [facility] GERIATRICS  ACUTE/EPISODIC VISIT    [facility] Medical Record Number:  [id number]  Place of Service where encounter took place:  [facility] [[id number]]    Chief Complaint   Patient presents with     RECHECK       HPI:    Kylah Britt is a 74 year old  (1950), who is being seen today for an episodic care visit.  HPI information obtained from: facility chart records, facility staff, patient report, and [facility] chart review.    Today's concern is:    Diagnoses         Codes Comments    Neurodegenerative disorder (H)    -  Primary G31.9     Bilateral leg and foot pain     M79.604, M79.605, M79.671, M79.672     Psychomotor restlessness     R45.1     Abnormal gait     R26.9           Nursing had a note for this NP that she is having a lot of lower leg pain to the point she is crying out.      Anamaria now resides in memory care.  Do see her ambulate with her walker and the help from staff.  It looks more painful for her to ambulate.      Entered her apartment and she was in her recliner with the TV on.  She perked up right away and started pulling on her hair and crying as rubbing her lower legs.  States both of them hurt.  Informed her of the medication change that will happen.  She is already on the medication and hopefully will only be tired for the first 1-2 days.  She gets nervous with the start of medications.    ALLERGIES:    Allergies   Allergen Reactions     No Known Drug Allergy         MEDICATIONS:  Post Discharge Medication Reconciliation Status: patient was not discharged from an inpatient facility or TCU.     Current Outpatient Medications   Medication Sig Dispense Refill     gabapentin (NEURONTIN) 100 MG capsule 100mg po in AM and at 12N, 300mg po at bedtime and 100mg po every 4 hours as needed for RLS or pain       acetaminophen (TYLENOL) 500 MG tablet Take  1,000 mg by mouth 3 times daily And 1000mg daily PRN       baclofen (LIORESAL) 10 MG tablet Take 10 mg by mouth 2 times daily 3pm and 9pm, takes 5mg every morning       Calcium Carbonate (CALCIUM 600 PO) Take 1 tablet by mouth every other day       diclofenac (VOLTAREN) 1 % topical gel Apply 4 g topically 4 times daily Apply to knees       FLUoxetine (PROZAC) 10 MG capsule Take 2 capsules (20 mg) by mouth daily       Lidocaine (LIDOCARE) 4 % Patch Place 1 patch onto the skin every 24 hours To prevent lidocaine toxicity, patient should be patch free for 12 hrs daily. Apply to L hip       LORazepam (ATIVAN) 0.5 MG tablet Take 1 tablet (0.5 mg) by mouth every morning. May also take 1 tablet (0.5 mg) every 6 hours as needed for anxiety or agitation. 30 tablet 5     melatonin 3 MG tablet Take 3 tablets (9 mg) by mouth At Bedtime       QUEtiapine (SEROQUEL) 25 MG tablet 12.5mg in AM and 25mg at HS 30 tablet 5     SENNA-docusate sodium (SENNA S) 8.6-50 MG tablet Take 1 tablet by mouth 2 times daily       traMADol (ULTRAM) 50 MG tablet Take 0.5 tablets (25 mg) by mouth 2 times daily. May also take 0.5 tablets (25 mg) every 4 hours as needed for moderate pain. 30 tablet 5     traZODone (DESYREL) 50 MG tablet Take 1 tablet (50 mg) by mouth At Bedtime 31 tablet 11     vitamin D3 (CHOLECALCIFEROL) 50 mcg (2000 units) tablet Take 50 mcg by mouth every other day         REVIEW OF SYSTEMS:  4 point ROS neg other than the symptoms noted above in the HPI.      PHYSICAL EXAM:  BP (!) 123/95   Pulse 84   Temp 98.1  F (36.7  C)   Resp 18   Wt 44.5 kg (98 lb)   SpO2 97%   BMI 17.92 kg/m    Petite female in stature and is also slender.    Lower legs are exposed as her pant legs are pulled up.  Toes are curled  like in spasms.    Skin is pale, dry, and warm to touch.    Heart rate regular and strong.  Lungs are clear.    Abdomen is flat, soft and non-tender.    No recent labs.      ASSESSMENT / PLAN:  (G31.9) Neurodegenerative  disorder (H)  (primary encounter diagnosis)  (M79.604,  M79.605,  M79.671,  M79.672) Bilateral leg and foot pain  (R45.1) Psychomotor restlessness  (R26.9) Abnormal gait  Comment: have chosen to change the Gabapentin as there is room to move with that dose.  Feel her pain is all over during the day now.  Will increase the Gabapentin to 300mg po TID.   Remains on her other medications.  Plan: gabapentin (NEURONTIN) 100 MG capsule        ORDERS:  Increase Gabapentin to 300mg po three time for lower leg pain    Electronically signed by  ESPERANZA Fuentes CNP            Sincerely,        ESPERANZA Fuentes CNP

## 2024-11-14 NOTE — PROGRESS NOTES
[facility] GERIATRICS  ACUTE/EPISODIC VISIT    [facility] Medical Record Number:  [id number]  Place of Service where encounter took place:  [facility] [[id number]]    Chief Complaint   Patient presents with    RECHECK       HPI:    Kylah Britt is a 74 year old  (1950), who is being seen today for an episodic care visit.  HPI information obtained from: facility chart records, facility staff, patient report, and [facility] chart review.    Today's concern is:    Diagnoses         Codes Comments    Neurodegenerative disorder (H)    -  Primary G31.9     Bilateral leg and foot pain     M79.604, M79.605, M79.671, M79.672     Psychomotor restlessness     R45.1     Abnormal gait     R26.9           Nursing had a note for this NP that she is having a lot of lower leg pain to the point she is crying out.      Anamaria now resides in memory care.  Do see her ambulate with her walker and the help from staff.  It looks more painful for her to ambulate.      Entered her apartment and she was in her recliner with the TV on.  She perked up right away and started pulling on her hair and crying as rubbing her lower legs.  States both of them hurt.  Informed her of the medication change that will happen.  She is already on the medication and hopefully will only be tired for the first 1-2 days.  She gets nervous with the start of medications.    ALLERGIES:    Allergies   Allergen Reactions    No Known Drug Allergy         MEDICATIONS:  Post Discharge Medication Reconciliation Status: patient was not discharged from an inpatient facility or TCU.     Current Outpatient Medications   Medication Sig Dispense Refill    gabapentin (NEURONTIN) 100 MG capsule 100mg po in AM and at 12N, 300mg po at bedtime and 100mg po every 4 hours as needed for RLS or pain      acetaminophen (TYLENOL) 500 MG tablet Take 1,000 mg by mouth 3 times daily And 1000mg daily PRN      baclofen (LIORESAL) 10 MG  tablet Take 10 mg by mouth 2 times daily 3pm and 9pm, takes 5mg every morning      Calcium Carbonate (CALCIUM 600 PO) Take 1 tablet by mouth every other day      diclofenac (VOLTAREN) 1 % topical gel Apply 4 g topically 4 times daily Apply to knees      FLUoxetine (PROZAC) 10 MG capsule Take 2 capsules (20 mg) by mouth daily      Lidocaine (LIDOCARE) 4 % Patch Place 1 patch onto the skin every 24 hours To prevent lidocaine toxicity, patient should be patch free for 12 hrs daily. Apply to L hip      LORazepam (ATIVAN) 0.5 MG tablet Take 1 tablet (0.5 mg) by mouth every morning. May also take 1 tablet (0.5 mg) every 6 hours as needed for anxiety or agitation. 30 tablet 5    melatonin 3 MG tablet Take 3 tablets (9 mg) by mouth At Bedtime      QUEtiapine (SEROQUEL) 25 MG tablet 12.5mg in AM and 25mg at HS 30 tablet 5    SENNA-docusate sodium (SENNA S) 8.6-50 MG tablet Take 1 tablet by mouth 2 times daily      traMADol (ULTRAM) 50 MG tablet Take 0.5 tablets (25 mg) by mouth 2 times daily. May also take 0.5 tablets (25 mg) every 4 hours as needed for moderate pain. 30 tablet 5    traZODone (DESYREL) 50 MG tablet Take 1 tablet (50 mg) by mouth At Bedtime 31 tablet 11    vitamin D3 (CHOLECALCIFEROL) 50 mcg (2000 units) tablet Take 50 mcg by mouth every other day         REVIEW OF SYSTEMS:  4 point ROS neg other than the symptoms noted above in the HPI.      PHYSICAL EXAM:  BP (!) 123/95   Pulse 84   Temp 98.1  F (36.7  C)   Resp 18   Wt 44.5 kg (98 lb)   SpO2 97%   BMI 17.92 kg/m    Petite female in stature and is also slender.    Lower legs are exposed as her pant legs are pulled up.  Toes are curled  like in spasms.    Skin is pale, dry, and warm to touch.    Heart rate regular and strong.  Lungs are clear.    Abdomen is flat, soft and non-tender.    No recent labs.      ASSESSMENT / PLAN:  (G31.9) Neurodegenerative disorder (H)  (primary encounter diagnosis)  (M79.604,  M79.605,  M79.671,  M79.672) Bilateral leg  and foot pain  (R45.1) Psychomotor restlessness  (R26.9) Abnormal gait  Comment: have chosen to change the Gabapentin as there is room to move with that dose.  Feel her pain is all over during the day now.  Will increase the Gabapentin to 300mg po TID.   Remains on her other medications.  Plan: gabapentin (NEURONTIN) 100 MG capsule        ORDERS:  Increase Gabapentin to 300mg po three time for lower leg pain    Electronically signed by  ESPERANZA Fuentes CNP

## 2024-11-17 RX ORDER — GABAPENTIN 100 MG/1
CAPSULE ORAL
Status: SHIPPED
Start: 2024-11-14

## 2024-11-27 ENCOUNTER — ASSISTED LIVING VISIT (OUTPATIENT)
Dept: GERIATRICS | Facility: CLINIC | Age: 74
End: 2024-11-27
Payer: COMMERCIAL

## 2024-11-27 VITALS
HEART RATE: 84 BPM | SYSTOLIC BLOOD PRESSURE: 123 MMHG | OXYGEN SATURATION: 97 % | DIASTOLIC BLOOD PRESSURE: 95 MMHG | TEMPERATURE: 98.1 F | RESPIRATION RATE: 18 BRPM | WEIGHT: 98 LBS | BODY MASS INDEX: 17.92 KG/M2

## 2024-11-27 DIAGNOSIS — M79.605 BILATERAL LEG AND FOOT PAIN: ICD-10-CM

## 2024-11-27 DIAGNOSIS — F41.8 MIXED ANXIETY AND DEPRESSIVE DISORDER: ICD-10-CM

## 2024-11-27 DIAGNOSIS — F63.3 HAIR PULLING: ICD-10-CM

## 2024-11-27 DIAGNOSIS — R26.81 UNSTEADY GAIT: ICD-10-CM

## 2024-11-27 DIAGNOSIS — F01.A3 MILD VASCULAR DEMENTIA WITH MOOD DISTURBANCE (H): ICD-10-CM

## 2024-11-27 DIAGNOSIS — M79.671 BILATERAL LEG AND FOOT PAIN: ICD-10-CM

## 2024-11-27 DIAGNOSIS — G31.9 NEURODEGENERATIVE DISORDER (H): Primary | ICD-10-CM

## 2024-11-27 DIAGNOSIS — M79.672 BILATERAL LEG AND FOOT PAIN: ICD-10-CM

## 2024-11-27 DIAGNOSIS — R45.1 PSYCHOMOTOR RESTLESSNESS: ICD-10-CM

## 2024-11-27 DIAGNOSIS — M79.604 BILATERAL LEG AND FOOT PAIN: ICD-10-CM

## 2024-11-27 PROCEDURE — 99349 HOME/RES VST EST MOD MDM 40: CPT | Performed by: NURSE PRACTITIONER

## 2024-11-27 NOTE — LETTER
11/27/2024      Kylah Britt  [address]  [facility] [address]        No notes on file      Sincerely,        ESPERANZA Fuentes CNP       hair.  This time she was not crying, but right away moved the blanket, lifted her pant legs above her knees and rubbed her legs due to tingling/pain.    Spoke about scheduling Lorazepam at 12 N as that is about the time she asks for the PRN dose.  Then would modify her PRN order.    Have also been thinking about changing her pain medicine which includes scheduled tramadol and as needed dosing.  Also is taking tylenol ES three times a day.  Would like to speak with her daughter first before changing her medications.    Had placed a call to the daughter and she called back while still present in building.  Question if Anamaria's anxiety can be driven by pain or her pain drives her anxiety.  Feel that it would be worthwhile to try to change her pain medicine to see if she could be more comfortable and give her a better quality of life.  Family is very well aware of how she is, and thankful for the care she is receiving as there are more eyes on her down in the memory care unit then if she was up in her apartment and down the [place].  Her falls have definitely decreased.  Did do a search in epic to see if she is ever used oxycodone.  At one point she was on 2.5 and 5 mg Of oxycodone.  Question if it may have contributed to encephalopathy and so adjustments were made at that time.  Willing to take the risk and schedule oxycodone while she is here and see how she tolerates as she has not had any recent surgeries or hospitalizations to affect her cognition.    ALLERGIES:    Allergies   Allergen Reactions     No Known Drug Allergy         MEDICATIONS:  Post Discharge Medication Reconciliation Status: patient was not discharged from an inpatient facility or TCU.     Current Outpatient Medications   Medication Sig Dispense Refill     acetaminophen (TYLENOL) 500 MG tablet Take 1,000 mg by mouth 3 times daily And 1000mg daily PRN       baclofen (LIORESAL) 10 MG tablet Take 10 mg by mouth 2 times daily 3pm and 9pm, takes 5mg every  morning       Calcium Carbonate (CALCIUM 600 PO) Take 1 tablet by mouth every other day       diclofenac (VOLTAREN) 1 % topical gel Apply 4 g topically 4 times daily Apply to knees       FLUoxetine (PROZAC) 10 MG capsule Take 2 capsules (20 mg) by mouth daily       gabapentin (NEURONTIN) 100 MG capsule 100mg po in AM and at 12N, 300mg po at bedtime and 100mg po every 4 hours as needed for RLS or pain       Lidocaine (LIDOCARE) 4 % Patch Place 1 patch onto the skin every 24 hours To prevent lidocaine toxicity, patient should be patch free for 12 hrs daily. Apply to L hip       LORazepam (ATIVAN) 0.5 MG tablet Take 1 tablet (0.5 mg) by mouth every morning. May also take 1 tablet (0.5 mg) every 6 hours as needed for anxiety or agitation. 30 tablet 5     melatonin 3 MG tablet Take 3 tablets (9 mg) by mouth At Bedtime       QUEtiapine (SEROQUEL) 25 MG tablet 12.5mg in AM and 25mg at HS 30 tablet 5     SENNA-docusate sodium (SENNA S) 8.6-50 MG tablet Take 1 tablet by mouth 2 times daily       traMADol (ULTRAM) 50 MG tablet Take 0.5 tablets (25 mg) by mouth 2 times daily. May also take 0.5 tablets (25 mg) every 4 hours as needed for moderate pain. 30 tablet 5     traZODone (DESYREL) 50 MG tablet Take 1 tablet (50 mg) by mouth At Bedtime 31 tablet 11     vitamin D3 (CHOLECALCIFEROL) 50 mcg (2000 units) tablet Take 50 mcg by mouth every other day           REVIEW OF SYSTEMS:  4 point ROS neg other than the symptoms noted above in the HPI.      PHYSICAL EXAM:  BP (!) 123/95   Pulse 84   Temp 98.1  F (36.7  C)   Resp 18   Wt 44.5 kg (98 lb)   SpO2 97%   BMI 17.92 kg/m    Anamaria is reclined in her recliner.  Pulling on her hair.    Legs out in front of her.  No redness, no swelling of shins or knees.  Has the pink, leather brace on her right lower leg to help with mobility.  Skin is pink/pale, warm and dry.  No respiratory distress. No oxygen.    Abdomen is soft and non-tender.    Staff ambulate with Anamaria and the walker.       No recent labs      ASSESSMENT / PLAN:  (G31.9) Neurodegenerative disorder (H)  (primary encounter diagnosis)  (R45.1) Psychomotor restlessness  (M79.604,  M79.605,  M79.671,  M79.672) Bilateral leg and foot pain  Comment: decision made to change her over to oxycodone 2.5mg.  will schedule TID and then every 6 hours as needed.  When the oxycodone starts, will have tramadol discontinued.  Monitor for tolerance and sedation.  Plan: oxyCODONE (ROXICODONE) 5 MG tablet    (R26.81) Unsteady gait  Comment: continue to help ambulate to keep her mobility going.      (F41.8) Mixed anxiety and depressive disorder  (F63.3) Hair pulling  (F01.A3) Mild vascular dementia with mood disturbance (H)  Comment: at this time will not change the Lorazepam and keep as it is.  One change with medication is enough to see effectiveness.  Right now feel adding a Lorazepam dose only masks what needs to be addressed.     Orders:   Start oxycodone 2.5mg po TID and 2.5mg every 6 hours PRN for neurodegenerative disorder  Stop Tramadol when oxycodone starts     No changes with the Lorazepam at this time.  Only want to move one medication at a time due to cognition.      Electronically signed by  ESPERANZA Fuentes CNP            Sincerely,        ESPERANZA Fuentes CNP

## 2024-11-27 NOTE — PROGRESS NOTES
[facility] GERIATRICS  ACUTE/EPISODIC VISIT    [facility] Medical Record Number:  [id number]  Place of Service where encounter took place:  [facility] [[id number]]    Chief Complaint   Patient presents with    RECHECK       HPI:    Klyah Britt is a 74 year old  (1950), who is being seen today for an episodic care visit.  HPI information obtained from: {[facility] HPI:749541}.    Today's concern is:      ALLERGIES:    Allergies   Allergen Reactions    No Known Drug Allergy         MEDICATIONS:  Post Discharge Medication Reconciliation Status: {O Med Rec (Provider):576942}. ***    Current Outpatient Medications   Medication Sig Dispense Refill    acetaminophen (TYLENOL) 500 MG tablet Take 1,000 mg by mouth 3 times daily And 1000mg daily PRN      baclofen (LIORESAL) 10 MG tablet Take 10 mg by mouth 2 times daily 3pm and 9pm, takes 5mg every morning      Calcium Carbonate (CALCIUM 600 PO) Take 1 tablet by mouth every other day      diclofenac (VOLTAREN) 1 % topical gel Apply 4 g topically 4 times daily Apply to knees      FLUoxetine (PROZAC) 10 MG capsule Take 2 capsules (20 mg) by mouth daily      gabapentin (NEURONTIN) 100 MG capsule 100mg po in AM and at 12N, 300mg po at bedtime and 100mg po every 4 hours as needed for RLS or pain      Lidocaine (LIDOCARE) 4 % Patch Place 1 patch onto the skin every 24 hours To prevent lidocaine toxicity, patient should be patch free for 12 hrs daily. Apply to L hip      LORazepam (ATIVAN) 0.5 MG tablet Take 1 tablet (0.5 mg) by mouth every morning. May also take 1 tablet (0.5 mg) every 6 hours as needed for anxiety or agitation. 30 tablet 5    melatonin 3 MG tablet Take 3 tablets (9 mg) by mouth At Bedtime      QUEtiapine (SEROQUEL) 25 MG tablet 12.5mg in AM and 25mg at HS 30 tablet 5    SENNA-docusate sodium (SENNA S) 8.6-50 MG tablet Take 1 tablet by mouth 2 times daily      traMADol (ULTRAM) 50 MG tablet Take 0.5 tablets (25 mg)  by mouth 2 times daily. May also take 0.5 tablets (25 mg) every 4 hours as needed for moderate pain. 30 tablet 5    traZODone (DESYREL) 50 MG tablet Take 1 tablet (50 mg) by mouth At Bedtime 31 tablet 11    vitamin D3 (CHOLECALCIFEROL) 50 mcg (2000 units) tablet Take 50 mcg by mouth every other day       Medications reviewed:  Medications reconciled to facility chart and changes were made to reflect current medications as identified as above med list. Below are the changes that were made:   Medications stopped since last EPIC medication reconciliation:   There are no discontinued medications.    Medications started since last [facility] medication reconciliation:  No orders of the defined types were placed in this encounter.    ***    REVIEW OF SYSTEMS:  4 point ROS neg other than the symptoms noted above in the HPI.***  Unable to be obtained due to cognitive impairment or aphasia.   ROS    PHYSICAL EXAM:  BP (!) 123/95   Pulse 84   Temp 98.1  F (36.7  C)   Resp 18   Wt 44.5 kg (98 lb)   SpO2 97%   BMI 17.92 kg/m    Physical Exam      ASSESSMENT / PLAN:  {[facility] DX:157910}    Orders:  ***    Electronically signed by  Lina Guerrero         day      diclofenac (VOLTAREN) 1 % topical gel Apply 4 g topically 4 times daily Apply to knees      FLUoxetine (PROZAC) 10 MG capsule Take 2 capsules (20 mg) by mouth daily      gabapentin (NEURONTIN) 100 MG capsule 100mg po in AM and at 12N, 300mg po at bedtime and 100mg po every 4 hours as needed for RLS or pain      Lidocaine (LIDOCARE) 4 % Patch Place 1 patch onto the skin every 24 hours To prevent lidocaine toxicity, patient should be patch free for 12 hrs daily. Apply to L hip      LORazepam (ATIVAN) 0.5 MG tablet Take 1 tablet (0.5 mg) by mouth every morning. May also take 1 tablet (0.5 mg) every 6 hours as needed for anxiety or agitation. 30 tablet 5    melatonin 3 MG tablet Take 3 tablets (9 mg) by mouth At Bedtime      QUEtiapine (SEROQUEL) 25 MG tablet 12.5mg in AM and 25mg at HS 30 tablet 5    SENNA-docusate sodium (SENNA S) 8.6-50 MG tablet Take 1 tablet by mouth 2 times daily      traMADol (ULTRAM) 50 MG tablet Take 0.5 tablets (25 mg) by mouth 2 times daily. May also take 0.5 tablets (25 mg) every 4 hours as needed for moderate pain. 30 tablet 5    traZODone (DESYREL) 50 MG tablet Take 1 tablet (50 mg) by mouth At Bedtime 31 tablet 11    vitamin D3 (CHOLECALCIFEROL) 50 mcg (2000 units) tablet Take 50 mcg by mouth every other day           REVIEW OF SYSTEMS:  4 point ROS neg other than the symptoms noted above in the HPI.      PHYSICAL EXAM:  BP (!) 123/95   Pulse 84   Temp 98.1  F (36.7  C)   Resp 18   Wt 44.5 kg (98 lb)   SpO2 97%   BMI 17.92 kg/m    Anamaria is reclined in her recliner.  Pulling on her hair.    Legs out in front of her.  No redness, no swelling of shins or knees.  Has the pink, leather brace on her right lower leg to help with mobility.  Skin is pink/pale, warm and dry.  No respiratory distress. No oxygen.    Abdomen is soft and non-tender.    Staff ambulate with Anamaria and the walker.      No recent labs      ASSESSMENT / PLAN:  (G31.9) Neurodegenerative disorder (H)  (primary  encounter diagnosis)  (R45.1) Psychomotor restlessness  (M79.604,  M79.605,  M79.671,  M79.672) Bilateral leg and foot pain  Comment: decision made to change her over to oxycodone 2.5mg.  will schedule TID and then every 6 hours as needed.  When the oxycodone starts, will have tramadol discontinued.  Monitor for tolerance and sedation.  Plan: oxyCODONE (ROXICODONE) 5 MG tablet    (R26.81) Unsteady gait  Comment: continue to help ambulate to keep her mobility going.      (F41.8) Mixed anxiety and depressive disorder  (F63.3) Hair pulling  (F01.A3) Mild vascular dementia with mood disturbance (H)  Comment: at this time will not change the Lorazepam and keep as it is.  One change with medication is enough to see effectiveness.  Right now feel adding a Lorazepam dose only masks what needs to be addressed.     Orders:   Start oxycodone 2.5mg po TID and 2.5mg every 6 hours PRN for neurodegenerative disorder  Stop Tramadol when oxycodone starts     No changes with the Lorazepam at this time.  Only want to move one medication at a time due to cognition.      Electronically signed by  ESPERANZA Fuentes CNP

## 2024-11-29 RX ORDER — OXYCODONE HYDROCHLORIDE 5 MG/1
TABLET ORAL
Qty: 30 TABLET | Refills: 0 | Status: SHIPPED | OUTPATIENT
Start: 2024-11-29

## 2024-12-05 ENCOUNTER — TELEPHONE (OUTPATIENT)
Dept: GERIATRICS | Facility: CLINIC | Age: 74
End: 2024-12-05
Payer: COMMERCIAL

## 2024-12-05 DIAGNOSIS — F41.8 MIXED ANXIETY AND DEPRESSIVE DISORDER: ICD-10-CM

## 2024-12-05 DIAGNOSIS — R45.1 PSYCHOMOTOR RESTLESSNESS: ICD-10-CM

## 2024-12-05 DIAGNOSIS — G31.9 NEURODEGENERATIVE DISORDER (H): Primary | ICD-10-CM

## 2024-12-05 RX ORDER — BACLOFEN 10 MG/1
5 TABLET ORAL 3 TIMES DAILY
Qty: 23 TABLET | Refills: 5 | Status: SHIPPED | OUTPATIENT
Start: 2024-12-05

## 2024-12-05 RX ORDER — QUETIAPINE FUMARATE 25 MG/1
25 TABLET, FILM COATED ORAL 2 TIMES DAILY
Qty: 60 TABLET | Refills: 5 | Status: SHIPPED | OUTPATIENT
Start: 2024-12-05

## 2024-12-05 NOTE — TELEPHONE ENCOUNTER
Call from daughter today to talk about multiple issues and medications.     Hospice care   Daughter interested in hospice care but has many questions for the kind of care they could deliver.  More so about if they provide therapy.  Daughter is interested in having hospice vendor look at chart, determine if she qualifies and then have a information meeting about the program.      2.  Wheelchair  Daughter would like her mom to have a wheelchair as she knows eventually she will need on.  Due to Anamaria's size she would need a true measurement from OT for size and then this NP can do a F2F for one.  Goal would be for Anamaria to still ambulate but W/C for long distances and low enough she can propel with her feet.    3.  Eating  Question how much is Anamaria really eating and where she eats.  Suggest to daughter when she goes in there today to have them start to help feed her.  Daughter ordered special utensils that will come this weekend and hopefully her mom will have better control of her feeding self with weighed spoon.    4.  Pain /mood  Reviewed medications with daughter and use of the PRNs which staff do use on a daily or more basis.  Staff state Anamaria still asks for PRN Lorazepam at noon time.    Spoke about increasing the Seroquel to 25mg BID to see if maybe that calms her more in the daytime and not request the ativan as much????  For now will keep the oxycodone the same order.  She gets this PRN as well.  Must of had a bad Tuesday this week as received a lot of PRNs that day - then backed down.      2.  Baclofen - attempting to back down on this to see if effective.  Started in February of 2024 for her toes curling.  Given her advancement with her neurodegenerative disorder     Scripts sent for new medication    ORDERS;     DECREASE BACLOFEN TO 5MG PO 3 TIMES A DAY FOR MUSCLE SPASMS    2.  INCREASE SEROQUEL TO 25MG PO TWICE A DAY FOR DEPRESSION/ANXIETY    3.  OUTPATIENT OT TO MEASURE FOR APPROPRIATE WHEELCHAIR  SIZE/MEASUREMENT AND UPDATE NP SO F2F CAN BE DONE AND ORDERED    4.  PLEASE HAVE [facility] EVALUATE CHART TO SEE IF APPROPRIATE TO BE ADMITTED TO PROGRAM.  IF SHE IS, THEN FAMILY WOULD LIKE AN INFORMATION MEETING OF THE PROGRAM.    ESPERANZA Fuentes CNP

## 2024-12-10 DIAGNOSIS — G31.9 NEURODEGENERATIVE DISORDER (H): ICD-10-CM

## 2024-12-10 RX ORDER — OXYCODONE HYDROCHLORIDE 5 MG/1
2.5 TABLET ORAL 3 TIMES DAILY
Qty: 45 TABLET | Refills: 0 | Status: SHIPPED | OUTPATIENT
Start: 2024-12-10 | End: 2024-12-11

## 2024-12-11 ENCOUNTER — ASSISTED LIVING VISIT (OUTPATIENT)
Dept: GERIATRICS | Facility: CLINIC | Age: 74
End: 2024-12-11
Payer: COMMERCIAL

## 2024-12-11 VITALS
SYSTOLIC BLOOD PRESSURE: 113 MMHG | HEART RATE: 80 BPM | TEMPERATURE: 97.7 F | BODY MASS INDEX: 22.5 KG/M2 | DIASTOLIC BLOOD PRESSURE: 65 MMHG | WEIGHT: 123 LBS | RESPIRATION RATE: 16 BRPM | OXYGEN SATURATION: 97 %

## 2024-12-11 DIAGNOSIS — F41.8 MIXED ANXIETY AND DEPRESSIVE DISORDER: ICD-10-CM

## 2024-12-11 DIAGNOSIS — R45.1 PSYCHOMOTOR RESTLESSNESS: ICD-10-CM

## 2024-12-11 DIAGNOSIS — G31.9 NEURODEGENERATIVE DISORDER (H): Primary | ICD-10-CM

## 2024-12-11 DIAGNOSIS — R26.9 ABNORMAL GAIT: ICD-10-CM

## 2024-12-11 PROCEDURE — 99349 HOME/RES VST EST MOD MDM 40: CPT | Performed by: NURSE PRACTITIONER

## 2024-12-11 RX ORDER — OXYCODONE HYDROCHLORIDE 5 MG/1
TABLET ORAL
Qty: 60 TABLET | Refills: 0 | Status: SHIPPED | OUTPATIENT
Start: 2024-12-11

## 2024-12-11 NOTE — PROGRESS NOTES
[facility] GERIATRICS  ACUTE/EPISODIC VISIT    [facility] Medical Record Number:  [id number]  Place of Service where encounter took place:  [facility] [[id number]]    Chief Complaint   Patient presents with    RECHECK       HPI:    Kylah Britt is a 74 year old  (1950), who is being seen today for an episodic care visit.  HPI information obtained from: facility chart records, facility staff, and [facility] chart review.  Did contact daughter about changes to medication and she responded back via text that she heard her mom had fallen in last 24 hours.  This NP had not heard that report yet.      Today's concern is:    Diagnoses         Codes Comments    Neurodegenerative disorder (H)    -  Primary G31.9     Psychomotor restlessness     R45.1     Abnormal gait     R26.9     Mixed anxiety and depressive disorder     F41.8           Came to see Anamaria today as wanted to know how she was responding to the the change over from Tramadol to oxycodone.  Prior to seeing Anamaria, spoke to staff in their daily meeting to ask their opinion.  Seems she tolerates the change over and they continue to watch her change with her mobility.  They are aware that family desires her to ambulate to meals and around her room and they will continue what they can.  The family still would want her to have the chance with therapies but are entertaining the thought of hospice as well.      Anamaria was behind closed door in apartment.  Went in and she was sleeping soundly in her recliner.  Did not arouse with this NP standing above her.  Decided not to wake her up and right away she goes into motor restlessness and pulling her hair.      Making decision on her medications based on the use of PRNs.    She continues to ask for a PRN Lorazepam around lunch time.    Has been taking a PRN oxycodone 1-2x a day.  Usually during the night and then around lunch time.      ALLERGIES:    Allergies    Allergen Reactions    No Known Drug Allergy         MEDICATIONS:  Post Discharge Medication Reconciliation Status: patient was not discharged from an inpatient facility or TCU.       Current Outpatient Medications   Medication Sig Dispense Refill    oxyCODONE (ROXICODONE) 5 MG tablet Take 0.5 tablets (2.5 mg) by mouth 4 times daily. May also take 0.5 tablets (2.5 mg) every 6 hours as needed for moderate pain. 60 tablet 0    acetaminophen (TYLENOL) 500 MG tablet Take 1,000 mg by mouth 3 times daily And 1000mg daily PRN      baclofen (LIORESAL) 10 MG tablet Take 0.5 tablets (5 mg) by mouth 3 times daily. 23 tablet 5    Calcium Carbonate (CALCIUM 600 PO) Take 1 tablet by mouth every other day      diclofenac (VOLTAREN) 1 % topical gel Apply 4 g topically 4 times daily Apply to knees      FLUoxetine (PROZAC) 10 MG capsule Take 2 capsules (20 mg) by mouth daily      gabapentin (NEURONTIN) 100 MG capsule 100mg po in AM and at 12N, 300mg po at bedtime and 100mg po every 4 hours as needed for RLS or pain      Lidocaine (LIDOCARE) 4 % Patch Place 1 patch onto the skin every 24 hours To prevent lidocaine toxicity, patient should be patch free for 12 hrs daily. Apply to L hip      LORazepam (ATIVAN) 0.5 MG tablet Take 1 tablet (0.5 mg) by mouth every morning. May also take 1 tablet (0.5 mg) every 6 hours as needed for anxiety or agitation. 30 tablet 5    melatonin 3 MG tablet Take 3 tablets (9 mg) by mouth At Bedtime      QUEtiapine (SEROQUEL) 25 MG tablet Take 1 tablet (25 mg) by mouth 2 times daily. 60 tablet 5    SENNA-docusate sodium (SENNA S) 8.6-50 MG tablet Take 1 tablet by mouth 2 times daily      traZODone (DESYREL) 50 MG tablet Take 1 tablet (50 mg) by mouth At Bedtime 31 tablet 11    vitamin D3 (CHOLECALCIFEROL) 50 mcg (2000 units) tablet Take 50 mcg by mouth every other day       Medications reviewed:  Medications reconciled to facility chart and changes were made to reflect current medications as identified as  above med list. Below are the changes that were made:   Medications stopped since last EPIC medication reconciliation:   Medications Discontinued During This Encounter   Medication Reason    oxyCODONE (ROXICODONE) 5 MG tablet        Medications started since last [facility] medication reconciliation:  Orders Placed This Encounter   Medications    oxyCODONE (ROXICODONE) 5 MG tablet     Sig: Take 0.5 tablets (2.5 mg) by mouth 4 times daily. May also take 0.5 tablets (2.5 mg) every 6 hours as needed for moderate pain.     Dispense:  60 tablet     Refill:  0       REVIEW OF SYSTEMS:  Sleeping and not a muscle is moving.    PHYSICAL EXAM:  /65   Pulse 80   Temp 97.7  F (36.5  C)   Resp 16   Wt 55.8 kg (123 lb)   SpO2 97%   BMI 22.50 kg/m    In her recliner with a blanket over her.  Feet elevated.  On her lap is a paper plate with a 1/2 cut breakfast sandwich not touched.   Skin appears pale.  Mouth open, head tipped slightly to her right.    No audible breath sounds.  No apnea noted.  No edema in lower extremities.    No recent labs.         ASSESSMENT / PLAN:  (G31.9) Neurodegenerative disorder (H)  (primary encounter diagnosis)  (R45.1) Psychomotor restlessness  (R26.9) Abnormal gait  Comment: staff will continue to ambulate with Anamaria as much as they can within the unit and in her apartment.  Will increase her oxycodone 2.5mg to 4x a day scheduled since she gets a PRN around lunch time almost daily.  Will keep the PRN as she gets a dose during the night.  Continue to monitor effectiveness.  Have updated daughter of visit and orders  Plan: oxyCODONE (ROXICODONE) 5 MG tablet      (F41.8) Mixed anxiety and depressive disorder  Comment: remains on Lorazepam 0.5mg po every AM and then has PRN dosing.  Still asking for lunch time medication.  Not going to schedule it at this time.  Would `be nice if pain in more control her anxiety would decline.  Family and staff continue to monitor her mood and behaviors.        Orders:  Increase oxycodone 5mg - give 1/2 tab (2.5mg) by mouth to 4x daily and continue 2.5mg every 6 hours PRN for neurodegenerative disorder.  Lower leg pain.     Electronically signed by  ESPERANZA Fuentes CNP

## 2024-12-11 NOTE — LETTER
12/11/2024      Kylah Britt  [address]  [facility] [address]        [facility] GERIATRICS  ACUTE/EPISODIC VISIT    [facility] Medical Record Number:  [id number]  Place of Service where encounter took place:  [facility] [[id number]]    Chief Complaint   Patient presents with     RECHECK       HPI:    Kylah Britt is a 74 year old  (1950), who is being seen today for an episodic care visit.  HPI information obtained from: facility chart records, facility staff, and [facility] chart review.  Did contact daughter about changes to medication and she responded back via text that she heard her mom had fallen in last 24 hours.  This NP had not heard that report yet.      Today's concern is:    Diagnoses         Codes Comments    Neurodegenerative disorder (H)    -  Primary G31.9     Psychomotor restlessness     R45.1     Abnormal gait     R26.9     Mixed anxiety and depressive disorder     F41.8           Came to see Anamaria today as wanted to know how she was responding to the the change over from Tramadol to oxycodone.  Prior to seeing Anamaria, spoke to staff in their daily meeting to ask their opinion.  Seems she tolerates the change over and they continue to watch her change with her mobility.  They are aware that family desires her to ambulate to meals and around her room and they will continue what they can.  The family still would want her to have the chance with therapies but are entertaining the thought of hospice as well.      Anamaria was behind closed door in apartment.  Went in and she was sleeping soundly in her recliner.  Did not arouse with this NP standing above her.  Decided not to wake her up and right away she goes into motor restlessness and pulling her hair.      Making decision on her medications based on the use of PRNs.    She continues to ask for a PRN Lorazepam around lunch time.    Has been taking a PRN oxycodone 1-2x a day.  Usually  during the night and then around lunch time.      ALLERGIES:    Allergies   Allergen Reactions     No Known Drug Allergy         MEDICATIONS:  Post Discharge Medication Reconciliation Status: patient was not discharged from an inpatient facility or TCU.       Current Outpatient Medications   Medication Sig Dispense Refill     oxyCODONE (ROXICODONE) 5 MG tablet Take 0.5 tablets (2.5 mg) by mouth 4 times daily. May also take 0.5 tablets (2.5 mg) every 6 hours as needed for moderate pain. 60 tablet 0     acetaminophen (TYLENOL) 500 MG tablet Take 1,000 mg by mouth 3 times daily And 1000mg daily PRN       baclofen (LIORESAL) 10 MG tablet Take 0.5 tablets (5 mg) by mouth 3 times daily. 23 tablet 5     Calcium Carbonate (CALCIUM 600 PO) Take 1 tablet by mouth every other day       diclofenac (VOLTAREN) 1 % topical gel Apply 4 g topically 4 times daily Apply to knees       FLUoxetine (PROZAC) 10 MG capsule Take 2 capsules (20 mg) by mouth daily       gabapentin (NEURONTIN) 100 MG capsule 100mg po in AM and at 12N, 300mg po at bedtime and 100mg po every 4 hours as needed for RLS or pain       Lidocaine (LIDOCARE) 4 % Patch Place 1 patch onto the skin every 24 hours To prevent lidocaine toxicity, patient should be patch free for 12 hrs daily. Apply to L hip       LORazepam (ATIVAN) 0.5 MG tablet Take 1 tablet (0.5 mg) by mouth every morning. May also take 1 tablet (0.5 mg) every 6 hours as needed for anxiety or agitation. 30 tablet 5     melatonin 3 MG tablet Take 3 tablets (9 mg) by mouth At Bedtime       QUEtiapine (SEROQUEL) 25 MG tablet Take 1 tablet (25 mg) by mouth 2 times daily. 60 tablet 5     SENNA-docusate sodium (SENNA S) 8.6-50 MG tablet Take 1 tablet by mouth 2 times daily       traZODone (DESYREL) 50 MG tablet Take 1 tablet (50 mg) by mouth At Bedtime 31 tablet 11     vitamin D3 (CHOLECALCIFEROL) 50 mcg (2000 units) tablet Take 50 mcg by mouth every other day       Medications reviewed:  Medications reconciled  to facility chart and changes were made to reflect current medications as identified as above med list. Below are the changes that were made:   Medications stopped since last EPIC medication reconciliation:   Medications Discontinued During This Encounter   Medication Reason     oxyCODONE (ROXICODONE) 5 MG tablet        Medications started since last [facility] medication reconciliation:  Orders Placed This Encounter   Medications     oxyCODONE (ROXICODONE) 5 MG tablet     Sig: Take 0.5 tablets (2.5 mg) by mouth 4 times daily. May also take 0.5 tablets (2.5 mg) every 6 hours as needed for moderate pain.     Dispense:  60 tablet     Refill:  0       REVIEW OF SYSTEMS:  Sleeping and not a muscle is moving.    PHYSICAL EXAM:  /65   Pulse 80   Temp 97.7  F (36.5  C)   Resp 16   Wt 55.8 kg (123 lb)   SpO2 97%   BMI 22.50 kg/m    In her recliner with a blanket over her.  Feet elevated.  On her lap is a paper plate with a 1/2 cut breakfast sandwich not touched.   Skin appears pale.  Mouth open, head tipped slightly to her right.    No audible breath sounds.  No apnea noted.  No edema in lower extremities.    No recent labs.         ASSESSMENT / PLAN:  (G31.9) Neurodegenerative disorder (H)  (primary encounter diagnosis)  (R45.1) Psychomotor restlessness  (R26.9) Abnormal gait  Comment: staff will continue to ambulate with Anamaria as much as they can within the unit and in her apartment.  Will increase her oxycodone 2.5mg to 4x a day scheduled since she gets a PRN around lunch time almost daily.  Will keep the PRN as she gets a dose during the night.  Continue to monitor effectiveness.  Have updated daughter of visit and orders  Plan: oxyCODONE (ROXICODONE) 5 MG tablet      (F41.8) Mixed anxiety and depressive disorder  Comment: remains on Lorazepam 0.5mg po every AM and then has PRN dosing.  Still asking for lunch time medication.  Not going to schedule it at this time.  Would `be nice if pain in more control her  anxiety would decline.  Family and staff continue to monitor her mood and behaviors.       Orders:  Increase oxycodone 5mg - give 1/2 tab (2.5mg) by mouth to 4x daily and continue 2.5mg every 6 hours PRN for neurodegenerative disorder.  Lower leg pain.     Electronically signed by  ESPERANZA Fuentes CNP            Sincerely,        ESPERANZA Fuentes CNP

## 2024-12-30 ENCOUNTER — ASSISTED LIVING VISIT (OUTPATIENT)
Dept: GERIATRICS | Facility: CLINIC | Age: 74
End: 2024-12-30
Payer: COMMERCIAL

## 2024-12-30 VITALS
BODY MASS INDEX: 22.5 KG/M2 | HEART RATE: 60 BPM | WEIGHT: 123 LBS | RESPIRATION RATE: 16 BRPM | SYSTOLIC BLOOD PRESSURE: 157 MMHG | TEMPERATURE: 97.5 F | OXYGEN SATURATION: 96 % | DIASTOLIC BLOOD PRESSURE: 81 MMHG

## 2024-12-30 DIAGNOSIS — F41.8 MIXED ANXIETY AND DEPRESSIVE DISORDER: ICD-10-CM

## 2024-12-30 DIAGNOSIS — G31.9 NEURODEGENERATIVE DISORDER: Primary | ICD-10-CM

## 2024-12-30 DIAGNOSIS — M79.605 BILATERAL LEG AND FOOT PAIN: ICD-10-CM

## 2024-12-30 DIAGNOSIS — R45.1 PSYCHOMOTOR RESTLESSNESS: ICD-10-CM

## 2024-12-30 DIAGNOSIS — M79.604 BILATERAL LEG AND FOOT PAIN: ICD-10-CM

## 2024-12-30 DIAGNOSIS — F01.A3 MILD VASCULAR DEMENTIA WITH MOOD DISTURBANCE (H): ICD-10-CM

## 2024-12-30 DIAGNOSIS — M79.672 BILATERAL LEG AND FOOT PAIN: ICD-10-CM

## 2024-12-30 DIAGNOSIS — M79.671 BILATERAL LEG AND FOOT PAIN: ICD-10-CM

## 2024-12-30 DIAGNOSIS — G31.9 NEURODEGENERATIVE DISORDER (H): ICD-10-CM

## 2024-12-30 PROCEDURE — 99349 HOME/RES VST EST MOD MDM 40: CPT | Performed by: NURSE PRACTITIONER

## 2024-12-30 RX ORDER — OXYCODONE HYDROCHLORIDE 5 MG/1
2.5 TABLET ORAL 4 TIMES DAILY
Qty: 120 TABLET | Refills: 0 | Status: SHIPPED | OUTPATIENT
Start: 2024-12-30

## 2024-12-30 NOTE — PROGRESS NOTES
[facility] GERIATRICS  ACUTE/EPISODIC VISIT    [facility] Medical Record Number:  [id number]  Place of Service where encounter took place:  [facility] [[id number]]    Chief Complaint   Patient presents with    RECHECK       HPI:    Kylah Britt is a 74 year old  (1950), who is being seen today for an episodic care visit.  HPI information obtained from: {[facility] HPI:196346}.    Today's concern is:      ALLERGIES:    Allergies   Allergen Reactions    No Known Drug Allergy         MEDICATIONS:  Post Discharge Medication Reconciliation Status: {O Med Rec (Provider):022329}. ***    Current Outpatient Medications   Medication Sig Dispense Refill    acetaminophen (TYLENOL) 500 MG tablet Take 1,000 mg by mouth 3 times daily And 1000mg daily PRN      baclofen (LIORESAL) 10 MG tablet Take 0.5 tablets (5 mg) by mouth 3 times daily. 23 tablet 5    Calcium Carbonate (CALCIUM 600 PO) Take 1 tablet by mouth every other day      diclofenac (VOLTAREN) 1 % topical gel Apply 4 g topically 4 times daily Apply to knees      FLUoxetine (PROZAC) 10 MG capsule Take 2 capsules (20 mg) by mouth daily      gabapentin (NEURONTIN) 100 MG capsule 100mg po in AM and at 12N, 300mg po at bedtime and 100mg po every 4 hours as needed for RLS or pain      Lidocaine (LIDOCARE) 4 % Patch Place 1 patch onto the skin every 24 hours To prevent lidocaine toxicity, patient should be patch free for 12 hrs daily. Apply to L hip      LORazepam (ATIVAN) 0.5 MG tablet Take 1 tablet (0.5 mg) by mouth every morning. May also take 1 tablet (0.5 mg) every 6 hours as needed for anxiety or agitation. 30 tablet 5    melatonin 3 MG tablet Take 3 tablets (9 mg) by mouth At Bedtime      oxyCODONE (ROXICODONE) 5 MG tablet Take 0.5 tablets (2.5 mg) by mouth 4 times daily. May also take 0.5 tablets (2.5 mg) every 6 hours as needed for moderate pain. 60 tablet 0    QUEtiapine (SEROQUEL) 25 MG tablet Take 1 tablet (25 mg) by  mouth 2 times daily. 60 tablet 5    SENNA-docusate sodium (SENNA S) 8.6-50 MG tablet Take 1 tablet by mouth 2 times daily      traZODone (DESYREL) 50 MG tablet Take 1 tablet (50 mg) by mouth At Bedtime 31 tablet 11    vitamin D3 (CHOLECALCIFEROL) 50 mcg (2000 units) tablet Take 50 mcg by mouth every other day       Medications reviewed:  Medications reconciled to facility chart and changes were made to reflect current medications as identified as above med list. Below are the changes that were made:   Medications stopped since last EPIC medication reconciliation:   There are no discontinued medications.    Medications started since last [facility] medication reconciliation:  No orders of the defined types were placed in this encounter.    ***    REVIEW OF SYSTEMS:  4 point ROS neg other than the symptoms noted above in the HPI.***  Unable to be obtained due to cognitive impairment or aphasia.   ROS    PHYSICAL EXAM:  BP (!) 157/81   Pulse 60   Temp 97.5  F (36.4  C)   Resp 16   Wt 55.8 kg (123 lb)   SpO2 96%   BMI 22.50 kg/m    Physical Exam      ASSESSMENT / PLAN:  {[facility] DX:883429}    Orders:  ***    Electronically signed by  Lina Guerrero         "        REVIEW OF SYSTEMS:  Unable to be obtained due to cognitive impairment or aphasia.  Anamaria became upset immediately      PHYSICAL EXAM:  BP (!) 157/81   Pulse 60   Temp 97.5  F (36.4  C)   Resp 16   Wt 55.8 kg (123 lb)   SpO2 96%   BMI 22.50 kg/m    Sitting in her recliner.  Just had a shower and hair drying naturally but she started to pull at it on the left crown area.  Then starts to pull up the pant legs to look at her legs and rubs them/cries out in pain.    Then she wants help to the bathroom so had her put the pendant light on as she tries to scoot to the end of her recliner \"Can't you help me?\"  \"Technically I can not\" but the aide came.      Wheelchair positioned by the recliner.  Family wants her to be walked as much as she can but Anamaria's gait slowly becoming weaker.  Risk of falling.    Heart rate regular and strong.  Skin is pink, warm and clammy but the wrists.    Lungs are clear with no audible adventitious sounds.      As far as PRN's, she requests and receives about once or twice a day of Lorazepam, gabapentin and oxycodone.  There are maybe a few days that are skipped.        ASSESSMENT / PLAN:  (G31.9) Neurodegenerative disorder  (primary encounter diagnosis)  (R45.1) Psychomotor restlessness  (M79.604,  M79.605,  M79.671,  M79.672) Bilateral leg and foot pain  Comment: taking into consideration of daughter's thoughts, think will let orders stay the same.  She is on oxycodone 2.5mg 4x a day scheduled and will take 1 extra most days.    Gabapentin is 300mg TID and 100mg po PRN.  Will take 1-2x a day extra and sometimes no extras.    Has the Lidocaine patch to left hip for 12 hours a day.  On scheduled Tylenol 1000mg 3x a day.    Had recently decreased the Baclofen to 5mg TID on 12/5/24.  The question is if this is really helping her.  Started due to her toes would curl up and she would try to flatten them back as an obsession.  Moved onto other more pressing pain issues.    Plan: " oxyCODONE (ROXICODONE) 5 MG tablet  Plan: gabapentin (NEURONTIN) 100 MG capsule    (F41.8) Mixed anxiety and depressive disorder  (F01.A3) Mild vascular dementia with mood disturbance (H)  Comment: so ebenezer Conrad is in memory care for many reasons but feel she gets more eyes on her.  She may stay in her apartment but at least her apartment is near the nursing station where sometimes they leave the door open to visually see her.    Hospice did an assessment and did not feel she qualified at this time.  Feel she was declining there and would Hospice be appropriate.  For now will continue to watch her overall being and adjust medications as appropriate.      Orders:  No new orders given today.  Will continue to monitor use of PRNs.  Will let daughter know of no changes with medications today    Electronically signed by  ESPERANZA Fuentes CNP

## 2024-12-30 NOTE — LETTER
12/30/2024      Kylah Britt  [address]  [facility] [address]        No notes on file      Sincerely,        ESPERANZA Fuentes CNP    Electronically signed   different for some, and good days/bad days.  Not wanting to over sedate her but feel there is a lot of psych along with her overall health condition.    ALLERGIES:    Allergies   Allergen Reactions     No Known Drug Allergy         MEDICATIONS:  Post Discharge Medication Reconciliation Status: patient was not discharged from an inpatient facility or TCU. Medications reviewed today.      Current Outpatient Medications   Medication Sig Dispense Refill     gabapentin (NEURONTIN) 100 MG capsule 300mg by mouth three times a day and 100mg po every 4 hours as needed for RLS or pain       oxyCODONE (ROXICODONE) 5 MG tablet Take 0.5 tablets (2.5 mg) by mouth every 6 hours as needed for pain.       acetaminophen (TYLENOL) 500 MG tablet Take 1,000 mg by mouth 3 times daily And 1000mg daily PRN       baclofen (LIORESAL) 10 MG tablet Take 0.5 tablets (5 mg) by mouth 3 times daily. 23 tablet 5     Calcium Carbonate (CALCIUM 600 PO) Take 1 tablet by mouth every other day       diclofenac (VOLTAREN) 1 % topical gel Apply 4 g topically 4 times daily Apply to knees       FLUoxetine (PROZAC) 10 MG capsule Take 2 capsules (20 mg) by mouth daily       Lidocaine (LIDOCARE) 4 % Patch Place 1 patch onto the skin every 24 hours To prevent lidocaine toxicity, patient should be patch free for 12 hrs daily. Apply to L hip       LORazepam (ATIVAN) 0.5 MG tablet Take 1 tablet (0.5 mg) by mouth every morning. May also take 1 tablet (0.5 mg) every 6 hours as needed for anxiety or agitation. 30 tablet 5     melatonin 3 MG tablet Take 3 tablets (9 mg) by mouth At Bedtime       oxyCODONE (ROXICODONE) 5 MG tablet Take 0.5 tablets (2.5 mg) by mouth 4 times daily. 120 tablet 0     QUEtiapine (SEROQUEL) 25 MG tablet Take 1 tablet (25 mg) by mouth 2 times daily. 60 tablet 5     SENNA-docusate sodium (SENNA S) 8.6-50 MG tablet Take 1 tablet by mouth 2 times daily       traZODone (DESYREL) 50 MG tablet Take 1 tablet (50 mg) by mouth At Bedtime 31 tablet 11  "    vitamin D3 (CHOLECALCIFEROL) 50 mcg (2000 units) tablet Take 50 mcg by mouth every other day         REVIEW OF SYSTEMS:  Unable to be obtained due to cognitive impairment or aphasia.  Anamaria became upset immediately      PHYSICAL EXAM:  BP (!) 157/81   Pulse 60   Temp 97.5  F (36.4  C)   Resp 16   Wt 55.8 kg (123 lb)   SpO2 96%   BMI 22.50 kg/m    Sitting in her recliner.  Just had a shower and hair drying naturally but she started to pull at it on the left crown area.  Then starts to pull up the pant legs to look at her legs and rubs them/cries out in pain.    Then she wants help to the bathroom so had her put the pendant light on as she tries to scoot to the end of her recliner \"Can't you help me?\"  \"Technically I can not\" but the aide came.      Wheelchair positioned by the recliner.  Family wants her to be walked as much as she can but nAamaria's gait slowly becoming weaker.  Risk of falling.    Heart rate regular and strong.  Skin is pink, warm and clammy but the wrists.    Lungs are clear with no audible adventitious sounds.      As far as PRN's, she requests and receives about once or twice a day of Lorazepam, gabapentin and oxycodone.  There are maybe a few days that are skipped.        ASSESSMENT / PLAN:  (G31.9) Neurodegenerative disorder  (primary encounter diagnosis)  (R45.1) Psychomotor restlessness  (M79.604,  M79.605,  M79.671,  M79.672) Bilateral leg and foot pain  Comment: taking into consideration of daughter's thoughts, think will let orders stay the same.  She is on oxycodone 2.5mg 4x a day scheduled and will take 1 extra most days.    Gabapentin is 300mg TID and 100mg po PRN.  Will take 1-2x a day extra and sometimes no extras.    Has the Lidocaine patch to left hip for 12 hours a day.  On scheduled Tylenol 1000mg 3x a day.    Had recently decreased the Baclofen to 5mg TID on 12/5/24.  The question is if this is really helping her.  Started due to her toes would curl up and she would try " to flatten them back as an obsession.  Moved onto other more pressing pain issues.    Plan: oxyCODONE (ROXICODONE) 5 MG tablet  Plan: gabapentin (NEURONTIN) 100 MG capsule    (F41.8) Mixed anxiety and depressive disorder  (F01.A3) Mild vascular dementia with mood disturbance (H)  Comment: so ebenezer Conrad is in memory care for many reasons but feel she gets more eyes on her.  She may stay in her apartment but at least her apartment is near the nursing station where sometimes they leave the door open to visually see her.    Hospice did an assessment and did not feel she qualified at this time.  Feel she was declining there and would Hospice be appropriate.  For now will continue to watch her overall being and adjust medications as appropriate.      Orders:  No new orders given today.  Will continue to monitor use of PRNs.  Will let daughter know of no changes with medications today    Electronically signed by  ESPERANZA Fuentes CNP            Sincerely,        ESPERANZA Fuentes CNP    Electronically signed

## 2025-01-09 RX ORDER — OXYCODONE HYDROCHLORIDE 5 MG/1
2.5 TABLET ORAL EVERY 6 HOURS PRN
Status: SHIPPED
Start: 2024-11-29

## 2025-01-09 RX ORDER — GABAPENTIN 100 MG/1
CAPSULE ORAL
Status: SHIPPED
Start: 2024-11-20

## 2025-01-31 ENCOUNTER — ASSISTED LIVING VISIT (OUTPATIENT)
Dept: GERIATRICS | Facility: CLINIC | Age: 75
End: 2025-01-31
Payer: COMMERCIAL

## 2025-01-31 VITALS
SYSTOLIC BLOOD PRESSURE: 153 MMHG | DIASTOLIC BLOOD PRESSURE: 70 MMHG | OXYGEN SATURATION: 94 % | WEIGHT: 123 LBS | RESPIRATION RATE: 16 BRPM | BODY MASS INDEX: 22.5 KG/M2 | TEMPERATURE: 97.7 F | HEART RATE: 90 BPM

## 2025-01-31 DIAGNOSIS — R26.9 ABNORMAL GAIT: ICD-10-CM

## 2025-01-31 DIAGNOSIS — G31.9 NEURODEGENERATIVE DISORDER: Primary | ICD-10-CM

## 2025-01-31 DIAGNOSIS — R45.1 PSYCHOMOTOR RESTLESSNESS: ICD-10-CM

## 2025-01-31 DIAGNOSIS — M79.605 BILATERAL LEG AND FOOT PAIN: ICD-10-CM

## 2025-01-31 DIAGNOSIS — F41.8 MIXED ANXIETY AND DEPRESSIVE DISORDER: ICD-10-CM

## 2025-01-31 DIAGNOSIS — F01.A3 MILD VASCULAR DEMENTIA WITH MOOD DISTURBANCE (H): ICD-10-CM

## 2025-01-31 DIAGNOSIS — M79.671 BILATERAL LEG AND FOOT PAIN: ICD-10-CM

## 2025-01-31 DIAGNOSIS — R26.81 UNSTEADY GAIT: ICD-10-CM

## 2025-01-31 DIAGNOSIS — M79.672 BILATERAL LEG AND FOOT PAIN: ICD-10-CM

## 2025-01-31 DIAGNOSIS — M79.604 BILATERAL LEG AND FOOT PAIN: ICD-10-CM

## 2025-01-31 PROCEDURE — 99349 HOME/RES VST EST MOD MDM 40: CPT | Performed by: NURSE PRACTITIONER

## 2025-01-31 NOTE — LETTER
1/31/2025      Kylah Britt  [address]  [facility] [address]        [facility] GERIATRICS  ACUTE/EPISODIC VISIT    [facility] Medical Record Number:  [id number]  Place of Service where encounter took place:  [facility] [[id number]]    Chief Complaint   Patient presents with     RECHECK       HPI:    Kylah Britt is a 74 year old  (1950), who is being seen today for an episodic care visit.  HPI information obtained from: facility chart records, facility staff, patient report, and [facility] chart review.    Today's concern is:    Diagnoses         Codes Comments    Neurodegenerative disorder    -  Primary G31.9     Psychomotor restlessness     R45.1     Bilateral leg and foot pain     M79.604, M79.605, M79.671, M79.672     Unsteady gait     R26.81     Abnormal gait     R26.9     Mixed anxiety and depressive disorder     F41.8     Mild vascular dementia with mood disturbance (H)     F01.A3           Came to see Anamaria today due to request for a face to face for a manual wheelchair.      Anamaria resides in the memory care unit in order to receive more care than being out in the [facility] side [facility].  She has a nice apartment but spends most of her time in the room.      Anamaria has chronic health conditions that include Neurodegenerative disorder, motor restlessness, abnormal and unsteady gait, chronic pain in legs, mixed anxiety and depression, and mild Dementia with mood disturbances.    Anamaria is in need of a manual standard wheelchair to assist her in daily activities where she is not able to tolerate standing or walking without risk of falls if someone in not by her side.  This wheelchair will be for lifetime (99) as her conditions are not reversible.    Anamaria could move the wheelchair short distances but it will be staff to help her transfer and propel wheelchair to destinations.  Family could do the same as well as they take her out in the community  on occasion.    Anamaria's health conditions impair her bathing, hygiene, dressing, toileting.  Her mobility limitations can not be safely resolved with using a walker due to weakness, balance and endurance..  With the wheelchair she is able to participate in her cares with tolerance and endurance.    Use of the wheelchair will be used on a daily basis to help her be more involved in social activities and activities of daily living.  Her space is adequate to get through doorways as well as turn around.    Anamaria has the mental capacity and willingness to be able to move around the wheelchair but it is her neurodegenerative disability that would require staff to assist her for safety.  No lower extremity edema, but abnormal and unsteady gait prevent her from being independent.        Wheelchair Documentation  Size: standard 16 x 16  Corresponding cushion: Yes: seat cushion   Standard foot rests: Yes  removable if able.  Elevating leg rests: No  Arm rests: Yes: standard desk height  Lap tray: No  Dose the patient use oxygen? No   Is the patient able to propel wheelchair?  possible  If no why not? Neurodegenerative disorder And is there someone who can?staff and family  1. The patient has mobility limitations that impairs their ability to participate in one or more mobility related activities: Toileting, Grooming, and Bathing.  The wheelchair is suitable and necessary for use in the patient's home.  2. The patient's mobility limitations cannot be safely resolved by using a cane/walker:Yes    Reason why a cane or walker will not meet the patient's needs. (ie: balance, tolerance, level of assistance) balance, endurance, strength  3. The patients home has adequate access to use a manual wheelchair:Yes  4. The use of a manual wheelchair on a regular basis will improve the patients ability to participate in mobility related ADL's at home:Yes  5. The patient is willing to use a manual wheelchair at home:Yes  6. The patient has  adequate upper body strength and the mental capability to safely use a manual wheelchair and/or has a caregiver that is able to assist: Yes  7. Does the patient have a lower extremity injury or edema?Yes  Reason for Type of Wheelchair  Patient weight: 112 lbs  Light Weight Wheelchair: Patient is unable to self-propel a standard wheelchair in the home but can self propel a light weight wheelchair.      ALLERGIES:    Allergies   Allergen Reactions     No Known Drug Allergy         MEDICATIONS:  Post Discharge Medication Reconciliation Status: patient was not discharged from an inpatient facility or TCU.     Current Outpatient Medications   Medication Sig Dispense Refill     acetaminophen (TYLENOL) 500 MG tablet Take 1,000 mg by mouth 3 times daily And 1000mg daily PRN       baclofen (LIORESAL) 10 MG tablet Take 0.5 tablets (5 mg) by mouth 3 times daily. 23 tablet 5     Calcium Carbonate (CALCIUM 600 PO) Take 1 tablet by mouth every other day       diclofenac (VOLTAREN) 1 % topical gel Apply 4 g topically 4 times daily Apply to knees       FLUoxetine (PROZAC) 10 MG capsule Take 2 capsules (20 mg) by mouth daily       gabapentin (NEURONTIN) 100 MG capsule 300mg by mouth three times a day and 100mg po every 4 hours as needed for RLS or pain       Lidocaine (LIDOCARE) 4 % Patch Place 1 patch onto the skin every 24 hours To prevent lidocaine toxicity, patient should be patch free for 12 hrs daily. Apply to L hip       LORazepam (ATIVAN) 0.5 MG tablet Take 1 tablet (0.5 mg) by mouth every morning. May also take 1 tablet (0.5 mg) every 6 hours as needed for anxiety or agitation. 30 tablet 5     melatonin 3 MG tablet Take 3 tablets (9 mg) by mouth At Bedtime       oxyCODONE (ROXICODONE) 5 MG tablet Take 0.5 tablets (2.5 mg) by mouth every 6 hours as needed for pain.       oxyCODONE (ROXICODONE) 5 MG tablet Take 0.5 tablets (2.5 mg) by mouth 4 times daily. 120 tablet 0     QUEtiapine (SEROQUEL) 25 MG tablet Take 1 tablet (25  mg) by mouth 2 times daily. 60 tablet 5     SENNA-docusate sodium (SENNA S) 8.6-50 MG tablet Take 1 tablet by mouth 2 times daily       traZODone (DESYREL) 50 MG tablet Take 1 tablet (50 mg) by mouth At Bedtime 31 tablet 11     vitamin D3 (CHOLECALCIFEROL) 50 mcg (2000 units) tablet Take 50 mcg by mouth every other day           REVIEW OF SYSTEMS:  4 point ROS neg other than the symptoms noted above in the HPI.  Becomes anxious and starts rubbing her legs.      PHYSICAL EXAM:  BP (!) 153/70   Pulse 90   Temp 97.7  F (36.5  C)   Resp 16   Wt 55.8 kg (123 lb)   SpO2 94%   BMI 22.50 kg/m    Petite female sitting in her recliner.  Sees this NP and starts pulling on her hair.  Normal for her.  No edema in lower legs as she pulls up the pant legs.    Skin is pink warm and dry.  No open areas.  Lungs are clear.  Heart rate regular and strong.  Abdomen is flat, soft and non-tender.    Blood pressures range from 110-160's/50-80's     No recent labs      ASSESSMENT / PLAN:  1. Neurodegenerative disorder    2. Psychomotor restlessness    3. Bilateral leg and foot pain    4. Unsteady gait    5. Abnormal gait    6. Mixed anxiety and depressive disorder    7. Mild vascular dementia with mood disturbance (H)      Medications reviewed and no changes.  PRNs are utilized.  The oxycodone is scheduled 2.5mg 4 times a day and almost daily used once PRN.  More in the middle of the night.  Occasionally PRN gabapentin and Tylenol is used but not consistently on a daily basis.      Continue to manage Anamaria's daily activities as she is unable to do herself.  Her apartment is in a good place as able to see her if door is open.      Will provide order for the wheelchair and send to the vendor and the [facility] where she resides.      Orders:  One manual light weight wheelchair 16 x16 with corresponding seat cushion, removable or swing out foot pedals if able, standard desk height arm rest.  Dx:  neurodegenerative disorder with abnormal and  unsteady gait, motor restlessness, and mild dementia.  Lifetime (99)    Electronically signed by  ESPERANZA Fuentes CNP            Sincerely,        ESPERANZA Fuentes CNP    Electronically signed

## 2025-01-31 NOTE — LETTER
1/31/2025      Kylah Britt  [address]  [facility] [address]        [facility] GERIATRICS  ACUTE/EPISODIC VISIT    [facility] Medical Record Number:  [id number]  Place of Service where encounter took place:  [facility] [[id number]]    Chief Complaint   Patient presents with     RECHECK       HPI:    Kylah Britt is a 74 year old  (1950), who is being seen today for an episodic care visit.  HPI information obtained from: facility chart records, facility staff, patient report, and [facility] chart review.    Today's concern is:    Diagnoses         Codes Comments    Neurodegenerative disorder    -  Primary G31.9     Psychomotor restlessness     R45.1     Bilateral leg and foot pain     M79.604, M79.605, M79.671, M79.672     Unsteady gait     R26.81     Abnormal gait     R26.9     Mixed anxiety and depressive disorder     F41.8     Mild vascular dementia with mood disturbance (H)     F01.A3           Came to see Anamaria today due to request for a face to face for a manual wheelchair.      Anamaria resides in the memory care unit in order to receive more care than being out in the [facility] side [facility].  She has a nice apartment but spends most of her time in the room.      Anamaria has chronic health conditions that include Neurodegenerative disorder, motor restlessness, abnormal and unsteady gait, chronic pain in legs, mixed anxiety and depression, and mild Dementia with mood disturbances.    Anamaria is in need of a manual standard wheelchair to assist her in daily activities where she is not able to tolerate standing or walking without risk of falls if someone in not by her side.  This wheelchair will be for lifetime (99) as her conditions are not reversible.    Anamaria could move the wheelchair short distances but it will be staff to help her transfer and propel wheelchair to destinations.  Family could do the same as well as they take her out in the community  on occasion.    Anamaria's health conditions impair her bathing, hygiene, dressing, toileting.  Her mobility limitations can not be safely resolved with using a walker due to weakness, balance and endurance..  With the wheelchair she is able to participate in her cares with tolerance and endurance.    Use of the wheelchair will be used on a daily basis to help her be more involved in social activities and activities of daily living.  Her space is adequate to get through doorways as well as turn around.    Anamaria has the mental capacity and willingness to be able to move around the wheelchair but it is her neurodegenerative disability that would require staff to assist her for safety.  No lower extremity edema, but abnormal and unsteady gait prevent her from being independent.        Wheelchair Documentation  Size: standard 16 x 16  Corresponding cushion: Yes: seat cushion   Standard foot rests: Yes  removable if able.  Elevating leg rests: No  Arm rests: Yes: standard desk height  Lap tray: No  Dose the patient use oxygen? No   Is the patient able to propel wheelchair?  possible  If no why not? Neurodegenerative disorder And is there someone who can?staff and family  1. The patient has mobility limitations that impairs their ability to participate in one or more mobility related activities: Toileting, Grooming, and Bathing.  The wheelchair is suitable and necessary for use in the patient's home.  2. The patient's mobility limitations cannot be safely resolved by using a cane/walker:Yes    Reason why a cane or walker will not meet the patient's needs. (ie: balance, tolerance, level of assistance) balance, endurance, strength  3. The patients home has adequate access to use a manual wheelchair:Yes  4. The use of a manual wheelchair on a regular basis will improve the patients ability to participate in mobility related ADL's at home:Yes  5. The patient is willing to use a manual wheelchair at home:Yes  6. The patient has  adequate upper body strength and the mental capability to safely use a manual wheelchair and/or has a caregiver that is able to assist: Yes  7. Does the patient have a lower extremity injury or edema?Yes  Reason for Type of Wheelchair  Patient weight: 112 lbs  Light Weight Wheelchair: Patient is unable to self-propel a standard wheelchair in the home but can self propel a light weight wheelchair.      ALLERGIES:    Allergies   Allergen Reactions     No Known Drug Allergy         MEDICATIONS:  Post Discharge Medication Reconciliation Status: patient was not discharged from an inpatient facility or TCU.     Current Outpatient Medications   Medication Sig Dispense Refill     acetaminophen (TYLENOL) 500 MG tablet Take 1,000 mg by mouth 3 times daily And 1000mg daily PRN       baclofen (LIORESAL) 10 MG tablet Take 0.5 tablets (5 mg) by mouth 3 times daily. 23 tablet 5     Calcium Carbonate (CALCIUM 600 PO) Take 1 tablet by mouth every other day       diclofenac (VOLTAREN) 1 % topical gel Apply 4 g topically 4 times daily Apply to knees       FLUoxetine (PROZAC) 10 MG capsule Take 2 capsules (20 mg) by mouth daily       gabapentin (NEURONTIN) 100 MG capsule 300mg by mouth three times a day and 100mg po every 4 hours as needed for RLS or pain       Lidocaine (LIDOCARE) 4 % Patch Place 1 patch onto the skin every 24 hours To prevent lidocaine toxicity, patient should be patch free for 12 hrs daily. Apply to L hip       LORazepam (ATIVAN) 0.5 MG tablet Take 1 tablet (0.5 mg) by mouth every morning. May also take 1 tablet (0.5 mg) every 6 hours as needed for anxiety or agitation. 30 tablet 5     melatonin 3 MG tablet Take 3 tablets (9 mg) by mouth At Bedtime       oxyCODONE (ROXICODONE) 5 MG tablet Take 0.5 tablets (2.5 mg) by mouth every 6 hours as needed for pain.       oxyCODONE (ROXICODONE) 5 MG tablet Take 0.5 tablets (2.5 mg) by mouth 4 times daily. 120 tablet 0     QUEtiapine (SEROQUEL) 25 MG tablet Take 1 tablet (25  mg) by mouth 2 times daily. 60 tablet 5     SENNA-docusate sodium (SENNA S) 8.6-50 MG tablet Take 1 tablet by mouth 2 times daily       traZODone (DESYREL) 50 MG tablet Take 1 tablet (50 mg) by mouth At Bedtime 31 tablet 11     vitamin D3 (CHOLECALCIFEROL) 50 mcg (2000 units) tablet Take 50 mcg by mouth every other day           REVIEW OF SYSTEMS:  4 point ROS neg other than the symptoms noted above in the HPI.  Becomes anxious and starts rubbing her legs.      PHYSICAL EXAM:  BP (!) 153/70   Pulse 90   Temp 97.7  F (36.5  C)   Resp 16   Wt 55.8 kg (123 lb)   SpO2 94%   BMI 22.50 kg/m    Petite female sitting in her recliner.  Sees this NP and starts pulling on her hair.  Normal for her.  No edema in lower legs as she pulls up the pant legs.    Skin is pink warm and dry.  No open areas.  Lungs are clear.  Heart rate regular and strong.  Abdomen is flat, soft and non-tender.    Blood pressures range from 110-160's/50-80's     No recent labs      ASSESSMENT / PLAN:  1. Neurodegenerative disorder    2. Psychomotor restlessness    3. Bilateral leg and foot pain    4. Unsteady gait    5. Abnormal gait    6. Mixed anxiety and depressive disorder    7. Mild vascular dementia with mood disturbance (H)      Medications reviewed and no changes.  PRNs are utilized.  The oxycodone is scheduled 2.5mg 4 times a day and almost daily used once PRN.  More in the middle of the night.  Occasionally PRN gabapentin and Tylenol is used but not consistently on a daily basis.      Continue to manage Anamaria's daily activities as she is unable to do herself.  Her apartment is in a good place as able to see her if door is open.      Will provide order for the wheelchair and send to the vendor and the [facility] where she resides.      Orders:  One manual light weight wheelchair 16 x16 with corresponding seat cushion, removable or swing out foot pedals if able, standard desk height arm rest.  Dx:  neurodegenerative disorder with abnormal and  unsteady gait, motor restlessness, and mild dementia.  Lifetime (99)    Electronically signed by  ESPERANZA Fuentes CNP            Sincerely,        ESPERANZA Fuentes CNP    Electronically signed

## 2025-01-31 NOTE — PROGRESS NOTES
[facility] GERIATRICS  ACUTE/EPISODIC VISIT    [facility] Medical Record Number:  [id number]  Place of Service where encounter took place:  [facility] ([facility] [[id number]]    Chief Complaint   Patient presents with    RECHECK       HPI:    Kylah Britt is a 74 year old  (1950), who is being seen today for an episodic care visit.  HPI information obtained from: facility chart records, facility staff, patient report, and [facility] chart review.    Today's concern is:    Diagnoses         Codes Comments    Neurodegenerative disorder    -  Primary G31.9     Psychomotor restlessness     R45.1     Bilateral leg and foot pain     M79.604, M79.605, M79.671, M79.672     Unsteady gait     R26.81     Abnormal gait     R26.9     Mixed anxiety and depressive disorder     F41.8     Mild vascular dementia with mood disturbance (H)     F01.A3           Came to see Anamaria today due to request for a face to face for a manual wheelchair.      Anamaria resides in the memory care unit in order to receive more care than being out in the [facility] side [facility].  She has a nice apartment but spends most of her time in the room.      Anamaria has chronic health conditions that include Neurodegenerative disorder, motor restlessness, abnormal and unsteady gait, chronic pain in legs, mixed anxiety and depression, and mild Dementia with mood disturbances.    Anamaria is in need of a manual standard wheelchair to assist her in daily activities where she is not able to tolerate standing or walking without risk of falls if someone in not by her side.  This wheelchair will be for lifetime (99) as her conditions are not reversible.    Anamaria could move the wheelchair short distances but it will be staff to help her transfer and propel wheelchair to destinations.  Family could do the same as well as they take her out in the community on occasion.    Anamaria's health conditions impair her bathing, hygiene,  dressing, toileting.  Her mobility limitations can not be safely resolved with using a walker due to weakness, balance and endurance..  With the wheelchair she is able to participate in her cares with tolerance and endurance.    Use of the wheelchair will be used on a daily basis to help her be more involved in social activities and activities of daily living.  Her space is adequate to get through doorways as well as turn around.    Anamaria has the mental capacity and willingness to be able to move around the wheelchair but it is her neurodegenerative disability that would require staff to assist her for safety.  No lower extremity edema, but abnormal and unsteady gait prevent her from being independent.        Wheelchair Documentation  Size: standard 16 x 16  Corresponding cushion: Yes: seat cushion   Standard foot rests: Yes  removable if able.  Elevating leg rests: No  Arm rests: Yes: standard desk height  Lap tray: No  Dose the patient use oxygen? No   Is the patient able to propel wheelchair?  possible  If no why not? Neurodegenerative disorder And is there someone who can?staff and family  1. The patient has mobility limitations that impairs their ability to participate in one or more mobility related activities: Toileting, Grooming, and Bathing.  The wheelchair is suitable and necessary for use in the patient's home.  2. The patient's mobility limitations cannot be safely resolved by using a cane/walker:Yes    Reason why a cane or walker will not meet the patient's needs. (ie: balance, tolerance, level of assistance) balance, endurance, strength  3. The patients home has adequate access to use a manual wheelchair:Yes  4. The use of a manual wheelchair on a regular basis will improve the patients ability to participate in mobility related ADL's at home:Yes  5. The patient is willing to use a manual wheelchair at home:Yes  6. The patient has adequate upper body strength and the mental capability to safely use a  manual wheelchair and/or has a caregiver that is able to assist: Yes  7. Does the patient have a lower extremity injury or edema?Yes  Reason for Type of Wheelchair  Patient weight: 112 lbs  Light Weight Wheelchair: Patient is unable to self-propel a standard wheelchair in the home but can self propel a light weight wheelchair.      ALLERGIES:    Allergies   Allergen Reactions    No Known Drug Allergy         MEDICATIONS:  Post Discharge Medication Reconciliation Status: patient was not discharged from an inpatient facility or TCU.     Current Outpatient Medications   Medication Sig Dispense Refill    acetaminophen (TYLENOL) 500 MG tablet Take 1,000 mg by mouth 3 times daily And 1000mg daily PRN      baclofen (LIORESAL) 10 MG tablet Take 0.5 tablets (5 mg) by mouth 3 times daily. 23 tablet 5    Calcium Carbonate (CALCIUM 600 PO) Take 1 tablet by mouth every other day      diclofenac (VOLTAREN) 1 % topical gel Apply 4 g topically 4 times daily Apply to knees      FLUoxetine (PROZAC) 10 MG capsule Take 2 capsules (20 mg) by mouth daily      gabapentin (NEURONTIN) 100 MG capsule 300mg by mouth three times a day and 100mg po every 4 hours as needed for RLS or pain      Lidocaine (LIDOCARE) 4 % Patch Place 1 patch onto the skin every 24 hours To prevent lidocaine toxicity, patient should be patch free for 12 hrs daily. Apply to L hip      LORazepam (ATIVAN) 0.5 MG tablet Take 1 tablet (0.5 mg) by mouth every morning. May also take 1 tablet (0.5 mg) every 6 hours as needed for anxiety or agitation. 30 tablet 5    melatonin 3 MG tablet Take 3 tablets (9 mg) by mouth At Bedtime      oxyCODONE (ROXICODONE) 5 MG tablet Take 0.5 tablets (2.5 mg) by mouth every 6 hours as needed for pain.      oxyCODONE (ROXICODONE) 5 MG tablet Take 0.5 tablets (2.5 mg) by mouth 4 times daily. 120 tablet 0    QUEtiapine (SEROQUEL) 25 MG tablet Take 1 tablet (25 mg) by mouth 2 times daily. 60 tablet 5    SENNA-docusate sodium (SENNA S) 8.6-50 MG  tablet Take 1 tablet by mouth 2 times daily      traZODone (DESYREL) 50 MG tablet Take 1 tablet (50 mg) by mouth At Bedtime 31 tablet 11    vitamin D3 (CHOLECALCIFEROL) 50 mcg (2000 units) tablet Take 50 mcg by mouth every other day           REVIEW OF SYSTEMS:  4 point ROS neg other than the symptoms noted above in the HPI.  Becomes anxious and starts rubbing her legs.      PHYSICAL EXAM:  BP (!) 153/70   Pulse 90   Temp 97.7  F (36.5  C)   Resp 16   Wt 55.8 kg (123 lb)   SpO2 94%   BMI 22.50 kg/m    Petite female sitting in her recliner.  Sees this NP and starts pulling on her hair.  Normal for her.  No edema in lower legs as she pulls up the pant legs.    Skin is pink warm and dry.  No open areas.  Lungs are clear.  Heart rate regular and strong.  Abdomen is flat, soft and non-tender.    Blood pressures range from 110-160's/50-80's     No recent labs      ASSESSMENT / PLAN:  1. Neurodegenerative disorder    2. Psychomotor restlessness    3. Bilateral leg and foot pain    4. Unsteady gait    5. Abnormal gait    6. Mixed anxiety and depressive disorder    7. Mild vascular dementia with mood disturbance (H)      Medications reviewed and no changes.  PRNs are utilized.  The oxycodone is scheduled 2.5mg 4 times a day and almost daily used once PRN.  More in the middle of the night.  Occasionally PRN gabapentin and Tylenol is used but not consistently on a daily basis.      Continue to manage Anamaria's daily activities as she is unable to do herself.  Her apartment is in a good place as able to see her if door is open.      Will provide order for the wheelchair and send to the vendor and the [facility] where she resides.      Orders:  One manual light weight wheelchair 16 x16 with corresponding seat cushion, removable or swing out foot pedals if able, standard desk height arm rest.  Dx:  neurodegenerative disorder with abnormal and unsteady gait, motor restlessness, and mild dementia.  Lifetime (99)    Electronically  signed by  ESPERANZA Fuentes CNP

## 2025-02-10 ENCOUNTER — LAB REQUISITION (OUTPATIENT)
Dept: LAB | Facility: CLINIC | Age: 75
End: 2025-02-10
Payer: MEDICARE

## 2025-02-10 DIAGNOSIS — R30.0 DYSURIA: ICD-10-CM

## 2025-02-10 LAB
ALBUMIN UR-MCNC: NEGATIVE MG/DL
APPEARANCE UR: CLEAR
BILIRUB UR QL STRIP: NEGATIVE
COLOR UR AUTO: ABNORMAL
GLUCOSE UR STRIP-MCNC: NEGATIVE MG/DL
HGB UR QL STRIP: NEGATIVE
KETONES UR STRIP-MCNC: NEGATIVE MG/DL
LEUKOCYTE ESTERASE UR QL STRIP: NEGATIVE
MUCOUS THREADS #/AREA URNS LPF: PRESENT /LPF
NITRATE UR QL: NEGATIVE
PH UR STRIP: 5.5 [PH] (ref 5–7)
RBC URINE: 2 /HPF
SP GR UR STRIP: 1.03 (ref 1–1.03)
UROBILINOGEN UR STRIP-MCNC: NORMAL MG/DL
WBC URINE: 1 /HPF

## 2025-02-10 PROCEDURE — 87086 URINE CULTURE/COLONY COUNT: CPT | Mod: ORL | Performed by: NURSE PRACTITIONER

## 2025-02-10 PROCEDURE — 81001 URINALYSIS AUTO W/SCOPE: CPT | Mod: ORL | Performed by: NURSE PRACTITIONER

## 2025-02-12 LAB — BACTERIA UR CULT: NO GROWTH

## 2025-02-13 DIAGNOSIS — R52 PAIN: Primary | ICD-10-CM

## 2025-02-13 RX ORDER — OXYCODONE HYDROCHLORIDE 5 MG/1
2.5 TABLET ORAL EVERY 6 HOURS PRN
Qty: 120 TABLET | Refills: 0 | Status: SHIPPED | OUTPATIENT
Start: 2025-02-13

## 2025-02-25 DIAGNOSIS — G31.9 NEURODEGENERATIVE DISORDER: ICD-10-CM

## 2025-02-25 RX ORDER — OXYCODONE HYDROCHLORIDE 5 MG/1
TABLET ORAL
Qty: 240 TABLET | Refills: 0 | Status: SHIPPED | OUTPATIENT
Start: 2025-02-25

## 2025-03-06 ENCOUNTER — ASSISTED LIVING VISIT (OUTPATIENT)
Dept: GERIATRICS | Facility: CLINIC | Age: 75
End: 2025-03-06
Payer: COMMERCIAL

## 2025-03-06 VITALS
TEMPERATURE: 97.7 F | BODY MASS INDEX: 24.14 KG/M2 | WEIGHT: 132 LBS | DIASTOLIC BLOOD PRESSURE: 52 MMHG | SYSTOLIC BLOOD PRESSURE: 98 MMHG | RESPIRATION RATE: 20 BRPM | HEART RATE: 69 BPM | OXYGEN SATURATION: 94 %

## 2025-03-06 DIAGNOSIS — M79.671 BILATERAL LEG AND FOOT PAIN: ICD-10-CM

## 2025-03-06 DIAGNOSIS — G31.9 NEURODEGENERATIVE DISORDER: ICD-10-CM

## 2025-03-06 DIAGNOSIS — M79.672 BILATERAL LEG AND FOOT PAIN: ICD-10-CM

## 2025-03-06 DIAGNOSIS — M79.604 BILATERAL LEG AND FOOT PAIN: ICD-10-CM

## 2025-03-06 DIAGNOSIS — R45.1 PSYCHOMOTOR RESTLESSNESS: ICD-10-CM

## 2025-03-06 DIAGNOSIS — M79.605 BILATERAL LEG AND FOOT PAIN: ICD-10-CM

## 2025-03-06 DIAGNOSIS — F01.A3 MILD VASCULAR DEMENTIA WITH MOOD DISTURBANCE (H): ICD-10-CM

## 2025-03-06 DIAGNOSIS — F41.8 MIXED ANXIETY AND DEPRESSIVE DISORDER: ICD-10-CM

## 2025-03-06 PROCEDURE — 99349 HOME/RES VST EST MOD MDM 40: CPT | Performed by: NURSE PRACTITIONER

## 2025-03-06 RX ORDER — OXYCODONE HYDROCHLORIDE 5 MG/1
TABLET ORAL
Qty: 250 TABLET | Refills: 0 | Status: SHIPPED | OUTPATIENT
Start: 2025-03-06

## 2025-03-06 NOTE — LETTER
3/6/2025      Kylah Britt  [address]  [facility] [address]        [facility] GERIATRICS  ACUTE/EPISODIC VISIT    [facility] Medical Record Number:  [id number]  Place of Service where encounter took place:  [facility] [[id number]]    Chief Complaint   Patient presents with     RECHECK       HPI:    Kylah Britt is a 74 year old  (1950), who is being seen today for an episodic care visit.  HPI information obtained from: facility chart records, facility staff, patient report, and [facility] chart review.    Today's concern is:    Diagnoses         Codes Comments    Neurodegenerative disorder    -  Primary G31.9     Psychomotor restlessness     R45.1     Bilateral leg and foot pain     M79.604, M79.605, M79.671, M79.672     Mixed anxiety and depressive disorder     F41.8     Mild vascular dementia with mood disturbance (H)     F01.A3           Came to see Anamaria today as staff requesting for her oxycodone to be scheduled during the night as she is asking consistently for it then.  Right now oxycodone 5mg is scheduled 4x a day.    Anamaria resides on the memory care unit as her cares have become heavier and needing more assist.  Her apartment upstairs was at the end of the wing.  With her progressing abnormal gait, and risk of falling, helped make the decision that safety was a priority.  She has a nice apartment now in memory care that staff can see her if the door is open.  STOP sign is velcroed between the door frame to stop others from entering that wander.    Anamaria is in her recliner and as soon as she sees this NP, she starts pulling at her hair, lifts her pant legs and rubs her lower extremities.  Do believe she has pain but her actions feel are engrained in her and that is her reaction when she sees staff.    Her neurodegenerative disease process does not have an exact name but continues to progress.  She should be ambulating to meals and to the  "bathroom with staff but that has become far and few between.      ALLERGIES:    Allergies   Allergen Reactions     No Known Drug Allergy         MEDICATIONS:  Post Discharge Medication Reconciliation Status: patient was not discharged from an inpatient facility or TCU.     Current Outpatient Medications   Medication Sig Dispense Refill     oxyCODONE (ROXICODONE) 5 MG tablet Take 0.5 tablets (2.5 mg) by mouth 5 times daily. May also take 0.5 tablets (2.5 mg) every 6 hours as needed for pain. 250 tablet 0     acetaminophen (TYLENOL) 500 MG tablet Take 1,000 mg by mouth 3 times daily And 1000mg daily PRN       baclofen (LIORESAL) 10 MG tablet Take 0.5 tablets (5 mg) by mouth 3 times daily. 23 tablet 5     Calcium Carbonate (CALCIUM 600 PO) Take 1 tablet by mouth every other day       diclofenac (VOLTAREN) 1 % topical gel Apply 4 g topically 4 times daily Apply to knees       FLUoxetine (PROZAC) 10 MG capsule Take 2 capsules (20 mg) by mouth daily       gabapentin (NEURONTIN) 100 MG capsule 300mg by mouth three times a day and 100mg po every 4 hours as needed for RLS or pain       Lidocaine (LIDOCARE) 4 % Patch Place 1 patch onto the skin every 24 hours To prevent lidocaine toxicity, patient should be patch free for 12 hrs daily. Apply to L hip       LORazepam (ATIVAN) 0.5 MG tablet TAKE 1 TABLET BY MOUTH EVERY MORNING FOR ANXIETY 30 tablet 5     melatonin 3 MG tablet Take 3 tablets (9 mg) by mouth At Bedtime       QUEtiapine (SEROQUEL) 25 MG tablet Take 1 tablet (25 mg) by mouth 2 times daily. 60 tablet 5     SENNA-docusate sodium (SENNA S) 8.6-50 MG tablet Take 1 tablet by mouth 2 times daily       traZODone (DESYREL) 50 MG tablet Take 1 tablet (50 mg) by mouth At Bedtime 31 tablet 11     vitamin D3 (CHOLECALCIFEROL) 50 mcg (2000 units) tablet Take 50 mcg by mouth every other day         REVIEW OF SYSTEMS:  4 point ROS neg other than the symptoms noted above in the HPI.  No chest pain, no SOB.  Just the \"nervousness\" " seen when she knows whom is visiting with her.      PHYSICAL EXAM:  BP 98/52   Pulse 69   Temp 97.7  F (36.5  C)   Resp 20   Wt 59.9 kg (132 lb)   SpO2 94%   BMI 24.14 kg/m    Alert and pleasant.  Knows self, whom staff are, and her surroundings.  Petite stature.    Skin is pale/pink, warm and dry.  Lungs are clear.  No coughing.  Will eat snacks and does not choke.    Heart rate regular and strong.  No edema in her feet.  No open sores on legs.  Does not wear her leather AFO on right leg.    Abdomen is flat, soft, and non-tender.      UA/UC checked in February but otherwise no other labs.      ASSESSMENT / PLAN:  (G31.9) Neurodegenerative disorder  (primary encounter diagnosis)  (R45.1) Psychomotor restlessness  (M79.604,  M79.605,  M79.671,  M79.672) Bilateral leg and foot pain  Comment: have had ongoing conversations with daughter about her mom's health.  Resigned to the fact that Anamaria continues to progress and needing more assist.  There is talk about if time for hospice as well.  For now will given an order to increase her oxycodone 2.5mg to 5x a day and keep the PRN dose at 2.5mg every 6 hours prn.  Remains on Tylenol ES 1000mg po TID, scheduled Voltaren Gel to legs 4x a day, scheduled Gabapentin 300mg 3x a day and 100mg prn which gets used almost on a daily basis.  Receives Baclofen 5mg TID as well  Plan: oxyCODONE (ROXICODONE) 5 MG tablet    (F41.8) Mixed anxiety and depressive disorder  (F01.A3) Mild vascular dementia with mood disturbance (H)  Comment: in the big picture, try to help Anamaria's mood/behaviors.  Anxiety is a major component in which she will start to whimper.    Remains on Seroquel 25mg twice a day, Trazodone at HS to help sleep, and prozac 20mg po daily.  Helps to have a routine for her but also anticipate changes.     Orders:  Increase the oxycodone 2.5mg to 5x a day and 2.5mg every 6 hours prn for chronic pain    Electronically signed by  ESPERANZA Fuentes CNP             Sincerely,        Gina Mejia APRN CNP    Electronically signed

## 2025-03-06 NOTE — PROGRESS NOTES
[facility] GERIATRICS  ACUTE/EPISODIC VISIT    [facility] Medical Record Number:  [id number]  Place of Service where encounter took place:  [facility] [[id number]]    Chief Complaint   Patient presents with    RECHECK       HPI:    Kylah Britt is a 74 year old  (1950), who is being seen today for an episodic care visit.  HPI information obtained from: {[facility] HPI:358992}.    Today's concern is:      ALLERGIES:    Allergies   Allergen Reactions    No Known Drug Allergy         MEDICATIONS:  Post Discharge Medication Reconciliation Status: {O Med Rec (Provider):428123}. ***    Current Outpatient Medications   Medication Sig Dispense Refill    acetaminophen (TYLENOL) 500 MG tablet Take 1,000 mg by mouth 3 times daily And 1000mg daily PRN      baclofen (LIORESAL) 10 MG tablet Take 0.5 tablets (5 mg) by mouth 3 times daily. 23 tablet 5    Calcium Carbonate (CALCIUM 600 PO) Take 1 tablet by mouth every other day      diclofenac (VOLTAREN) 1 % topical gel Apply 4 g topically 4 times daily Apply to knees      FLUoxetine (PROZAC) 10 MG capsule Take 2 capsules (20 mg) by mouth daily      gabapentin (NEURONTIN) 100 MG capsule 300mg by mouth three times a day and 100mg po every 4 hours as needed for RLS or pain      Lidocaine (LIDOCARE) 4 % Patch Place 1 patch onto the skin every 24 hours To prevent lidocaine toxicity, patient should be patch free for 12 hrs daily. Apply to L hip      LORazepam (ATIVAN) 0.5 MG tablet TAKE 1 TABLET BY MOUTH EVERY MORNING FOR ANXIETY 30 tablet 0    melatonin 3 MG tablet Take 3 tablets (9 mg) by mouth At Bedtime      oxyCODONE (ROXICODONE) 5 MG tablet Take 0.5 tablets (2.5 mg) by mouth 4 times daily. May also take 0.5 tablets (2.5 mg) every 6 hours as needed for pain. 240 tablet 0    QUEtiapine (SEROQUEL) 25 MG tablet Take 1 tablet (25 mg) by mouth 2 times daily. 60 tablet 5    SENNA-docusate sodium (SENNA S) 8.6-50 MG tablet Take 1 tablet by  mouth 2 times daily      traZODone (DESYREL) 50 MG tablet Take 1 tablet (50 mg) by mouth At Bedtime 31 tablet 11    vitamin D3 (CHOLECALCIFEROL) 50 mcg (2000 units) tablet Take 50 mcg by mouth every other day       Medications reviewed:  Medications reconciled to facility chart and changes were made to reflect current medications as identified as above med list. Below are the changes that were made:   Medications stopped since last EPIC medication reconciliation:   There are no discontinued medications.    Medications started since last [facility] medication reconciliation:  No orders of the defined types were placed in this encounter.    ***    REVIEW OF SYSTEMS:  4 point ROS neg other than the symptoms noted above in the HPI.***  Unable to be obtained due to cognitive impairment or aphasia.   ROS    PHYSICAL EXAM:  BP 98/52   Pulse 69   Temp 97.7  F (36.5  C)   Resp 20   Wt 59.9 kg (132 lb)   SpO2 94%   BMI 24.14 kg/m    Physical Exam      ASSESSMENT / PLAN:  {[facility] DX:982975}    Orders:  ***    Electronically signed by  Lina Guerrero         Temp 97.7  F (36.5  C)   Resp 20   Wt 59.9 kg (132 lb)   SpO2 94%   BMI 24.14 kg/m    Alert and pleasant.  Knows self, whom staff are, and her surroundings.  Petite stature.    Skin is pale/pink, warm and dry.  Lungs are clear.  No coughing.  Will eat snacks and does not choke.    Heart rate regular and strong.  No edema in her feet.  No open sores on legs.  Does not wear her leather AFO on right leg.    Abdomen is flat, soft, and non-tender.      UA/UC checked in February but otherwise no other labs.      ASSESSMENT / PLAN:  (G31.9) Neurodegenerative disorder  (primary encounter diagnosis)  (R45.1) Psychomotor restlessness  (M79.604,  M79.605,  M79.671,  M79.672) Bilateral leg and foot pain  Comment: have had ongoing conversations with daughter about her mom's health.  Resigned to the fact that Anamaria continues to progress and needing more assist.  There is talk about if time for hospice as well.  For now will given an order to increase her oxycodone 2.5mg to 5x a day and keep the PRN dose at 2.5mg every 6 hours prn.  Remains on Tylenol ES 1000mg po TID, scheduled Voltaren Gel to legs 4x a day, scheduled Gabapentin 300mg 3x a day and 100mg prn which gets used almost on a daily basis.  Receives Baclofen 5mg TID as well  Plan: oxyCODONE (ROXICODONE) 5 MG tablet    (F41.8) Mixed anxiety and depressive disorder  (F01.A3) Mild vascular dementia with mood disturbance (H)  Comment: in the big picture, try to help Anamaria's mood/behaviors.  Anxiety is a major component in which she will start to whimper.    Remains on Seroquel 25mg twice a day, Trazodone at HS to help sleep, and prozac 20mg po daily.  Helps to have a routine for her but also anticipate changes.     Orders:  Increase the oxycodone 2.5mg to 5x a day and 2.5mg every 6 hours prn for chronic pain    Electronically signed by  ESPERANZA Fuentes CNP

## 2025-03-06 NOTE — LETTER
3/6/2025      Kylah Britt  [address]  [facility] [address]        No notes on file      Sincerely,        ESPERANZA Fuentes CNP    Electronically signed

## 2025-04-10 ENCOUNTER — MEDICAL CORRESPONDENCE (OUTPATIENT)
Dept: HEALTH INFORMATION MANAGEMENT | Facility: CLINIC | Age: 75
End: 2025-04-10
Payer: COMMERCIAL

## 2025-05-01 ENCOUNTER — DOCUMENTATION ONLY (OUTPATIENT)
Dept: GERIATRICS | Facility: CLINIC | Age: 75
End: 2025-05-01
Payer: COMMERCIAL

## 2025-05-04 ENCOUNTER — TELEPHONE (OUTPATIENT)
Dept: GERIATRICS | Facility: CLINIC | Age: 75
End: 2025-05-04
Payer: COMMERCIAL

## 2025-05-04 DIAGNOSIS — N30.00 ACUTE CYSTITIS WITHOUT HEMATURIA: Primary | ICD-10-CM

## 2025-05-04 RX ORDER — CEFDINIR 300 MG/1
300 CAPSULE ORAL 2 TIMES DAILY
Qty: 14 CAPSULE | Refills: 0 | Status: SHIPPED | OUTPATIENT
Start: 2025-05-04 | End: 2025-05-11

## 2025-05-07 ENCOUNTER — ASSISTED LIVING VISIT (OUTPATIENT)
Dept: GERIATRICS | Facility: CLINIC | Age: 75
End: 2025-05-07
Payer: COMMERCIAL

## 2025-05-07 VITALS
DIASTOLIC BLOOD PRESSURE: 74 MMHG | SYSTOLIC BLOOD PRESSURE: 157 MMHG | TEMPERATURE: 98.1 F | OXYGEN SATURATION: 97 % | HEART RATE: 67 BPM | WEIGHT: 110 LBS | BODY MASS INDEX: 20.12 KG/M2 | RESPIRATION RATE: 16 BRPM

## 2025-05-07 NOTE — PROGRESS NOTES
[facility] GERIATRICS  ACUTE/EPISODIC VISIT    [facility] Medical Record Number:  [id number]  Place of Service where encounter took place:  [facility] [[id number]]    Chief Complaint   Patient presents with    RECHECK       HPI:    Kylah Britt is a 74 year old  (1950), who is being seen today for an episodic care visit.  HPI information obtained from: {[facility] HPI:542269}.    Today's concern is:      ALLERGIES:    Allergies   Allergen Reactions    No Known Drug Allergy         MEDICATIONS:  Post Discharge Medication Reconciliation Status: {O Med Rec (Provider):451861}. ***    Current Outpatient Medications   Medication Sig Dispense Refill    acetaminophen (TYLENOL) 500 MG tablet Take 1,000 mg by mouth 3 times daily And 1000mg daily PRN      baclofen (LIORESAL) 10 MG tablet Take 0.5 tablets (5 mg) by mouth 3 times daily. 23 tablet 5    Calcium Carbonate (CALCIUM 600 PO) Take 1 tablet by mouth every other day      cefdinir (OMNICEF) 300 MG capsule Take 1 capsule (300 mg) by mouth 2 times daily for 7 days. 14 capsule 0    diclofenac (VOLTAREN) 1 % topical gel Apply 4 g topically 4 times daily Apply to knees      FLUoxetine (PROZAC) 10 MG capsule Take 2 capsules (20 mg) by mouth daily      gabapentin (NEURONTIN) 100 MG capsule 300mg by mouth three times a day and 100mg po every 4 hours as needed for RLS or pain      Lidocaine (LIDOCARE) 4 % Patch Place 1 patch onto the skin every 24 hours To prevent lidocaine toxicity, patient should be patch free for 12 hrs daily. Apply to L hip      LORazepam (ATIVAN) 0.5 MG tablet TAKE 1 TABLET BY MOUTH EVERY MORNING FOR ANXIETY 30 tablet 5    melatonin 3 MG tablet Take 3 tablets (9 mg) by mouth At Bedtime      oxyCODONE (ROXICODONE) 5 MG tablet Take 0.5 tablets (2.5 mg) by mouth 5 times daily. May also take 0.5 tablets (2.5 mg) every 6 hours as needed for pain. 250 tablet 0    QUEtiapine (SEROQUEL) 25 MG tablet Take 1 tablet (25  mg) by mouth 2 times daily. 60 tablet 5    SENNA-docusate sodium (SENNA S) 8.6-50 MG tablet Take 1 tablet by mouth 2 times daily      traZODone (DESYREL) 50 MG tablet Take 1 tablet (50 mg) by mouth At Bedtime 31 tablet 11    vitamin D3 (CHOLECALCIFEROL) 50 mcg (2000 units) tablet Take 50 mcg by mouth every other day       Medications reviewed:  Medications reconciled to facility chart and changes were made to reflect current medications as identified as above med list. Below are the changes that were made:   Medications stopped since last EPIC medication reconciliation:   There are no discontinued medications.    Medications started since last [facility] medication reconciliation:  No orders of the defined types were placed in this encounter.    ***    REVIEW OF SYSTEMS:  4 point ROS neg other than the symptoms noted above in the HPI.***  Unable to be obtained due to cognitive impairment or aphasia.   ROS    PHYSICAL EXAM:  BP (!) 157/74   Pulse 67   Temp 98.1  F (36.7  C)   Resp 16   Wt 49.9 kg (110 lb)   SpO2 97%   BMI 20.12 kg/m    Physical Exam      ASSESSMENT / PLAN:  {[facility] DX:168143}    Orders:  No new orders today.  Spoke to hospice nurse about pain management    Electronically signed by  ESPERANZA Fuentes CNP

## 2025-05-07 NOTE — LETTER
5/7/2025      Kylah Britt  [address]  [facility] [address]        No notes on file      Sincerely,        ESPERANZA Fuentes CNP    Electronically signed

## 2025-05-21 DIAGNOSIS — G31.9 NEURODEGENERATIVE DISORDER: ICD-10-CM

## 2025-05-21 RX ORDER — OXYCODONE HYDROCHLORIDE 5 MG/1
TABLET ORAL
Qty: 24 TABLET | Refills: 0 | OUTPATIENT
Start: 2025-05-21

## 2025-06-05 ENCOUNTER — LAB REQUISITION (OUTPATIENT)
Dept: LAB | Facility: CLINIC | Age: 75
End: 2025-06-05
Payer: COMMERCIAL

## 2025-06-05 DIAGNOSIS — R39.15 URGENCY OF URINATION: ICD-10-CM

## 2025-06-05 LAB
ALBUMIN UR-MCNC: NEGATIVE MG/DL
APPEARANCE UR: CLEAR
BILIRUB UR QL STRIP: NEGATIVE
COLOR UR AUTO: ABNORMAL
GLUCOSE UR STRIP-MCNC: NEGATIVE MG/DL
HGB UR QL STRIP: NEGATIVE
KETONES UR STRIP-MCNC: NEGATIVE MG/DL
LEUKOCYTE ESTERASE UR QL STRIP: NEGATIVE
MUCOUS THREADS #/AREA URNS LPF: PRESENT /LPF
NITRATE UR QL: NEGATIVE
PH UR STRIP: 6 [PH] (ref 5–7)
RBC URINE: 0 /HPF
SP GR UR STRIP: 1.03 (ref 1–1.03)
UROBILINOGEN UR STRIP-MCNC: NORMAL MG/DL
WBC URINE: 0 /HPF

## 2025-06-05 PROCEDURE — 81001 URINALYSIS AUTO W/SCOPE: CPT | Mod: ORL | Performed by: NURSE PRACTITIONER

## 2025-06-05 PROCEDURE — 87086 URINE CULTURE/COLONY COUNT: CPT | Mod: ORL | Performed by: NURSE PRACTITIONER

## 2025-06-07 LAB — BACTERIA UR CULT: NORMAL

## 2025-06-14 ENCOUNTER — HEALTH MAINTENANCE LETTER (OUTPATIENT)
Age: 75
End: 2025-06-14

## 2025-07-10 PROCEDURE — 81001 URINALYSIS AUTO W/SCOPE: CPT | Mod: ORL | Performed by: NURSE PRACTITIONER

## 2025-07-10 PROCEDURE — 87088 URINE BACTERIA CULTURE: CPT | Mod: ORL | Performed by: NURSE PRACTITIONER

## 2025-07-11 ENCOUNTER — LAB REQUISITION (OUTPATIENT)
Dept: LAB | Facility: CLINIC | Age: 75
End: 2025-07-11
Payer: OTHER MISCELLANEOUS

## 2025-07-11 DIAGNOSIS — R39.11 HESITANCY OF MICTURITION: ICD-10-CM

## 2025-07-11 LAB
ALBUMIN UR-MCNC: NEGATIVE MG/DL
AMORPH CRY #/AREA URNS HPF: ABNORMAL /HPF
APPEARANCE UR: CLEAR
BILIRUB UR QL STRIP: NEGATIVE
COLOR UR AUTO: ABNORMAL
GLUCOSE UR STRIP-MCNC: NEGATIVE MG/DL
HGB UR QL STRIP: NEGATIVE
KETONES UR STRIP-MCNC: NEGATIVE MG/DL
LEUKOCYTE ESTERASE UR QL STRIP: NEGATIVE
NITRATE UR QL: NEGATIVE
PH UR STRIP: 7 [PH] (ref 5–7)
RBC URINE: 0 /HPF
SP GR UR STRIP: 1.01 (ref 1–1.03)
UROBILINOGEN UR STRIP-MCNC: NORMAL MG/DL
WBC URINE: 0 /HPF

## 2025-07-12 LAB — BACTERIA UR CULT: ABNORMAL

## 2025-07-13 ENCOUNTER — TELEPHONE (OUTPATIENT)
Dept: GERIATRICS | Facility: CLINIC | Age: 75
End: 2025-07-13
Payer: COMMERCIAL

## 2025-07-13 DIAGNOSIS — N30.00 ACUTE CYSTITIS WITHOUT HEMATURIA: Primary | ICD-10-CM

## 2025-07-13 RX ORDER — DOXYCYCLINE 100 MG/1
100 CAPSULE ORAL 2 TIMES DAILY
Qty: 10 CAPSULE | Refills: 0 | Status: SHIPPED | OUTPATIENT
Start: 2025-07-13 | End: 2025-07-18

## 2025-07-13 NOTE — TELEPHONE ENCOUNTER
Culture 50,000-100,000 CFU/mL Aerococcus urinae Abnormal    Aerococcus species is usually susceptible to penicillin, cephalosporins, tetracycline and vancomycin.         New orders:    Doxycycline 100mg po BID x5 days for UTI    ESPERANZA Fuentes CNP

## 2025-07-30 ENCOUNTER — ASSISTED LIVING VISIT (OUTPATIENT)
Dept: GERIATRICS | Facility: CLINIC | Age: 75
End: 2025-07-30
Payer: OTHER MISCELLANEOUS

## 2025-07-30 VITALS
DIASTOLIC BLOOD PRESSURE: 78 MMHG | RESPIRATION RATE: 18 BRPM | BODY MASS INDEX: 21.22 KG/M2 | HEART RATE: 70 BPM | WEIGHT: 116 LBS | SYSTOLIC BLOOD PRESSURE: 155 MMHG

## 2025-07-30 DIAGNOSIS — F01.A3 MILD VASCULAR DEMENTIA WITH MOOD DISTURBANCE (H): ICD-10-CM

## 2025-07-30 DIAGNOSIS — Z51.5 HOSPICE CARE PATIENT: ICD-10-CM

## 2025-07-30 DIAGNOSIS — G31.9 NEURODEGENERATIVE DISORDER: Primary | ICD-10-CM

## 2025-07-30 DIAGNOSIS — F41.8 MIXED ANXIETY AND DEPRESSIVE DISORDER: ICD-10-CM

## 2025-07-30 DIAGNOSIS — R45.1 PSYCHOMOTOR RESTLESSNESS: ICD-10-CM

## 2025-07-30 PROCEDURE — 99349 HOME/RES VST EST MOD MDM 40: CPT | Mod: GV | Performed by: NURSE PRACTITIONER

## 2025-07-30 NOTE — LETTER
7/30/2025      Kylah Britt  [address]  [facility] [address]        No notes on file      Sincerely,        ESPERANZA Fuentes CNP    Electronically signed       acetaminophen (TYLENOL) 500 MG tablet Take 1,000 mg by mouth 3 times daily And 1000mg daily PRN       baclofen (LIORESAL) 10 MG tablet Take 0.5 tablets (5 mg) by mouth 3 times daily. 23 tablet 5     Calcium Carbonate (CALCIUM 600 PO) Take 1 tablet by mouth every other day       diclofenac (VOLTAREN) 1 % topical gel Apply 4 g topically 4 times daily Apply to knees       FLUoxetine (PROZAC) 10 MG capsule Take 2 capsules (20 mg) by mouth daily       gabapentin (NEURONTIN) 100 MG capsule 300mg by mouth three times a day and 100mg po every 4 hours as needed for RLS or pain       Lidocaine (LIDOCARE) 4 % Patch Place 1 patch onto the skin every 24 hours To prevent lidocaine toxicity, patient should be patch free for 12 hrs daily. Apply to L hip       LORazepam (ATIVAN) 0.5 MG tablet 0.5mg po twice a day and 0.5mg po every 2 hours prn       melatonin 3 MG tablet Take 3 tablets (9 mg) by mouth At Bedtime       QUEtiapine (SEROQUEL) 25 MG tablet Take 1 tablet (25 mg) by mouth 2 times daily. 60 tablet 5     SENNA-docusate sodium (SENNA S) 8.6-50 MG tablet Take 1 tablet by mouth 2 times daily       traZODone (DESYREL) 50 MG tablet Take 1 tablet (50 mg) by mouth At Bedtime 31 tablet 11     vitamin D3 (CHOLECALCIFEROL) 50 mcg (2000 units) tablet Take 50 mcg by mouth every other day         REVIEW OF SYSTEMS:  4 point ROS neg other than the symptoms noted above in the HPI.  Denied any chest pain, no SOB.        PHYSICAL EXAM:  BP (!) 155/78   Pulse 70   Resp 18   Wt 52.6 kg (116 lb)   BMI 21.22 kg/m    Petite female sitting in her recliner.  Eating with left stump Again got dammit patient ran in there he can always go to our house if I had to donate fresh water probably.  Regular texture food.  Skin is pink/pale, warm and dry.    No respiratory distress.  Does not use oxygen.  Assist of one to transfer and lift from surface to surface  Speech clear.    No edema in her lower legs.  No active motor restlessness seen, even in  his left arm that she is using for eating      ASSESSMENT / PLAN:  (G31.9) Neurodegenerative disorder  (primary encounter diagnosis)  (R45.1) Psychomotor restlessness  Comment: in reviewing the nursing notes, it appears that staff continue to call to use medications for anxiety and pain.  Hospice continues to manage her medications and will continue to have hospice manage the pain and anxiety.  Expect that she will continue to decline.   Plan: morphine (MS CONTIN) 15 MG CR tablet, oxyCODONE        (ROXICODONE) 5 MG tablet    (F41.8) Mixed anxiety and depressive disorder  (F01.A3) Mild vascular dementia with mood disturbance (H)  (Z51.5) Hospice care patient  Comment: has multiple medications for pain, anxiety, and comfort.  Today is a different type of day and moment catching her for a visit.  Continue to provide emotional support to Anamaria and her family      Orders:  No new orders.    Electronically signed by  ESPERANZA Fuentes CNP            Sincerely,        ESPERANZA Fuentes CNP    Electronically signed

## 2025-07-30 NOTE — PROGRESS NOTES
[facility] GERIATRICS  ACUTE/EPISODIC VISIT    [facility] Medical Record Number:  [id number]  Place of Service where encounter took place:  [facility] [[id number]]    Chief Complaint   Patient presents with    RECHECK       HPI:    Kylah Britt is a 74 year old  (1950), who is being seen today for an episodic care visit.  HPI information obtained from: {FGS HPI:146742}.    Today's concern is:      ALLERGIES:    Allergies   Allergen Reactions    No Known Drug Allergy         MEDICATIONS:  Post Discharge Medication Reconciliation Status: {O Med Rec (Provider):474464}. ***    Current Outpatient Medications   Medication Sig Dispense Refill    acetaminophen (TYLENOL) 500 MG tablet Take 1,000 mg by mouth 3 times daily And 1000mg daily PRN      baclofen (LIORESAL) 10 MG tablet Take 0.5 tablets (5 mg) by mouth 3 times daily. 23 tablet 5    Calcium Carbonate (CALCIUM 600 PO) Take 1 tablet by mouth every other day      diclofenac (VOLTAREN) 1 % topical gel Apply 4 g topically 4 times daily Apply to knees      FLUoxetine (PROZAC) 10 MG capsule Take 2 capsules (20 mg) by mouth daily      gabapentin (NEURONTIN) 100 MG capsule 300mg by mouth three times a day and 100mg po every 4 hours as needed for RLS or pain      Lidocaine (LIDOCARE) 4 % Patch Place 1 patch onto the skin every 24 hours To prevent lidocaine toxicity, patient should be patch free for 12 hrs daily. Apply to L hip      LORazepam (ATIVAN) 0.5 MG tablet 0.5mg po twice a day and 0.5mg po every 2 hours prn      melatonin 3 MG tablet Take 3 tablets (9 mg) by mouth At Bedtime      oxyCODONE (ROXICODONE) 5 MG tablet Take 0.5 tablets (2.5 mg) by mouth 3 times daily. May also take 0.5 tablets (2.5 mg) every hour as needed for pain.      QUEtiapine (SEROQUEL) 25 MG tablet Take 1 tablet (25 mg) by mouth 2 times daily. 60 tablet 5    SENNA-docusate sodium (SENNA S) 8.6-50 MG tablet Take 1 tablet by mouth 2 times daily       traZODone (DESYREL) 50 MG tablet Take 1 tablet (50 mg) by mouth At Bedtime 31 tablet 11    vitamin D3 (CHOLECALCIFEROL) 50 mcg (2000 units) tablet Take 50 mcg by mouth every other day       Medications reviewed:  Medications reconciled to facility chart and changes were made to reflect current medications as identified as above med list. Below are the changes that were made:   Medications stopped since last EPIC medication reconciliation:   There are no discontinued medications.    Medications started since last [facility] medication reconciliation:  No orders of the defined types were placed in this encounter.    ***    REVIEW OF SYSTEMS:  4 point ROS neg other than the symptoms noted above in the HPI.***  Unable to be obtained due to cognitive impairment or aphasia.   ROS    PHYSICAL EXAM:  BP (!) 155/78   Pulse 70   Resp 18   Wt 52.6 kg (116 lb)   BMI 21.22 kg/m    Physical Exam      ASSESSMENT / PLAN:  {FGS DX:157568}    Orders:  ***    Electronically signed by  Lina Guerrero         Neurodegenerative disorder  (primary encounter diagnosis)  (R45.1) Psychomotor restlessness  Comment: in reviewing the nursing notes, it appears that staff continue to call to use medications for anxiety and pain.  Hospice continues to manage her medications and will continue to have hospice manage the pain and anxiety.  Expect that she will continue to decline.   Plan: morphine (MS CONTIN) 15 MG CR tablet, oxyCODONE        (ROXICODONE) 5 MG tablet    (F41.8) Mixed anxiety and depressive disorder  (F01.A3) Mild vascular dementia with mood disturbance (H)  (Z51.5) Hospice care patient  Comment: has multiple medications for pain, anxiety, and comfort.  Today is a different type of day and moment catching her for a visit.  Continue to provide emotional support to Anamaria and her family      Orders:  No new orders.    Electronically signed by  ESPERANZA Fuentes CNP

## 2025-08-06 RX ORDER — OXYCODONE HYDROCHLORIDE 5 MG/1
2.5 TABLET ORAL
Status: SHIPPED
Start: 2025-05-15

## 2025-08-06 RX ORDER — MORPHINE SULFATE 15 MG/1
15 TABLET, FILM COATED, EXTENDED RELEASE ORAL EVERY MORNING
Status: SHIPPED
Start: 2025-05-15

## 2025-08-11 DIAGNOSIS — R52 PAIN: ICD-10-CM

## 2025-08-11 DIAGNOSIS — F41.8 MIXED ANXIETY AND DEPRESSIVE DISORDER: ICD-10-CM

## 2025-08-11 DIAGNOSIS — M79.651 PAIN OF RIGHT THIGH: ICD-10-CM

## 2025-08-11 DIAGNOSIS — Z78.9 TAKES DIETARY SUPPLEMENTS: Primary | ICD-10-CM

## 2025-08-11 DIAGNOSIS — F51.02 ADJUSTMENT INSOMNIA: ICD-10-CM

## 2025-08-12 RX ORDER — LIDOCAINE PAIN RELIEF 40 MG/1000MG
PATCH TOPICAL
Qty: 30 PATCH | Refills: 11 | Status: SHIPPED | OUTPATIENT
Start: 2025-08-12

## 2025-08-12 RX ORDER — PSEUDOEPHED/ACETAMINOPH/DIPHEN 30MG-500MG
TABLET ORAL
Qty: 168 TABLET | Refills: 11 | Status: SHIPPED | OUTPATIENT
Start: 2025-08-12

## 2025-08-12 RX ORDER — CHOLECALCIFEROL (VITAMIN D3) 50 MCG
1 TABLET ORAL EVERY OTHER DAY
Qty: 14 TABLET | Refills: 11 | Status: SHIPPED | OUTPATIENT
Start: 2025-08-12

## 2025-08-12 RX ORDER — TRAZODONE HYDROCHLORIDE 50 MG/1
TABLET ORAL
Qty: 28 TABLET | Refills: 11 | Status: SHIPPED | OUTPATIENT
Start: 2025-08-12

## 2025-08-19 ENCOUNTER — HOSPITAL ENCOUNTER (EMERGENCY)
Facility: CLINIC | Age: 75
Discharge: HOME | End: 2025-08-19
Attending: EMERGENCY MEDICINE | Admitting: EMERGENCY MEDICINE
Payer: MEDICARE

## 2025-08-19 ENCOUNTER — APPOINTMENT (OUTPATIENT)
Dept: GENERAL RADIOLOGY | Facility: CLINIC | Age: 75
End: 2025-08-19
Attending: EMERGENCY MEDICINE
Payer: MEDICARE

## 2025-08-19 ASSESSMENT — COLUMBIA-SUICIDE SEVERITY RATING SCALE - C-SSRS
2. HAVE YOU ACTUALLY HAD ANY THOUGHTS OF KILLING YOURSELF IN THE PAST MONTH?: NO
1. IN THE PAST MONTH, HAVE YOU WISHED YOU WERE DEAD OR WISHED YOU COULD GO TO SLEEP AND NOT WAKE UP?: NO
6. HAVE YOU EVER DONE ANYTHING, STARTED TO DO ANYTHING, OR PREPARED TO DO ANYTHING TO END YOUR LIFE?: NO

## 2025-08-19 ASSESSMENT — ACTIVITIES OF DAILY LIVING (ADL): ADLS_ACUITY_SCORE: 61

## 2025-08-20 DIAGNOSIS — R52 PAIN: Primary | ICD-10-CM

## 2025-08-20 RX ORDER — GABAPENTIN 100 MG/1
CAPSULE ORAL
Qty: 30 CAPSULE | Refills: 11 | Status: SHIPPED | OUTPATIENT
Start: 2025-08-20

## 2025-08-20 RX ORDER — GABAPENTIN 300 MG/1
CAPSULE ORAL
Qty: 90 CAPSULE | Refills: 11 | Status: SHIPPED | OUTPATIENT
Start: 2025-08-20

## 2025-08-21 ENCOUNTER — ASSISTED LIVING VISIT (OUTPATIENT)
Dept: GERIATRICS | Facility: CLINIC | Age: 75
End: 2025-08-21
Payer: OTHER MISCELLANEOUS

## 2025-08-21 VITALS
OXYGEN SATURATION: 95 % | BODY MASS INDEX: 25.06 KG/M2 | RESPIRATION RATE: 16 BRPM | DIASTOLIC BLOOD PRESSURE: 55 MMHG | SYSTOLIC BLOOD PRESSURE: 100 MMHG | TEMPERATURE: 97.9 F | WEIGHT: 137 LBS | HEART RATE: 84 BPM

## 2025-08-28 ENCOUNTER — LAB REQUISITION (OUTPATIENT)
Dept: LAB | Facility: CLINIC | Age: 75
End: 2025-08-28
Payer: OTHER MISCELLANEOUS

## 2025-08-28 LAB
ALBUMIN UR-MCNC: NEGATIVE MG/DL
AMORPH CRY #/AREA URNS HPF: ABNORMAL /HPF
APPEARANCE UR: ABNORMAL
BILIRUB UR QL STRIP: NEGATIVE
COLOR UR AUTO: ABNORMAL
GLUCOSE UR STRIP-MCNC: NEGATIVE MG/DL
HGB UR QL STRIP: NEGATIVE
KETONES UR STRIP-MCNC: NEGATIVE MG/DL
LEUKOCYTE ESTERASE UR QL STRIP: NEGATIVE
NITRATE UR QL: NEGATIVE
PH UR STRIP: 6.5 [PH] (ref 5–7)
RBC URINE: 0 /HPF
SP GR UR STRIP: 1.02 (ref 1–1.03)
UROBILINOGEN UR STRIP-MCNC: NORMAL MG/DL
WBC URINE: 0 /HPF

## (undated) DEVICE — DRAPE IOBAN INCISE 13X13" 6640EZ

## (undated) DEVICE — PREP CHLORAPREP 26ML TINTED ORANGE  260815

## (undated) DEVICE — SOL ISOPROPYL ALCOHOL USP 70% 16OZ  NDC10565-002-16 D0022

## (undated) DEVICE — PACK MINOR PROCEDURE CUSTOM

## (undated) DEVICE — IMP WIRE KIRSCHNER SYN 1.6X150MM 292.16
Type: IMPLANTABLE DEVICE | Site: HEEL | Status: NON-FUNCTIONAL
Removed: 2017-06-08

## (undated) DEVICE — DRAPE C-ARM

## (undated) DEVICE — SOL WATER IRRIG 1000ML BOTTLE 07139-09

## (undated) DEVICE — BLADE KNIFE SURG 15 371115

## (undated) DEVICE — DRAPE IOBAN ISOLATION VERTICAL 320X21CM 6617

## (undated) DEVICE — DRSG AQUACEL AG ADV 3.5X4" 422603

## (undated) DEVICE — KIT PATIENT CARE HANA TABLE PROFX SUPINE 6855

## (undated) DEVICE — SU VICRYL 0 CT-1 36" J946H

## (undated) DEVICE — IMPLANTABLE DEVICE
Type: IMPLANTABLE DEVICE | Site: HEEL | Status: NON-FUNCTIONAL
Removed: 2017-06-08

## (undated) DEVICE — SU VICRYL 4-0 PS-2 27" UND J426H

## (undated) DEVICE — DRSG AQUACEL AG HYDROFIBER 3.5X6" 422604

## (undated) DEVICE — GOWN LG DISP 9515

## (undated) DEVICE — PREP SKIN SCRUB TRAY 4461A

## (undated) DEVICE — DRILL BIT STRK 4.2X103MM FULL THRD 1806-4280S

## (undated) DEVICE — SPONGE LAP 18X18" 1515

## (undated) DEVICE — DRSG ADAPTIC 3X3" 6112

## (undated) DEVICE — GLOVE EXAM NITRILE LG

## (undated) DEVICE — GOWN IMPERVIOUS SPECIALTY XL/XLONG 39049

## (undated) DEVICE — SU VICRYL 3-0 CT-2 27" UND J232H

## (undated) DEVICE — SPLINT FIBERGLASS 4X30" PRE-CUT RESIN 76430

## (undated) DEVICE — SU VICRYL 2-0 CT-1 27" UND J259H

## (undated) DEVICE — DRAPE STERI U 1015

## (undated) DEVICE — GLOVE BIOGEL INDICATOR 7.5 LF 41675

## (undated) DEVICE — BNDG ELASTIC 4"X5YDS UNSTERILE 6611-40

## (undated) DEVICE — BLADE KNIFE SURG 10 371110

## (undated) DEVICE — GLOVE PROTEXIS W/NEU-THERA 8.0  2D73TE80

## (undated) DEVICE — KIT ENDO TURNOVER/PROCEDURE CARRY-ON 101822

## (undated) DEVICE — TUBING SUCTION 12"X1/4" N612

## (undated) DEVICE — DRSG GAUZE 4X4" 3033

## (undated) DEVICE — CAST PADDING 4" WEBRIL STERILE

## (undated) DEVICE — ESU PENCIL SMOKE EVAC W/ROCKER SWITCH 0703-047-000

## (undated) DEVICE — SU PROLENE 4-0 PS-2 18" 8682G

## (undated) DEVICE — CAST PADDING 4" WEBRIL UNSTERILE

## (undated) DEVICE — ESU CLEANER TIP 31142717

## (undated) DEVICE — DRSG GAUZE 4X4" TRAY

## (undated) DEVICE — PACK EXTREMITY SOP15EXFSD

## (undated) DEVICE — DRSG KERLIX 4 1/2"X4YDS ROLL 6730

## (undated) DEVICE — LUBRICATING JELLY 4.25OZ

## (undated) DEVICE — SYR BULB IRRIG DOVER 60 ML LATEX FREE 67000

## (undated) DEVICE — BNDG ELASTIC 6"X5YDS UNSTERILE 6611-60

## (undated) DEVICE — DRSG ADAPTIC 3X8" 6113

## (undated) DEVICE — GLOVE BIOGEL PI SZ 7.5 40875

## (undated) DEVICE — GLOVE BIOGEL PI INDICATOR 8.0 LF 41680

## (undated) DEVICE — SOL NACL 0.9% IRRIG 1000ML BOTTLE 07138-09

## (undated) RX ORDER — FENTANYL CITRATE 50 UG/ML
INJECTION, SOLUTION INTRAMUSCULAR; INTRAVENOUS
Status: DISPENSED
Start: 2017-06-08

## (undated) RX ORDER — ONDANSETRON 2 MG/ML
INJECTION INTRAMUSCULAR; INTRAVENOUS
Status: DISPENSED
Start: 2017-06-08

## (undated) RX ORDER — DEXAMETHASONE SODIUM PHOSPHATE 10 MG/ML
INJECTION INTRAMUSCULAR; INTRAVENOUS
Status: DISPENSED
Start: 2017-06-08

## (undated) RX ORDER — EPHEDRINE SULFATE 50 MG/ML
INJECTION, SOLUTION INTRAMUSCULAR; INTRAVENOUS; SUBCUTANEOUS
Status: DISPENSED
Start: 2017-06-08

## (undated) RX ORDER — FENTANYL CITRATE 50 UG/ML
INJECTION, SOLUTION INTRAMUSCULAR; INTRAVENOUS
Status: DISPENSED
Start: 2023-11-04

## (undated) RX ORDER — LIDOCAINE HYDROCHLORIDE 20 MG/ML
INJECTION, SOLUTION EPIDURAL; INFILTRATION; INTRACAUDAL; PERINEURAL
Status: DISPENSED
Start: 2017-06-08

## (undated) RX ORDER — PROPOFOL 10 MG/ML
INJECTION, EMULSION INTRAVENOUS
Status: DISPENSED
Start: 2017-06-08

## (undated) RX ORDER — DEXAMETHASONE SODIUM PHOSPHATE 10 MG/ML
INJECTION, SOLUTION INTRAMUSCULAR; INTRAVENOUS
Status: DISPENSED
Start: 2023-11-04

## (undated) RX ORDER — BUPIVACAINE HYDROCHLORIDE AND EPINEPHRINE 5; 5 MG/ML; UG/ML
INJECTION, SOLUTION EPIDURAL; INTRACAUDAL; PERINEURAL
Status: DISPENSED
Start: 2023-11-04

## (undated) RX ORDER — ONDANSETRON 2 MG/ML
INJECTION INTRAMUSCULAR; INTRAVENOUS
Status: DISPENSED
Start: 2023-11-04

## (undated) RX ORDER — BUPIVACAINE HYDROCHLORIDE 5 MG/ML
INJECTION, SOLUTION EPIDURAL; INTRACAUDAL
Status: DISPENSED
Start: 2017-06-08